# Patient Record
Sex: MALE | Race: WHITE | Employment: OTHER | URBAN - METROPOLITAN AREA
[De-identification: names, ages, dates, MRNs, and addresses within clinical notes are randomized per-mention and may not be internally consistent; named-entity substitution may affect disease eponyms.]

---

## 2017-01-23 VITALS
HEART RATE: 114 BPM | DIASTOLIC BLOOD PRESSURE: 92 MMHG | SYSTOLIC BLOOD PRESSURE: 136 MMHG | OXYGEN SATURATION: 96 % | HEIGHT: 67 IN | TEMPERATURE: 98 F | WEIGHT: 234 LBS | BODY MASS INDEX: 36.73 KG/M2 | RESPIRATION RATE: 22 BRPM

## 2017-01-23 PROCEDURE — 75810000275 HC EMERGENCY DEPT VISIT NO LEVEL OF CARE

## 2017-01-23 RX ORDER — CLOPIDOGREL BISULFATE 75 MG/1
75 TABLET ORAL
COMMUNITY

## 2017-01-24 ENCOUNTER — HOSPITAL ENCOUNTER (EMERGENCY)
Age: 62
Discharge: LEFT BEFORE DISCHARGE | End: 2017-01-24
Attending: EMERGENCY MEDICINE
Payer: MEDICARE

## 2017-01-24 DIAGNOSIS — Z53.21 PATIENT LEFT WITHOUT BEING SEEN: Primary | ICD-10-CM

## 2017-01-24 NOTE — ED TRIAGE NOTES
\"I am being treated by Dr. Tarah Fuller for this Esophagus problem. \"  Patient states that about 1900 his Esophagus started tightening up. States that Dr. Tarah Fuller dilates his esophagus about every 5-8 weeks.

## 2017-02-04 NOTE — ED PROVIDER NOTES
The patient left the emergency department before the patient was evaluated by me. Jose Guadalupe Fox M.D. Provider Attestation:  If a scribe was utilized in generation of this patient record, I personally performed the services described in the documentation, reviewed the documentation, as recorded by the scribe in my presence, and it accurately records the patient's history of presenting illness, review of systems, patient physical examination, and procedures performed by me as the attending physician. Jose Guadalupe Fox M.D. [facility] Board Certified Emergency Physician  1/23/2017.

## 2017-02-14 ENCOUNTER — ANESTHESIA EVENT (OUTPATIENT)
Dept: ENDOSCOPY | Age: 62
End: 2017-02-14
Payer: MEDICARE

## 2017-02-15 ENCOUNTER — HOSPITAL ENCOUNTER (OUTPATIENT)
Age: 62
Setting detail: OUTPATIENT SURGERY
Discharge: HOME | End: 2017-02-15
Attending: INTERNAL MEDICINE | Admitting: INTERNAL MEDICINE
Payer: MEDICARE

## 2017-02-15 ENCOUNTER — ANESTHESIA (OUTPATIENT)
Dept: ENDOSCOPY | Age: 62
End: 2017-02-15
Payer: MEDICARE

## 2017-02-15 VITALS
BODY MASS INDEX: 33.33 KG/M2 | RESPIRATION RATE: 15 BRPM | TEMPERATURE: 96.8 F | HEIGHT: 69 IN | DIASTOLIC BLOOD PRESSURE: 89 MMHG | HEART RATE: 76 BPM | SYSTOLIC BLOOD PRESSURE: 154 MMHG | WEIGHT: 225 LBS | OXYGEN SATURATION: 98 %

## 2017-02-15 LAB
AMPHET UR QL SCN: NEGATIVE
BARBITURATES UR QL SCN: NEGATIVE
BENZODIAZ UR QL: POSITIVE
CANNABINOIDS UR QL SCN: NEGATIVE
COCAINE UR QL SCN: NEGATIVE
HDSCOM,HDSCOM: ABNORMAL
METHADONE UR QL: NEGATIVE
OPIATES UR QL: NEGATIVE
PCP UR QL: NEGATIVE

## 2017-02-15 PROCEDURE — 88312 SPECIAL STAINS GROUP 1: CPT | Performed by: INTERNAL MEDICINE

## 2017-02-15 PROCEDURE — 77030019988 HC FCPS ENDOSC DISP BSC -B: Performed by: INTERNAL MEDICINE

## 2017-02-15 PROCEDURE — 77030008565 HC TBNG SUC IRR ERBE -B: Performed by: INTERNAL MEDICINE

## 2017-02-15 PROCEDURE — 74011250636 HC RX REV CODE- 250/636: Performed by: ANESTHESIOLOGY

## 2017-02-15 PROCEDURE — 74011250636 HC RX REV CODE- 250/636: Performed by: NURSE ANESTHETIST, CERTIFIED REGISTERED

## 2017-02-15 PROCEDURE — 80307 DRUG TEST PRSMV CHEM ANLYZR: CPT

## 2017-02-15 PROCEDURE — 74011000250 HC RX REV CODE- 250: Performed by: ANESTHESIOLOGY

## 2017-02-15 PROCEDURE — 74011250636 HC RX REV CODE- 250/636

## 2017-02-15 PROCEDURE — 88305 TISSUE EXAM BY PATHOLOGIST: CPT | Performed by: INTERNAL MEDICINE

## 2017-02-15 PROCEDURE — 76060000031 HC ANESTHESIA FIRST 0.5 HR: Performed by: INTERNAL MEDICINE

## 2017-02-15 PROCEDURE — 76040000019: Performed by: INTERNAL MEDICINE

## 2017-02-15 PROCEDURE — 77030018846 HC SOL IRR STRL H20 ICUM -A: Performed by: INTERNAL MEDICINE

## 2017-02-15 RX ORDER — FAMOTIDINE 10 MG/ML
20 INJECTION INTRAVENOUS ONCE
Status: COMPLETED | OUTPATIENT
Start: 2017-02-15 | End: 2017-02-15

## 2017-02-15 RX ORDER — PROPOFOL 10 MG/ML
INJECTION, EMULSION INTRAVENOUS AS NEEDED
Status: DISCONTINUED | OUTPATIENT
Start: 2017-02-15 | End: 2017-02-15 | Stop reason: HOSPADM

## 2017-02-15 RX ORDER — SODIUM CHLORIDE 0.9 % (FLUSH) 0.9 %
5-10 SYRINGE (ML) INJECTION AS NEEDED
Status: DISCONTINUED | OUTPATIENT
Start: 2017-02-15 | End: 2017-02-15 | Stop reason: HOSPADM

## 2017-02-15 RX ORDER — ONDANSETRON 2 MG/ML
4 INJECTION INTRAMUSCULAR; INTRAVENOUS ONCE
Status: COMPLETED | OUTPATIENT
Start: 2017-02-15 | End: 2017-02-15

## 2017-02-15 RX ORDER — HYDROMORPHONE HYDROCHLORIDE 2 MG/ML
0.5 INJECTION, SOLUTION INTRAMUSCULAR; INTRAVENOUS; SUBCUTANEOUS ONCE
Status: COMPLETED | OUTPATIENT
Start: 2017-02-15 | End: 2017-02-15

## 2017-02-15 RX ORDER — SODIUM CHLORIDE, SODIUM LACTATE, POTASSIUM CHLORIDE, CALCIUM CHLORIDE 600; 310; 30; 20 MG/100ML; MG/100ML; MG/100ML; MG/100ML
75 INJECTION, SOLUTION INTRAVENOUS CONTINUOUS
Status: DISCONTINUED | OUTPATIENT
Start: 2017-02-15 | End: 2017-02-15 | Stop reason: HOSPADM

## 2017-02-15 RX ADMIN — ONDANSETRON 4 MG: 2 INJECTION INTRAMUSCULAR; INTRAVENOUS at 08:12

## 2017-02-15 RX ADMIN — PROPOFOL 100 MG: 10 INJECTION, EMULSION INTRAVENOUS at 07:43

## 2017-02-15 RX ADMIN — HYDROMORPHONE HYDROCHLORIDE 0.5 MG: 2 INJECTION, SOLUTION INTRAMUSCULAR; INTRAVENOUS; SUBCUTANEOUS at 08:11

## 2017-02-15 RX ADMIN — FAMOTIDINE 20 MG: 10 INJECTION, SOLUTION INTRAVENOUS at 06:48

## 2017-02-15 RX ADMIN — PROPOFOL 100 MG: 10 INJECTION, EMULSION INTRAVENOUS at 07:40

## 2017-02-15 RX ADMIN — PROPOFOL 50 MG: 10 INJECTION, EMULSION INTRAVENOUS at 07:46

## 2017-02-15 RX ADMIN — SODIUM CHLORIDE, SODIUM LACTATE, POTASSIUM CHLORIDE, AND CALCIUM CHLORIDE 75 ML/HR: 600; 310; 30; 20 INJECTION, SOLUTION INTRAVENOUS at 06:48

## 2017-02-15 NOTE — ANESTHESIA PREPROCEDURE EVALUATION
Anesthetic History     PONV          Review of Systems / Medical History  Patient summary reviewed and pertinent labs reviewed    Pulmonary                   Neuro/Psych       CVA  TIA     Cardiovascular    Hypertension: well controlled        Dysrhythmias   CAD    Exercise tolerance: <4 METS     GI/Hepatic/Renal     GERD: poorly controlled      Liver disease     Endo/Other        Arthritis     Other Findings   Comments: Current Smoker? NO       Elective Surgery? Yes       Abstained from smoking 24 hours prior to anesthesia? N/A    Risk Factors for Postoperative nausea/vomiting:       History of postoperative nausea/vomiting? NO       Female? YES       Motion sickness? NO       Intended opioid administration for postoperative analgesia?   NO           Physical Exam    Airway  Mallampati: III  TM Distance: 4 - 6 cm  Neck ROM: normal range of motion   Mouth opening: Diminished (comment)     Cardiovascular  Regular rate and rhythm,  S1 and S2 normal,  no murmur, click, rub, or gallop             Dental    Dentition: Poor dentition     Pulmonary  Breath sounds clear to auscultation               Abdominal  GI exam deferred       Other Findings            Anesthetic Plan    ASA: 3  Anesthesia type: MAC          Induction: Intravenous  Anesthetic plan and risks discussed with: Patient

## 2017-02-15 NOTE — H&P
WW[facility].Dheere Bolo  [phone]      GASTROENTEROLOGY Pre-Procedure H and P      Impression/Plan:   1.  This patient is consented for an EGD for dysphagia      Chief Complaint: dysphagia      HPI:  Diamond Hawley is a 57 y.o. male who is being is having an EGD for dysphagia  PMH:   Past Medical History   Diagnosis Date    Altered mental status     Arthritis     Arthropathy     Back pain     Bilateral kidney stones     Burn      ON HANDS    CAD (coronary artery disease)      MI    3 cardiac stents    Calcium nephrolithiasis     Cardiac arrhythmia     Cigarette smoker     Cirrhosis, alcoholic ([address]     Cocaine abuse, episodic use     Colon polyp     Diarrhea     DJD (degenerative joint disease)     Dyskinesia     Esophageal disorder     Esophageal erosions     Flank pain, acute     Foot ulcer, right (HCC)     GERD (gastroesophageal reflux disease)     Hearing reduced     Hematuria     Herniated disc      X 10 IN BACK    Hiatus hernia syndrome     History of kidney stones     HNP (herniated nucleus pulposus)     Hyperlipemia     Hypertension     Hypokalemia     Knee pain     Left ureteral stone     Muscle atrophy     Myocardial infarction ([address] 1999    N&V (nausea and vomiting)     Nocturia     Nutcracker esophagus     Other ill-defined conditions(799.89)      dysphagia    Renal stone     Slurred speech     Stroke ([address]      questionable TIA    Swallowing difficulty     Syncopal episodes     Syncope and collapse     Unspecified adverse effect of anesthesia      AT TIMES ESOPHAGUS SPASMS AFTER PROCEDURES       PSH:   Past Surgical History   Procedure Laterality Date    Hx coronary stent placement  2008     3 STENTS PLACED    Hx heart catheterization  2012     EVERYTHING LOOKED GOOD AT THIS POINT    Hx cholecystectomy  2009    Hx appendectomy       9YEARS OLD    Hx hernia repair       ABDOMEN    Hx other surgical       ESOPHAGUS DILATATION    Hx endoscopy       with Dilatation, multiple times    Hx mohs procedure  2016    Hx urological  07/18/2016     [facility]-Cystoscopy,URETEROSCOPY W/ LITHOTRIPSY, Dr Peacock Certain Hx urological  10/10/2016     [facility]-Cystoscopy with removal of ureteral stent, Dr Peacock Certain Hx heent       \"2 jaw surgeries\"    Hx knee arthroscopy  9/2012     LEFT    Hx orthopaedic  12/12     left knee    Hx rotator cuff repair Right      x2    Hx urological  01/30/2017     [facility]-Cystoscopy, Left retrograde pyelography, Left diagnostic ureteroscopy, Left 6 x 26 cm double-J ureteral stent placement,Right retrograde pyelography, Right ureteroscopic stone extraction,Right 6 x 24 cm double-J ureteral stent placement, Interpretation of urography,Intraoperative fluoro less than 1 hour,            Social HX:   Social History     Social History    Marital status:      Spouse name: N/A    Number of children: N/A    Years of education: N/A     Occupational History    Not on file. Social History Main Topics    Smoking status: Current Every Day Smoker     Packs/day: 0.25     Years: 20.00     Types: Cigarettes    Smokeless tobacco: Never Used    Alcohol use No      Comment: \"none in over 21 years\"    Drug use: No      Comment: cocaine 2011    Sexual activity: Not on file     Other Topics Concern    Not on file     Social History Narrative       FHX:   Family History   Problem Relation Age of Onset    Diabetes Father     Heart Disease Father     Stroke Father        Allergy:   Allergies   Allergen Reactions    Bee Sting [Sting, Bee] Hives and Shortness of Breath    Codeine Hives and Shortness of Breath     Has tolerated Hydromorphone.      Haldol [Haloperidol Lactate] Hives and Shortness of Breath    Haloperidol Hives and Cough    Keflex [Cephalexin] Diarrhea    No Allergy Information Available Other (comments)     Other reaction(s): hives    Shellfish Containing Products Hives    Stadol [Butorphanol Tartrate] Hives, Shortness of Breath and Rash    Vicodin [Hydrocodone-Acetaminophen] Hives, Shortness of Breath and Rash       Home Medications:     Prescriptions Prior to Admission   Medication Sig    clopidogrel (PLAVIX) 75 mg tab Take 75 mg by mouth.  tamsulosin (FLOMAX) 0.4 mg capsule Take 1 Cap by mouth daily (after dinner).  HYDROmorphone (DILAUDID) 2 mg tablet Take 1-2 Tabs by mouth every four (4) hours as needed for Pain. Max Daily Amount: 24 mg.    ondansetron (ZOFRAN ODT) 4 mg disintegrating tablet 4 mg.  tamsulosin (FLOMAX) 0.4 mg capsule Take 1 Cap by mouth daily (after dinner).  nitroglycerin (NITROSTAT) 0.3 mg SL tablet 0.3 mg.    meperidine (DEMEROL) 100 mg tablet Take 100 mg by mouth every four (4) hours as needed for Pain.  diazepam (VALIUM) 10 mg tablet Take 10 mg by mouth every six (6) hours as needed for Anxiety.  esomeprazole (NEXIUM) 40 mg capsule Take 1 Cap by mouth Before breakfast and dinner. (Patient taking differently: Take 40 mg by mouth daily.)    carvedilol (COREG) 25 mg tablet Take 25 mg by mouth two (2) times daily (with meals).  atorvastatin (LIPITOR) 40 mg tablet Take 40 mg by mouth daily.  DULoxetine (CYMBALTA) 60 mg capsule Take 60 mg by mouth daily. Review of Systems:     Constitutional: No fevers, chills, weight loss, fatigue. Skin: No rashes, pruritis, jaundice, ulcerations, erythema. HENT: No headaches, nosebleeds, sinus pressure, rhinorrhea, sore throat. Eyes: No visual changes, blurred vision, eye pain, photophobia, jaundice. Cardiovascular: No chest pain, heart palpitations. Respiratory: No cough, SOB, wheezing, chest discomfort, orthopnea. Gastrointestinal:    Genitourinary: No dysuria, bleeding, discharge, pyuria. Musculoskeletal: No weakness, arthralgias, wasting. Endo: No sweats. Heme: No bruising, easy bleeding. Allergies: As noted. Neurological: Cranial nerves intact. Alert and oriented. Gait not assessed.    Psychiatric:  No anxiety, depression, hallucinations. Visit Vitals    Ht 5' 9\" (1.753 m)    Wt 102.1 kg (225 lb)    BMI 33.23 kg/m2       Physical Assessment:     constitutional: appearance: well developed, well nourished, normal habitus, no deformities, in no acute distress. skin: inspection: no rashes, ulcers, icterus or other lesions; no clubbing or telangiectasias. palpation: no induration or subcutaneos nodules. eyes: inspection: normal conjunctivae and lids; no jaundice pupils: normal  ENMT: mouth: normal oral mucosa,lips and gums; good dentition. oropharynx: normal tongue, hard and soft palate; posterior pharynx without erithema, exudate or lesions. neck: thyroid: normal size, consistency and position; no masses or tenderness. respiratory: effort: normal chest excursion; no intercostal retraction or accessory muscle use. cardiovascular: abdominal aorta: normal size and position; no bruits. palpation: PMI of normal size and position; normal rhythm; no thrill or murmurs. abdominal: abdomen: normal consistency; no tenderness or masses. hernias: no hernias appreciated. liver: normal size and consistency. spleen: not palpable. rectal: hemoccult/guaiac: not performed. musculoskeletal: digits and nails: no clubbing, cyanosis, petechiae or other inflammatory conditions. gait: normal gait and station head and neck: normal range of motion; no pain, crepitation or contracture. spine/ribs/pelvis: normal range of motion; no pain, deformity or contracture. neurologic: cranial nerves: II-XII normal.   psychiatric: judgement/insight: within normal limits. memory: within normal limits for recent and remote events. mood and affect: no evidence of depression, anxiety or agitation. orientation: oriented to time, space and person. Basic Metabolic Profile   No results for input(s): NA, K, CL, CO2, BUN, GLU, CA, MG, PHOS in the last 72 hours.     No lab exists for component: CREAT      CBC w/Diff    No results for input(s): WBC, RBC, HGB, HCT, MCV, MCH, MCHC, RDW, PLT, HGBEXT, HCTEXT, PLTEXT in the last 72 hours. No lab exists for component: MPV No results for input(s): GRANS, LYMPH, EOS, PRO, MYELO, METAS, BLAST in the last 72 hours. No lab exists for component: MONO, BASO     Hepatic Function   No results for input(s): ALB, TP, TBILI, GPT, SGOT, AP, AML, LPSE in the last 72 hours. No lab exists for component: DBILI     Coags   No results for input(s): PTP, INR, APTT in the last 72 hours. No lab exists for component: Mariaelena Donahue MD  [facility], [address] [place] [address]  Cell: [phone]  Direct pager: [phone]  [email or web address]

## 2017-02-15 NOTE — ANESTHESIA POSTPROCEDURE EVALUATION
Post-Anesthesia Evaluation & Assessment    Visit Vitals    /84    Pulse 77    Temp 36.5 °C (97.7 °F)    Resp 21    Ht 5' 9\" (1.753 m)    Wt 102.1 kg (225 lb)    SpO2 98%    BMI 33.23 kg/m2       Post-operative hydration adequate. Pain score (VAS): 0 Pain Scale 1: Numeric (0 - 10) (02/15/17 0825)  Pain Intensity 1: 3 (02/15/17 0825)   Managed. Mental status & Level of consciousness: alert and oriented x 3    Neurological status: moves all extremities, sensation grossly intact    Pulmonary status: airway patent, no supplemental oxygen required    Complications related to anesthesia: none    Patient has met all discharge requirements.     Additional comments:        Eri Young MD  February 15, 2017

## 2017-02-15 NOTE — PROGRESS NOTES
[email or web address] [address]  [place], [address]      Brief Procedure Note    Jenni Prado  1955  [id number]    Date of Procedure: 2/15/2017    Preoperative diagnosis: Alteration in chewing function [R13.10]    Postoperative diagnosis: Grade B Esophagitis    Type of Anesthesia: MAC (Monitored anesthesia care)    Description of findings: same as post op dx    Procedure: Procedure(s):  ENDOSCOPY WITH DILATION w/ biopsies    :  Dr. Iliana Mckeon MD    Assistant(s): Endoscopy Technician-1: Kiki Krabbe [place]  Endoscopy Technician-2: Marina Muhammad  Endoscopy RN-1: Arnel Montero RN  Endoscopy RN-2: Reji Aguirre RN    EBL:None    Specimens:   ID Type Source Tests Collected by Time Destination   1 : Distal Esophagus biopsies Preservative Esophagus, Distal  Iliana Mckeon MD 2/15/2017 0745 Pathology       Findings: See printed and scanned procedure note    Complications: None    Dr. Iliana Mckeon MD  2/15/2017  7:54 AM

## 2017-02-15 NOTE — PERIOP NOTES
I have reviewed discharge instructions with the patient and spouse. The patient and spouse verbalized understanding. Patient armband removed and shredded.

## 2017-02-15 NOTE — IP AVS SNAPSHOT
Emily Rigby 
 
 
 [address] Patient: Kalia Madsen MRN: [id number] THV:[id number]/73/2078 You are allergic to the following Allergen Reactions Bee Sting (Sting, Bee) Hives Shortness of Breath Codeine Hives Shortness of Breath Has tolerated Hydromorphone. Haldol (Haloperidol Lactate) Hives Shortness of Breath Haloperidol Hives Cough Keflex (Cephalexin) Diarrhea No Allergy Information Available Other (comments) Other reaction(s): hives Shellfish Containing Products Hives Stadol (Butorphanol Tartrate) Hives Shortness of Breath Rash Vicodin (Hydrocodone-Acetaminophen) Hives Shortness of Breath Rash Recent Documentation Height Weight BMI Smoking Status 1.753 m 102.1 kg 33.23 kg/m2 Current Every Day Smoker Emergency Contacts Name Discharge Info Relation Home Work Mobile [facility] DISCHARGE CAREGIVER [3] Spouse [3] [phone] About your hospitalization You were admitted on:  February 15, 2017 You last received care in the:  [facility] 2 RECOVERY You were discharged on:  February 15, 2017 Unit phone number:  [phone] Why you were hospitalized Your primary diagnosis was:  Not on File Providers Seen During Your Hospitalizations Provider Role Specialty Primary office phone Sesar Scott MD Attending Provider Gastroenterology [phone] Your Primary Care Physician (PCP) Primary Care Physician Office Phone Office Fax NONE ** None ** ** None ** Follow-up Information Follow up With Details Comments Contact Info None   None ([phone]) Patient stated that they have no PCP Sesar Scott MD  Follow up within 8 to 12 weeks. [address] [place] [address] [address] [facility] 
[phone] Current Discharge Medication List  
  
 CONTINUE these medications which have CHANGED Dose & Instructions Dispensing Information Comments Morning Noon Evening Bedtime  
 esomeprazole 40 mg capsule Commonly known as:  NexIUM What changed:  when to take this Your next dose is: Today, Tomorrow Other:  _________ Dose:  40 mg Take 1 Cap by mouth Before breakfast and dinner. Quantity:  180 Cap Refills:  4 CONTINUE these medications which have NOT CHANGED Dose & Instructions Dispensing Information Comments Morning Noon Evening Bedtime COREG 25 mg tablet Generic drug:  carvedilol Your next dose is: Today, Tomorrow Other:  _________ Dose:  25 mg Take 25 mg by mouth two (2) times daily (with meals). Refills:  0  
     
   
   
   
  
 CYMBALTA 60 mg capsule Generic drug:  DULoxetine Your next dose is: Today, Tomorrow Other:  _________ Dose:  60 mg Take 60 mg by mouth daily. Refills:  0 DEMEROL 100 mg tablet Generic drug:  meperidine Your next dose is: Today, Tomorrow Other:  _________ Dose:  100 mg Take 100 mg by mouth every four (4) hours as needed for Pain. Refills:  0 HYDROmorphone 2 mg tablet Commonly known as:  DILAUDID Your next dose is: Today, Tomorrow Other:  _________ Dose:  2-4 mg Take 1-2 Tabs by mouth every four (4) hours as needed for Pain. Max Daily Amount: 24 mg. Quantity:  41 Tab Refills:  0 LIPITOR 40 mg tablet Generic drug:  atorvastatin Your next dose is: Today, Tomorrow Other:  _________ Dose:  40 mg Take 40 mg by mouth daily. Refills:  0  
     
   
   
   
  
 nitroglycerin 0.3 mg SL tablet Commonly known as:  NITROSTAT Your next dose is: Today, Tomorrow Other:  _________  Dose:  0.3 mg  
0.3 mg.  
 Refills:  0  
     
   
   
   
  
 ondansetron 4 mg disintegrating tablet Commonly known as:  ZOFRAN ODT Your next dose is: Today, Tomorrow Other:  _________ Dose:  4 mg  
4 mg. Refills:  0 PLAVIX 75 mg Tab Generic drug:  clopidogrel Your next dose is: Today, Tomorrow Other:  _________ Dose:  75 mg Take 75 mg by mouth. Refills:  0  
     
   
   
   
  
 * tamsulosin 0.4 mg capsule Commonly known as:  FLOMAX Your next dose is: Today, Tomorrow Other:  _________ Dose:  0.4 mg Take 1 Cap by mouth daily (after dinner). Quantity:  90 Cap Refills:  3  
     
   
   
   
  
 * tamsulosin 0.4 mg capsule Commonly known as:  FLOMAX Your next dose is: Today, Tomorrow Other:  _________ Dose:  0.4 mg Take 1 Cap by mouth daily (after dinner). Quantity:  90 Cap Refills:  3 VALIUM 10 mg tablet Generic drug:  diazePAM  
   
Your next dose is: Today, Tomorrow Other:  _________ Dose:  10 mg Take 10 mg by mouth every six (6) hours as needed for Anxiety. Refills:  0  
     
   
   
   
  
 * Notice: This list has 2 medication(s) that are the same as other medications prescribed for you. Read the directions carefully, and ask your doctor or other care provider to review them with you. Discharge Instructions Esophageal Dilation: What to Expect at [facility] Your Recovery After you have esophageal dilation, you will stay at the hospital or surgery center for 1 to 2 hours. This will allow the medicine to wear off. You will be able to go home after your doctor or nurse checks to make sure you are not having any problems. This care sheet gives you a general idea about how long it will take for you to recover. But each person recovers at a different pace. Follow the steps below to get better as quickly as possible. How can you care for yourself at home? Activity · Rest as much as you need to after you go home. · You should be able to go back to your usual activities the day after the procedure. Diet · Follow your doctor's directions for eating after the procedure. · Drink plenty of fluids (unless your doctor has told you not to). Medicines · Your doctor will tell you if and when you can restart your medicines. He or she will also give you instructions about taking any new medicines. · If you take blood thinners, such as warfarin (Coumadin), clopidogrel (Plavix), or aspirin, be sure to talk to your doctor. He or she will tell you if and when to start taking those medicines again. Make sure that you understand exactly what your doctor wants you to do. · If you have a sore throat the day after the procedure, use an over-the-counter spray to numb your throat. Sucking on throat lozenges and gargling with warm salt water may also help relieve your symptoms. Follow-up care is a key part of your treatment and safety. Be sure to make and go to all appointments, and call your doctor if you are having problems. It's also a good idea to know your test results and keep a list of the medicines you take. When should you call for help? Call 911 anytime you think you may need emergency care. For example, call if: 
· You passed out (lost consciousness). · You have sudden chest pain and shortness of breath. · You cough up blood. · You vomit blood or what looks like coffee grounds. · You pass maroon or very bloody stools. Call your doctor now or seek immediate medical care if: 
· You have trouble swallowing. · You have belly pain. · Your stools are black and tarlike or have streaks of blood. · You are sick to your stomach or cannot keep fluids down. · You have signs of infection, such as: 
¨ Increased pain, swelling, warmth, or redness around your throat, neck, or belly. ¨ A fever. Watch closely for changes in your health, and be sure to contact your doctor if: 
· Your throat still hurts after a day or two. · You do not get better as expected. Where can you learn more? Go to http://turner-doris.info/. Enter A066 in the search box to learn more about \"Esophageal Dilation: What to Expect at Home. \" Current as of: August 9, 2016 Content Version: 11.1 © 3120-2565 [employer]. Care instructions adapted under license by [employer] (which disclaims liability or warranty for this information). If you have questions about a medical condition or this instruction, always ask your healthcare professional. [place] [address] any warranty or liability for your use of this information. DISCHARGE SUMMARY from Nurse The following personal items are in your possession at time of discharge: 
 
Dental Appliances: None Visual Aid: None PATIENT INSTRUCTIONS: 
 
After general anesthesia or intravenous sedation, for 24 hours or while taking prescription Narcotics: · Limit your activities · Do not drive and operate hazardous machinery · Do not make important personal or business decisions · Do  not drink alcoholic beverages · If you have not urinated within 8 hours after discharge, please contact your surgeon on call. Report the following to your surgeon: 
· Excessive pain, swelling, redness or odor of or around the surgical area · Temperature over 100.5 · Nausea and vomiting lasting longer than 4 hours or if unable to take medications · Any signs of decreased circulation or nerve impairment to extremity: change in color, persistent  numbness, tingling, coldness or increase pain · Any questions *  Please give a list of your current medications to your Primary Care Provider.  
 
*  Please update this list whenever your medications are discontinued, doses are 
 changed, or new medications (including over-the-counter products) are added. *  Please carry medication information at all times in case of emergency situations. These are general instructions for a healthy lifestyle: No smoking/ No tobacco products/ Avoid exposure to second hand smoke Surgeon General's Warning:  Quitting smoking now greatly reduces serious risk to your health. Obesity, smoking, and sedentary lifestyle greatly increases your risk for illness A healthy diet, regular physical exercise & weight monitoring are important for maintaining a healthy lifestyle You may be retaining fluid if you have a history of heart failure or if you experience any of the following symptoms:  Weight gain of 3 pounds or more overnight or 5 pounds in a week, increased swelling in our hands or feet or shortness of breath while lying flat in bed. Please call your doctor as soon as you notice any of these symptoms; do not wait until your next office visit. Recognize signs and symptoms of STROKE: 
 
F-face looks uneven A-arms unable to move or move unevenly S-speech slurred or non-existent T-time-call 911 as soon as signs and symptoms begin-DO NOT go Back to bed or wait to see if you get better-TIME IS BRAIN. Warning Signs of HEART ATTACK Call 911 if you have these symptoms: 
? Chest discomfort. Most heart attacks involve discomfort in the center of the chest that lasts more than a few minutes, or that goes away and comes back. It can feel like uncomfortable pressure, squeezing, fullness, or pain. ? Discomfort in other areas of the upper body. Symptoms can include pain or discomfort in one or both arms, the back, neck, jaw, or stomach. ? Shortness of breath with or without chest discomfort. ? Other signs may include breaking out in a cold sweat, nausea, or lightheadedness. Don't wait more than five minutes to call [address]!  Fast action can save your life. Calling 911 is almost always the fastest way to get lifesaving treatment. Emergency Medical Services staff can begin treatment when they arrive  up to an hour sooner than if someone gets to the hospital by car. The discharge information has been reviewed with the patient and spouse. The patient and spouse verbalized understanding. Discharge medications reviewed with the patient and spouse and appropriate educational materials and side effects teaching were provided. Discharge Orders None Introducing [facility] SERVICES! Shahida Collier introduces PinMyPet patient portal. Now you can access parts of your medical record, email your doctor's office, and request medication refills online. 1. In your internet browser, go to [email or web address] 2. Click on the First Time User? Click Here link in the Sign In box. You will see the New Member Sign Up page. 3. Enter your PinMyPet Access Code exactly as it appears below. You will not need to use this code after youve completed the sign-up process. If you do not sign up before the expiration date, you must request a new code. · PinMyPet Access Code: [email or web address] Expires: 3/14/2017 12:59 AM 
 
4. Enter the last four digits of your Social Security Number (xxxx) and Date of Birth (mm/dd/yyyy) as indicated and click Submit. You will be taken to the next sign-up page. 5. Create a PinMyPet ID. This will be your PinMyPet login ID and cannot be changed, so think of one that is secure and easy to remember. 6. Create a PinMyPet password. You can change your password at any time. 7. Enter your Password Reset Question and Answer. This can be used at a later time if you forget your password. 8. Enter your e-mail address. You will receive e-mail notification when new information is available in [address] 9. Click Sign Up. You can now view and download portions of your medical record. 10. Click the Download Summary menu link to download a portable copy of your medical information. If you have questions, please visit the Frequently Asked Questions section of the [employer] website. Remember, MyChart is NOT to be used for urgent needs. For medical emergencies, dial 911. Now available from your iPhone and Android! General Information Please provide this summary of care documentation to your next provider. Patient Signature:  ____________________________________________________________ Date:  ____________________________________________________________  
  
Yelena Sleight Provider Signature:  ____________________________________________________________ Date:  ____________________________________________________________

## 2017-02-15 NOTE — DISCHARGE INSTRUCTIONS
Esophageal Dilation: What to Expect at [address] [place] [address]  After you have esophageal dilation, you will stay at the hospital or surgery center for 1 to 2 hours. This will allow the medicine to wear off. You will be able to go home after your doctor or nurse checks to make sure you are not having any problems. This care sheet gives you a general idea about how long it will take for you to recover. But each person recovers at a different pace. Follow the steps below to get better as quickly as possible. How can you care for yourself at home? Activity  · Rest as much as you need to after you go home. · You should be able to go back to your usual activities the day after the procedure. Diet  · Follow your doctor's directions for eating after the procedure. · Drink plenty of fluids (unless your doctor has told you not to). Medicines  · Your doctor will tell you if and when you can restart your medicines. He or she will also give you instructions about taking any new medicines. · If you take blood thinners, such as warfarin (Coumadin), clopidogrel (Plavix), or aspirin, be sure to talk to your doctor. He or she will tell you if and when to start taking those medicines again. Make sure that you understand exactly what your doctor wants you to do. · If you have a sore throat the day after the procedure, use an over-the-counter spray to numb your throat. Sucking on throat lozenges and gargling with warm salt water may also help relieve your symptoms. Follow-up care is a key part of your treatment and safety. Be sure to make and go to all appointments, and call your doctor if you are having problems. It's also a good idea to know your test results and keep a list of the medicines you take. When should you call for help? Call 911 anytime you think you may need emergency care. For example, call if:  · You passed out (lost consciousness). · You have sudden chest pain and shortness of breath.   · You cough up blood.  · You vomit blood or what looks like coffee grounds. · You pass maroon or very bloody stools. Call your doctor now or seek immediate medical care if:  · You have trouble swallowing. · You have belly pain. · Your stools are black and tarlike or have streaks of blood. · You are sick to your stomach or cannot keep fluids down. · You have signs of infection, such as:  ¨ Increased pain, swelling, warmth, or redness around your throat, neck, or belly. ¨ A fever. Watch closely for changes in your health, and be sure to contact your doctor if:  · Your throat still hurts after a day or two. · You do not get better as expected. Where can you learn more? Go to http://turner-doris.info/. Enter R527 in the search box to learn more about \"Esophageal Dilation: What to Expect at Home. \"  Current as of: August 9, 2016  Content Version: 11.1  © 3635-3475 [employer]. Care instructions adapted under license by [employer] (which disclaims liability or warranty for this information). If you have questions about a medical condition or this instruction, always ask your healthcare professional. [place] [address] any warranty or liability for your use of this information. DISCHARGE SUMMARY from Nurse    The following personal items are in your possession at time of discharge:    Dental Appliances: None  Visual Aid: None                    PATIENT INSTRUCTIONS:    After general anesthesia or intravenous sedation, for 24 hours or while taking prescription Narcotics:  · Limit your activities  · Do not drive and operate hazardous machinery  · Do not make important personal or business decisions  · Do  not drink alcoholic beverages  · If you have not urinated within 8 hours after discharge, please contact your surgeon on call.     Report the following to your surgeon:  · Excessive pain, swelling, redness or odor of or around the surgical area  · Temperature over 100.5  · Nausea and vomiting lasting longer than 4 hours or if unable to take medications  · Any signs of decreased circulation or nerve impairment to extremity: change in color, persistent  numbness, tingling, coldness or increase pain  · Any questions  *  Please give a list of your current medications to your Primary Care Provider. *  Please update this list whenever your medications are discontinued, doses are      changed, or new medications (including over-the-counter products) are added. *  Please carry medication information at all times in case of emergency situations. These are general instructions for a healthy lifestyle:    No smoking/ No tobacco products/ Avoid exposure to second hand smoke    Surgeon General's Warning:  Quitting smoking now greatly reduces serious risk to your health. Obesity, smoking, and sedentary lifestyle greatly increases your risk for illness    A healthy diet, regular physical exercise & weight monitoring are important for maintaining a healthy lifestyle    You may be retaining fluid if you have a history of heart failure or if you experience any of the following symptoms:  Weight gain of 3 pounds or more overnight or 5 pounds in a week, increased swelling in our hands or feet or shortness of breath while lying flat in bed. Please call your doctor as soon as you notice any of these symptoms; do not wait until your next office visit. Recognize signs and symptoms of STROKE:    F-face looks uneven    A-arms unable to move or move unevenly    S-speech slurred or non-existent    T-time-call 911 as soon as signs and symptoms begin-DO NOT go       Back to bed or wait to see if you get better-TIME IS BRAIN. Warning Signs of HEART ATTACK     Call 911 if you have these symptoms:   Chest discomfort. Most heart attacks involve discomfort in the center of the chest that lasts more than a few minutes, or that goes away and comes back.  It can feel like uncomfortable pressure, squeezing, fullness, or pain.  Discomfort in other areas of the upper body. Symptoms can include pain or discomfort in one or both arms, the back, neck, jaw, or stomach.  Shortness of breath with or without chest discomfort.  Other signs may include breaking out in a cold sweat, nausea, or lightheadedness. Don't wait more than five minutes to call 911 - MINUTES MATTER! Fast action can save your life. Calling 911 is almost always the fastest way to get lifesaving treatment. Emergency Medical Services staff can begin treatment when they arrive -- up to an hour sooner than if someone gets to the hospital by car. The discharge information has been reviewed with the patient and spouse. The patient and spouse verbalized understanding. Discharge medications reviewed with the patient and spouse and appropriate educational materials and side effects teaching were provided.

## 2017-04-24 ENCOUNTER — ANESTHESIA EVENT (OUTPATIENT)
Dept: ENDOSCOPY | Age: 62
End: 2017-04-24
Payer: MEDICARE

## 2017-04-25 ENCOUNTER — ANESTHESIA (OUTPATIENT)
Dept: ENDOSCOPY | Age: 62
End: 2017-04-25
Payer: MEDICARE

## 2017-04-25 ENCOUNTER — HOSPITAL ENCOUNTER (OUTPATIENT)
Age: 62
Setting detail: OUTPATIENT SURGERY
Discharge: HOME | End: 2017-04-25
Attending: INTERNAL MEDICINE | Admitting: INTERNAL MEDICINE
Payer: MEDICARE

## 2017-04-25 VITALS
TEMPERATURE: 98 F | BODY MASS INDEX: 33.33 KG/M2 | SYSTOLIC BLOOD PRESSURE: 101 MMHG | HEIGHT: 69 IN | DIASTOLIC BLOOD PRESSURE: 80 MMHG | HEART RATE: 78 BPM | OXYGEN SATURATION: 93 % | WEIGHT: 225 LBS | RESPIRATION RATE: 12 BRPM

## 2017-04-25 PROCEDURE — 74011250636 HC RX REV CODE- 250/636

## 2017-04-25 PROCEDURE — 74011250636 HC RX REV CODE- 250/636: Performed by: NURSE ANESTHETIST, CERTIFIED REGISTERED

## 2017-04-25 PROCEDURE — 76040000019: Performed by: INTERNAL MEDICINE

## 2017-04-25 PROCEDURE — 76060000031 HC ANESTHESIA FIRST 0.5 HR: Performed by: INTERNAL MEDICINE

## 2017-04-25 RX ORDER — SODIUM CHLORIDE, SODIUM LACTATE, POTASSIUM CHLORIDE, CALCIUM CHLORIDE 600; 310; 30; 20 MG/100ML; MG/100ML; MG/100ML; MG/100ML
INJECTION, SOLUTION INTRAVENOUS
Status: DISCONTINUED | OUTPATIENT
Start: 2017-04-25 | End: 2017-04-25 | Stop reason: HOSPADM

## 2017-04-25 RX ORDER — FLUMAZENIL 0.1 MG/ML
0.2 INJECTION INTRAVENOUS
Status: DISCONTINUED | OUTPATIENT
Start: 2017-04-25 | End: 2017-04-25 | Stop reason: HOSPADM

## 2017-04-25 RX ORDER — HYDROMORPHONE HYDROCHLORIDE 2 MG/ML
1 INJECTION, SOLUTION INTRAMUSCULAR; INTRAVENOUS; SUBCUTANEOUS
Status: DISCONTINUED | OUTPATIENT
Start: 2017-04-25 | End: 2017-04-25 | Stop reason: HOSPADM

## 2017-04-25 RX ORDER — DIAZEPAM 10 MG/1
10 TABLET ORAL
COMMUNITY
End: 2018-11-24

## 2017-04-25 RX ORDER — EPINEPHRINE 0.1 MG/ML
1 INJECTION INTRACARDIAC; INTRAVENOUS
Status: DISCONTINUED | OUTPATIENT
Start: 2017-04-25 | End: 2017-04-25 | Stop reason: HOSPADM

## 2017-04-25 RX ORDER — SODIUM CHLORIDE 0.9 % (FLUSH) 0.9 %
5-10 SYRINGE (ML) INJECTION AS NEEDED
Status: DISCONTINUED | OUTPATIENT
Start: 2017-04-25 | End: 2017-04-25 | Stop reason: HOSPADM

## 2017-04-25 RX ORDER — ATROPINE SULFATE 0.1 MG/ML
0.5 INJECTION INTRAVENOUS
Status: DISCONTINUED | OUTPATIENT
Start: 2017-04-25 | End: 2017-04-25 | Stop reason: HOSPADM

## 2017-04-25 RX ORDER — SODIUM CHLORIDE, SODIUM LACTATE, POTASSIUM CHLORIDE, CALCIUM CHLORIDE 600; 310; 30; 20 MG/100ML; MG/100ML; MG/100ML; MG/100ML
50 INJECTION, SOLUTION INTRAVENOUS CONTINUOUS
Status: DISCONTINUED | OUTPATIENT
Start: 2017-04-25 | End: 2017-04-25 | Stop reason: HOSPADM

## 2017-04-25 RX ORDER — SODIUM CHLORIDE 0.9 % (FLUSH) 0.9 %
5-10 SYRINGE (ML) INJECTION EVERY 8 HOURS
Status: DISCONTINUED | OUTPATIENT
Start: 2017-04-25 | End: 2017-04-25 | Stop reason: HOSPADM

## 2017-04-25 RX ORDER — HYDROMORPHONE HYDROCHLORIDE 2 MG/ML
INJECTION, SOLUTION INTRAMUSCULAR; INTRAVENOUS; SUBCUTANEOUS
Status: DISCONTINUED
Start: 2017-04-25 | End: 2017-04-25 | Stop reason: HOSPADM

## 2017-04-25 RX ORDER — PROPOFOL 10 MG/ML
INJECTION, EMULSION INTRAVENOUS AS NEEDED
Status: DISCONTINUED | OUTPATIENT
Start: 2017-04-25 | End: 2017-04-25 | Stop reason: HOSPADM

## 2017-04-25 RX ORDER — NALOXONE HYDROCHLORIDE 0.4 MG/ML
0.4 INJECTION, SOLUTION INTRAMUSCULAR; INTRAVENOUS; SUBCUTANEOUS
Status: DISCONTINUED | OUTPATIENT
Start: 2017-04-25 | End: 2017-04-25 | Stop reason: HOSPADM

## 2017-04-25 RX ORDER — DEXTROMETHORPHAN/PSEUDOEPHED 2.5-7.5/.8
1.2 DROPS ORAL
Status: DISCONTINUED | OUTPATIENT
Start: 2017-04-25 | End: 2017-04-25 | Stop reason: HOSPADM

## 2017-04-25 RX ORDER — FAMOTIDINE 10 MG/ML
20 INJECTION INTRAVENOUS ONCE
Status: DISCONTINUED | OUTPATIENT
Start: 2017-04-25 | End: 2017-04-25 | Stop reason: HOSPADM

## 2017-04-25 RX ADMIN — PROPOFOL 50 MG: 10 INJECTION, EMULSION INTRAVENOUS at 12:20

## 2017-04-25 RX ADMIN — HYDROMORPHONE HYDROCHLORIDE 1 MG: 2 INJECTION, SOLUTION INTRAMUSCULAR; INTRAVENOUS; SUBCUTANEOUS at 12:49

## 2017-04-25 RX ADMIN — PROPOFOL 100 MG: 10 INJECTION, EMULSION INTRAVENOUS at 12:16

## 2017-04-25 RX ADMIN — SODIUM CHLORIDE, SODIUM LACTATE, POTASSIUM CHLORIDE, CALCIUM CHLORIDE: 600; 310; 30; 20 INJECTION, SOLUTION INTRAVENOUS at 12:16

## 2017-04-25 NOTE — IP AVS SNAPSHOT
[address] [facility] 
 
 
 [address] [id number] [phone] Patient: Marvin Braswell MRN: [id number] WLJ:[id number] You are allergic to the following Allergen Reactions Bee Sting (Sting, Bee) Hives Shortness of Breath Codeine Hives Shortness of Breath Has tolerated Hydromorphone. Haldol (Haloperidol Lactate) Hives Shortness of Breath Haloperidol Hives Cough Keflex (Cephalexin) Diarrhea No Allergy Information Available Other (comments) Other reaction(s): hives Shellfish Containing Products Hives Stadol (Butorphanol Tartrate) Hives Shortness of Breath Rash Vicodin (Hydrocodone-Acetaminophen) Hives Shortness of Breath Rash Recent Documentation Height Weight BMI Smoking Status 1.753 m 102.1 kg 33.23 kg/m2 Current Some Day Smoker Emergency Contacts Name Discharge Info Relation Home Work Mobile [facility] [employer] DISCHARGE CAREGIVER [3] Spouse [3] [phone] [facility]  Spouse [3] [phone] About your hospitalization You were admitted on:  April 25, 2017 You last received care in the:  [facility] You were discharged on:  April 25, 2017 Unit phone number:  [phone] Why you were hospitalized Your primary diagnosis was:  Not on File Providers Seen During Your Hospitalizations Provider Role Specialty Primary office phone Bradley Eastman MD Attending Provider Gastroenterology [phone] Your Primary Care Physician (PCP) Primary Care Physician Office Phone Office Fax NONE ** None ** ** None ** Follow-up Information Follow up With Details Comments Contact Info Bradley Eastman MD   [address] [place] [address] [address] 200 [facility] 
[phone]  None   None ([phone]) Patient stated that they have no PCP 
  
  
  
 
  
  
  
 Current Discharge Medication List  
  
CONTINUE these medications which have CHANGED Dose & Instructions Dispensing Information Comments Morning Noon Evening Bedtime  
 esomeprazole 40 mg capsule Commonly known as:  NexIUM What changed:  when to take this Your last dose was: Your next dose is:    
   
   
 Dose:  40 mg Take 1 Cap by mouth Before breakfast and dinner. Quantity:  180 Cap Refills:  4 CONTINUE these medications which have NOT CHANGED Dose & Instructions Dispensing Information Comments Morning Noon Evening Bedtime COREG 25 mg tablet Generic drug:  carvedilol Your last dose was: Your next dose is:    
   
   
 Dose:  25 mg Take 25 mg by mouth two (2) times daily (with meals). Refills:  0  
     
   
   
   
  
 CYMBALTA 60 mg capsule Generic drug:  DULoxetine Your last dose was: Your next dose is:    
   
   
 Dose:  60 mg Take 60 mg by mouth daily. Refills:  0 DEMEROL 100 mg tablet Generic drug:  meperidine Your last dose was: Your next dose is:    
   
   
 Dose:  100 mg Take 100 mg by mouth every four (4) hours as needed for Pain. Refills:  0  
     
   
   
   
  
 finasteride 5 mg tablet Commonly known as:  PROSCAR Your last dose was: Your next dose is:    
   
   
 Dose:  5 mg Take 1 Tab by mouth daily. Quantity:  90 Tab Refills:  3 HYDROmorphone 2 mg tablet Commonly known as:  DILAUDID Your last dose was: Your next dose is:    
   
   
 Dose:  2-4 mg Take 1-2 Tabs by mouth every four (4) hours as needed for Pain. Max Daily Amount: 24 mg. Quantity:  41 Tab Refills:  0 LIPITOR 40 mg tablet Generic drug:  atorvastatin Your last dose was: Your next dose is:    
   
   
 Dose:  40 mg Take 40 mg by mouth daily. Refills:  0 nitroglycerin 0.3 mg SL tablet Commonly known as:  NITROSTAT Your last dose was: Your next dose is:    
   
   
 Dose:  0.3 mg  
0.3 mg. Refills:  0  
     
   
   
   
  
 ondansetron 4 mg disintegrating tablet Commonly known as:  ZOFRAN ODT Your last dose was: Your next dose is:    
   
   
 Dose:  4 mg  
4 mg. Refills:  0 PLAVIX 75 mg Tab Generic drug:  clopidogrel Your last dose was: Your next dose is:    
   
   
 Dose:  75 mg Take 75 mg by mouth. Refills:  0  
     
   
   
   
  
 tamsulosin 0.4 mg capsule Commonly known as:  FLOMAX Your last dose was: Your next dose is:    
   
   
 Dose:  0.4 mg Take 1 Cap by mouth daily (after dinner). Quantity:  90 Cap Refills:  3 VALIUM 10 mg tablet Generic drug:  diazePAM  
   
Your last dose was: Your next dose is:    
   
   
 Dose:  10 mg Take 10 mg by mouth every six (6) hours as needed for Anxiety. Refills:  0 Discharge Instructions DISCHARGE SUMMARY from Nurse POST-PROCEDURE INSTRUCTIONS: 
 
Call your Physician if you: 
? Observe any excess bleeding. ? Develop a temperature over 100.5o F. 
? Experience abdominal, shoulder or chest pain. ? Notice any signs of decreased circulation or nerve impairment to an extremity such as a change in color, persistent numbness, tingling, coldness or increase in pain. ? Vomit blood or you have nausea and vomiting lasting longer than 4 hours. ? Are unable to take medications. ? Are unable to urinate within 8 hours after discharge following general anesthesia or intravenous sedation.  
 
For the next 24 hours after receiving general anesthesia or intravenous sedation, or while taking prescription Narcotics, limit your activities: 
? Do NOT drive a motor vehicle, operate hazard machinery or power tools, or perform tasks that require coordination. The medication you received during your procedure may have some effect on your mental awareness. ? Do NOT make important personal or business decisions. The medication you received during your procedure may have some effect on your mental awareness. ? Do NOT drink alcoholic beverages. These drinks do not mix well with the medications that have been given to you. ? Upon discharge from the hospital, you must be accompanied by a responsible adult. ? Resume your diet as directed by your physician. ? Resume medications as your physician has prescribed. ? Please give a list of your current medications to your Primary Care Provider. ? Please update this list whenever your medications are discontinued, doses are changed, or new medications (including over-the-counter products) are added. ? Please carry medication information at all times in case of emergency situations. These are general instructions for a healthy lifestyle: No smoking/ No tobacco products/ Avoid exposure to second hand smoke. ? Surgeon General's Warning:  Quitting smoking now greatly reduces serious risk to your health. Obesity, smoking, and a sedentary lifestyle greatly increase your risk for illness. ? A healthy diet, regular physical exercise & weight monitoring are important for maintaining a healthy lifestyle ? You may be retaining fluid if you have a history of heart failure or if you experience any of the following symptoms:  Weight gain of 3 pounds or more overnight or 5 pounds in a week, increased swelling in our hands or feet or shortness of breath while lying flat in bed. Please call your doctor as soon as you notice any of these symptoms; do not wait until your next office visit. Recognize signs and symptoms of STROKE: 
F  -  Face looks uneven A  -  Arms unable to move or move unevenly S  -  Speech slurred or non-existent T  -  Time to call 911 - as soon as signs and symptoms begin - DO NOT go back to bed or wait to see If you get better - TIME IS BRAIN. Colorectal Screening ? Colorectal cancer almost always develops from precancerous polyps (abnormal growths) in the colon or rectum. Screening tests can find precancerous polyps, so that they can be removed before they turn into cancer. Screening tests can also find colorectal cancer early, when treatment works best. 
? Speak with your physician about when you should begin screening and how often you should be tested. Upper GI Endoscopy: What to Expect at [facility] Your Recovery After you have an endoscopy, you will stay at the hospital or clinic for 1 to 2 hours. This will allow the medicine to wear off. You will be able to go home after your doctor or nurse checks to make sure you are not having any problems. You may have to stay overnight if you had treatment during the test. You may have a sore throat for a day or two after the test. 
This care sheet gives you a general idea about what to expect after the test. 
How can you care for yourself at home? Activity · Rest as much as you need to after you go home. · You should be able to go back to your usual activities the day after the test. 
Diet · Follow your doctor's directions for eating after the test. 
· Drink plenty of fluids (unless your doctor has told you not to). Medications · If you have a sore throat the day after the test, use an over-the-counter spray to numb your throat. Follow-up care is a key part of your treatment and safety. Be sure to make and go to all appointments, and call your doctor if you are having problems. It's also a good idea to know your test results and keep a list of the medicines you take. When should you call for help? Call 911 anytime you think you may need emergency care. For example, call if: 
· You passed out (lost consciousness). · You cough up blood. · You vomit blood or what looks like coffee grounds. · You pass maroon or very bloody stools. Call your doctor now or seek immediate medical care if: 
· You have trouble swallowing. · You have belly pain. · Your stools are black and tarlike or have streaks of blood. · You are sick to your stomach or cannot keep fluids down. Watch closely for changes in your health, and be sure to contact your doctor if: 
· Your throat still hurts after a day or two. · You do not get better as expected. Where can you learn more? Go to [employer] Enter [phone] in the search box to learn more about \"Upper GI Endoscopy: What to Expect at Home. \"  
© 3087-3910 Healthwise, Incorporated. Care instructions adapted under license by Juan M Hernandez (which disclaims liability or warranty for this information). This care instruction is for use with your licensed healthcare professional. If you have questions about a medical condition or this instruction, always ask your healthcare professional. [place] [address] any warranty or liability for your use of this information. Content Version: 64.5.758030; Current as of: November 14, 2014 Gastritis: Care Instructions Your Care Instructions Gastritis is a sore and upset stomach. It happens when something irritates the stomach lining. Many things can cause it. These include an infection such as the flu or something you ate or drank. Medicines or a sore on the lining of the stomach (ulcer) also can cause it. Your belly may bloat and ache. You may belch, vomit, and feel sick to your stomach. You should be able to relieve the problem by taking medicine. And it may help to change your diet. If gastritis lasts, your doctor may prescribe medicine. Follow-up care is a key part of your treatment and safety.  Be sure to make and go to all appointments, and call your doctor if you are having problems. It's also a good idea to know your test results and keep a list of the medicines you take. How can you care for yourself at home? · If your doctor prescribed antibiotics, take them as directed. Do not stop taking them just because you feel better. You need to take the full course of antibiotics. · Be safe with medicines. If your doctor prescribed medicine to decrease stomach acid, take it as directed. Call your doctor if you think you are having a problem with your medicine. · Do not take any other medicine, including over-the-counter pain relievers, without talking to your doctor first. 
· If your doctor recommends over-the-counter medicine to reduce stomach acid, such as Pepcid AC, Prilosec, Tagamet HB, or Zantac 75, follow the directions on the label. · Drink plenty of fluids (enough so that your urine is light yellow or clear like water) to prevent dehydration. Choose water and other caffeine-free clear liquids. If you have kidney, heart, or liver disease and have to limit fluids, talk with your doctor before you increase the amount of fluids you drink. · Limit how much alcohol you drink. · Avoid coffee, tea, cola drinks, chocolate, and other foods with caffeine. They increase stomach acid. When should you call for help? Call 911 anytime you think you may need emergency care. For example, call if: 
· You vomit blood or what looks like coffee grounds. · You pass maroon or very bloody stools. Call your doctor now or seek immediate medical care if: 
· You start breathing fast and have not produced urine in the last 8 hours. · You cannot keep fluids down. Watch closely for changes in your health, and be sure to contact your doctor if: 
· You do not get better as expected. Where can you learn more? Go to http://turner-doris.info/. Enter [id number] in the search box to learn more about \"Gastritis: Care Instructions. \" Current as of: August 9, 2016 Content Version: 11.2 © 2699-2671 Healthwise, [employer]. Care instructions adapted under license by [employer] (which disclaims liability or warranty for this information). If you have questions about a medical condition or this instruction, always ask your healthcare professional. [address] any warranty or liability for your use of this information. Esophageal Dilation: What to Expect at [facility] Your Recovery After you have esophageal dilation, you will stay at the hospital or surgery center for 1 to 2 hours. This will allow the medicine to wear off. You will be able to go home after your doctor or nurse checks to make sure you are not having any problems. This care sheet gives you a general idea about how long it will take for you to recover. But each person recovers at a different pace. Follow the steps below to get better as quickly as possible. How can you care for yourself at home? Activity · Rest as much as you need to after you go home. · You should be able to go back to your usual activities the day after the procedure. Diet · Follow your doctor's directions for eating after the procedure. · Drink plenty of fluids (unless your doctor has told you not to). Medicines · Your doctor will tell you if and when you can restart your medicines. He or she will also give you instructions about taking any new medicines. · If you take blood thinners, such as warfarin (Coumadin), clopidogrel (Plavix), or aspirin, be sure to talk to your doctor. He or she will tell you if and when to start taking those medicines again. Make sure that you understand exactly what your doctor wants you to do. · If you have a sore throat the day after the procedure, use an over-the-counter spray to numb your throat. Sucking on throat lozenges and gargling with warm salt water may also help relieve your symptoms. Follow-up care is a key part of your treatment and safety.  Be sure to make and go to all appointments, and call your doctor if you are having problems. It's also a good idea to know your test results and keep a list of the medicines you take. When should you call for help? Call 911 anytime you think you may need emergency care. For example, call if: 
· You passed out (lost consciousness). · You have sudden chest pain and shortness of breath. · You cough up blood. · You vomit blood or what looks like coffee grounds. · You pass maroon or very bloody stools. Call your doctor now or seek immediate medical care if: 
· You have trouble swallowing. · You have belly pain. · Your stools are black and tarlike or have streaks of blood. · You are sick to your stomach or cannot keep fluids down. · You have signs of infection, such as: 
¨ Increased pain, swelling, warmth, or redness around your throat, neck, or belly. ¨ A fever. Watch closely for changes in your health, and be sure to contact your doctor if: 
· Your throat still hurts after a day or two. · You do not get better as expected. Where can you learn more? Go to http://turner-doris.info/. Enter P929 in the search box to learn more about \"Esophageal Dilation: What to Expect at Home. \" Current as of: August 9, 2016 Content Version: 11.2 © 7940-5163 [employer]. Care instructions adapted under license by [employer] (which disclaims liability or warranty for this information). If you have questions about a medical condition or this instruction, always ask your healthcare professional. [place] [address] any warranty or liability for your use of this information. Discharge Orders None Introducing [facility]! [address] introduces [employer] patient portal. Now you can access parts of your medical record, email your doctor's office, and request medication refills online.    
 
1. In your internet browser, go to [email or web address]. [email or web address] 2. Click on the First Time User? Click Here link in the Sign In box. You will see the New Member Sign Up page. 3. Enter your [employer] Access Code exactly as it appears below. You will not need to use this code after youve completed the sign-up process. If you do not sign up before the expiration date, you must request a new code. · [employer] Access Code: [email or web address] Expires: 7/10/2017 10:11 AM 
 
4. Enter the last four digits of your Social Security Number (xxxx) and Date of Birth (mm/dd/yyyy) as indicated and click Submit. You will be taken to the next sign-up page. 5. Create a [employer] ID. This will be your [employer] login ID and cannot be changed, so think of one that is secure and easy to remember. 6. Create a [employer] password. You can change your password at any time. 7. Enter your Password Reset Question and Answer. This can be used at a later time if you forget your password. 8. Enter your e-mail address. You will receive e-mail notification when new information is available in [address] 9. Click Sign Up. You can now view and download portions of your medical record. 10. Click the Download Summary menu link to download a portable copy of your medical information. If you have questions, please visit the Frequently Asked Questions section of the [employer] website. Remember, [employer] is NOT to be used for urgent needs. For medical emergencies, dial 911. Now available from your iPhone and Android! General Information Please provide this summary of care documentation to your next provider. Patient Signature:  ____________________________________________________________ Date:  ____________________________________________________________  
  
Nobles Aroldo Provider Signature:  ____________________________________________________________ Date:  ____________________________________________________________

## 2017-04-25 NOTE — ANESTHESIA PREPROCEDURE EVALUATION
Anesthetic History     PONV          Review of Systems / Medical History  Patient summary reviewed and pertinent labs reviewed    Pulmonary          Smoker         Neuro/Psych       CVA: no residual symptoms  TIA     Cardiovascular    Hypertension: well controlled        Dysrhythmias   CAD         GI/Hepatic/Renal     GERD: poorly controlled      Liver disease     Endo/Other        Arthritis     Other Findings   Comments:   Risk Factors for Postoperative nausea/vomiting:       History of postoperative nausea/vomiting? YES       Female? NO       Motion sickness? NO       Intended opioid administration for postoperative analgesia?   NO         Physical Exam    Airway  Mallampati: III  TM Distance: 4 - 6 cm  Neck ROM: decreased range of motion       Comments: Cervical collar present Cardiovascular  Regular rate and rhythm,  S1 and S2 normal,  no murmur, click, rub, or gallop             Dental    Dentition: Poor dentition     Pulmonary  Breath sounds clear to auscultation               Abdominal  GI exam deferred       Other Findings            Anesthetic Plan    ASA: 3  Anesthesia type: MAC          Induction: Intravenous  Anesthetic plan and risks discussed with: Patient

## 2017-04-25 NOTE — PROCEDURES
[place]  [address] [facility], [address]      Brief Procedure Note    Renetta Cedric  1955  [id number]    Date of Procedure: 4/25/2017    Preoperative diagnosis: 787.20 - R13.10,  Dysphagia    Postoperative diagnosis:  Gastritis, grade 2 esophagiitis, non-obstructive dysphagia, Dilitation with 52F Jonotyaritza Ordonez    Type of Anesthesia: MAC (monitered anesthesia care)    Description of Findings: same as post op dx    Procedure: Procedure(s):  UPPER ENDOSCOPY / dilatation    :  Dr. Leydi Nava MD    Assistant(s): [unfilled]    Type of Anesthesia:MAC     EBL:None    Specimens: * No specimens in log *    Findings: See printed and scanned procedure note    Complications: None    Dr. Leydi Nava MD  4/25/2017  12:26 PM

## 2017-04-25 NOTE — DISCHARGE INSTRUCTIONS
DISCHARGE SUMMARY from Nurse     POST-PROCEDURE INSTRUCTIONS:    Call your Physician if you:  ? Observe any excess bleeding. ? Develop a temperature over 100.5o F.  ? Experience abdominal, shoulder or chest pain. ? Notice any signs of decreased circulation or nerve impairment to an extremity such as a change in color, persistent numbness, tingling, coldness or increase in pain. ? Vomit blood or you have nausea and vomiting lasting longer than 4 hours. ? Are unable to take medications. ? Are unable to urinate within 8 hours after discharge following general anesthesia or intravenous sedation. For the next 24 hours after receiving general anesthesia or intravenous sedation, or while taking prescription Narcotics, limit your activities:  ? Do NOT drive a motor vehicle, operate hazard machinery or power tools, or perform tasks that require coordination. The medication you received during your procedure may have some effect on your mental awareness. ? Do NOT make important personal or business decisions. The medication you received during your procedure may have some effect on your mental awareness. ? Do NOT drink alcoholic beverages. These drinks do not mix well with the medications that have been given to you. ? Upon discharge from the hospital, you must be accompanied by a responsible adult. ? Resume your diet as directed by your physician. ? Resume medications as your physician has prescribed. ? Please give a list of your current medications to your Primary Care Provider. ? Please update this list whenever your medications are discontinued, doses are changed, or new medications (including over-the-counter products) are added. ? Please carry medication information at all times in case of emergency situations. These are general instructions for a healthy lifestyle:    No smoking/ No tobacco products/ Avoid exposure to second hand smoke.    Surgeon General's Warning:  Quitting smoking now greatly reduces serious risk to your health. Obesity, smoking, and a sedentary lifestyle greatly increase your risk for illness.  A healthy diet, regular physical exercise & weight monitoring are important for maintaining a healthy lifestyle   You may be retaining fluid if you have a history of heart failure or if you experience any of the following symptoms:  Weight gain of 3 pounds or more overnight or 5 pounds in a week, increased swelling in our hands or feet or shortness of breath while lying flat in bed. Please call your doctor as soon as you notice any of these symptoms; do not wait until your next office visit. Recognize signs and symptoms of STROKE:  F  -  Face looks uneven  A  -  Arms unable to move or move unevenly  S  -  Speech slurred or non-existent  T  -  Time to call 911 - as soon as signs and symptoms begin - DO NOT go back to bed or wait to see If you get better - TIME IS BRAIN. Colorectal Screening   Colorectal cancer almost always develops from precancerous polyps (abnormal growths) in the colon or rectum. Screening tests can find precancerous polyps, so that they can be removed before they turn into cancer. Screening tests can also find colorectal cancer early, when treatment works best.  [facility] Speak with your physician about when you should begin screening and how often you should be tested. Upper GI Endoscopy: What to Expect at [address] [place] [address]  After you have an endoscopy, you will stay at the hospital or clinic for 1 to 2 hours. This will allow the medicine to wear off. You will be able to go home after your doctor or nurse checks to make sure you are not having any problems. You may have to stay overnight if you had treatment during the test. You may have a sore throat for a day or two after the test.  This care sheet gives you a general idea about what to expect after the test.  How can you care for yourself at home?   Activity  · Rest as much as you need to after you go home.  · You should be able to go back to your usual activities the day after the test.  Diet  · Follow your doctor's directions for eating after the test.  · Drink plenty of fluids (unless your doctor has told you not to). Medications  · If you have a sore throat the day after the test, use an over-the-counter spray to numb your throat. Follow-up care is a key part of your treatment and safety. Be sure to make and go to all appointments, and call your doctor if you are having problems. It's also a good idea to know your test results and keep a list of the medicines you take. When should you call for help? Call 911 anytime you think you may need emergency care. For example, call if:  · You passed out (lost consciousness). · You cough up blood. · You vomit blood or what looks like coffee grounds. · You pass maroon or very bloody stools. Call your doctor now or seek immediate medical care if:  · You have trouble swallowing. · You have belly pain. · Your stools are black and tarlike or have streaks of blood. · You are sick to your stomach or cannot keep fluids down. Watch closely for changes in your health, and be sure to contact your doctor if:  · Your throat still hurts after a day or two. · You do not get better as expected. Where can you learn more? Go to InterStelNet.be  Enter J454 in the search box to learn more about \"Upper GI Endoscopy: What to Expect at Home. \"   © 4290-5961 Healthwise, Incorporated. Care instructions adapted under license by [employer] (which disclaims liability or warranty for this information). This care instruction is for use with your licensed healthcare professional. If you have questions about a medical condition or this instruction, always ask your healthcare professional. [address] any warranty or liability for your use of this information.   Content Version: 84.7.873815; Current as of: November 14, 2014 Gastritis: Care Instructions  Your Care Instructions    Gastritis is a sore and upset stomach. It happens when something irritates the stomach lining. Many things can cause it. These include an infection such as the flu or something you ate or drank. Medicines or a sore on the lining of the stomach (ulcer) also can cause it. Your belly may bloat and ache. You may belch, vomit, and feel sick to your stomach. You should be able to relieve the problem by taking medicine. And it may help to change your diet. If gastritis lasts, your doctor may prescribe medicine. Follow-up care is a key part of your treatment and safety. Be sure to make and go to all appointments, and call your doctor if you are having problems. It's also a good idea to know your test results and keep a list of the medicines you take. How can you care for yourself at home? · If your doctor prescribed antibiotics, take them as directed. Do not stop taking them just because you feel better. You need to take the full course of antibiotics. · Be safe with medicines. If your doctor prescribed medicine to decrease stomach acid, take it as directed. Call your doctor if you think you are having a problem with your medicine. · Do not take any other medicine, including over-the-counter pain relievers, without talking to your doctor first.  · If your doctor recommends over-the-counter medicine to reduce stomach acid, such as Pepcid AC, Prilosec, Tagamet HB, or Zantac 75, follow the directions on the label. · Drink plenty of fluids (enough so that your urine is light yellow or clear like water) to prevent dehydration. Choose water and other caffeine-free clear liquids. If you have kidney, heart, or liver disease and have to limit fluids, talk with your doctor before you increase the amount of fluids you drink. · Limit how much alcohol you drink. · Avoid coffee, tea, cola drinks, chocolate, and other foods with caffeine. They increase stomach acid.   When should you call for help? Call 911 anytime you think you may need emergency care. For example, call if:  · You vomit blood or what looks like coffee grounds. · You pass maroon or very bloody stools. Call your doctor now or seek immediate medical care if:  · You start breathing fast and have not produced urine in the last 8 hours. · You cannot keep fluids down. Watch closely for changes in your health, and be sure to contact your doctor if:  · You do not get better as expected. Where can you learn more? Go to http://turner-doris.info/. Enter [id number] in the search box to learn more about \"Gastritis: Care Instructions. \"  Current as of: August 9, 2016  Content Version: 11.2  © 9599-8212 [employer]. Care instructions adapted under license by [employer]: [employer] (which disclaims liability or warranty for this information). If you have questions about a medical condition or this instruction, always ask your healthcare professional. [place] [address] any warranty or liability for your use of this information. Esophageal Dilation: What to Expect at [address] [place] [address]  After you have esophageal dilation, you will stay at the hospital or surgery center for 1 to 2 hours. This will allow the medicine to wear off. You will be able to go home after your doctor or nurse checks to make sure you are not having any problems. This care sheet gives you a general idea about how long it will take for you to recover. But each person recovers at a different pace. Follow the steps below to get better as quickly as possible. How can you care for yourself at home? Activity  · Rest as much as you need to after you go home. · You should be able to go back to your usual activities the day after the procedure. Diet  · Follow your doctor's directions for eating after the procedure. · Drink plenty of fluids (unless your doctor has told you not to).   Medicines  · Your doctor will tell you if and when you can restart your medicines. He or she will also give you instructions about taking any new medicines. · If you take blood thinners, such as warfarin (Coumadin), clopidogrel (Plavix), or aspirin, be sure to talk to your doctor. He or she will tell you if and when to start taking those medicines again. Make sure that you understand exactly what your doctor wants you to do. · If you have a sore throat the day after the procedure, use an over-the-counter spray to numb your throat. Sucking on throat lozenges and gargling with warm salt water may also help relieve your symptoms. Follow-up care is a key part of your treatment and safety. Be sure to make and go to all appointments, and call your doctor if you are having problems. It's also a good idea to know your test results and keep a list of the medicines you take. When should you call for help? Call 911 anytime you think you may need emergency care. For example, call if:  · You passed out (lost consciousness). · You have sudden chest pain and shortness of breath. · You cough up blood. · You vomit blood or what looks like coffee grounds. · You pass maroon or very bloody stools. Call your doctor now or seek immediate medical care if:  · You have trouble swallowing. · You have belly pain. · Your stools are black and tarlike or have streaks of blood. · You are sick to your stomach or cannot keep fluids down. · You have signs of infection, such as:  ¨ Increased pain, swelling, warmth, or redness around your throat, neck, or belly. ¨ A fever. Watch closely for changes in your health, and be sure to contact your doctor if:  · Your throat still hurts after a day or two. · You do not get better as expected. Where can you learn more? Go to http://turner-doris.info/. Enter C909 in the search box to learn more about \"Esophageal Dilation: What to Expect at Home. \"  Current as of: August 9, 2016  Content Version: 11.2  © 0594-9835 Healthwise, Incorporated. Care instructions adapted under license by [employer] (which disclaims liability or warranty for this information). If you have questions about a medical condition or this instruction, always ask your healthcare professional. [address] any warranty or liability for your use of this information.

## 2017-04-25 NOTE — H&P
[facility]    [email or web address]      Impression: 1. Esophageal spasm. Plan:     1. EGD/Dil with MAC      Chief Complaint: Dysphagia      HPI:  Hailee Mcclellan is a 57 y.o. male who is being seen preop for Dysphagia sx's possibly related to esophageal spasm.     PMH:   Past Medical History:   Diagnosis Date    Altered mental status     Arthritis     Arthropathy     Back pain     Bilateral kidney stones     Burn     ON HANDS    CAD (coronary artery disease)     MI    3 cardiac stents    Calcium nephrolithiasis     Calculus of kidney     Cardiac arrhythmia     Cerebral artery occlusion with cerebral infarction ([address]     Chest pain     Cigarette smoker     Cirrhosis, alcoholic (HCC)     Cocaine abuse, episodic use     Colon polyp     Diarrhea     DJD (degenerative joint disease)     Dyskinesia     Esophageal disorder     Esophageal erosions     Flank pain, acute     Foot ulcer, right (HCC)     GERD (gastroesophageal reflux disease)     Gross hematuria     Head trauma     Hearing reduced     Hematuria     Herniated disc     X 10 IN BACK    Hiatus hernia syndrome     History of kidney stones     HNP (herniated nucleus pulposus)     Hyperlipemia     Hypertension     Hypokalemia     Jaw fracture (HCC)     Kidney stones     Knee pain     Left ureteral stone     Muscle atrophy     Myocardial infarction ([address] 1999    N&V (nausea and vomiting)     Nocturia     Nondependent alcohol abuse, in remission     Nutcracker esophagus     Other ill-defined conditions     dysphagia    Renal stone     Slurred speech     Stroke [facility])     questionable TIA    Swallowing difficulty     Syncopal episodes     Syncope and collapse     Unspecified adverse effect of anesthesia     AT TIMES ESOPHAGUS SPASMS AFTER PROCEDURES    Unspecified cerebral artery occlusion with cerebral infarction        PSH:   Past Surgical History:   Procedure Laterality Date    HX APPENDECTOMY      9YEARS OLD    HX CHOLECYSTECTOMY  2009    HX CORONARY STENT PLACEMENT  2008    3 STENTS PLACED    HX ENDOSCOPY      with Dilatation, multiple times    HX HEART CATHETERIZATION  2012    EVERYTHING LOOKED GOOD AT THIS POINT    HX HEENT      \"2 jaw surgeries\"    HX HERNIA REPAIR      ABDOMEN    HX KNEE ARTHROSCOPY  9/2012    LEFT    HX MOHS PROCEDURES  2016    HX ORTHOPAEDIC  12/12    left knee    HX OTHER SURGICAL      ESOPHAGUS DILATATION    HX ROTATOR CUFF REPAIR Right     x2    HX UROLOGICAL  07/18/2016    [facility]-Cystoscopy,URETEROSCOPY W/ LITHOTRIPSY, Dr Rachna Ramírez  UROLOGICAL  10/10/2016    [facility]-Cystoscopy with removal of ureteral stent, Dr Rachna Ramírez  UROLOGICAL  01/30/2017    [facility]-Cystoscopy, Left retrograde pyelography, Left diagnostic ureteroscopy, Left 6 x 26 cm double-J ureteral stent placement,Right retrograde pyelography, Right ureteroscopic stone extraction,Right 6 x 24 cm double-J ureteral stent placement, Interpretation of urography,Intraoperative fluoro less than 1 hour,          UROLOGICAL  02/08/2017    [facility]-CYSTOURETHROSCOPY WITH STENT REMOVAL, Dr Da Hernandez       Social HX:   Social History     Social History    Marital status:      Spouse name: N/A    Number of children: N/A    Years of education: N/A     Occupational History    Not on file.      Social History Main Topics    Smoking status: Current Some Day Smoker     Packs/day: 0.25     Years: 20.00     Types: Cigarettes    Smokeless tobacco: Never Used    Alcohol use No      Comment: \"none in over 21 years\"    Drug use: No      Comment: cocaine 2011    Sexual activity: Not on file     Other Topics Concern    Not on file     Social History Narrative       FHX:   Family History   Problem Relation Age of Onset    Diabetes Father     Heart Disease Father     Stroke Father        Allergy:   Allergies   Allergen Reactions    Bee Sting [Sting, Bee] Hives and Shortness of Breath    Codeine Hives and Shortness of Breath     Has tolerated Hydromorphone.  Haldol [Haloperidol Lactate] Hives and Shortness of Breath    Haloperidol Hives and Cough    Keflex [Cephalexin] Diarrhea    No Allergy Information Available Other (comments)     Other reaction(s): hives    Shellfish Containing Products Hives    Stadol [Butorphanol Tartrate] Hives, Shortness of Breath and Rash    Vicodin [Hydrocodone-Acetaminophen] Hives, Shortness of Breath and Rash       Home Medications:     Prescriptions Prior to Admission   Medication Sig    finasteride (PROSCAR) 5 mg tablet Take 1 Tab by mouth daily.  HYDROmorphone (DILAUDID) 2 mg tablet Take 1-2 Tabs by mouth every four (4) hours as needed for Pain. Max Daily Amount: 24 mg.  clopidogrel (PLAVIX) 75 mg tab Take 75 mg by mouth.  ondansetron (ZOFRAN ODT) 4 mg disintegrating tablet 4 mg.  tamsulosin (FLOMAX) 0.4 mg capsule Take 1 Cap by mouth daily (after dinner).  nitroglycerin (NITROSTAT) 0.3 mg SL tablet 0.3 mg.    meperidine (DEMEROL) 100 mg tablet Take 100 mg by mouth every four (4) hours as needed for Pain.  esomeprazole (NEXIUM) 40 mg capsule Take 1 Cap by mouth Before breakfast and dinner. (Patient taking differently: Take 40 mg by mouth daily.)    carvedilol (COREG) 25 mg tablet Take 25 mg by mouth two (2) times daily (with meals).  atorvastatin (LIPITOR) 40 mg tablet Take 40 mg by mouth daily.  DULoxetine (CYMBALTA) 60 mg capsule Take 60 mg by mouth daily. Review of Systems:     Constitutional: No fevers, chills, weight loss, fatigue. Cardiovascular: No chest pain, heart palpitations. Respiratory: No cough, SOB, wheezing, chest discomfort, orthopnea. Gastrointestinal: Dysphagia       Musculoskeletal: Neck pain           Allergies: As noted.                          Visit Vitals    Ht 5' 9\" (1.753 m)    Wt 102.1 kg (225 lb)    BMI 33.23 kg/m2       Physical Assessment: constitutional: appearance: well developed, well nourished, normal habitus, no deformities,  ENMT: mouth: normal oral mucosa,lips and gums; good dentition. oropharynx: normal tongue, hard and soft palate; posterior pharynx without erithema, exudate or lesions. respiratory: effort: normal chest excursion; no intercostal retraction or accessory muscle use. cardiovascular: abdominal aorta: normal size and position; no bruits. palpation: PMI of normal size and position; normal rhythm; no thrill or murmurs. abdominal: abdomen: normal consistency; no tenderness or masses. hernias: no hernias appreciated. liver: normal size and consistency. spleen: not palpable. rectal: hemoccult/guaiac: not performed. psychiatric: orientation: oriented to time, space and person. Shawn Cordoba MD, M.D. [facility] 1948, [address] [facility]  Pager [phone]  [email or web address]

## 2017-04-25 NOTE — ANESTHESIA POSTPROCEDURE EVALUATION
Post-Anesthesia Evaluation & Assessment    Visit Vitals    BP (!) 88/58    Pulse 84    Temp 36.7 °C (98 °F)    Resp 14    Ht 5' 9\" (1.753 m)    Wt 102.1 kg (225 lb)    SpO2 92%    BMI 33.23 kg/m2       Post-operative hydration adequate. Pain score (VAS): 0 Pain Scale 1: Numeric (0 - 10) (04/25/17 1228)  Pain Intensity 1: 8 (04/25/17 1228)   Managed. Mental status & Level of consciousness: returned to baseline    Neurological status: returned to baseline    Pulmonary status: airway patent, oxygen given as needed. Complications related to anesthesia: none    Patient has met all discharge requirements.     Additional comments:        Jericho Funes MD  April 25, 2017

## 2017-04-25 NOTE — H&P
History and Physical reviewed; I have examined the patient and there are no pertinent changes. Collins Ortega MD, MD   11:31 AM 4/25/2017  [facility], [address] [place] [address]  [email or web address]

## 2017-06-17 ENCOUNTER — ANESTHESIA EVENT (OUTPATIENT)
Dept: ENDOSCOPY | Age: 62
End: 2017-06-17
Payer: MEDICARE

## 2017-06-19 ENCOUNTER — HOSPITAL ENCOUNTER (OUTPATIENT)
Age: 62
Setting detail: OUTPATIENT SURGERY
Discharge: HOME | End: 2017-06-19
Attending: INTERNAL MEDICINE | Admitting: INTERNAL MEDICINE
Payer: MEDICARE

## 2017-06-19 ENCOUNTER — ANESTHESIA (OUTPATIENT)
Dept: ENDOSCOPY | Age: 62
End: 2017-06-19
Payer: MEDICARE

## 2017-06-19 VITALS
RESPIRATION RATE: 30 BRPM | HEART RATE: 88 BPM | OXYGEN SATURATION: 97 % | WEIGHT: 230 LBS | HEIGHT: 68 IN | SYSTOLIC BLOOD PRESSURE: 128 MMHG | DIASTOLIC BLOOD PRESSURE: 68 MMHG | BODY MASS INDEX: 34.86 KG/M2 | TEMPERATURE: 98 F

## 2017-06-19 PROCEDURE — 74011000250 HC RX REV CODE- 250: Performed by: NURSE ANESTHETIST, CERTIFIED REGISTERED

## 2017-06-19 PROCEDURE — 74011250636 HC RX REV CODE- 250/636: Performed by: NURSE ANESTHETIST, CERTIFIED REGISTERED

## 2017-06-19 PROCEDURE — 76060000031 HC ANESTHESIA FIRST 0.5 HR: Performed by: INTERNAL MEDICINE

## 2017-06-19 PROCEDURE — 74011250636 HC RX REV CODE- 250/636: Performed by: ANESTHESIOLOGY

## 2017-06-19 PROCEDURE — 74011000250 HC RX REV CODE- 250

## 2017-06-19 PROCEDURE — 76040000019: Performed by: INTERNAL MEDICINE

## 2017-06-19 PROCEDURE — 88305 TISSUE EXAM BY PATHOLOGIST: CPT | Performed by: INTERNAL MEDICINE

## 2017-06-19 PROCEDURE — 74011250636 HC RX REV CODE- 250/636

## 2017-06-19 PROCEDURE — 77030009426 HC FCPS BIOP ENDOSC BSC -B: Performed by: INTERNAL MEDICINE

## 2017-06-19 RX ORDER — SODIUM CHLORIDE, SODIUM LACTATE, POTASSIUM CHLORIDE, CALCIUM CHLORIDE 600; 310; 30; 20 MG/100ML; MG/100ML; MG/100ML; MG/100ML
75 INJECTION, SOLUTION INTRAVENOUS CONTINUOUS
Status: DISCONTINUED | OUTPATIENT
Start: 2017-06-20 | End: 2017-06-19 | Stop reason: HOSPADM

## 2017-06-19 RX ORDER — PROPOFOL 10 MG/ML
INJECTION, EMULSION INTRAVENOUS AS NEEDED
Status: DISCONTINUED | OUTPATIENT
Start: 2017-06-19 | End: 2017-06-19 | Stop reason: HOSPADM

## 2017-06-19 RX ORDER — HYDROMORPHONE HYDROCHLORIDE 2 MG/ML
INJECTION, SOLUTION INTRAMUSCULAR; INTRAVENOUS; SUBCUTANEOUS
Status: COMPLETED
Start: 2017-06-19 | End: 2017-06-19

## 2017-06-19 RX ORDER — LIDOCAINE HYDROCHLORIDE 20 MG/ML
INJECTION, SOLUTION EPIDURAL; INFILTRATION; INTRACAUDAL; PERINEURAL AS NEEDED
Status: DISCONTINUED | OUTPATIENT
Start: 2017-06-19 | End: 2017-06-19 | Stop reason: HOSPADM

## 2017-06-19 RX ORDER — HYDROMORPHONE HYDROCHLORIDE 1 MG/ML
0.5 INJECTION, SOLUTION INTRAMUSCULAR; INTRAVENOUS; SUBCUTANEOUS ONCE
Status: DISCONTINUED | OUTPATIENT
Start: 2017-06-19 | End: 2017-06-19 | Stop reason: HOSPADM

## 2017-06-19 RX ADMIN — PROPOFOL 40 MG: 10 INJECTION, EMULSION INTRAVENOUS at 09:46

## 2017-06-19 RX ADMIN — LIDOCAINE HYDROCHLORIDE 20 MG: 20 INJECTION, SOLUTION EPIDURAL; INFILTRATION; INTRACAUDAL; PERINEURAL at 09:46

## 2017-06-19 RX ADMIN — FAMOTIDINE 20 MG: 10 INJECTION, SOLUTION INTRAVENOUS at 09:07

## 2017-06-19 RX ADMIN — HYDROMORPHONE HYDROCHLORIDE 0.5 MG: 2 INJECTION, SOLUTION INTRAMUSCULAR; INTRAVENOUS; SUBCUTANEOUS at 10:11

## 2017-06-19 RX ADMIN — SODIUM CHLORIDE, SODIUM LACTATE, POTASSIUM CHLORIDE, AND CALCIUM CHLORIDE 75 ML/HR: 600; 310; 30; 20 INJECTION, SOLUTION INTRAVENOUS at 09:07

## 2017-06-19 NOTE — IP AVS SNAPSHOT
[address] [place] [id number] [phone] Patient: Dung Ahn MRN: [id number] You are allergic to the following Allergen Reactions Bee Sting (Sting, Bee) Hives Shortness of Breath Codeine Hives Shortness of Breath Has tolerated Hydromorphone. Haldol (Haloperidol Lactate) Hives Shortness of Breath Haloperidol Hives Cough Keflex (Cephalexin) Diarrhea No Allergy Information Available Other (comments) Other reaction(s): hives Shellfish Containing Products Hives Stadol (Butorphanol Tartrate) Hives Shortness of Breath Rash Vicodin (Hydrocodone-Acetaminophen) Hives Shortness of Breath Rash Recent Documentation Height Weight BMI Smoking Status 1.727 m 104.3 kg 34.97 kg/m2 Current Some Day Smoker Emergency Contacts Name Discharge Info Relation Home Work Mobile [facility] [employer] DISCHARGE CAREGIVER [3] Spouse [3] [phone] Sandy Spangler  Spouse [3] [phone] About your hospitalization You were admitted on:  June 19, 2017 You last received care in the:  [facility] You were discharged on:  June 19, 2017 Unit phone number:  [phone] Why you were hospitalized Your primary diagnosis was:  Not on File Providers Seen During Your Hospitalizations Provider Role Specialty Primary office phone Renee Louis MD Attending Provider Gastroenterology [phone] Your Primary Care Physician (PCP) Primary Care Physician Office Phone Office Fax NONE ** None ** ** None ** Follow-up Information Follow up With Details Comments Contact Info Renee Louis MD   [address] [place] [address] [address] 200 [facility] 
[phone] None   None ([phone]) Patient stated that they have no PCP Your Appointments Tuesday July 11, 2017 11:45 AM EDT Any with Debbie Church MD  
[facility]. Lion Jarvis [address]) [address] [place] [address]  
[phone] Current Discharge Medication List  
  
CONTINUE these medications which have CHANGED Dose & Instructions Dispensing Information Comments Morning Noon Evening Bedtime  
 esomeprazole 40 mg capsule Commonly known as:  NexIUM What changed:  when to take this Your last dose was: Your next dose is:    
   
   
 Dose:  40 mg Take 1 Cap by mouth Before breakfast and dinner. Quantity:  180 Cap Refills:  4 CONTINUE these medications which have NOT CHANGED Dose & Instructions Dispensing Information Comments Morning Noon Evening Bedtime COREG 25 mg tablet Generic drug:  carvedilol Your last dose was: Your next dose is:    
   
   
 Dose:  25 mg Take 25 mg by mouth two (2) times daily (with meals). Refills:  0  
     
   
   
   
  
 CYMBALTA 60 mg capsule Generic drug:  DULoxetine Your last dose was: Your next dose is:    
   
   
 Dose:  60 mg Take 60 mg by mouth daily. Refills:  0 DEMEROL 100 mg tablet Generic drug:  meperidine Your last dose was: Your next dose is:    
   
   
 Dose:  100 mg Take 100 mg by mouth every four (4) hours as needed for Pain. Refills:  0  
     
   
   
   
  
 finasteride 5 mg tablet Commonly known as:  PROSCAR Your last dose was: Your next dose is:    
   
   
 Dose:  5 mg Take 1 Tab by mouth daily. Quantity:  90 Tab Refills:  3 LIPITOR 40 mg tablet Generic drug:  atorvastatin Your last dose was: Your next dose is:    
   
   
 Dose:  40 mg Take 40 mg by mouth daily. Refills:  0  
     
   
   
   
  
 nitroglycerin 0.3 mg SL tablet Commonly known as:  NITROSTAT Your last dose was: Your next dose is:    
   
   
 Dose:  0.3 mg  
0.3 mg. Refills:  0  
     
   
   
   
  
 ondansetron 4 mg disintegrating tablet Commonly known as:  ZOFRAN ODT Your last dose was: Your next dose is:    
   
   
 Dose:  4 mg  
4 mg. Refills:  0 PLAVIX 75 mg Tab Generic drug:  clopidogrel Your last dose was: Your next dose is:    
   
   
 Dose:  75 mg Take 75 mg by mouth. Refills:  0  
     
   
   
   
  
 tamsulosin 0.4 mg capsule Commonly known as:  FLOMAX Your last dose was: Your next dose is:    
   
   
 Dose:  0.4 mg Take 1 Cap by mouth daily (after dinner). Quantity:  90 Cap Refills:  3 VALIUM 10 mg tablet Generic drug:  diazePAM  
   
Your last dose was: Your next dose is:    
   
   
 Dose:  10 mg Take 10 mg by mouth every six (6) hours as needed for Anxiety. Refills:  0 Discharge Instructions Upper GI Endoscopy: What to Expect at [facility] Your Recovery After you have an endoscopy, you will stay at the hospital or clinic for 1 to 2 hours. This will allow the medicine to wear off. You will be able to go home after your doctor or nurse checks to make sure you are not having any problems. You may have to stay overnight if you had treatment during the test. You may have a sore throat for a day or two after the test. 
This care sheet gives you a general idea about what to expect after the test. 
How can you care for yourself at home? Activity · Rest as much as you need to after you go home. · You should be able to go back to your usual activities the day after the test. 
Diet · Follow your doctor's directions for eating after the test. 
· Drink plenty of fluids (unless your doctor has told you not to). Medications · If you have a sore throat the day after the test, use an over-the-counter spray to numb your throat. Follow-up care is a key part of your treatment and safety. Be sure to make and go to all appointments, and call your doctor if you are having problems. It's also a good idea to know your test results and keep a list of the medicines you take. When should you call for help? Call 911 anytime you think you may need emergency care. For example, call if: 
· You passed out (lost consciousness). · You cough up blood. · You vomit blood or what looks like coffee grounds. · You pass maroon or very bloody stools. Call your doctor now or seek immediate medical care if: 
· You have trouble swallowing. · You have belly pain. · Your stools are black and tarlike or have streaks of blood. · You are sick to your stomach or cannot keep fluids down. Watch closely for changes in your health, and be sure to contact your doctor if: 
· Your throat still hurts after a day or two. · You do not get better as expected. Where can you learn more? Go to [employer] Enter [phone] in the search box to learn more about \"Upper GI Endoscopy: What to Expect at Home. \"  
© 9001-7985 Healthwise, Incorporated. Care instructions adapted under license by [address] [employer] (which disclaims liability or warranty for this information). This care instruction is for use with your licensed healthcare professional. If you have questions about a medical condition or this instruction, always ask your healthcare professional. [place] [address] any warranty or liability for your use of this information. Content Version: 43.9.484387; Current as of: November 14, 2014 DISCHARGE SUMMARY from Nurse POST-PROCEDURE INSTRUCTIONS: 
 
Call your Physician if you: 
? Observe any excess bleeding. ? Develop a temperature over 100.5o F. 
? Experience abdominal, shoulder or chest pain. ?  Notice any signs of decreased circulation or nerve impairment to an extremity such as a change in color, persistent numbness, tingling, coldness or increase in pain. ? Vomit blood or you have nausea and vomiting lasting longer than 4 hours. ? Are unable to take medications. ? Are unable to urinate within 8 hours after discharge following general anesthesia or intravenous sedation. For the next 24 hours after receiving general anesthesia or intravenous sedation, or while taking prescription Narcotics, limit your activities: 
? Do NOT drive a motor vehicle, operate hazard machinery or power tools, or perform tasks that require coordination. The medication you received during your procedure may have some effect on your mental awareness. ? Do NOT make important personal or business decisions. The medication you received during your procedure may have some effect on your mental awareness. ? Do NOT drink alcoholic beverages. These drinks do not mix well with the medications that have been given to you. ? Upon discharge from the hospital, you must be accompanied by a responsible adult. ? Resume your diet as directed by your physician. ? Resume medications as your physician has prescribed. ? Please give a list of your current medications to your Primary Care Provider. ? Please update this list whenever your medications are discontinued, doses are changed, or new medications (including over-the-counter products) are added. ? Please carry medication information at all times in case of emergency situations. These are general instructions for a healthy lifestyle: No smoking/ No tobacco products/ Avoid exposure to second hand smoke. ? Surgeon General's Warning:  Quitting smoking now greatly reduces serious risk to your health. Obesity, smoking, and a sedentary lifestyle greatly increase your risk for illness. ? A healthy diet, regular physical exercise & weight monitoring are important for maintaining a healthy lifestyle ? You may be retaining fluid if you have a history of heart failure or if you experience any of the following symptoms:  Weight gain of 3 pounds or more overnight or 5 pounds in a week, increased swelling in our hands or feet or shortness of breath while lying flat in bed. Please call your doctor as soon as you notice any of these symptoms; do not wait until your next office visit. Recognize signs and symptoms of STROKE: 
F  -  Face looks uneven A  -  Arms unable to move or move unevenly S  -  Speech slurred or non-existent T  -  Time to call 911 - as soon as signs and symptoms begin - DO NOT go back to bed or wait to see If you get better - TIME IS BRAIN. Colorectal Screening ? Colorectal cancer almost always develops from precancerous polyps (abnormal growths) in the colon or rectum. Screening tests can find precancerous polyps, so that they can be removed before they turn into cancer. Screening tests can also find colorectal cancer early, when treatment works best. 
? Speak with your physician about when you should begin screening and how often you should be tested ? Additional Information If you have questions, please call [phone]. Remember, MyChart is NOT to be used for urgent needs. For medical emergencies, dial 911. Educational references and/or instructions provided during this visit included: 
 
 
 
 
APPOINTMENTS: 
 
Please make a follow-up appointment with your physician. Discharge information has been reviewed with the patient. The patient verbalized understanding. Esophageal Dilation: What to Expect at [facility] Your Recovery After you have esophageal dilation, you will stay at the hospital or surgery center for 1 to 2 hours. This will allow the medicine to wear off. You will be able to go home after your doctor or nurse checks to make sure you are not having any problems.  
This care sheet gives you a general idea about how long it will take for you to recover. But each person recovers at a different pace. Follow the steps below to get better as quickly as possible. How can you care for yourself at home? Activity · Rest as much as you need to after you go home. · You should be able to go back to your usual activities the day after the procedure. Diet · Follow your doctor's directions for eating after the procedure. · Drink plenty of fluids (unless your doctor has told you not to). Medicines · Your doctor will tell you if and when you can restart your medicines. He or she will also give you instructions about taking any new medicines. · If you take blood thinners, such as warfarin (Coumadin), clopidogrel (Plavix), or aspirin, be sure to talk to your doctor. He or she will tell you if and when to start taking those medicines again. Make sure that you understand exactly what your doctor wants you to do. · If you have a sore throat the day after the procedure, use an over-the-counter spray to numb your throat. Sucking on throat lozenges and gargling with warm salt water may also help relieve your symptoms. Follow-up care is a key part of your treatment and safety. Be sure to make and go to all appointments, and call your doctor if you are having problems. It's also a good idea to know your test results and keep a list of the medicines you take. When should you call for help? Call 911 anytime you think you may need emergency care. For example, call if: 
· You passed out (lost consciousness). · You have sudden chest pain and shortness of breath. · You cough up blood. · You vomit blood or what looks like coffee grounds. · You pass maroon or very bloody stools. Call your doctor now or seek immediate medical care if: 
· You have trouble swallowing. · You have belly pain. · Your stools are black and tarlike or have streaks of blood. · You are sick to your stomach or cannot keep fluids down. · You have signs of infection, such as: ¨ Increased pain, swelling, warmth, or redness around your throat, neck, or belly. ¨ A fever. Watch closely for changes in your health, and be sure to contact your doctor if: 
· Your throat still hurts after a day or two. · You do not get better as expected. Where can you learn more? Go to http://turner-doris.info/. Enter G077 in the search box to learn more about \"Esophageal Dilation: What to Expect at Home. \" Current as of: August 9, 2016 Content Version: 11.2 © 3388-5545 [employer]. Care instructions adapted under license by [employer] (which disclaims liability or warranty for this information). If you have questions about a medical condition or this instruction, always ask your healthcare professional. [address] any warranty or liability for your use of this information. Discharge Orders None Introducing [facility]! Hilda Martin introduces ClearViewâ„¢ Audio patient portal. Now you can access parts of your medical record, email your doctor's office, and request medication refills online. 1. In your internet browser, go to [email or web address] 2. Click on the First Time User? Click Here link in the Sign In box. You will see the New Member Sign Up page. 3. Enter your ClearViewâ„¢ Audio Access Code exactly as it appears below. You will not need to use this code after youve completed the sign-up process. If you do not sign up before the expiration date, you must request a new code. · ClearViewâ„¢ Audio Access Code: [email or web address] Expires: 7/10/2017 10:11 AM 
 
4. Enter the last four digits of your Social Security Number (xxxx) and Date of Birth (mm/dd/yyyy) as indicated and click Submit. You will be taken to the next sign-up page. 5. Create a ClearViewâ„¢ Audio ID. This will be your ClearViewâ„¢ Audio login ID and cannot be changed, so think of one that is secure and easy to remember. 6. Create a [employer] password. You can change your password at any time. 7. Enter your Password Reset Question and Answer. This can be used at a later time if you forget your password. 8. Enter your e-mail address. You will receive e-mail notification when new information is available in [address] 9. Click Sign Up. You can now view and download portions of your medical record. 10. Click the Download Summary menu link to download a portable copy of your medical information. If you have questions, please visit the Frequently Asked Questions section of the [employer] website. Remember, [employer] is NOT to be used for urgent needs. For medical emergencies, dial 911. Now available from your iPhone and Android! General Information Please provide this summary of care documentation to your next provider. Patient Signature:  ____________________________________________________________ Date:  ____________________________________________________________  
  
[employer]el[employer] Has Provider Signature:  ____________________________________________________________ Date:  ____________________________________________________________

## 2017-06-19 NOTE — ANESTHESIA POSTPROCEDURE EVALUATION
Post-Anesthesia Evaluation and Assessment    Patient: [place] Ahuja MRN: [id number]  SSN: [id number]    YOB: 1955  Age: 60 y.o. Sex: male       Cardiovascular Function/Vital Signs  Visit Vitals    /75    Pulse 80    Temp 36.7 °C (98 °F)    Resp (!) 46    Ht 5' 8\" (1.727 m)    Wt 104.3 kg (230 lb)    SpO2 98%    BMI 34.97 kg/m2       Patient is status post MAC anesthesia for Procedure(s):  UPPER ENDOSCOPY, Bx's, dilatation 54Fr. Nausea/Vomiting: None    Postoperative hydration reviewed and adequate. Pain:  Pain Scale 1: Numeric (0 - 10) (06/19/17 0847)  Pain Intensity 1: 5 (06/19/17 0847)   Managed    Neurological Status: At baseline    Mental Status and Level of Consciousness: Arousable    Pulmonary Status:   O2 Device: Nasal cannula (06/19/17 0950)   Adequate oxygenation and airway patent    Complications related to anesthesia: None    Post-anesthesia assessment completed.  No concerns    Signed By: Samantha Combs MD     June 19, 2017

## 2017-06-19 NOTE — ANESTHESIA PREPROCEDURE EVALUATION
Anesthetic History     PONV          Review of Systems / Medical History  Patient summary reviewed, nursing notes reviewed and pertinent labs reviewed    Pulmonary  Within defined limits                 Neuro/Psych       CVA: no residual symptoms  TIA     Cardiovascular    Hypertension: well controlled        Dysrhythmias   CAD         GI/Hepatic/Renal     GERD: well controlled      Liver disease     Endo/Other        Arthritis     Other Findings   Comments:   Risk Factors for Postoperative nausea/vomiting:       History of postoperative nausea/vomiting? YES       Female? NO       Motion sickness? NO       Intended opioid administration for postoperative analgesia? NO      Smoking Abstinence  Current Smoker? NO  Elective Surgery? YES  Seen preoperatively by anesthesiologist or proxy prior to day of surgery? YES  Pt abstained from smoking 24 hours prior to anesthesia?  N/A           Physical Exam    Airway  Mallampati: II  TM Distance: 4 - 6 cm  Neck ROM: normal range of motion   Mouth opening: Normal     Cardiovascular  Regular rate and rhythm,  S1 and S2 normal,  no murmur, click, rub, or gallop             Dental    Dentition: Poor dentition     Pulmonary  Breath sounds clear to auscultation               Abdominal  GI exam deferred       Other Findings            Anesthetic Plan    ASA: 3  Anesthesia type: MAC          Induction: Intravenous  Anesthetic plan and risks discussed with: Patient

## 2017-06-19 NOTE — H&P
[facility]   [email or web address]      Impression:   1.chest pains with reflux hx of esophageal spasm and dysphagia       Plan:     1. egd dil mac all risks discussed       Chief Complaint:   Chest pain reflux dysphagia     HPI:  Ene Mraquis is a 60 y.o. male who is being seen on consult for chest pain reflux dyspahgia     PMH:   Past Medical History:   Diagnosis Date    Altered mental status     Arthritis     Arthropathy     Back pain     Bilateral kidney stones     Burn     ON HANDS    CAD (coronary artery disease)     MI    3 cardiac stents    Calcium nephrolithiasis     Calculus of kidney     Cardiac arrhythmia     Cerebral artery occlusion with cerebral infarction ([address]     Chest pain     Cigarette smoker     Cirrhosis, alcoholic ([address]     Cocaine abuse, episodic use     Colon polyp     Diarrhea     DJD (degenerative joint disease)     Dyskinesia     Esophageal disorder     Esophageal erosions     Flank pain, acute     Foot ulcer, right (HCC)     GERD (gastroesophageal reflux disease)     Gross hematuria     Head trauma     Hearing reduced     Hematuria     Herniated disc     X 10 IN BACK    Hiatus hernia syndrome     History of kidney stones     HNP (herniated nucleus pulposus)     Hyperlipemia     Hypertension     Hypokalemia     Jaw fracture (HCC)     Kidney stones     Knee pain     Left ureteral stone     Muscle atrophy     Myocardial infarction ([address] 1999    N&V (nausea and vomiting)     Nocturia     Nondependent alcohol abuse, in remission     Nutcracker esophagus     Other ill-defined conditions     dysphagia    Renal stone     Slurred speech     Stroke [facility])     questionable TIA    Swallowing difficulty     Syncopal episodes     Syncope and collapse     Unspecified adverse effect of anesthesia     AT TIMES ESOPHAGUS SPASMS AFTER PROCEDURES    Unspecified cerebral artery occlusion with cerebral infarction        PSH:   Past Surgical History:   Procedure Laterality Date    HX APPENDECTOMY      9YEARS OLD    HX CHOLECYSTECTOMY  2009    HX CORONARY STENT PLACEMENT  2008    3 STENTS PLACED    HX ENDOSCOPY      with Dilatation, multiple times    HX HEART CATHETERIZATION  2012    EVERYTHING LOOKED GOOD AT THIS POINT    HX HEENT      \"2 jaw surgeries\"    HX HERNIA REPAIR      ABDOMEN    HX KNEE ARTHROSCOPY  9/2012    LEFT    HX MOHS PROCEDURES  2016    HX ORTHOPAEDIC  12/12    left knee    HX OTHER SURGICAL      ESOPHAGUS DILATATION    HX ROTATOR CUFF REPAIR Right     x2    HX UROLOGICAL  07/18/2016    [facility]-Cystoscopy,URETEROSCOPY W/ LITHOTRIPSY, Dr Pallavi Block  UROLOGICAL  10/10/2016    [facility]-Cystoscopy with removal of ureteral stent, Dr Pallavi Block  UROLOGICAL  01/30/2017    [facility]-Cystoscopy, Left retrograde pyelography, Left diagnostic ureteroscopy, Left 6 x 26 cm double-J ureteral stent placement,Right retrograde pyelography, Right ureteroscopic stone extraction,Right 6 x 24 cm double-J ureteral stent placement, Interpretation of urography,Intraoperative fluoro less than 1 hour,          UROLOGICAL  02/08/2017    [facility]-CYSTOURETHROSCOPY WITH STENT REMOVAL, Dr Kasandra Torres       Social HX:   Social History     Social History    Marital status:      Spouse name: N/A    Number of children: N/A    Years of education: N/A     Occupational History    Not on file.      Social History Main Topics    Smoking status: Current Some Day Smoker     Packs/day: 0.25     Years: 20.00     Types: Cigarettes    Smokeless tobacco: Never Used    Alcohol use No      Comment: \"none in over 21 years\"    Drug use: No      Comment: cocaine 2011    Sexual activity: Not on file     Other Topics Concern    Not on file     Social History Narrative       FHX:   Family History   Problem Relation Age of Onset    Diabetes Father     Heart Disease Father     Stroke Father        Allergy:   Allergies Allergen Reactions    Bee Sting [Sting, Bee] Hives and Shortness of Breath    Codeine Hives and Shortness of Breath     Has tolerated Hydromorphone.  Haldol [Haloperidol Lactate] Hives and Shortness of Breath    Haloperidol Hives and Cough    Keflex [Cephalexin] Diarrhea    No Allergy Information Available Other (comments)     Other reaction(s): hives    Shellfish Containing Products Hives    Stadol [Butorphanol Tartrate] Hives, Shortness of Breath and Rash    Vicodin [Hydrocodone-Acetaminophen] Hives, Shortness of Breath and Rash       Home Medications:     Prescriptions Prior to Admission   Medication Sig    diazePAM (VALIUM) 10 mg tablet Take 10 mg by mouth every six (6) hours as needed for Anxiety.  finasteride (PROSCAR) 5 mg tablet Take 1 Tab by mouth daily.  clopidogrel (PLAVIX) 75 mg tab Take 75 mg by mouth.  tamsulosin (FLOMAX) 0.4 mg capsule Take 1 Cap by mouth daily (after dinner).  meperidine (DEMEROL) 100 mg tablet Take 100 mg by mouth every four (4) hours as needed for Pain.  esomeprazole (NEXIUM) 40 mg capsule Take 1 Cap by mouth Before breakfast and dinner. (Patient taking differently: Take 40 mg by mouth daily.)    carvedilol (COREG) 25 mg tablet Take 25 mg by mouth two (2) times daily (with meals).  atorvastatin (LIPITOR) 40 mg tablet Take 40 mg by mouth daily.  DULoxetine (CYMBALTA) 60 mg capsule Take 60 mg by mouth daily.  ondansetron (ZOFRAN ODT) 4 mg disintegrating tablet 4 mg.  nitroglycerin (NITROSTAT) 0.3 mg SL tablet 0.3 mg.       Review of Systems:     Constitutional: No fevers, chills, weight loss, fatigue. Skin: No rashes, pruritis, jaundice, ulcerations, erythema. HENT: No headaches, nosebleeds, sinus pressure, rhinorrhea, sore throat. Eyes: No visual changes, blurred vision, eye pain, photophobia, jaundice. Cardiovascular: No chest pain, heart palpitations. Respiratory: No cough, SOB, wheezing, chest discomfort, orthopnea. Gastrointestinal: Chest pain with reflux    Genitourinary: No dysuria, bleeding, discharge, pyuria. Musculoskeletal: No weakness, arthralgias, wasting. Endo: No sweats. Heme: No bruising, easy bleeding. Allergies: As noted. Neurological: Cranial nerves intact. Alert and oriented. Gait not assessed. Psychiatric:  No anxiety, depression, hallucinations. Visit Vitals    /71    Pulse 85    Temp 98 °F (36.7 °C)    Resp 18    Ht 5' 8\" (1.727 m)    Wt 104.3 kg (230 lb)    SpO2 97%    BMI 34.97 kg/m2       Physical Assessment:     constitutional: appearance: well developed, well nourished, normal habitus, no deformities, in no acute distress. skin: inspection: no rashes, ulcers, icterus or other lesions; no clubbing or telangiectasias. palpation: no induration or subcutaneos nodules. eyes: inspection: normal conjunctivae and lids; no jaundice pupils: symmetrical, normoreactive to light, normal accommodation and size. ENMT: mouth: normal oral mucosa,lips and gums; good dentition. oropharynx: normal tongue, hard and soft palate; posterior pharynx without erythema, exudate or lesions. neck: no masses organomegaly or tenderness. respiratory: effort: normal chest excursion; no intercostal retraction or accessory muscle use. cardiovascular: abdominal aorta: normal size and position; no bruits. palpation: PMI of normal size and position; normal rhythm; no thrill or murmurs. abdominal: abdomen: normal consistency; no tenderness or masses. hernias: no hernias appreciated. liver: normal size and consistency. spleen: not palpable. rectal: hemoccult/guaiac: not performed. musculoskeletal: no deformities or muscle wasting   lymphatic: axilae: not palpable. groin: not palpable. neck: within normal limits. other: not palpable. neurologic: cranial nerves: II-XII normal.   psychiatric: judgement/insight: within normal limits.  memory: within normal limits for recent and remote events. mood and affect: no evidence of depression, anxiety or agitation. orientation: oriented to time, space and person. Basic Metabolic Profile   No results for input(s): NA, K, CL, CO2, BUN, GLU, CA, MG, PHOS in the last 72 hours. No lab exists for component: CREAT      CBC w/Diff    No results for input(s): WBC, RBC, HGB, HCT, MCV, MCH, MCHC, RDW, PLT, HGBEXT, HCTEXT, PLTEXT in the last 72 hours. No lab exists for component: MPV No results for input(s): GRANS, LYMPH, EOS, PRO, MYELO, METAS, BLAST in the last 72 hours. No lab exists for component: MONO, BASO     Hepatic Function   No results for input(s): ALB, TP, TBILI, GPT, SGOT, AP, AML, LPSE in the last 72 hours. No lab exists for component: Jose E Penaloza MD, M.D. [facility], [address] [place] [address]  [email or web address]. [facility]

## 2017-06-19 NOTE — DISCHARGE INSTRUCTIONS
Upper GI Endoscopy: What to Expect at [address] [place] [address]  After you have an endoscopy, you will stay at the hospital or clinic for 1 to 2 hours. This will allow the medicine to wear off. You will be able to go home after your doctor or nurse checks to make sure you are not having any problems. You may have to stay overnight if you had treatment during the test. You may have a sore throat for a day or two after the test.  This care sheet gives you a general idea about what to expect after the test.  How can you care for yourself at home? Activity  · Rest as much as you need to after you go home. · You should be able to go back to your usual activities the day after the test.  Diet  · Follow your doctor's directions for eating after the test.  · Drink plenty of fluids (unless your doctor has told you not to). Medications  · If you have a sore throat the day after the test, use an over-the-counter spray to numb your throat. Follow-up care is a key part of your treatment and safety. Be sure to make and go to all appointments, and call your doctor if you are having problems. It's also a good idea to know your test results and keep a list of the medicines you take. When should you call for help? Call 911 anytime you think you may need emergency care. For example, call if:  · You passed out (lost consciousness). · You cough up blood. · You vomit blood or what looks like coffee grounds. · You pass maroon or very bloody stools. Call your doctor now or seek immediate medical care if:  · You have trouble swallowing. · You have belly pain. · Your stools are black and tarlike or have streaks of blood. · You are sick to your stomach or cannot keep fluids down. Watch closely for changes in your health, and be sure to contact your doctor if:  · Your throat still hurts after a day or two. · You do not get better as expected. Where can you learn more?    Go to Deal[employer].be  Enter J454 in the search box to learn more about \"Upper GI Endoscopy: What to Expect at Home. \"   © 4010-8706 [employer], [employer]orporated. Care instructions adapted under license by [employer] (which disclaims liability or warranty for this information). This care instruction is for use with your licensed healthcare professional. If you have questions about a medical condition or this instruction, always ask your healthcare professional. [address] any warranty or liability for your use of this information. Content Version: 07.2.670811; Current as of: November 14, 2014    DISCHARGE SUMMARY from Nurse     POST-PROCEDURE INSTRUCTIONS:    Call your Physician if you:  ? Observe any excess bleeding. ? Develop a temperature over 100.5o F.  ? Experience abdominal, shoulder or chest pain. ? Notice any signs of decreased circulation or nerve impairment to an extremity such as a change in color, persistent numbness, tingling, coldness or increase in pain. ? Vomit blood or you have nausea and vomiting lasting longer than 4 hours. ? Are unable to take medications. ? Are unable to urinate within 8 hours after discharge following general anesthesia or intravenous sedation. For the next 24 hours after receiving general anesthesia or intravenous sedation, or while taking prescription Narcotics, limit your activities:  ? Do NOT drive a motor vehicle, operate hazard machinery or power tools, or perform tasks that require coordination. The medication you received during your procedure may have some effect on your mental awareness. ? Do NOT make important personal or business decisions. The medication you received during your procedure may have some effect on your mental awareness. ? Do NOT drink alcoholic beverages. These drinks do not mix well with the medications that have been given to you. ? Upon discharge from the hospital, you must be accompanied by a responsible adult. ?  Resume your diet as directed by your physician. ? Resume medications as your physician has prescribed. ? Please give a list of your current medications to your Primary Care Provider. ? Please update this list whenever your medications are discontinued, doses are changed, or new medications (including over-the-counter products) are added. ? Please carry medication information at all times in case of emergency situations. These are general instructions for a healthy lifestyle:    No smoking/ No tobacco products/ Avoid exposure to second hand smoke.  Surgeon General's Warning:  Quitting smoking now greatly reduces serious risk to your health. Obesity, smoking, and a sedentary lifestyle greatly increase your risk for illness.  A healthy diet, regular physical exercise & weight monitoring are important for maintaining a healthy lifestyle   You may be retaining fluid if you have a history of heart failure or if you experience any of the following symptoms:  Weight gain of 3 pounds or more overnight or 5 pounds in a week, increased swelling in our hands or feet or shortness of breath while lying flat in bed. Please call your doctor as soon as you notice any of these symptoms; do not wait until your next office visit. Recognize signs and symptoms of STROKE:  F  -  Face looks uneven  A  -  Arms unable to move or move unevenly  S  -  Speech slurred or non-existent  T  -  Time to call 911 - as soon as signs and symptoms begin - DO NOT go back to bed or wait to see If you get better - TIME IS BRAIN. Colorectal Screening   Colorectal cancer almost always develops from precancerous polyps (abnormal growths) in the colon or rectum. Screening tests can find precancerous polyps, so that they can be removed before they turn into cancer.  Screening tests can also find colorectal cancer early, when treatment works best.  Vinicio Koehler Speak with your physician about when you should begin screening and how often you should be tested  Vinicio Koehler   Additional Information    If you have questions, please call [phone]. Remember, MyChart is NOT to be used for urgent needs. For medical emergencies, dial 911. Educational references and/or instructions provided during this visit included:          APPOINTMENTS:    Please make a follow-up appointment with your physician. Discharge information has been reviewed with the patient. The patient verbalized understanding. Esophageal Dilation: What to Expect at [address] [place] [address]  After you have esophageal dilation, you will stay at the hospital or surgery center for 1 to 2 hours. This will allow the medicine to wear off. You will be able to go home after your doctor or nurse checks to make sure you are not having any problems. This care sheet gives you a general idea about how long it will take for you to recover. But each person recovers at a different pace. Follow the steps below to get better as quickly as possible. How can you care for yourself at home? Activity  · Rest as much as you need to after you go home. · You should be able to go back to your usual activities the day after the procedure. Diet  · Follow your doctor's directions for eating after the procedure. · Drink plenty of fluids (unless your doctor has told you not to). Medicines  · Your doctor will tell you if and when you can restart your medicines. He or she will also give you instructions about taking any new medicines. · If you take blood thinners, such as warfarin (Coumadin), clopidogrel (Plavix), or aspirin, be sure to talk to your doctor. He or she will tell you if and when to start taking those medicines again. Make sure that you understand exactly what your doctor wants you to do. · If you have a sore throat the day after the procedure, use an over-the-counter spray to numb your throat. Sucking on throat lozenges and gargling with warm salt water may also help relieve your symptoms.   Follow-up care is a key part of your treatment and safety. Be sure to make and go to all appointments, and call your doctor if you are having problems. It's also a good idea to know your test results and keep a list of the medicines you take. When should you call for help? Call 911 anytime you think you may need emergency care. For example, call if:  · You passed out (lost consciousness). · You have sudden chest pain and shortness of breath. · You cough up blood. · You vomit blood or what looks like coffee grounds. · You pass maroon or very bloody stools. Call your doctor now or seek immediate medical care if:  · You have trouble swallowing. · You have belly pain. · Your stools are black and tarlike or have streaks of blood. · You are sick to your stomach or cannot keep fluids down. · You have signs of infection, such as:  ¨ Increased pain, swelling, warmth, or redness around your throat, neck, or belly. ¨ A fever. Watch closely for changes in your health, and be sure to contact your doctor if:  · Your throat still hurts after a day or two. · You do not get better as expected. Where can you learn more? Go to http://turner-doris.info/. Enter N891 in the search box to learn more about \"Esophageal Dilation: What to Expect at Home. \"  Current as of: August 9, 2016  Content Version: 11.2  © 5780-7150 [employer]. Care instructions adapted under license by [employer] (which disclaims liability or warranty for this information). If you have questions about a medical condition or this instruction, always ask your healthcare professional. [place] [address] any warranty or liability for your use of this information.

## 2017-08-12 ENCOUNTER — HOSPITAL ENCOUNTER (EMERGENCY)
Age: 62
Discharge: HOME | End: 2017-08-13
Attending: EMERGENCY MEDICINE
Payer: MEDICARE

## 2017-08-12 ENCOUNTER — APPOINTMENT (OUTPATIENT)
Dept: GENERAL RADIOLOGY | Age: 62
End: 2017-08-12
Attending: EMERGENCY MEDICINE
Payer: MEDICARE

## 2017-08-12 DIAGNOSIS — R07.89 ATYPICAL CHEST PAIN: Primary | ICD-10-CM

## 2017-08-12 DIAGNOSIS — R13.10 DYSPHAGIA, UNSPECIFIED TYPE: ICD-10-CM

## 2017-08-12 LAB
ANION GAP BLD CALC-SCNC: 6 MMOL/L (ref 3–18)
BASOPHILS # BLD AUTO: 0 K/UL (ref 0–0.06)
BASOPHILS # BLD: 0 % (ref 0–2)
BUN SERPL-MCNC: 9 MG/DL (ref 7–18)
BUN/CREAT SERPL: 9 (ref 12–20)
CALCIUM SERPL-MCNC: 9.7 MG/DL (ref 8.5–10.1)
CHLORIDE SERPL-SCNC: 105 MMOL/L (ref 100–108)
CK MB CFR SERPL CALC: 0.9 % (ref 0–4)
CK MB SERPL-MCNC: 3.8 NG/ML (ref 5–25)
CK SERPL-CCNC: 413 U/L (ref 39–308)
CO2 SERPL-SCNC: 27 MMOL/L (ref 21–32)
CREAT SERPL-MCNC: 1.04 MG/DL (ref 0.6–1.3)
DIFFERENTIAL METHOD BLD: ABNORMAL
EOSINOPHIL # BLD: 0.6 K/UL (ref 0–0.4)
EOSINOPHIL NFR BLD: 7 % (ref 0–5)
ERYTHROCYTE [DISTWIDTH] IN BLOOD BY AUTOMATED COUNT: 14.2 % (ref 11.6–14.5)
GLUCOSE SERPL-MCNC: 88 MG/DL (ref 74–99)
HCT VFR BLD AUTO: 40 % (ref 36–48)
HGB BLD-MCNC: 14.3 G/DL (ref 13–16)
LYMPHOCYTES # BLD AUTO: 29 % (ref 21–52)
LYMPHOCYTES # BLD: 2.5 K/UL (ref 0.9–3.6)
MCH RBC QN AUTO: 32.4 PG (ref 24–34)
MCHC RBC AUTO-ENTMCNC: 35.8 G/DL (ref 31–37)
MCV RBC AUTO: 90.5 FL (ref 74–97)
MONOCYTES # BLD: 0.8 K/UL (ref 0.05–1.2)
MONOCYTES NFR BLD AUTO: 10 % (ref 3–10)
NEUTS SEG # BLD: 4.5 K/UL (ref 1.8–8)
NEUTS SEG NFR BLD AUTO: 54 % (ref 40–73)
PLATELET # BLD AUTO: 140 K/UL (ref 135–420)
PMV BLD AUTO: 10.8 FL (ref 9.2–11.8)
POTASSIUM SERPL-SCNC: 4.5 MMOL/L (ref 3.5–5.5)
RBC # BLD AUTO: 4.42 M/UL (ref 4.7–5.5)
SODIUM SERPL-SCNC: 138 MMOL/L (ref 136–145)
TROPONIN I SERPL-MCNC: <0.02 NG/ML (ref 0–0.04)
WBC # BLD AUTO: 8.5 K/UL (ref 4.6–13.2)

## 2017-08-12 PROCEDURE — 93005 ELECTROCARDIOGRAM TRACING: CPT

## 2017-08-12 PROCEDURE — 96374 THER/PROPH/DIAG INJ IV PUSH: CPT

## 2017-08-12 PROCEDURE — 80048 BASIC METABOLIC PNL TOTAL CA: CPT | Performed by: EMERGENCY MEDICINE

## 2017-08-12 PROCEDURE — 96375 TX/PRO/DX INJ NEW DRUG ADDON: CPT

## 2017-08-12 PROCEDURE — 96376 TX/PRO/DX INJ SAME DRUG ADON: CPT

## 2017-08-12 PROCEDURE — 82550 ASSAY OF CK (CPK): CPT | Performed by: EMERGENCY MEDICINE

## 2017-08-12 PROCEDURE — 71010 XR CHEST PORT: CPT

## 2017-08-12 PROCEDURE — 74011250636 HC RX REV CODE- 250/636: Performed by: EMERGENCY MEDICINE

## 2017-08-12 PROCEDURE — 85025 COMPLETE CBC W/AUTO DIFF WBC: CPT | Performed by: EMERGENCY MEDICINE

## 2017-08-12 PROCEDURE — 99285 EMERGENCY DEPT VISIT HI MDM: CPT

## 2017-08-12 RX ORDER — ONDANSETRON 2 MG/ML
4 INJECTION INTRAMUSCULAR; INTRAVENOUS
Status: COMPLETED | OUTPATIENT
Start: 2017-08-12 | End: 2017-08-12

## 2017-08-12 RX ORDER — HYDROMORPHONE HYDROCHLORIDE 2 MG/ML
1 INJECTION, SOLUTION INTRAMUSCULAR; INTRAVENOUS; SUBCUTANEOUS ONCE
Status: COMPLETED | OUTPATIENT
Start: 2017-08-12 | End: 2017-08-12

## 2017-08-12 RX ADMIN — HYDROMORPHONE HYDROCHLORIDE 1 MG: 2 INJECTION, SOLUTION INTRAMUSCULAR; INTRAVENOUS; SUBCUTANEOUS at 23:57

## 2017-08-12 RX ADMIN — ONDANSETRON 4 MG: 2 INJECTION INTRAMUSCULAR; INTRAVENOUS at 21:58

## 2017-08-12 RX ADMIN — HYDROMORPHONE HYDROCHLORIDE 1 MG: 2 INJECTION, SOLUTION INTRAMUSCULAR; INTRAVENOUS; SUBCUTANEOUS at 19:57

## 2017-08-12 NOTE — ED PROVIDER NOTES
HPI Comments: The patient is a 60 y.o. male with a history of  esophagus, presents to the ED with complaints of an episode of esophagus discomfort . Patient has associated symptoms of what the patient describes as a squeezing feeling and states that he feels as if his stomach is empty. Patient states when he has these episodes, he usually gets an IV, shot of Dilaudid, and Zofran. Patient denies fever, vomiting, and chest pain. Patient states that he has had three heart stents. Patient has no further complaints.        Past Medical History:   Diagnosis Date    Altered mental status     Arthritis     Arthropathy     Back pain     Bilateral kidney stones     Burn     ON HANDS    CAD (coronary artery disease)     MI    3 cardiac stents    Calcium nephrolithiasis     Calculus of kidney     Cardiac arrhythmia     Cerebral artery occlusion with cerebral infarction ([address]     Chest pain     Cigarette smoker     Cirrhosis, alcoholic (HCC)     Cocaine abuse, episodic use     Colon polyp     Coronary artery disease     Diarrhea     DJD (degenerative joint disease)     Drug-seeking behavior     Dyskinesia     Esophageal disorder     Esophageal erosions     Flank pain, acute     Foot ulcer, right (HCC)     Gastric ulcer     GERD (gastroesophageal reflux disease)     Gross hematuria     Head trauma     Hearing reduced     Hematuria     Herniated disc     X 10 IN BACK    Hiatus hernia syndrome     History of kidney stones     HNP (herniated nucleus pulposus)     Hyperlipemia     Hypertension     Hypokalemia     Infection     Jaw fracture (HCC)     Kidney stones     Knee pain     Left ureteral stone     Muscle atrophy     Myocardial infarction ([address] 1999    N&V (nausea and vomiting)     Nocturia     Nondependent alcohol abuse, in remission     Nutcracker esophagus     Other ill-defined conditions     dysphagia    Renal stone     Slurred speech     Stroke ([address] questionable TIA    Swallowing difficulty     Syncopal episodes     Syncope and collapse     Tobacco dependence     Unspecified adverse effect of anesthesia     AT TIMES ESOPHAGUS SPASMS AFTER PROCEDURES    Unspecified cerebral artery occlusion with cerebral infarction        Past Surgical History:   Procedure Laterality Date    HX APPENDECTOMY      9YEARS OLD    HX CHOLECYSTECTOMY  2009    HX CORONARY STENT PLACEMENT  2008    3 STENTS PLACED    HX ENDOSCOPY      with Dilatation, multiple times    HX HEART CATHETERIZATION  2012    EVERYTHING LOOKED GOOD AT THIS POINT    HX HEENT      \"2 jaw surgeries\"    HX HERNIA REPAIR      ABDOMEN    HX KNEE ARTHROSCOPY  9/2012    LEFT    HX MOHS PROCEDURES  2016    HX ORTHOPAEDIC  12/12    left knee    HX OTHER SURGICAL      ESOPHAGUS DILATATION    HX ROTATOR CUFF REPAIR Right     x2    HX UROLOGICAL  07/18/2016    [facility]-Cystoscopy,URETEROSCOPY W/ LITHOTRIPSY, Dr Cheryl Wise  UROLOGICAL  10/10/2016    [facility]-Cystoscopy with removal of ureteral stent, Dr Cheryl Wise  UROLOGICAL  01/30/2017    [facility]-Cystoscopy, Left retrograde pyelography, Left diagnostic ureteroscopy, Left 6 x 26 cm double-J ureteral stent placement,Right retrograde pyelography, Right ureteroscopic stone extraction,Right 6 x 24 cm double-J ureteral stent placement, Interpretation of urography,Intraoperative fluoro less than 1 hour,          UROLOGICAL  02/08/2017    [facility]-CYSTOURETHROSCOPY WITH STENT REMOVAL, Dr Keri Alvarado         Family History:   Problem Relation Age of Onset    Diabetes Father     Heart Disease Father     Stroke Father        Social History     Social History    Marital status:      Spouse name: N/A    Number of children: N/A    Years of education: N/A     Occupational History    Not on file.      Social History Main Topics    Smoking status: Current Some Day Smoker     Packs/day: 0.25     Years: 20.00     Types: Cigarettes    Smokeless tobacco: Never Used    Alcohol use No      Comment: \"none in over 20 years\"    Drug use: No      Comment: cocaine 2011    Sexual activity: Yes     Partners: Female     Other Topics Concern    Not on file     Social History Narrative         ALLERGIES: Bee sting [sting, bee]; Codeine; Haldol [haloperidol lactate]; Haloperidol; Keflex [cephalexin]; No allergy information available; Shellfish containing products; Stadol [butorphanol tartrate]; and Vicodin [hydrocodone-acetaminophen]    Review of Systems   Constitutional: Negative for fever. HENT: Positive for trouble swallowing. Negative for congestion. Respiratory: Positive for chest tightness. Negative for cough and shortness of breath. Cardiovascular: Negative for chest pain and leg swelling. Gastrointestinal: Negative for abdominal pain, nausea and vomiting. Genitourinary: Negative for dysuria. Musculoskeletal: Negative. Neurological: Negative for light-headedness and headaches. All other systems reviewed and are negative. Vitals:    08/12/17 1950 08/13/17 0046 08/13/17 0109 08/13/17 0217   BP: 120/84 148/73  140/72   Pulse: (!) 105 95  (!) 103   Resp: 20 20  19   Temp: 97.4 °F (36.3 °C)   98.6 °F (37 °C)   SpO2: 97% 95% 97% 98%   Weight: 108.4 kg (239 lb) 108.4 kg (239 lb)     Height: 5' 7\" (1.702 m) 6' (1.829 m)              Physical Exam   Constitutional: He is oriented to person, place, and time. He appears distressed. HENT:   Head: Atraumatic. Mouth/Throat: Oropharynx is clear and moist.   Eyes: Conjunctivae are normal.   Neck: Neck supple. Cardiovascular: Normal rate, regular rhythm and normal heart sounds. Pulmonary/Chest: Effort normal and breath sounds normal. No respiratory distress. He exhibits no tenderness. Abdominal: Soft. Bowel sounds are normal. He exhibits no distension. There is no tenderness. There is no rebound and no guarding. Musculoskeletal: Normal range of motion. He exhibits no edema or tenderness. Neurological: He is alert and oriented to person, place, and time. Skin: Skin is warm and dry. Psychiatric: He has a normal mood and affect. Nursing note and vitals reviewed. MDM  Number of Diagnoses or Management Options  Atypical chest pain:   Dysphagia, unspecified type:   Diagnosis management comments: Eric Boucher is a 60 y.o. male presenting with cp and difficulty swallowing. Pt scheduled to have esophageal dilation this week. Uncomfortable on arrival. Labs and pain medication ordered and on reexam pt resting comfortably. 2 sets enzymes wnl. Pt remained asymptomatic and tolerated po prior to dc. History and exam not consistent with aortic pathology or pulmonary embolus at this time. I have instructed pt to follow up as scheduled with GI this week and Return precautions discussed. Patient stated verbal understanding and agrees with course and plan. ED Course     Vitals:  Patient Vitals for the past 12 hrs:   Temp Pulse Resp BP SpO2   08/13/17 0217 98.6 °F (37 °C) (!) 103 19 140/72 98 %   08/13/17 0109 - - - - 97 %   08/13/17 0046 - 95 20 148/73 95 %   08/12/17 1950 97.4 °F (36.3 °C) (!) 105 20 120/84 97 %           EKG interpretation by ED Physician:  2014 Normal Sinus Rhythm. 94 bpm. No STEMI. Interpreted by Marisa Kwok MD 9:58 PM    02 Normal Sinus Rhythm. 98 bpm. No STEMI. X-Ray, CT or other radiology findings or impressions:  Xr Chest Port    Result Date: 8/12/2017  Chest Single View CPT CODE: 70369 HISTORY: Short of breath. Throat pain. COMPARISON: September 28, 2016. FINDINGS:  The lungs are clear. The heart and mediastinum are unremarkable. There is no evidence of pleural effusion. Surgical anchor right humeral head. IMPRESSION: No radiographic evidence of acute cardiopulmonary process. Progress notes, Consult notes or additional Procedure notes:       Disposition:   Discharged home in stable condition      Diagnosis:   1. Atypical chest pain    2. Dysphagia, unspecified type                       Procedures               Scribe Attestation      Brittany Sheehan acting as a scribe for and in the presence of Kiki Melendez MD      August 13, 2017 at 7:24 AM       Provider Attestation:      I personally performed the services described in the documentation, reviewed the documentation, as recorded by the scribe in my presence, and it accurately and completely records my words and actions.  August 13, 2017 at 7:24 AM - Kiki Melendez MD

## 2017-08-13 VITALS
WEIGHT: 239 LBS | SYSTOLIC BLOOD PRESSURE: 140 MMHG | HEIGHT: 72 IN | TEMPERATURE: 98.6 F | OXYGEN SATURATION: 98 % | BODY MASS INDEX: 32.37 KG/M2 | RESPIRATION RATE: 19 BRPM | DIASTOLIC BLOOD PRESSURE: 72 MMHG | HEART RATE: 103 BPM

## 2017-08-13 LAB
ATRIAL RATE: 94 BPM
ATRIAL RATE: 98 BPM
CALCULATED P AXIS, ECG09: -16 DEGREES
CALCULATED P AXIS, ECG09: 20 DEGREES
CALCULATED R AXIS, ECG10: -12 DEGREES
CALCULATED R AXIS, ECG10: -13 DEGREES
CALCULATED T AXIS, ECG11: 29 DEGREES
CALCULATED T AXIS, ECG11: 38 DEGREES
CK MB CFR SERPL CALC: 0.9 % (ref 0–4)
CK MB SERPL-MCNC: 3 NG/ML (ref 5–25)
CK SERPL-CCNC: 333 U/L (ref 39–308)
DIAGNOSIS, 93000: NORMAL
DIAGNOSIS, 93000: NORMAL
P-R INTERVAL, ECG05: 144 MS
P-R INTERVAL, ECG05: 146 MS
Q-T INTERVAL, ECG07: 332 MS
Q-T INTERVAL, ECG07: 342 MS
QRS DURATION, ECG06: 78 MS
QRS DURATION, ECG06: 80 MS
QTC CALCULATION (BEZET), ECG08: 415 MS
QTC CALCULATION (BEZET), ECG08: 436 MS
TROPONIN I SERPL-MCNC: <0.02 NG/ML (ref 0–0.04)
VENTRICULAR RATE, ECG03: 94 BPM
VENTRICULAR RATE, ECG03: 98 BPM

## 2017-08-13 PROCEDURE — 93005 ELECTROCARDIOGRAM TRACING: CPT

## 2017-08-13 NOTE — ROUTINE PROCESS
I have reviewed discharge instructions with the patient. The patient verbalized understanding. ..   arm

## 2017-08-13 NOTE — DISCHARGE INSTRUCTIONS
Chest Pain: Care Instructions  Your Care Instructions  There are many things that can cause chest pain. Some are not serious and will get better on their own in a few days. But some kinds of chest pain need more testing and treatment. Your doctor may have recommended a follow-up visit in the next 8 to 12 hours. If you are not getting better, you may need more tests or treatment. Even though your doctor has released you, you still need to watch for any problems. The doctor carefully checked you, but sometimes problems can develop later. If you have new symptoms or if your symptoms do not get better, get medical care right away. If you have worse or different chest pain or pressure that lasts more than 5 minutes or you passed out (lost consciousness), call 911 or seek other emergency help right away. A medical visit is only one step in your treatment. Even if you feel better, you still need to do what your doctor recommends, such as going to all suggested follow-up appointments and taking medicines exactly as directed. This will help you recover and help prevent future problems. How can you care for yourself at home? · Rest until you feel better. · Take your medicine exactly as prescribed. Call your doctor if you think you are having a problem with your medicine. · Do not drive after taking a prescription pain medicine. When should you call for help? Call 911 if:  · You passed out (lost consciousness). · You have severe difficulty breathing. · You have symptoms of a heart attack. These may include:  ¨ Chest pain or pressure, or a strange feeling in your chest.  ¨ Sweating. ¨ Shortness of breath. ¨ Nausea or vomiting. ¨ Pain, pressure, or a strange feeling in your back, neck, jaw, or upper belly or in one or both shoulders or arms. ¨ Lightheadedness or sudden weakness. ¨ A fast or irregular heartbeat.   After you call 911, the  may tell you to chew 1 adult-strength or 2 to 4 low-dose aspirin. Wait for an ambulance. Do not try to drive yourself. Call your doctor today if:  · You have any trouble breathing. · Your chest pain gets worse. · You are dizzy or lightheaded, or you feel like you may faint. · You are not getting better as expected. · You are having new or different chest pain. Where can you learn more? Go to http://turner-doris.info/. Enter A120 in the search box to learn more about \"Chest Pain: Care Instructions. \"  Current as of: March 20, 2017  Content Version: 11.3  © 3336-8397 [employer]. Care instructions adapted under license by [employer] (which disclaims liability or warranty for this information). If you have questions about a medical condition or this instruction, always ask your healthcare professional. [address] any warranty or liability for your use of this information.

## 2017-08-21 ENCOUNTER — ANESTHESIA EVENT (OUTPATIENT)
Dept: ENDOSCOPY | Age: 62
End: 2017-08-21
Payer: MEDICARE

## 2017-08-22 ENCOUNTER — ANESTHESIA (OUTPATIENT)
Dept: ENDOSCOPY | Age: 62
End: 2017-08-22
Payer: MEDICARE

## 2017-08-22 ENCOUNTER — HOSPITAL ENCOUNTER (OUTPATIENT)
Age: 62
Setting detail: OUTPATIENT SURGERY
Discharge: HOME | End: 2017-08-22
Attending: INTERNAL MEDICINE | Admitting: INTERNAL MEDICINE
Payer: MEDICARE

## 2017-08-22 VITALS
WEIGHT: 239 LBS | HEIGHT: 67 IN | OXYGEN SATURATION: 97 % | RESPIRATION RATE: 16 BRPM | HEART RATE: 71 BPM | BODY MASS INDEX: 37.51 KG/M2 | SYSTOLIC BLOOD PRESSURE: 131 MMHG | TEMPERATURE: 96.3 F | DIASTOLIC BLOOD PRESSURE: 66 MMHG

## 2017-08-22 PROCEDURE — 74011250636 HC RX REV CODE- 250/636: Performed by: ANESTHESIOLOGY

## 2017-08-22 PROCEDURE — 74011250636 HC RX REV CODE- 250/636: Performed by: NURSE ANESTHETIST, CERTIFIED REGISTERED

## 2017-08-22 PROCEDURE — 76040000019: Performed by: INTERNAL MEDICINE

## 2017-08-22 PROCEDURE — 76060000031 HC ANESTHESIA FIRST 0.5 HR: Performed by: INTERNAL MEDICINE

## 2017-08-22 PROCEDURE — 74011000250 HC RX REV CODE- 250

## 2017-08-22 PROCEDURE — 74011000250 HC RX REV CODE- 250: Performed by: NURSE ANESTHETIST, CERTIFIED REGISTERED

## 2017-08-22 PROCEDURE — 74011250636 HC RX REV CODE- 250/636

## 2017-08-22 RX ORDER — NALOXONE HYDROCHLORIDE 0.4 MG/ML
0.4 INJECTION, SOLUTION INTRAMUSCULAR; INTRAVENOUS; SUBCUTANEOUS
Status: CANCELLED | OUTPATIENT
Start: 2017-08-22 | End: 2017-08-22

## 2017-08-22 RX ORDER — LIDOCAINE HYDROCHLORIDE 20 MG/ML
INJECTION, SOLUTION EPIDURAL; INFILTRATION; INTRACAUDAL; PERINEURAL AS NEEDED
Status: DISCONTINUED | OUTPATIENT
Start: 2017-08-22 | End: 2017-08-22 | Stop reason: HOSPADM

## 2017-08-22 RX ORDER — EPINEPHRINE 0.1 MG/ML
1 INJECTION INTRACARDIAC; INTRAVENOUS
Status: CANCELLED | OUTPATIENT
Start: 2017-08-22 | End: 2017-08-22

## 2017-08-22 RX ORDER — DEXTROMETHORPHAN/PSEUDOEPHED 2.5-7.5/.8
1.2 DROPS ORAL
Status: CANCELLED | OUTPATIENT
Start: 2017-08-22

## 2017-08-22 RX ORDER — SODIUM CHLORIDE 0.9 % (FLUSH) 0.9 %
5-10 SYRINGE (ML) INJECTION AS NEEDED
Status: DISCONTINUED | OUTPATIENT
Start: 2017-08-22 | End: 2017-08-22 | Stop reason: HOSPADM

## 2017-08-22 RX ORDER — HYDROMORPHONE HYDROCHLORIDE 2 MG/ML
1 INJECTION, SOLUTION INTRAMUSCULAR; INTRAVENOUS; SUBCUTANEOUS 2 TIMES DAILY
Status: DISCONTINUED | OUTPATIENT
Start: 2017-08-22 | End: 2017-08-22 | Stop reason: HOSPADM

## 2017-08-22 RX ORDER — SODIUM CHLORIDE, SODIUM LACTATE, POTASSIUM CHLORIDE, CALCIUM CHLORIDE 600; 310; 30; 20 MG/100ML; MG/100ML; MG/100ML; MG/100ML
75 INJECTION, SOLUTION INTRAVENOUS CONTINUOUS
Status: DISCONTINUED | OUTPATIENT
Start: 2017-08-22 | End: 2017-08-22 | Stop reason: HOSPADM

## 2017-08-22 RX ORDER — SODIUM CHLORIDE 0.9 % (FLUSH) 0.9 %
5-10 SYRINGE (ML) INJECTION EVERY 8 HOURS
Status: DISCONTINUED | OUTPATIENT
Start: 2017-08-22 | End: 2017-08-22 | Stop reason: HOSPADM

## 2017-08-22 RX ORDER — ATROPINE SULFATE 0.1 MG/ML
0.5 INJECTION INTRAVENOUS
Status: CANCELLED | OUTPATIENT
Start: 2017-08-22 | End: 2017-08-22

## 2017-08-22 RX ORDER — PROPOFOL 10 MG/ML
INJECTION, EMULSION INTRAVENOUS AS NEEDED
Status: DISCONTINUED | OUTPATIENT
Start: 2017-08-22 | End: 2017-08-22 | Stop reason: HOSPADM

## 2017-08-22 RX ORDER — SODIUM CHLORIDE 0.9 % (FLUSH) 0.9 %
5-10 SYRINGE (ML) INJECTION EVERY 8 HOURS
Status: CANCELLED | OUTPATIENT
Start: 2017-08-22 | End: 2017-08-22

## 2017-08-22 RX ORDER — SODIUM CHLORIDE 0.9 % (FLUSH) 0.9 %
5-10 SYRINGE (ML) INJECTION AS NEEDED
Status: CANCELLED | OUTPATIENT
Start: 2017-08-22 | End: 2017-08-22

## 2017-08-22 RX ORDER — INSULIN LISPRO 100 [IU]/ML
INJECTION, SOLUTION INTRAVENOUS; SUBCUTANEOUS ONCE
Status: DISCONTINUED | OUTPATIENT
Start: 2017-08-22 | End: 2017-08-22 | Stop reason: HOSPADM

## 2017-08-22 RX ORDER — FLUMAZENIL 0.1 MG/ML
0.2 INJECTION INTRAVENOUS
Status: CANCELLED | OUTPATIENT
Start: 2017-08-22 | End: 2017-08-22

## 2017-08-22 RX ADMIN — PROPOFOL 100 MG: 10 INJECTION, EMULSION INTRAVENOUS at 07:40

## 2017-08-22 RX ADMIN — LIDOCAINE HYDROCHLORIDE 20 MG: 20 INJECTION, SOLUTION EPIDURAL; INFILTRATION; INTRACAUDAL; PERINEURAL at 07:40

## 2017-08-22 RX ADMIN — HYDROMORPHONE HYDROCHLORIDE 1 MG: 2 INJECTION, SOLUTION INTRAMUSCULAR; INTRAVENOUS; SUBCUTANEOUS at 08:04

## 2017-08-22 RX ADMIN — FAMOTIDINE 20 MG: 10 INJECTION, SOLUTION INTRAVENOUS at 07:22

## 2017-08-22 NOTE — DISCHARGE INSTRUCTIONS
DISCHARGE SUMMARY from Nurse     POST-PROCEDURE INSTRUCTIONS:    Call your Physician if you:  ? Observe any excess bleeding. ? Develop a temperature over 100.5o F.  ? Experience abdominal, shoulder or chest pain. ? Notice any signs of decreased circulation or nerve impairment to an extremity such as a change in color, persistent numbness, tingling, coldness or increase in pain. ? Vomit blood or you have nausea and vomiting lasting longer than 4 hours. ? Are unable to take medications. ? Are unable to urinate within 8 hours after discharge following general anesthesia or intravenous sedation. For the next 24 hours after receiving general anesthesia or intravenous sedation, or while taking prescription Narcotics, limit your activities:  ? Do NOT drive a motor vehicle, operate hazard machinery or power tools, or perform tasks that require coordination. The medication you received during your procedure may have some effect on your mental awareness. ? Do NOT make important personal or business decisions. The medication you received during your procedure may have some effect on your mental awareness. ? Do NOT drink alcoholic beverages. These drinks do not mix well with the medications that have been given to you. ? Upon discharge from the hospital, you must be accompanied by a responsible adult. ? Resume your diet as directed by your physician. ? Resume medications as your physician has prescribed. ? Please give a list of your current medications to your Primary Care Provider. ? Please update this list whenever your medications are discontinued, doses are changed, or new medications (including over-the-counter products) are added. ? Please carry medication information at all times in case of emergency situations. These are general instructions for a healthy lifestyle:    No smoking/ No tobacco products/ Avoid exposure to second hand smoke.    Surgeon General's Warning:  Quitting smoking now greatly reduces serious risk to your health. Obesity, smoking, and a sedentary lifestyle greatly increase your risk for illness.  A healthy diet, regular physical exercise & weight monitoring are important for maintaining a healthy lifestyle   You may be retaining fluid if you have a history of heart failure or if you experience any of the following symptoms:  Weight gain of 3 pounds or more overnight or 5 pounds in a week, increased swelling in our hands or feet or shortness of breath while lying flat in bed. Please call your doctor as soon as you notice any of these symptoms; do not wait until your next office visit. Recognize signs and symptoms of STROKE:  F  -  Face looks uneven  A  -  Arms unable to move or move unevenly  S  -  Speech slurred or non-existent  T  -  Time to call 911 - as soon as signs and symptoms begin - DO NOT go back to bed or wait to see If you get better - TIME IS BRAIN. Colorectal Screening   Colorectal cancer almost always develops from precancerous polyps (abnormal growths) in the colon or rectum. Screening tests can find precancerous polyps, so that they can be removed before they turn into cancer. Screening tests can also find colorectal cancer early, when treatment works best.  Carolyn Lyman Speak with your physician about when you should begin screening and how often you should be tested. Upper GI Endoscopy: What to Expect at [address] [place] [address]  After you have an endoscopy, you will stay at the hospital or clinic for 1 to 2 hours. This will allow the medicine to wear off. You will be able to go home after your doctor or nurse checks to make sure you are not having any problems. You may have to stay overnight if you had treatment during the test. You may have a sore throat for a day or two after the test.  This care sheet gives you a general idea about what to expect after the test.  How can you care for yourself at home?   Activity  · Rest as much as you need to after you go home.  · You should be able to go back to your usual activities the day after the test.  Diet  · Follow your doctor's directions for eating after the test.  · Drink plenty of fluids (unless your doctor has told you not to). Medications  · If you have a sore throat the day after the test, use an over-the-counter spray to numb your throat. Follow-up care is a key part of your treatment and safety. Be sure to make and go to all appointments, and call your doctor if you are having problems. It's also a good idea to know your test results and keep a list of the medicines you take. When should you call for help? Call 911 anytime you think you may need emergency care. For example, call if:  · You passed out (lost consciousness). · You cough up blood. · You vomit blood or what looks like coffee grounds. · You pass maroon or very bloody stools. Call your doctor now or seek immediate medical care if:  · You have trouble swallowing. · You have belly pain. · Your stools are black and tarlike or have streaks of blood. · You are sick to your stomach or cannot keep fluids down. Watch closely for changes in your health, and be sure to contact your doctor if:  · Your throat still hurts after a day or two. · You do not get better as expected. Where can you learn more? Go to Aicent.be  Enter J454 in the search box to learn more about \"Upper GI Endoscopy: What to Expect at Home. \"   © 9290-6911 [employer]. Care instructions adapted under license by [employer] (which disclaims liability or warranty for this information). This care instruction is for use with your licensed healthcare professional. If you have questions about a medical condition or this instruction, always ask your healthcare professional. [place] [address] any warranty or liability for your use of this information.   Content Version: 44.4.183392; Current as of: November 14, 2014         Esophageal Dilation: What to Expect at [address] [place] [address]  After you have esophageal dilation, you will stay at the hospital or surgery center for 1 to 2 hours. This will allow the medicine to wear off. You will be able to go home after your doctor or nurse checks to make sure you are not having any problems. This care sheet gives you a general idea about how long it will take for you to recover. But each person recovers at a different pace. Follow the steps below to get better as quickly as possible. How can you care for yourself at home? Activity  · Rest as much as you need to after you go home. · You should be able to go back to your usual activities the day after the procedure. Diet  · Follow your doctor's directions for eating after the procedure. · Drink plenty of fluids (unless your doctor has told you not to). Medicines  · Your doctor will tell you if and when you can restart your medicines. He or she will also give you instructions about taking any new medicines. · If you take blood thinners, such as warfarin (Coumadin), clopidogrel (Plavix), or aspirin, be sure to talk to your doctor. He or she will tell you if and when to start taking those medicines again. Make sure that you understand exactly what your doctor wants you to do. · If you have a sore throat the day after the procedure, use an over-the-counter spray to numb your throat. Sucking on throat lozenges and gargling with warm salt water may also help relieve your symptoms. Follow-up care is a key part of your treatment and safety. Be sure to make and go to all appointments, and call your doctor if you are having problems. It's also a good idea to know your test results and keep a list of the medicines you take. When should you call for help? Call 911 anytime you think you may need emergency care. For example, call if:  · You passed out (lost consciousness). · You have sudden chest pain and shortness of breath. · You cough up blood.   · You vomit blood or what looks like coffee grounds. · You pass maroon or very bloody stools. Call your doctor now or seek immediate medical care if:  · You have trouble swallowing. · You have belly pain. · Your stools are black and tarlike or have streaks of blood. · You are sick to your stomach or cannot keep fluids down. · You have signs of infection, such as:  ¨ Increased pain, swelling, warmth, or redness around your throat, neck, or belly. ¨ A fever. Watch closely for changes in your health, and be sure to contact your doctor if:  · Your throat still hurts after a day or two. · You do not get better as expected. Where can you learn more? Go to http://turner-doris.info/. Enter E647 in the search box to learn more about \"Esophageal Dilation: What to Expect at Home. \"  Current as of: August 9, 2016  Content Version: 11.3  © 4703-9997 [employer]. Care instructions adapted under license by [employer] (which disclaims liability or warranty for this information). If you have questions about a medical condition or this instruction, always ask your healthcare professional. [place] [address] any warranty or liability for your use of this information.

## 2017-08-22 NOTE — IP AVS SNAPSHOT
[address] [address] [place] [id number] [phone] Patient: Karsten Gonsales MRN: [id number] SWW:[id number]/27/1863 You are allergic to the following Allergen Reactions Bee Sting (Sting, Bee) Hives Shortness of Breath Codeine Hives Shortness of Breath Has tolerated Hydromorphone. Haldol (Haloperidol Lactate) Hives Shortness of Breath Haloperidol Hives Cough Keflex (Cephalexin) Diarrhea No Allergy Information Available Other (comments) Other reaction(s): hives Shellfish Containing Products Hives Stadol (Butorphanol Tartrate) Hives Shortness of Breath Rash Vicodin (Hydrocodone-Acetaminophen) Hives Shortness of Breath Rash Recent Documentation Height Weight BMI Smoking Status 1.702 m 108.4 kg 37.43 kg/m2 Current Some Day Smoker Emergency Contacts Name Discharge Info Relation Home Work Mobile [facility] DISCHARGE CAREGIVER [3] Spouse [3] [phone] About your hospitalization You were admitted on:  August 22, 2017 You last received care in the:  [facility] You were discharged on:  August 22, 2017 Unit phone number:  [phone] Why you were hospitalized Your primary diagnosis was:  Not on File Providers Seen During Your Hospitalizations Provider Role Specialty Primary office phone Toney Luna MD Attending Provider Gastroenterology [phone] Your Primary Care Physician (PCP) Primary Care Physician Office Phone Office Fax NONE ** None ** ** None ** Follow-up Information Follow up With Details Comments Contact Info Toney Luna MD   [address] [place] [address] [address] [facility] 
[phone] Current Discharge Medication List  
  
ASK your doctor about these medications Dose & Instructions Dispensing Information Comments Morning Noon Evening Bedtime COREG 25 mg tablet Generic drug:  carvedilol Your last dose was: Your next dose is:    
   
   
 Dose:  25 mg Take 25 mg by mouth two (2) times daily (with meals). Refills:  0  
     
   
   
   
  
 CYMBALTA 60 mg capsule Generic drug:  DULoxetine Your last dose was: Your next dose is:    
   
   
 Dose:  60 mg Take 60 mg by mouth daily. Refills:  0 DEMEROL 100 mg tablet Generic drug:  meperidine Your last dose was: Your next dose is:    
   
   
 Dose:  100 mg Take 100 mg by mouth every four (4) hours as needed for Pain. Refills:  0  
     
   
   
   
  
 esomeprazole 40 mg capsule Commonly known as:  NexIUM Your last dose was: Your next dose is:    
   
   
 Dose:  40 mg Take 1 Cap by mouth Before breakfast and dinner. Quantity:  180 Cap Refills:  4 HYDROmorphone 2 mg tablet Commonly known as:  DILAUDID Your last dose was: Your next dose is:    
   
   
 Dose:  2-4 mg Take 1-2 Tabs by mouth every four (4) hours as needed for Pain. Max Daily Amount: 24 mg. Quantity:  41 Tab Refills:  0 LIPITOR 40 mg tablet Generic drug:  atorvastatin Your last dose was: Your next dose is:    
   
   
 Dose:  40 mg Take 40 mg by mouth daily. Refills:  0  
     
   
   
   
  
 nitroglycerin 0.3 mg SL tablet Commonly known as:  NITROSTAT Your last dose was: Your next dose is:    
   
   
 Dose:  0.3 mg  
0.3 mg. Refills:  0  
     
   
   
   
  
 ondansetron 4 mg disintegrating tablet Commonly known as:  ZOFRAN ODT Your last dose was: Your next dose is:    
   
   
 Dose:  4 mg  
4 mg. Refills:  0 PLAVIX 75 mg Tab Generic drug:  clopidogrel Your last dose was: Your next dose is:    
   
   
 Dose:  75 mg Take 75 mg by mouth. Refills:  0  
     
   
   
   
  
 tamsulosin 0.4 mg capsule Commonly known as:  FLOMAX Your last dose was: Your next dose is:    
   
   
 Dose:  0.4 mg Take 1 Cap by mouth daily (after dinner). Quantity:  90 Cap Refills:  3 VALIUM 10 mg tablet Generic drug:  diazePAM  
   
Your last dose was: Your next dose is:    
   
   
 Dose:  10 mg Take 10 mg by mouth every six (6) hours as needed for Anxiety. Refills:  0 Discharge Instructions DISCHARGE SUMMARY from Nurse POST-PROCEDURE INSTRUCTIONS: 
 
Call your Physician if you: 
? Observe any excess bleeding. ? Develop a temperature over 100.5o F. 
? Experience abdominal, shoulder or chest pain. ? Notice any signs of decreased circulation or nerve impairment to an extremity such as a change in color, persistent numbness, tingling, coldness or increase in pain. ? Vomit blood or you have nausea and vomiting lasting longer than 4 hours. ? Are unable to take medications. ? Are unable to urinate within 8 hours after discharge following general anesthesia or intravenous sedation. For the next 24 hours after receiving general anesthesia or intravenous sedation, or while taking prescription Narcotics, limit your activities: 
? Do NOT drive a motor vehicle, operate hazard machinery or power tools, or perform tasks that require coordination. The medication you received during your procedure may have some effect on your mental awareness. ? Do NOT make important personal or business decisions. The medication you received during your procedure may have some effect on your mental awareness. ? Do NOT drink alcoholic beverages. These drinks do not mix well with the medications that have been given to you. ? Upon discharge from the hospital, you must be accompanied by a responsible adult. ? Resume your diet as directed by your physician. ? Resume medications as your physician has prescribed. ? Please give a list of your current medications to your Primary Care Provider. ? Please update this list whenever your medications are discontinued, doses are changed, or new medications (including over-the-counter products) are added. ? Please carry medication information at all times in case of emergency situations. These are general instructions for a healthy lifestyle: No smoking/ No tobacco products/ Avoid exposure to second hand smoke. ? Surgeon General's Warning:  Quitting smoking now greatly reduces serious risk to your health. Obesity, smoking, and a sedentary lifestyle greatly increase your risk for illness. ? A healthy diet, regular physical exercise & weight monitoring are important for maintaining a healthy lifestyle ? You may be retaining fluid if you have a history of heart failure or if you experience any of the following symptoms:  Weight gain of 3 pounds or more overnight or 5 pounds in a week, increased swelling in our hands or feet or shortness of breath while lying flat in bed. Please call your doctor as soon as you notice any of these symptoms; do not wait until your next office visit. Recognize signs and symptoms of STROKE: 
F  -  Face looks uneven A  -  Arms unable to move or move unevenly S  -  Speech slurred or non-existent T  -  Time to call 911 - as soon as signs and symptoms begin - DO NOT go back to bed or wait to see If you get better - TIME IS BRAIN. Colorectal Screening ? Colorectal cancer almost always develops from precancerous polyps (abnormal growths) in the colon or rectum. Screening tests can find precancerous polyps, so that they can be removed before they turn into cancer. Screening tests can also find colorectal cancer early, when treatment works best. 
?  Speak with your physician about when you should begin screening and how often you should be tested. Upper GI Endoscopy: What to Expect at [facility] Your Recovery After you have an endoscopy, you will stay at the hospital or clinic for 1 to 2 hours. This will allow the medicine to wear off. You will be able to go home after your doctor or nurse checks to make sure you are not having any problems. You may have to stay overnight if you had treatment during the test. You may have a sore throat for a day or two after the test. 
This care sheet gives you a general idea about what to expect after the test. 
How can you care for yourself at home? Activity · Rest as much as you need to after you go home. · You should be able to go back to your usual activities the day after the test. 
Diet · Follow your doctor's directions for eating after the test. 
· Drink plenty of fluids (unless your doctor has told you not to). Medications · If you have a sore throat the day after the test, use an over-the-counter spray to numb your throat. Follow-up care is a key part of your treatment and safety. Be sure to make and go to all appointments, and call your doctor if you are having problems. It's also a good idea to know your test results and keep a list of the medicines you take. When should you call for help? Call 911 anytime you think you may need emergency care. For example, call if: 
· You passed out (lost consciousness). · You cough up blood. · You vomit blood or what looks like coffee grounds. · You pass maroon or very bloody stools. Call your doctor now or seek immediate medical care if: 
· You have trouble swallowing. · You have belly pain. · Your stools are black and tarlike or have streaks of blood. · You are sick to your stomach or cannot keep fluids down. Watch closely for changes in your health, and be sure to contact your doctor if: 
· Your throat still hurts after a day or two. · You do not get better as expected. Where can you learn more? Go to [employer] Enter [phone] in the search box to learn more about \"Upper GI Endoscopy: What to Expect at Home. \"  
© 8365-9180 Healthwise, Incorporated. Care instructions adapted under license by Tanisha Oconnell (which disclaims liability or warranty for this information). This care instruction is for use with your licensed healthcare professional. If you have questions about a medical condition or this instruction, always ask your healthcare professional. [address] any warranty or liability for your use of this information. Content Version: 71.2.948119; Current as of: November 14, 2014 Esophageal Dilation: What to Expect at [facility] Your Recovery After you have esophageal dilation, you will stay at the hospital or surgery center for 1 to 2 hours. This will allow the medicine to wear off. You will be able to go home after your doctor or nurse checks to make sure you are not having any problems. This care sheet gives you a general idea about how long it will take for you to recover. But each person recovers at a different pace. Follow the steps below to get better as quickly as possible. How can you care for yourself at home? Activity · Rest as much as you need to after you go home. · You should be able to go back to your usual activities the day after the procedure. Diet · Follow your doctor's directions for eating after the procedure. · Drink plenty of fluids (unless your doctor has told you not to). Medicines · Your doctor will tell you if and when you can restart your medicines. He or she will also give you instructions about taking any new medicines. · If you take blood thinners, such as warfarin (Coumadin), clopidogrel (Plavix), or aspirin, be sure to talk to your doctor. He or she will tell you if and when to start taking those medicines again. Make sure that you understand exactly what your doctor wants you to do. · If you have a sore throat the day after the procedure, use an over-the-counter spray to numb your throat. Sucking on throat lozenges and gargling with warm salt water may also help relieve your symptoms. Follow-up care is a key part of your treatment and safety. Be sure to make and go to all appointments, and call your doctor if you are having problems. It's also a good idea to know your test results and keep a list of the medicines you take. When should you call for help? Call 911 anytime you think you may need emergency care. For example, call if: 
· You passed out (lost consciousness). · You have sudden chest pain and shortness of breath. · You cough up blood. · You vomit blood or what looks like coffee grounds. · You pass maroon or very bloody stools. Call your doctor now or seek immediate medical care if: 
· You have trouble swallowing. · You have belly pain. · Your stools are black and tarlike or have streaks of blood. · You are sick to your stomach or cannot keep fluids down. · You have signs of infection, such as: 
¨ Increased pain, swelling, warmth, or redness around your throat, neck, or belly. ¨ A fever. Watch closely for changes in your health, and be sure to contact your doctor if: 
· Your throat still hurts after a day or two. · You do not get better as expected. Where can you learn more? Go to http://turner-doris.info/. Enter M461 in the search box to learn more about \"Esophageal Dilation: What to Expect at Home. \" Current as of: August 9, 2016 Content Version: 11.3 © 8985-5442 [employer]. Care instructions adapted under license by [employer] (which disclaims liability or warranty for this information). If you have questions about a medical condition or this instruction, always ask your healthcare professional. [address] any warranty or liability for your use of this information. Discharge Orders None Introducing [facility]! Lilian Elenamargie introduces [facility] patient portal. Now you can access parts of your medical record, email your doctor's office, and request medication refills online. 1. In your internet browser, go to [email or web address] 2. Click on the First Time User? Click Here link in the Sign In box. You will see the New Member Sign Up page. 3. Enter your [facility] Access Code exactly as it appears below. You will not need to use this code after youve completed the sign-up process. If you do not sign up before the expiration date, you must request a new code. · [facility] Access Code: [email or web address] Expires: 11/9/2017  9:51 AM 
 
4. Enter the last four digits of your Social Security Number (xxxx) and Date of Birth (mm/dd/yyyy) as indicated and click Submit. You will be taken to the next sign-up page. 5. Create a [facility] ID. This will be your [facility] login ID and cannot be changed, so think of one that is secure and easy to remember. 6. Create a [facility] password. You can change your password at any time. 7. Enter your Password Reset Question and Answer. This can be used at a later time if you forget your password. 8. Enter your e-mail address. You will receive e-mail notification when new information is available in [address] 9. Click Sign Up. You can now view and download portions of your medical record. 10. Click the Download Summary menu link to download a portable copy of your medical information. If you have questions, please visit the Frequently Asked Questions section of the [facility] website. Remember, [facility] is NOT to be used for urgent needs. For medical emergencies, dial 911. Now available from your iPhone and Android! General Information Please provide this summary of care documentation to your next provider.  
  
  
    
    
 Patient Signature: ____________________________________________________________ Date:  ____________________________________________________________  
  
Francoise Braydon Provider Signature:  ____________________________________________________________ Date:  ____________________________________________________________

## 2017-08-22 NOTE — PROCEDURES
[place]  [facility], [address]      Brief Procedure Note    Jose Jackson  1955  [id number]    Date of Procedure: 8/22/2017    Preoperative diagnosis: Dysphagia, unspecified type [R13.10]    Postoperative diagnosis:  Grade 1 Esophagitis, Non Obstructive Dysphagia, dilated to 47 Fr    Type of Anesthesia: MAC (monitered anesthesia care)    Description of Findings: same as post op dx    Procedure: Procedure(s):  ENDOSCOPY WITH ESOPHAGEAL DILATATION 54Fr    :  Dr. Charity Villalpando MD    Assistant(s): [unfilled]    Type of Anesthesia:MAC     EBL:None    Specimens: * No specimens in log *    Findings: See printed and scanned procedure note    Complications: None    Dr. Charity Villalpando MD  8/22/2017  7:54 AM

## 2017-08-22 NOTE — H&P
[facility]    [email or web address]      Impression: 1. Dysphagia  2. Esophageal dysmotility      Plan:     1. EGD/Dil with MAC      Chief Complaint: Dysphagia      HPI:  Dalton Ortega is a 60 y.o. male who is being seen preop for dysphagia and chronic dysmotility of esophagus. He has some relief with dilations.     PMH:   Past Medical History:   Diagnosis Date    Altered mental status     Arthritis     Arthropathy     Back pain     Bilateral kidney stones     Burn     ON HANDS    CAD (coronary artery disease)     MI    3 cardiac stents    Calcium nephrolithiasis     Calculus of kidney     Cardiac arrhythmia     Cerebral artery occlusion with cerebral infarction (HCC)     Chest pain     Cigarette smoker     Cirrhosis, alcoholic (HCC)     Cocaine abuse, episodic use     Colon polyp     Coronary artery disease     Diarrhea     DJD (degenerative joint disease)     Drug-seeking behavior     Dyskinesia     Esophageal disorder     Esophageal erosions     Flank pain, acute     Foot ulcer, right (HCC)     Gastric ulcer     GERD (gastroesophageal reflux disease)     Gross hematuria     Head trauma     Hearing reduced     Hematuria     Herniated disc     X 10 IN BACK    Hiatus hernia syndrome     History of kidney stones     HNP (herniated nucleus pulposus)     Hyperlipemia     Hypertension     Hypokalemia     Infection     Jaw fracture (HCC)     Kidney stones     Knee pain     Left ureteral stone     Muscle atrophy     Myocardial infarction ([address] 1999    N&V (nausea and vomiting)     Nocturia     Nondependent alcohol abuse, in remission     Nutcracker esophagus     Other ill-defined conditions     dysphagia    Renal stone     Slurred speech     Stroke [facility])     questionable TIA    Swallowing difficulty     Syncopal episodes     Syncope and collapse     Tobacco dependence     Unspecified adverse effect of anesthesia     AT TIMES ESOPHAGUS SPASMS AFTER PROCEDURES    Unspecified cerebral artery occlusion with cerebral infarction        PSH:   Past Surgical History:   Procedure Laterality Date    HX APPENDECTOMY      9YEARS OLD    HX CHOLECYSTECTOMY  2009    HX CORONARY STENT PLACEMENT  2008    3 STENTS PLACED    HX ENDOSCOPY      with Dilatation, multiple times    HX HEART CATHETERIZATION  2012    EVERYTHING LOOKED GOOD AT THIS POINT    HX HEENT      \"2 jaw surgeries\"    HX HERNIA REPAIR      ABDOMEN    HX KNEE ARTHROSCOPY  9/2012    LEFT    HX MOHS PROCEDURES  2016    HX ORTHOPAEDIC  12/12    left knee    HX OTHER SURGICAL      ESOPHAGUS DILATATION    HX ROTATOR CUFF REPAIR Right     x2    HX UROLOGICAL  07/18/2016    SPA-Cystoscopy,URETEROSCOPY W/ LITHOTRIPSY, Dr Rosalie Gonzales  UROLOGICAL  10/10/2016    SL-Cystoscopy with removal of ureteral stent, Dr Rosalie Gonzales  UROLOGICAL  01/30/2017    SP[facility]-Cystoscopy, Left retrograde pyelography, Left diagnostic ureteroscopy, Left 6 x 26 cm double-J ureteral stent placement,Right retrograde pyelography, Right ureteroscopic stone extraction,Right 6 x 24 cm double-J ureteral stent placement, Interpretation of urography,Intraoperative fluoro less than 1 hour,          UROLOGICAL  02/08/2017    SL-CYSTOURETHROSCOPY WITH STENT REMOVAL, Dr Otilia Aguirre       Social HX:   Social History     Social History    Marital status:      Spouse name: N/A    Number of children: N/A    Years of education: N/A     Occupational History    Not on file.      Social History Main Topics    Smoking status: Current Some Day Smoker     Packs/day: 0.25     Years: 20.00     Types: Cigarettes    Smokeless tobacco: Never Used    Alcohol use No      Comment: \"none in over 20 years\"    Drug use: No      Comment: cocaine 2011    Sexual activity: Yes     Partners: Female     Other Topics Concern    Not on file     Social History Narrative       FHX:   Family History   Problem Relation Age of Onset    Diabetes Father     Heart Disease Father     Stroke Father        Allergy:   Allergies   Allergen Reactions    Bee Sting [Sting, Bee] Hives and Shortness of Breath    Codeine Hives and Shortness of Breath     Has tolerated Hydromorphone.  Haldol [Haloperidol Lactate] Hives and Shortness of Breath    Haloperidol Hives and Cough    Keflex [Cephalexin] Diarrhea    No Allergy Information Available Other (comments)     Other reaction(s): hives    Shellfish Containing Products Hives    Stadol [Butorphanol Tartrate] Hives, Shortness of Breath and Rash    Vicodin [Hydrocodone-Acetaminophen] Hives, Shortness of Breath and Rash       Home Medications:     Prescriptions Prior to Admission   Medication Sig    diazePAM (VALIUM) 10 mg tablet Take 10 mg by mouth every six (6) hours as needed for Anxiety.  ondansetron (ZOFRAN ODT) 4 mg disintegrating tablet 4 mg.  tamsulosin (FLOMAX) 0.4 mg capsule Take 1 Cap by mouth daily (after dinner).  meperidine (DEMEROL) 100 mg tablet Take 100 mg by mouth every four (4) hours as needed for Pain.  esomeprazole (NEXIUM) 40 mg capsule Take 1 Cap by mouth Before breakfast and dinner. (Patient taking differently: Take 40 mg by mouth daily.)    carvedilol (COREG) 25 mg tablet Take 25 mg by mouth two (2) times daily (with meals).  atorvastatin (LIPITOR) 40 mg tablet Take 40 mg by mouth daily.  DULoxetine (CYMBALTA) 60 mg capsule Take 60 mg by mouth daily.  HYDROmorphone (DILAUDID) 2 mg tablet Take 1-2 Tabs by mouth every four (4) hours as needed for Pain. Max Daily Amount: 24 mg.  clopidogrel (PLAVIX) 75 mg tab Take 75 mg by mouth.  nitroglycerin (NITROSTAT) 0.3 mg SL tablet 0.3 mg.       Review of Systems:     Constitutional: No fevers, chills, weight loss, fatigue. Cardiovascular: No chest pain, heart palpitations. Respiratory: No cough, SOB, wheezing, chest discomfort, orthopnea. Gastrointestinal: Dysphagia       Musculoskeletal: No weakness, arthralgias, wasting. Allergies: As noted. Visit Vitals    /74    Pulse 78    Temp 97.6 °F (36.4 °C)    Resp 12    Ht 5' 7\" (1.702 m)    Wt 108.4 kg (239 lb)    SpO2 97%    BMI 37.43 kg/m2       Physical Assessment:     constitutional: appearance: well developed, well nourished, normal habitus, no deformities, in no acute distress. ENMT: mouth: normal oral mucosa,lips and gums; good dentition. oropharynx: normal tongue, hard and soft palate; posterior pharynx without erithema, exudate or lesions. respiratory: effort: normal chest excursion; no intercostal retraction or accessory muscle use. cardiovascular: abdominal aorta: normal size and position; no bruits. palpation: PMI of normal size and position; normal rhythm; no thrill or murmurs. abdominal: abdomen: normal consistency; no tenderness or masses. hernias: no hernias appreciated. liver: normal size and consistency. spleen: not palpable. rectal: hemoccult/guaiac: not performed. musculoskeletal: digits and nails: no clubbing, cyanosis, petechiae or other inflammatory conditions. psychiatric: orientation: oriented to time, space and person. Nory Oakley MD, M.D. [facility] 1948, [address] [facility]  Pager [phone]  [email or web address]

## 2017-08-22 NOTE — ANESTHESIA POSTPROCEDURE EVALUATION
Post-Anesthesia Evaluation and Assessment    Patient: Marbin Ceballos MRN: [id number]  SSN: [id number]    YOB: 1955  Age: 60 y.o. Sex: male       Cardiovascular Function/Vital Signs  Visit Vitals    /53    Pulse 68    Temp 35.7 °C (96.3 °F)    Resp 16    Ht 5' 7\" (1.702 m)    Wt 108.4 kg (239 lb)    SpO2 99%    BMI 37.43 kg/m2       Patient is status post MAC anesthesia for Procedure(s):  ENDOSCOPY WITH ESOPHAGEAL DILATATION 54Fr. Nausea/Vomiting: None    Postoperative hydration reviewed and adequate. Pain:  Pain Scale 1: Numeric (0 - 10) (08/22/17 0708)  Pain Intensity 1: 0 (08/22/17 0708)   Managed    Neurological Status: At baseline    Mental Status and Level of Consciousness: Arousable    Pulmonary Status:   O2 Device: CO2 nasal cannula (08/22/17 0747)   Adequate oxygenation and airway patent    Complications related to anesthesia: None    Post-anesthesia assessment completed.  No concerns    Signed By: Yahir Cunha MD     August 22, 2017

## 2017-08-22 NOTE — ANESTHESIA PREPROCEDURE EVALUATION
Anesthetic History     PONV          Review of Systems / Medical History  Patient summary reviewed, nursing notes reviewed and pertinent labs reviewed    Pulmonary        Sleep apnea  Undiagnosed apnea and smoker         Neuro/Psych       CVA       Cardiovascular    Hypertension: well controlled        Dysrhythmias : atrial fibrillation  CAD    Exercise tolerance: >4 METS     GI/Hepatic/Renal     GERD: well controlled      PUD and liver disease     Endo/Other        Obesity and arthritis     Other Findings   Comments: Risk Factors for Postoperative nausea/vomiting:       History of postoperative nausea/vomiting? YES       Female? NO       Motion sickness? NO       Intended opioid administration for postoperative analgesia? NO      Smoking Abstinence  Current Smoker? YES  Elective Surgery? YES  Seen preoperatively by anesthesiologist or proxy prior to day of surgery? YES  Pt abstained from smoking 24 hours prior to anesthesia?  NO           Physical Exam    Airway  Mallampati: II  TM Distance: 4 - 6 cm  Neck ROM: short neck   Mouth opening: Normal     Cardiovascular    Rhythm: irregular           Dental    Dentition: Poor dentition     Pulmonary  Breath sounds clear to auscultation               Abdominal  GI exam deferred       Other Findings            Anesthetic Plan    ASA: 3  Anesthesia type: MAC            Anesthetic plan and risks discussed with: Patient

## 2017-10-17 ENCOUNTER — APPOINTMENT (OUTPATIENT)
Dept: GENERAL RADIOLOGY | Age: 62
End: 2017-10-17
Attending: EMERGENCY MEDICINE
Payer: MEDICARE

## 2017-10-17 ENCOUNTER — HOSPITAL ENCOUNTER (EMERGENCY)
Age: 62
Discharge: HOME | End: 2017-10-18
Attending: EMERGENCY MEDICINE
Payer: MEDICARE

## 2017-10-17 DIAGNOSIS — R07.89 OTHER CHEST PAIN: Primary | ICD-10-CM

## 2017-10-17 LAB
ALBUMIN SERPL-MCNC: 3 G/DL (ref 3.4–5)
ALBUMIN/GLOB SERPL: 0.7 {RATIO} (ref 0.8–1.7)
ALP SERPL-CCNC: 128 U/L (ref 45–117)
ALT SERPL-CCNC: 49 U/L (ref 16–61)
ANION GAP SERPL CALC-SCNC: 6 MMOL/L (ref 3–18)
APTT PPP: 29.2 SEC (ref 23–36.4)
AST SERPL-CCNC: 51 U/L (ref 15–37)
BASOPHILS # BLD: 0 K/UL (ref 0–0.1)
BASOPHILS NFR BLD: 1 % (ref 0–2)
BILIRUB SERPL-MCNC: 0.6 MG/DL (ref 0.2–1)
BNP SERPL-MCNC: 149 PG/ML (ref 0–900)
BUN SERPL-MCNC: 7 MG/DL (ref 7–18)
BUN/CREAT SERPL: 10 (ref 12–20)
CALCIUM SERPL-MCNC: 9.7 MG/DL (ref 8.5–10.1)
CHLORIDE SERPL-SCNC: 109 MMOL/L (ref 100–108)
CK MB CFR SERPL CALC: NORMAL % (ref 0–4)
CK MB SERPL-MCNC: <1 NG/ML (ref 5–25)
CK SERPL-CCNC: 104 U/L (ref 39–308)
CO2 SERPL-SCNC: 27 MMOL/L (ref 21–32)
CREAT SERPL-MCNC: 0.72 MG/DL (ref 0.6–1.3)
DIFFERENTIAL METHOD BLD: ABNORMAL
EOSINOPHIL # BLD: 0.4 K/UL (ref 0–0.4)
EOSINOPHIL NFR BLD: 5 % (ref 0–5)
ERYTHROCYTE [DISTWIDTH] IN BLOOD BY AUTOMATED COUNT: 13.3 % (ref 11.6–14.5)
GLOBULIN SER CALC-MCNC: 4.2 G/DL (ref 2–4)
GLUCOSE SERPL-MCNC: 84 MG/DL (ref 74–99)
HCT VFR BLD AUTO: 41.3 % (ref 36–48)
HGB BLD-MCNC: 14.9 G/DL (ref 13–16)
INR PPP: 1.1 (ref 0.8–1.2)
LIPASE SERPL-CCNC: 194 U/L (ref 73–393)
LYMPHOCYTES # BLD: 3 K/UL (ref 0.9–3.6)
LYMPHOCYTES NFR BLD: 34 % (ref 21–52)
MCH RBC QN AUTO: 32 PG (ref 24–34)
MCHC RBC AUTO-ENTMCNC: 36.1 G/DL (ref 31–37)
MCV RBC AUTO: 88.6 FL (ref 74–97)
MONOCYTES # BLD: 0.6 K/UL (ref 0.05–1.2)
MONOCYTES NFR BLD: 7 % (ref 3–10)
NEUTS SEG # BLD: 4.7 K/UL (ref 1.8–8)
NEUTS SEG NFR BLD: 53 % (ref 40–73)
PLATELET # BLD AUTO: 139 K/UL (ref 135–420)
PMV BLD AUTO: 9.8 FL (ref 9.2–11.8)
POTASSIUM SERPL-SCNC: 3.4 MMOL/L (ref 3.5–5.5)
PROT SERPL-MCNC: 7.2 G/DL (ref 6.4–8.2)
PROTHROMBIN TIME: 13.4 SEC (ref 11.5–15.2)
RBC # BLD AUTO: 4.66 M/UL (ref 4.7–5.5)
SODIUM SERPL-SCNC: 142 MMOL/L (ref 136–145)
TROPONIN I SERPL-MCNC: <0.02 NG/ML (ref 0–0.04)
WBC # BLD AUTO: 8.8 K/UL (ref 4.6–13.2)

## 2017-10-17 PROCEDURE — 80053 COMPREHEN METABOLIC PANEL: CPT | Performed by: EMERGENCY MEDICINE

## 2017-10-17 PROCEDURE — 74022 RADEX COMPL AQT ABD SERIES: CPT

## 2017-10-17 PROCEDURE — 96374 THER/PROPH/DIAG INJ IV PUSH: CPT

## 2017-10-17 PROCEDURE — 85610 PROTHROMBIN TIME: CPT | Performed by: EMERGENCY MEDICINE

## 2017-10-17 PROCEDURE — 99285 EMERGENCY DEPT VISIT HI MDM: CPT

## 2017-10-17 PROCEDURE — 83690 ASSAY OF LIPASE: CPT | Performed by: EMERGENCY MEDICINE

## 2017-10-17 PROCEDURE — 85730 THROMBOPLASTIN TIME PARTIAL: CPT | Performed by: EMERGENCY MEDICINE

## 2017-10-17 PROCEDURE — 82550 ASSAY OF CK (CPK): CPT | Performed by: EMERGENCY MEDICINE

## 2017-10-17 PROCEDURE — 74011250636 HC RX REV CODE- 250/636: Performed by: EMERGENCY MEDICINE

## 2017-10-17 PROCEDURE — 96375 TX/PRO/DX INJ NEW DRUG ADDON: CPT

## 2017-10-17 PROCEDURE — 83880 ASSAY OF NATRIURETIC PEPTIDE: CPT | Performed by: EMERGENCY MEDICINE

## 2017-10-17 PROCEDURE — 85025 COMPLETE CBC W/AUTO DIFF WBC: CPT | Performed by: EMERGENCY MEDICINE

## 2017-10-17 PROCEDURE — 96376 TX/PRO/DX INJ SAME DRUG ADON: CPT

## 2017-10-17 PROCEDURE — 93005 ELECTROCARDIOGRAM TRACING: CPT

## 2017-10-17 RX ORDER — ONDANSETRON 2 MG/ML
4 INJECTION INTRAMUSCULAR; INTRAVENOUS
Status: COMPLETED | OUTPATIENT
Start: 2017-10-17 | End: 2017-10-17

## 2017-10-17 RX ORDER — HYDROMORPHONE HYDROCHLORIDE 2 MG/ML
1 INJECTION, SOLUTION INTRAMUSCULAR; INTRAVENOUS; SUBCUTANEOUS
Status: COMPLETED | OUTPATIENT
Start: 2017-10-17 | End: 2017-10-17

## 2017-10-17 RX ORDER — HYDROMORPHONE HYDROCHLORIDE 1 MG/ML
0.5 INJECTION, SOLUTION INTRAMUSCULAR; INTRAVENOUS; SUBCUTANEOUS
Status: COMPLETED | OUTPATIENT
Start: 2017-10-17 | End: 2017-10-18

## 2017-10-17 RX ADMIN — HYDROMORPHONE HYDROCHLORIDE 1 MG: 2 INJECTION, SOLUTION INTRAMUSCULAR; INTRAVENOUS; SUBCUTANEOUS at 22:04

## 2017-10-17 RX ADMIN — ONDANSETRON 4 MG: 2 INJECTION INTRAMUSCULAR; INTRAVENOUS at 22:05

## 2017-10-18 ENCOUNTER — ANESTHESIA EVENT (OUTPATIENT)
Dept: ENDOSCOPY | Age: 62
End: 2017-10-18
Payer: MEDICARE

## 2017-10-18 VITALS
WEIGHT: 239 LBS | DIASTOLIC BLOOD PRESSURE: 61 MMHG | RESPIRATION RATE: 12 BRPM | OXYGEN SATURATION: 92 % | SYSTOLIC BLOOD PRESSURE: 125 MMHG | TEMPERATURE: 97.7 F | BODY MASS INDEX: 37.43 KG/M2 | HEART RATE: 96 BPM

## 2017-10-18 LAB
ATRIAL RATE: 88 BPM
CALCULATED P AXIS, ECG09: 47 DEGREES
CALCULATED R AXIS, ECG10: -21 DEGREES
CALCULATED T AXIS, ECG11: 53 DEGREES
DIAGNOSIS, 93000: NORMAL
P-R INTERVAL, ECG05: 142 MS
Q-T INTERVAL, ECG07: 354 MS
QRS DURATION, ECG06: 84 MS
QTC CALCULATION (BEZET), ECG08: 428 MS
VENTRICULAR RATE, ECG03: 88 BPM

## 2017-10-18 PROCEDURE — 74011250636 HC RX REV CODE- 250/636: Performed by: EMERGENCY MEDICINE

## 2017-10-18 RX ADMIN — HYDROMORPHONE HYDROCHLORIDE 0.5 MG: 1 INJECTION, SOLUTION INTRAMUSCULAR; INTRAVENOUS; SUBCUTANEOUS at 00:16

## 2017-10-18 NOTE — DISCHARGE INSTRUCTIONS
Chest Pain: Care Instructions  Your Care Instructions  There are many things that can cause chest pain. Some are not serious and will get better on their own in a few days. But some kinds of chest pain need more testing and treatment. Your doctor may have recommended a follow-up visit in the next 8 to 12 hours. If you are not getting better, you may need more tests or treatment. Even though your doctor has released you, you still need to watch for any problems. The doctor carefully checked you, but sometimes problems can develop later. If you have new symptoms or if your symptoms do not get better, get medical care right away. If you have worse or different chest pain or pressure that lasts more than 5 minutes or you passed out (lost consciousness), call 911 or seek other emergency help right away. A medical visit is only one step in your treatment. Even if you feel better, you still need to do what your doctor recommends, such as going to all suggested follow-up appointments and taking medicines exactly as directed. This will help you recover and help prevent future problems. How can you care for yourself at home? · Rest until you feel better. · Take your medicine exactly as prescribed. Call your doctor if you think you are having a problem with your medicine. · Do not drive after taking a prescription pain medicine. When should you call for help? Call 911 if:  · You passed out (lost consciousness). · You have severe difficulty breathing. · You have symptoms of a heart attack. These may include:  ¨ Chest pain or pressure, or a strange feeling in your chest.  ¨ Sweating. ¨ Shortness of breath. ¨ Nausea or vomiting. ¨ Pain, pressure, or a strange feeling in your back, neck, jaw, or upper belly or in one or both shoulders or arms. ¨ Lightheadedness or sudden weakness. ¨ A fast or irregular heartbeat.   After you call 911, the  may tell you to chew 1 adult-strength or 2 to 4 low-dose aspirin. Wait for an ambulance. Do not try to drive yourself. Call your doctor today if:  · You have any trouble breathing. · Your chest pain gets worse. · You are dizzy or lightheaded, or you feel like you may faint. · You are not getting better as expected. · You are having new or different chest pain. Where can you learn more? Go to http://turner-doris.info/. Enter A120 in the search box to learn more about \"Chest Pain: Care Instructions. \"  Current as of: March 20, 2017  Content Version: 11.3  © 8622-3840 [employer]. Care instructions adapted under license by [employer] (which disclaims liability or warranty for this information). If you have questions about a medical condition or this instruction, always ask your healthcare professional. [address] any warranty or liability for your use of this information.

## 2017-10-18 NOTE — ED TRIAGE NOTES
Patient A/O x 4, complaining of chest pain, SOB, abdominal pain x today. Patient states he called his cardiologist and was told to take nitroglycerin SL x 3, if no relief, then come to ED. Patient states he took his doses of Nitroglycerin x 1945, 1950, 1955. Patient states the pain is still constant.

## 2017-10-18 NOTE — ED PROVIDER NOTES
HPI Comments: 9:33 PM        Karis Masterson is a 60 y.o. Male with PMHx of GERD, Stroke, HLD, Hypokalemia and Cirrhosis presents to the ED with a chief complaint of epigastric pain for the past 3 days. Pt rates pain 8/10. 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 Pt also states chest pain and SOB. Pt states he has 3 stents placed in his heart and sees his cardiologist every 4 months. Pt also states that his GI physician, Dr. Yuni Bethea, has advised him to take three Nitro, one every 5 minutes and if he does not feel better then immediately go to the ED. Pt reports he has been very weak today. Patient denies fever, chills, cough, N/V/D, headache, dysuria, diaphoresis, syncope or any other symptoms or complaints. The history is provided by the patient. No  was used.         Past Medical History:   Diagnosis Date    Altered mental status     Arthritis     Arthropathy     Back pain     Bilateral kidney stones     Burn     ON HANDS    CAD (coronary artery disease)     MI    3 cardiac stents    Calcium nephrolithiasis     Calculus of kidney     Cardiac arrhythmia     Cerebral artery occlusion with cerebral infarction ([address]     Chest pain     Cigarette smoker     Cirrhosis, alcoholic ([address]     Cocaine abuse, episodic use     Colon polyp     Coronary artery disease     Diarrhea     DJD (degenerative joint disease)     Drug-seeking behavior     Dyskinesia     Esophageal disorder     Esophageal erosions     Flank pain, acute     Foot ulcer, right (HCC)     Gastric ulcer     GERD (gastroesophageal reflux disease)     Gross hematuria     Head trauma     Hearing reduced     Hematuria     Herniated disc     X 10 IN BACK    Hiatus hernia syndrome     History of kidney stones     HNP (herniated nucleus pulposus)     Hyperlipemia     Hypertension     Hypokalemia     Infection     Jaw fracture (HCC)     Kidney stones     Knee pain     Left ureteral stone     Muscle atrophy     Myocardial infarction ([facility] 1999    N&V (nausea and vomiting)     Nocturia     Nondependent alcohol abuse, in remission     Nutcracker esophagus     Other ill-defined conditions     dysphagia    Renal stone     Slurred speech     Stroke [facility])     questionable TIA    Swallowing difficulty     Syncopal episodes     Syncope and collapse     Tobacco dependence     Unspecified adverse effect of anesthesia     AT TIMES ESOPHAGUS SPASMS AFTER PROCEDURES    Unspecified cerebral artery occlusion with cerebral infarction        Past Surgical History:   Procedure Laterality Date    HX APPENDECTOMY      9YEARS OLD    HX CHOLECYSTECTOMY  2009    HX CORONARY STENT PLACEMENT  2008    3 STENTS PLACED    HX ENDOSCOPY      with Dilatation, multiple times    HX HEART CATHETERIZATION  2012    EVERYTHING LOOKED GOOD AT THIS POINT    HX HEENT      \"2 jaw surgeries\"    HX HERNIA REPAIR      ABDOMEN    HX KNEE ARTHROSCOPY  9/2012    LEFT    HX MOHS PROCEDURES  2016    HX ORTHOPAEDIC  12/12    left knee    HX OTHER SURGICAL      ESOPHAGUS DILATATION    HX ROTATOR CUFF REPAIR Right     x2    HX UROLOGICAL  07/18/2016    [facility]-Cystoscopy,URETEROSCOPY W/ LITHOTRIPSY, Dr Juan Watts  UROLOGICAL  10/10/2016    [facility]-Cystoscopy with removal of ureteral stent, Dr Juan Watts  UROLOGICAL  01/30/2017    [facility]-Cystoscopy, Left retrograde pyelography, Left diagnostic ureteroscopy, Left 6 x 26 cm double-J ureteral stent placement,Right retrograde pyelography, Right ureteroscopic stone extraction,Right 6 x 24 cm double-J ureteral stent placement, Interpretation of urography,Intraoperative fluoro less than 1 hour,          UROLOGICAL  02/08/2017    [facility]-CYSTOURETHROSCOPY WITH STENT REMOVAL, Dr Joreg Lorenzo         Family History:   Problem Relation Age of Onset    Diabetes Father     Heart Disease Father     Stroke Father        Social History     Social History    Marital status:      Spouse name: N/A    Number of children: N/A    Years of education: N/A     Occupational History    Not on file. Social History Main Topics    Smoking status: Current Some Day Smoker     Packs/day: 0.25     Years: 20.00     Types: Cigarettes    Smokeless tobacco: Never Used    Alcohol use No      Comment: \"none in over 20 years\"    Drug use: No      Comment: cocaine 2011    Sexual activity: Yes     Partners: Female     Other Topics Concern    Not on file     Social History Narrative         ALLERGIES: Bee sting [sting, bee]; Codeine; Haldol [haloperidol lactate]; Haloperidol; Keflex [cephalexin]; No allergy information available; Shellfish containing products; Stadol [butorphanol tartrate]; and Vicodin [hydrocodone-acetaminophen]    Review of Systems   Respiratory: Positive for shortness of breath. Cardiovascular: Positive for chest pain. Gastrointestinal: Positive for abdominal pain. All other systems reviewed and are negative. Vitals:    10/17/17 2047   BP: (!) 185/94   Pulse: 93   Resp: 20   Temp: 97.7 °F (36.5 °C)   SpO2: 97%   Weight: 108.4 kg (239 lb)            Physical Exam   Constitutional: He is oriented to person, place, and time. He appears well-developed. HENT:   Head: Normocephalic and atraumatic. Eyes: EOM are normal. Pupils are equal, round, and reactive to light. Neck: Normal range of motion. Neck supple. Cardiovascular: Normal rate, regular rhythm and normal heart sounds. Exam reveals no friction rub. No murmur heard. Pulmonary/Chest: Effort normal and breath sounds normal. No respiratory distress. He has no wheezes. Abdominal: Soft. He exhibits no distension. There is no tenderness. There is no rebound and no guarding. Musculoskeletal: Normal range of motion. Neurological: He is alert and oriented to person, place, and time. Skin: Skin is warm and dry. Psychiatric: He has a normal mood and affect.  His behavior is normal. Thought content normal.        MDM  Number of Diagnoses or Management Options  Other chest pain:   Diagnosis management comments:   EKG shows a sinus rhythm with a normal axis and normal intervals there is no ST elevation or depression there is no hypertrophy. There is trace depression morphology and II as well as aVF and V5 and V6. This is completely unchanged compared to 8 1317. Records shows no prior echo or stress here [facility] was sent to the hospital shows a stress test from July 10, 2017 is as follows  INTERPRETATION:  This is a Lexiscan nuclear stress test with the patient receiving 0.4 mg of Lexiscan, 8.5 and 33 millicuries of technetium. There were no diagnostic ST-segment changes associated with the administration of Lexiscan. Review of stress and rest nuclear SPECT images shows that there was significant soft tissue attenuation and diaphragmatic attenuation artifact present, which to some degree, limits diagnostic accuracy. There are, however, no definite pathologic perfusion defects noted. This, therefore, felt to represent a study with no diagnostic abnormalities and no definite evidence for infarct or ischemia. Review of gated images shows a normal left ventricular ejection fraction of 50%. There were no segmental wall motion abnormalities seen     patient has essentially had midsternal chest pain constant for the past 3 days he has had a history of both coronary disease and nutcracker esophagus. He contacted his GI and cardiologist who told him to get evaluated. I do not think this is a aortic dissection I do not think he needs a CAT scan pending results of the labs, he has had this in the past.     cxr napd  Axr; nsbgp.        ED Course       Procedures           Scribe Attestation      Jeanette Gooden acting as a scribe for and in the presence of Jenifer Madsen MD      October 17, 2017 at 10:21 PM       Provider Attestation:      I personally performed the services described in the documentation, reviewed the documentation, as recorded by the scribe in my presence, and it accurately and completely records my words and actions.  October 17, 2017 at 10:21 PM - Mane Mcmullen MD

## 2017-10-18 NOTE — ROUTINE PROCESS
I have reviewed discharge instructions with the patient. The patient verbalized understanding. Patient armband removed and shredded. Pt ambulated to car with wife without any distress.

## 2017-10-18 NOTE — ED NOTES
[facility]le[facility] EMERGENCY DEPT      60 y.o. male with noted past medical history who presents to the emergency department with intermittent chest and abd pain at 1300 hours that has now been constant for the last couple hours. I performed a brief evaluation, including history and physical, of the patient here in triage and I have determined that pt will need further treatment and evaluation from the main side ER physician. I have placed initial orders to help in expediting patients care. Emmy Hays M.D.

## 2017-10-19 ENCOUNTER — ANESTHESIA (OUTPATIENT)
Dept: ENDOSCOPY | Age: 62
End: 2017-10-19
Payer: MEDICARE

## 2017-10-19 ENCOUNTER — HOSPITAL ENCOUNTER (OUTPATIENT)
Age: 62
Setting detail: OUTPATIENT SURGERY
Discharge: HOME | End: 2017-10-19
Attending: INTERNAL MEDICINE | Admitting: INTERNAL MEDICINE
Payer: MEDICARE

## 2017-10-19 VITALS
SYSTOLIC BLOOD PRESSURE: 132 MMHG | OXYGEN SATURATION: 99 % | DIASTOLIC BLOOD PRESSURE: 60 MMHG | BODY MASS INDEX: 37.51 KG/M2 | HEIGHT: 67 IN | RESPIRATION RATE: 14 BRPM | HEART RATE: 78 BPM | TEMPERATURE: 97.7 F | WEIGHT: 239 LBS

## 2017-10-19 PROCEDURE — 76040000019: Performed by: INTERNAL MEDICINE

## 2017-10-19 PROCEDURE — 74011250636 HC RX REV CODE- 250/636: Performed by: NURSE ANESTHETIST, CERTIFIED REGISTERED

## 2017-10-19 PROCEDURE — 74011250636 HC RX REV CODE- 250/636

## 2017-10-19 PROCEDURE — 77030009426 HC FCPS BIOP ENDOSC BSC -B: Performed by: INTERNAL MEDICINE

## 2017-10-19 PROCEDURE — 74011000250 HC RX REV CODE- 250: Performed by: NURSE ANESTHETIST, CERTIFIED REGISTERED

## 2017-10-19 PROCEDURE — 76060000031 HC ANESTHESIA FIRST 0.5 HR: Performed by: INTERNAL MEDICINE

## 2017-10-19 PROCEDURE — 74011250636 HC RX REV CODE- 250/636: Performed by: ANESTHESIOLOGY

## 2017-10-19 PROCEDURE — 88305 TISSUE EXAM BY PATHOLOGIST: CPT | Performed by: INTERNAL MEDICINE

## 2017-10-19 PROCEDURE — 74011000250 HC RX REV CODE- 250

## 2017-10-19 RX ORDER — PROPOFOL 10 MG/ML
INJECTION, EMULSION INTRAVENOUS AS NEEDED
Status: DISCONTINUED | OUTPATIENT
Start: 2017-10-19 | End: 2017-10-19 | Stop reason: HOSPADM

## 2017-10-19 RX ORDER — LIDOCAINE HYDROCHLORIDE 20 MG/ML
INJECTION, SOLUTION EPIDURAL; INFILTRATION; INTRACAUDAL; PERINEURAL AS NEEDED
Status: DISCONTINUED | OUTPATIENT
Start: 2017-10-19 | End: 2017-10-19 | Stop reason: HOSPADM

## 2017-10-19 RX ORDER — HYDROMORPHONE HYDROCHLORIDE 2 MG/ML
1 INJECTION, SOLUTION INTRAMUSCULAR; INTRAVENOUS; SUBCUTANEOUS
Status: COMPLETED | OUTPATIENT
Start: 2017-10-19 | End: 2017-10-19

## 2017-10-19 RX ORDER — SODIUM CHLORIDE, SODIUM LACTATE, POTASSIUM CHLORIDE, CALCIUM CHLORIDE 600; 310; 30; 20 MG/100ML; MG/100ML; MG/100ML; MG/100ML
50 INJECTION, SOLUTION INTRAVENOUS CONTINUOUS
Status: DISCONTINUED | OUTPATIENT
Start: 2017-10-19 | End: 2017-10-19 | Stop reason: HOSPADM

## 2017-10-19 RX ADMIN — PROPOFOL 100 MG: 10 INJECTION, EMULSION INTRAVENOUS at 07:48

## 2017-10-19 RX ADMIN — HYDROMORPHONE HYDROCHLORIDE 1 MG: 2 INJECTION, SOLUTION INTRAMUSCULAR; INTRAVENOUS; SUBCUTANEOUS at 08:18

## 2017-10-19 RX ADMIN — LIDOCAINE HYDROCHLORIDE 60 MG: 20 INJECTION, SOLUTION EPIDURAL; INFILTRATION; INTRACAUDAL; PERINEURAL at 07:48

## 2017-10-19 RX ADMIN — FAMOTIDINE 20 MG: 10 INJECTION INTRAVENOUS at 07:23

## 2017-10-19 RX ADMIN — SODIUM CHLORIDE, SODIUM LACTATE, POTASSIUM CHLORIDE, AND CALCIUM CHLORIDE 50 ML/HR: 600; 310; 30; 20 INJECTION, SOLUTION INTRAVENOUS at 07:19

## 2017-10-19 NOTE — ANESTHESIA PREPROCEDURE EVALUATION
Anesthetic History     PONV          Review of Systems / Medical History  Patient summary reviewed and pertinent labs reviewed    Pulmonary          Smoker         Neuro/Psych       CVA  TIA     Cardiovascular    Hypertension        Dysrhythmias   CAD         GI/Hepatic/Renal     GERD      PUD and liver disease     Endo/Other        Arthritis     Other Findings   Comments:   Risk Factors for Postoperative nausea/vomiting:       History of postoperative nausea/vomiting? NO       Female? NO       Motion sickness? NO       Intended opioid administration for postoperative analgesia? YES      Smoking Abstinence  Current Smoker? YES  Elective Surgery? NO  Seen preoperatively by anesthesiologist or proxy prior to day of surgery? YES  Pt abstained from smoking 24 hours prior to anesthesia?  YES           Physical Exam    Airway  Mallampati: II  TM Distance: 4 - 6 cm  Neck ROM: normal range of motion   Mouth opening: Normal     Cardiovascular  Regular rate and rhythm,  S1 and S2 normal,  no murmur, click, rub, or gallop             Dental  No notable dental hx       Pulmonary  Breath sounds clear to auscultation               Abdominal  Abdominal exam normal       Other Findings            Anesthetic Plan    ASA: 3  Anesthesia type: MAC          Induction: Intravenous  Anesthetic plan and risks discussed with: Patient

## 2017-10-19 NOTE — ANESTHESIA POSTPROCEDURE EVALUATION
Post-Anesthesia Evaluation and Assessment    Patient: Magi Champion MRN: [id number]  SSN: [id number]    YOB: 1955  Age: 60 y.o. Sex: male       Cardiovascular Function/Vital Signs  Visit Vitals    /51    Pulse 79    Temp 36.5 °C (97.7 °F)    Resp 20    Ht 5' 7\" (1.702 m)    Wt 108.4 kg (239 lb)    SpO2 97%    BMI 37.43 kg/m2       Patient is status post MAC anesthesia for Procedure(s):  UPPER ENDOSCOPY / dilatation 54Fr / Bx's / Polypectomy. Nausea/Vomiting: None    Postoperative hydration reviewed and adequate. Pain:  Pain Scale 1: Numeric (0 - 10) (10/19/17 8601)  Pain Intensity 1: 0 (10/19/17 0708)   Managed    Neurological Status: At baseline    Mental Status and Level of Consciousness: Arousable    Pulmonary Status:   O2 Device: Nasal cannula (10/19/17 3768)   Adequate oxygenation and airway patent    Complications related to anesthesia: None    Post-anesthesia assessment completed.  No concerns    Signed By: William Champion MD     October 19, 2017

## 2017-10-19 NOTE — IP AVS SNAPSHOT
[address] [facility] [address] [id number] [phone] Patient: Karely Gong MRN: [id number] BGQ:[id number] You are allergic to the following Allergen Reactions Bee Sting (Sting, Bee) Hives Shortness of Breath Codeine Hives Shortness of Breath Has tolerated Hydromorphone. Haldol (Haloperidol Lactate) Hives Shortness of Breath Haloperidol Hives Cough Keflex (Cephalexin) Diarrhea No Allergy Information Available Other (comments) Other reaction(s): hives Shellfish Containing Products Hives Stadol (Butorphanol Tartrate) Hives Shortness of Breath Rash Vicodin (Hydrocodone-Acetaminophen) Hives Shortness of Breath Rash Recent Documentation Height Weight BMI Smoking Status 1.702 m 108.4 kg 37.43 kg/m2 Current Some Day Smoker Emergency Contacts Name Discharge Info Relation Home Work Mobile [facility] [employer] DISCHARGE CAREGIVER [3] Spouse [3] [phone] About your hospitalization You were admitted on:  October 19, 2017 You last received care in the:  [facility] You were discharged on:  October 19, 2017 Unit phone number:  [phone] Why you were hospitalized Your primary diagnosis was:  Not on File Providers Seen During Your Hospitalizations Provider Role Specialty Primary office phone Kevin Chang MD Attending Provider Gastroenterology [phone] Your Primary Care Physician (PCP) Primary Care Physician Office Phone Office Fax NONE ** None ** ** None ** Follow-up Information Follow up With Details Comments Contact Info Kevin Chang MD   [address] [place] [address] [address] [facility] 
[phone] None   None (395) Patient stated that they have no PCP Current Discharge Medication List  
  
 CONTINUE these medications which have CHANGED Dose & Instructions Dispensing Information Comments Morning Noon Evening Bedtime  
 esomeprazole 40 mg capsule Commonly known as:  NexIUM What changed:  when to take this Your last dose was: Your next dose is:    
   
   
 Dose:  40 mg Take 1 Cap by mouth Before breakfast and dinner. Quantity:  180 Cap Refills:  4 CONTINUE these medications which have NOT CHANGED Dose & Instructions Dispensing Information Comments Morning Noon Evening Bedtime  
 chlorthalidone 25 mg tablet Commonly known as:  Suzen Pay Your last dose was: Your next dose is:    
   
   
 Dose:  25 mg Take 1 Tab by mouth daily. Quantity:  30 Tab Refills:  11  
     
   
   
   
  
 COREG 25 mg tablet Generic drug:  carvedilol Your last dose was: Your next dose is:    
   
   
 Dose:  25 mg Take 25 mg by mouth two (2) times daily (with meals). Refills:  0  
     
   
   
   
  
 CYMBALTA 60 mg capsule Generic drug:  DULoxetine Your last dose was: Your next dose is:    
   
   
 Dose:  60 mg Take 60 mg by mouth daily. Refills:  0 DEMEROL 100 mg tablet Generic drug:  meperidine Your last dose was: Your next dose is:    
   
   
 Dose:  100 mg Take 100 mg by mouth every four (4) hours as needed for Pain. Refills:  0 HYDROmorphone 2 mg tablet Commonly known as:  DILAUDID Your last dose was: Your next dose is:    
   
   
 Dose:  2-4 mg Take 1-2 Tabs by mouth every four (4) hours as needed for Pain. Max Daily Amount: 24 mg. Quantity:  41 Tab Refills:  0 LIPITOR 40 mg tablet Generic drug:  atorvastatin Your last dose was: Your next dose is:    
   
   
 Dose:  40 mg Take 40 mg by mouth daily. Refills:  0 nitroglycerin 0.3 mg SL tablet Commonly known as:  NITROSTAT Your last dose was: Your next dose is:    
   
   
 Dose:  0.3 mg  
0.3 mg. Refills:  0  
     
   
   
   
  
 ondansetron 4 mg disintegrating tablet Commonly known as:  ZOFRAN ODT Your last dose was: Your next dose is:    
   
   
 Dose:  4 mg  
4 mg. Refills:  0 PLAVIX 75 mg Tab Generic drug:  clopidogrel Your last dose was: Your next dose is:    
   
   
 Dose:  75 mg Take 75 mg by mouth. Refills:  0  
     
   
   
   
  
 tamsulosin 0.4 mg capsule Commonly known as:  FLOMAX Your last dose was: Your next dose is:    
   
   
 Dose:  0.4 mg Take 1 Cap by mouth daily (after dinner). Quantity:  90 Cap Refills:  3 VALIUM 10 mg tablet Generic drug:  diazePAM  
   
Your last dose was: Your next dose is:    
   
   
 Dose:  10 mg Take 10 mg by mouth every six (6) hours as needed for Anxiety. Refills:  0 Discharge Instructions Upper GI Endoscopy: What to Expect at [facility] Your Recovery After you have an endoscopy, you will stay at the hospital or clinic for 1 to 2 hours. This will allow the medicine to wear off. You will be able to go home after your doctor or nurse checks to make sure you are not having any problems. You may have to stay overnight if you had treatment during the test. You may have a sore throat for a day or two after the test. 
This care sheet gives you a general idea about what to expect after the test. 
How can you care for yourself at home? Activity · Rest as much as you need to after you go home. · You should be able to go back to your usual activities the day after the test. 
Diet · Follow your doctor's directions for eating after the test. 
· Drink plenty of fluids (unless your doctor has told you not to). Medications · If you have a sore throat the day after the test, use an over-the-counter spray to numb your throat. Follow-up care is a key part of your treatment and safety. Be sure to make and go to all appointments, and call your doctor if you are having problems. It's also a good idea to know your test results and keep a list of the medicines you take. When should you call for help? Call 911 anytime you think you may need emergency care. For example, call if: 
· You passed out (lost consciousness). · You cough up blood. · You vomit blood or what looks like coffee grounds. · You pass maroon or very bloody stools. Call your doctor now or seek immediate medical care if: 
· You have trouble swallowing. · You have belly pain. · Your stools are black and tarlike or have streaks of blood. · You are sick to your stomach or cannot keep fluids down. Watch closely for changes in your health, and be sure to contact your doctor if: 
· Your throat still hurts after a day or two. · You do not get better as expected. Where can you learn more? Go to Handpressions.be Enter [phone] in the search box to learn more about \"Upper GI Endoscopy: What to Expect at Home. \"  
© 7938-2346 Healthwise, Incorporated. Care instructions adapted under license by Jose Patel (which disclaims liability or warranty for this information). This care instruction is for use with your licensed healthcare professional. If you have questions about a medical condition or this instruction, always ask your healthcare professional. [place] [address] any warranty or liability for your use of this information. Content Version: 51.7.516370; Current as of: November 14, 2014 DISCHARGE SUMMARY from Nurse POST-PROCEDURE INSTRUCTIONS: 
 
Call your Physician if you: 
? Observe any excess bleeding. ? Develop a temperature over 100.5o F. 
? Experience abdominal, shoulder or chest pain. ? Notice any signs of decreased circulation or nerve impairment to an extremity such as a change in color, persistent numbness, tingling, coldness or increase in pain. ? Vomit blood or you have nausea and vomiting lasting longer than 4 hours. ? Are unable to take medications. ? Are unable to urinate within 8 hours after discharge following general anesthesia or intravenous sedation. For the next 24 hours after receiving general anesthesia or intravenous sedation, or while taking prescription Narcotics, limit your activities: 
? Do NOT drive a motor vehicle, operate hazard machinery or power tools, or perform tasks that require coordination. The medication you received during your procedure may have some effect on your mental awareness. ? Do NOT make important personal or business decisions. The medication you received during your procedure may have some effect on your mental awareness. ? Do NOT drink alcoholic beverages. These drinks do not mix well with the medications that have been given to you. ? Upon discharge from the hospital, you must be accompanied by a responsible adult. ? Resume your diet as directed by your physician. ? Resume medications as your physician has prescribed. ? Please give a list of your current medications to your Primary Care Provider. ? Please update this list whenever your medications are discontinued, doses are changed, or new medications (including over-the-counter products) are added. ? Please carry medication information at all times in case of emergency situations. These are general instructions for a healthy lifestyle: No smoking/ No tobacco products/ Avoid exposure to second hand smoke. ? Surgeon General's Warning:  Quitting smoking now greatly reduces serious risk to your health. Obesity, smoking, and a sedentary lifestyle greatly increase your risk for illness.  
? A healthy diet, regular physical exercise & weight monitoring are important for maintaining a healthy lifestyle ? You may be retaining fluid if you have a history of heart failure or if you experience any of the following symptoms:  Weight gain of 3 pounds or more overnight or 5 pounds in a week, increased swelling in our hands or feet or shortness of breath while lying flat in bed. Please call your doctor as soon as you notice any of these symptoms; do not wait until your next office visit. Recognize signs and symptoms of STROKE: 
F  -  Face looks uneven A  -  Arms unable to move or move unevenly S  -  Speech slurred or non-existent T  -  Time to call 911 - as soon as signs and symptoms begin - DO NOT go back to bed or wait to see If you get better - TIME IS BRAIN. Colorectal Screening ? Colorectal cancer almost always develops from precancerous polyps (abnormal growths) in the colon or rectum. Screening tests can find precancerous polyps, so that they can be removed before they turn into cancer. Screening tests can also find colorectal cancer early, when treatment works best. 
? Speak with your physician about when you should begin screening and how often you should be tested ? Additional Information High-Fiber Diet: Care Instructions Your Care Instructions A high-fiber diet may help you relieve constipation and feel less bloated. Your doctor and dietitian will help you make a high-fiber eating plan based on your personal needs. The plan will include the things you like to eat. It will also make sure that you get 30 grams of fiber a day. Before you make changes to the way you eat, be sure to talk with your doctor or dietitian. Follow-up care is a key part of your treatment and safety. Be sure to make and go to all appointments, and call your doctor if you are having problems. It's also a good idea to know your test results and keep a list of the medicines you take. How can you care for yourself at home? · You can increase how much fiber you get if you eat more of certain foods. These foods include: ¨ Whole-grain breads and cereals. ¨ Fruits, such as pears, apples, and peaches. Eat the skins and peels if you can. ¨ Vegetables, such as broccoli, cabbage, spinach, carrots, asparagus, and squash. ¨ Starchy vegetables. These include potatoes with skins, kidney beans, and lima beans. · Take a fiber supplement every day if your doctor recommends it. Examples are Benefiber, Citrucel, FiberCon, and Metamucil. Ask your doctor how much to take. · Drink plenty of fluids, enough so that your urine is light yellow or clear like water. If you have kidney, heart, or liver disease and have to limit fluids, talk with your doctor before you increase the amount of fluids you drink. · Get some exercise every day. Exercise helps stool move through the colon. It also helps prevent constipation. · Keep a food diary. Try to notice and write down what foods cause gas, pain, or other symptoms. Then you can avoid these foods. Where can you learn more? Go to http://turner-doris.info/. Enter X779 in the search box to learn more about \"High-Fiber Diet: Care Instructions. \" Current as of: December 15, 2016 Content Version: 11.3 © 6895-3875 [employer]. Care instructions adapted under license by [employer] (which disclaims liability or warranty for this information). If you have questions about a medical condition or this instruction, always ask your healthcare professional. [place] [address] any warranty or liability for your use of this information. Gastroesophageal Reflux Disease (GERD): Care Instructions Your Care Instructions Gastroesophageal reflux disease (GERD) is the backward flow of stomach acid into the esophagus. The esophagus is the tube that leads from your throat to your stomach.  A one-way valve prevents the stomach acid from moving up into this tube. When you have GERD, this valve does not close tightly enough. If you have mild GERD symptoms including heartburn, you may be able to control the problem with antacids or over-the-counter medicine. Changing your diet, losing weight, and making other lifestyle changes can also help reduce symptoms. Follow-up care is a key part of your treatment and safety. Be sure to make and go to all appointments, and call your doctor if you are having problems. Its also a good idea to know your test results and keep a list of the medicines you take. How can you care for yourself at home? · Take your medicines exactly as prescribed. Call your doctor if you think you are having a problem with your medicine. · Your doctor may recommend over-the-counter medicine. For mild or occasional indigestion, antacids, such as Tums, Gaviscon, Mylanta, or Maalox, may help. Your doctor also may recommend over-the-counter acid reducers, such as Pepcid AC, Tagamet HB, Zantac 75, or Prilosec. Read and follow all instructions on the label. If you use these medicines often, talk with your doctor. · Change your eating habits. ¨ Its best to eat several small meals instead of two or three large meals. ¨ After you eat, wait 2 to 3 hours before you lie down. ¨ Chocolate, mint, and alcohol can make GERD worse. ¨ Spicy foods, foods that have a lot of acid (like tomatoes and oranges), and coffee can make GERD symptoms worse in some people. If your symptoms are worse after you eat a certain food, you may want to stop eating that food to see if your symptoms get better. · Do not smoke or chew tobacco. Smoking can make GERD worse. If you need help quitting, talk to your doctor about stop-smoking programs and medicines. These can increase your chances of quitting for good.  
· If you have GERD symptoms at night, raise the head of your bed 6 to 8 inches by putting the frame on blocks or placing a foam wedge under the head of your mattress. (Adding extra pillows does not work.) · Do not wear tight clothing around your middle. · Lose weight if you need to. Losing just 5 to 10 pounds can help. When should you call for help? Call your doctor now or seek immediate medical care if: 
· You have new or different belly pain. · Your stools are black and tarlike or have streaks of blood. Watch closely for changes in your health, and be sure to contact your doctor if: 
· Your symptoms have not improved after 2 days. · Food seems to catch in your throat or chest. 
Where can you learn more? Go to http://turner-doris.info/. Enter D219 in the search box to learn more about \"Gastroesophageal Reflux Disease (GERD): Care Instructions. \" Current as of: August 9, 2016 Content Version: 11.3 © 1587-4347 [employer]. Care instructions adapted under license by [employer]ent[employer] (which disclaims liability or warranty for this information). If you have questions about a medical condition or this instruction, always ask your healthcare professional. [address] any warranty or liability for your use of this information. Gastritis: Care Instructions Your Care Instructions Gastritis is a sore and upset stomach. It happens when something irritates the stomach lining. Many things can cause it. These include an infection such as the flu or something you ate or drank. Medicines or a sore on the lining of the stomach (ulcer) also can cause it. Your belly may bloat and ache. You may belch, vomit, and feel sick to your stomach. You should be able to relieve the problem by taking medicine. And it may help to change your diet. If gastritis lasts, your doctor may prescribe medicine. Follow-up care is a key part of your treatment and safety.  Be sure to make and go to all appointments, and call your doctor if you are having problems. It's also a good idea to know your test results and keep a list of the medicines you take. How can you care for yourself at home? · If your doctor prescribed antibiotics, take them as directed. Do not stop taking them just because you feel better. You need to take the full course of antibiotics. · Be safe with medicines. If your doctor prescribed medicine to decrease stomach acid, take it as directed. Call your doctor if you think you are having a problem with your medicine. · Do not take any other medicine, including over-the-counter pain relievers, without talking to your doctor first. 
· If your doctor recommends over-the-counter medicine to reduce stomach acid, such as Pepcid AC, Prilosec, Tagamet HB, or Zantac 75, follow the directions on the label. · Drink plenty of fluids (enough so that your urine is light yellow or clear like water) to prevent dehydration. Choose water and other caffeine-free clear liquids. If you have kidney, heart, or liver disease and have to limit fluids, talk with your doctor before you increase the amount of fluids you drink. · Limit how much alcohol you drink. · Avoid coffee, tea, cola drinks, chocolate, and other foods with caffeine. They increase stomach acid. When should you call for help? Call 911 anytime you think you may need emergency care. For example, call if: 
· You vomit blood or what looks like coffee grounds. · You pass maroon or very bloody stools. Call your doctor now or seek immediate medical care if: 
· You start breathing fast and have not produced urine in the last 8 hours. · You cannot keep fluids down. Watch closely for changes in your health, and be sure to contact your doctor if: 
· You do not get better as expected. Where can you learn more? Go to http://turner-doris.info/. Enter [id number] in the search box to learn more about \"Gastritis: Care Instructions. \" Current as of: August 9, 2016 Content Version: 11.3 © 4091-8663 Healthwise, [employer]. Care instructions adapted under license by [employer] (which disclaims liability or warranty for this information). If you have questions about a medical condition or this instruction, always ask your healthcare professional. [address] any warranty or liability for your use of this information. If you have questions, please call [phone]. Remember, Voz[employer] is NOT to be used for urgent needs. For medical emergencies, dial 911. Educational references and/or instructions provided during this visit included: 
 
GERD and gastritis APPOINTMENTS: 
 
Please make a follow-up appointment with your physician. Discharge information has been reviewed with the patient and responsible party. The patient and responsible party verbalized understanding. Discharge Orders None Introducing [facility]! Naty Donis introduces Voz[employer] patient portal. Now you can access parts of your medical record, email your doctor's office, and request medication refills online. 1. In your internet browser, go to [email or web address] 2. Click on the First Time User? Click Here link in the Sign In box. You will see the New Member Sign Up page. 3. Enter your Voz[employer] Access Code exactly as it appears below. You will not need to use this code after youve completed the sign-up process. If you do not sign up before the expiration date, you must request a new code. · Voz[employer] Access Code: [email or web address] Expires: 11/9/2017  9:51 AM 
 
4. Enter the last four digits of your Social Security Number (xxxx) and Date of Birth (mm/dd/yyyy) as indicated and click Submit. You will be taken to the next sign-up page. 5. Create a Lucky Antt ID. This will be your V[employer] login ID and cannot be changed, so think of one that is secure and easy to remember. 6. Create a Voz[employer]com password. You can change your password at any time. 7. Enter your Password Reset Question and Answer. This can be used at a later time if you forget your password. 8. Enter your e-mail address. You will receive e-mail notification when new information is available in [address] 9. Click Sign Up. You can now view and download portions of your medical record. 10. Click the Download Summary menu link to download a portable copy of your medical information. If you have questions, please visit the Frequently Asked Questions section of the Atlas[employer]D website. Remember, Atlas[employer]D is NOT to be used for urgent needs. For medical emergencies, dial 911. Now available from your iPhone and Android! General Information Please provide this summary of care documentation to your next provider. Patient Signature:  ____________________________________________________________ Date:  ____________________________________________________________  
  
Shane Sport Provider Signature:  ____________________________________________________________ Date:  ____________________________________________________________

## 2017-10-19 NOTE — H&P
[facility]   [email or web address]      Impression:   1.esophageal spasm chest pain dysphagia       Plan:     1. egd dil mac all risks discussed       Chief Complaint: VERONICA      HPI:  Ramandeep Mata is a 60 y.o. male who is being seen on consult for VERONICA.     PMH:   Past Medical History:   Diagnosis Date    Altered mental status     Arthritis     Arthropathy     Back pain     Bilateral kidney stones     Burn     ON HANDS    CAD (coronary artery disease)     MI    3 cardiac stents    Calcium nephrolithiasis     Calculus of kidney     Cardiac arrhythmia     Cerebral artery occlusion with cerebral infarction ([address]     Chest pain     Cigarette smoker     Cirrhosis, alcoholic (HCC)     Cocaine abuse, episodic use     Colon polyp     Diarrhea     DJD (degenerative joint disease)     Drug-seeking behavior     Dyskinesia     Esophageal disorder     Esophageal erosions     Flank pain, acute     Foot ulcer, right (HCC)     Gastric ulcer     GERD (gastroesophageal reflux disease)     Gross hematuria     Head trauma     Hearing reduced     Hematuria     Herniated disc     X 10 IN BACK    Hiatus hernia syndrome     History of kidney stones     HNP (herniated nucleus pulposus)     Hyperlipemia     Hypertension     Hypokalemia     Infection     Jaw fracture (HCC)     Kidney stones     Knee pain     Left ureteral stone     Muscle atrophy     Myocardial infarction 1999    N&V (nausea and vomiting)     Nocturia     Nondependent alcohol abuse, in remission     Nutcracker esophagus     Other ill-defined conditions(949.89)     dysphagia    Renal stone     Slurred speech     Stroke [facility])     questionable TIA    Swallowing difficulty     Syncopal episodes     Syncope and collapse     Tobacco dependence     Unspecified adverse effect of anesthesia     AT TIMES ESOPHAGUS SPASMS AFTER PROCEDURES    Unspecified cerebral artery occlusion with cerebral infarction        PSH:   Past Surgical History:   Procedure Laterality Date    HX APPENDECTOMY      9YEARS OLD    HX CHOLECYSTECTOMY  2009    HX CORONARY STENT PLACEMENT  2008    3 STENTS PLACED    HX ENDOSCOPY      with Dilatation, multiple times    HX HEART CATHETERIZATION  2012    EVERYTHING LOOKED GOOD AT THIS POINT    HX HEENT      \"2 jaw surgeries\"    HX HERNIA REPAIR      ABDOMEN    HX KNEE ARTHROSCOPY  9/2012    LEFT    HX MOHS PROCEDURES  2016    HX ORTHOPAEDIC  12/12    left knee    HX OTHER SURGICAL      ESOPHAGUS DILATATION    HX ROTATOR CUFF REPAIR Right     x2    HX UROLOGICAL  07/18/2016    SPA-Cystoscopy,URETEROSCOPY W/ LITHOTRIPSY, Dr Callum Garvey  UROLOGICAL  10/10/2016    SL-Cystoscopy with removal of ureteral stent, Dr Callum Garvey  UROLOGICAL  01/30/2017    SPAH-Cystoscopy, Left retrograde pyelography, Left diagnostic ureteroscopy, Left 6 x 26 cm double-J ureteral stent placement,Right retrograde pyelography, Right ureteroscopic stone extraction,Right 6 x 24 cm double-J ureteral stent placement, Interpretation of urography,Intraoperative fluoro less than 1 hour,          UROLOGICAL  02/08/2017    SL-CYSTOURETHROSCOPY WITH STENT REMOVAL, Dr Adán Travis       Social HX:   Social History     Social History    Marital status:      Spouse name: N/A    Number of children: N/A    Years of education: N/A     Occupational History    Not on file.      Social History Main Topics    Smoking status: Current Some Day Smoker     Packs/day: 0.25     Years: 20.00     Types: Cigarettes    Smokeless tobacco: Never Used    Alcohol use No      Comment: \"none in over 21 years\"    Drug use: No      Comment: cocaine 2011    Sexual activity: Yes     Partners: Female     Other Topics Concern    Not on file     Social History Narrative       FHX:   Family History   Problem Relation Age of Onset    Diabetes Father     Heart Disease Father     Stroke Father Allergy:   Allergies   Allergen Reactions    Bee Sting [Sting, Bee] Hives and Shortness of Breath    Codeine Hives and Shortness of Breath     Has tolerated Hydromorphone.  Haldol [Haloperidol Lactate] Hives and Shortness of Breath    Haloperidol Hives and Cough    Keflex [Cephalexin] Diarrhea    No Allergy Information Available Other (comments)     Other reaction(s): hives    Shellfish Containing Products Hives    Stadol [Butorphanol Tartrate] Hives, Shortness of Breath and Rash    Vicodin [Hydrocodone-Acetaminophen] Hives, Shortness of Breath and Rash       Home Medications:     Prescriptions Prior to Admission   Medication Sig    chlorthalidone (HYGROTEN) 25 mg tablet Take 1 Tab by mouth daily.  HYDROmorphone (DILAUDID) 2 mg tablet Take 1-2 Tabs by mouth every four (4) hours as needed for Pain. Max Daily Amount: 24 mg.  tamsulosin (FLOMAX) 0.4 mg capsule Take 1 Cap by mouth daily (after dinner).  esomeprazole (NEXIUM) 40 mg capsule Take 1 Cap by mouth Before breakfast and dinner. (Patient taking differently: Take 40 mg by mouth daily.)    carvedilol (COREG) 25 mg tablet Take 25 mg by mouth two (2) times daily (with meals).  atorvastatin (LIPITOR) 40 mg tablet Take 40 mg by mouth daily.  DULoxetine (CYMBALTA) 60 mg capsule Take 60 mg by mouth daily.  diazePAM (VALIUM) 10 mg tablet Take 10 mg by mouth every six (6) hours as needed for Anxiety.  clopidogrel (PLAVIX) 75 mg tab Take 75 mg by mouth.  ondansetron (ZOFRAN ODT) 4 mg disintegrating tablet 4 mg.  nitroglycerin (NITROSTAT) 0.3 mg SL tablet 0.3 mg.    meperidine (DEMEROL) 100 mg tablet Take 100 mg by mouth every four (4) hours as needed for Pain. Review of Systems:     Constitutional: No fevers, chills, weight loss, fatigue. Skin: No rashes, pruritis, jaundice, ulcerations, erythema. HENT: No headaches, nosebleeds, sinus pressure, rhinorrhea, sore throat.    Eyes: No visual changes, blurred vision, eye pain, photophobia, jaundice. Cardiovascular: No chest pain, heart palpitations. Respiratory: No cough, SOB, wheezing, chest discomfort, orthopnea. Gastrointestinal:    Genitourinary: No dysuria, bleeding, discharge, pyuria. Musculoskeletal: No weakness, arthralgias, wasting. Endo: No sweats. Heme: No bruising, easy bleeding. Allergies: As noted. Neurological: Cranial nerves intact. Alert and oriented. Gait not assessed. Psychiatric:  No anxiety, depression, hallucinations. Visit Vitals    /66    Pulse 75    Temp 97.7 °F (36.5 °C)    Resp 16    Ht 5' 7\" (1.702 m)    Wt 108.4 kg (239 lb)    SpO2 97%    BMI 37.43 kg/m2       Physical Assessment:     constitutional: appearance: well developed, well nourished, normal habitus, no deformities, in no acute distress. skin: inspection: no rashes, ulcers, icterus or other lesions; no clubbing or telangiectasias. palpation: no induration or subcutaneos nodules. eyes: inspection: normal conjunctivae and lids; no jaundice pupils: symmetrical, normoreactive to light, normal accommodation and size. ENMT: mouth: normal oral mucosa,lips and gums; good dentition. oropharynx: normal tongue, hard and soft palate; posterior pharynx without erythema, exudate or lesions. neck: no masses organomegaly or tenderness. respiratory: effort: normal chest excursion; no intercostal retraction or accessory muscle use. cardiovascular: abdominal aorta: normal size and position; no bruits. palpation: PMI of normal size and position; normal rhythm; no thrill or murmurs. abdominal: abdomen: normal consistency; no tenderness or masses. hernias: no hernias appreciated. liver: normal size and consistency. spleen: not palpable. rectal: hemoccult/guaiac: not performed. musculoskeletal: no deformities or muscle wasting   lymphatic: axilae: not palpable. groin: not palpable. neck: within normal limits. other: not palpable.    neurologic: cranial nerves: II-XII normal.   psychiatric: judgement/insight: within normal limits. memory: within normal limits for recent and remote events. mood and affect: no evidence of depression, anxiety or agitation. orientation: oriented to time, space and person. Basic Metabolic Profile   Recent Labs      10/17/17   2201   NA  142   K  3.4*   CL  109*   CO2  27   BUN  7   GLU  84   CA  9.7         CBC w/Diff    Recent Labs      10/17/17   2201   WBC  8.8   RBC  4.66*   HGB  14.9   HCT  41.3   MCV  88.6   MCH  32.0   MCHC  36.1   RDW  13.3   PLT  139    Recent Labs      10/17/17   2201   GRANS  53   LYMPH  34   EOS  5        Hepatic Function   Recent Labs      10/17/17   2201   ALB  3.0*   TP  7.2   TBILI  0.6   SGOT  51*   AP  128*   LPSE  194          Viviana Beauchamp MD, M.D. [facility] [address] 1948, [address] [facility]  [email or web address]

## 2017-10-19 NOTE — DISCHARGE INSTRUCTIONS
Upper GI Endoscopy: What to Expect at [address] [place] [address]  After you have an endoscopy, you will stay at the hospital or clinic for 1 to 2 hours. This will allow the medicine to wear off. You will be able to go home after your doctor or nurse checks to make sure you are not having any problems. You may have to stay overnight if you had treatment during the test. You may have a sore throat for a day or two after the test.  This care sheet gives you a general idea about what to expect after the test.  How can you care for yourself at home? Activity  · Rest as much as you need to after you go home. · You should be able to go back to your usual activities the day after the test.  Diet  · Follow your doctor's directions for eating after the test.  · Drink plenty of fluids (unless your doctor has told you not to). Medications  · If you have a sore throat the day after the test, use an over-the-counter spray to numb your throat. Follow-up care is a key part of your treatment and safety. Be sure to make and go to all appointments, and call your doctor if you are having problems. It's also a good idea to know your test results and keep a list of the medicines you take. When should you call for help? Call 911 anytime you think you may need emergency care. For example, call if:  · You passed out (lost consciousness). · You cough up blood. · You vomit blood or what looks like coffee grounds. · You pass maroon or very bloody stools. Call your doctor now or seek immediate medical care if:  · You have trouble swallowing. · You have belly pain. · Your stools are black and tarlike or have streaks of blood. · You are sick to your stomach or cannot keep fluids down. Watch closely for changes in your health, and be sure to contact your doctor if:  · Your throat still hurts after a day or two. · You do not get better as expected. Where can you learn more?    Go to DealExplorer.be  Enter J454 in the search box to learn more about \"Upper GI Endoscopy: What to Expect at Home. \"   © 2344-0249 [employer]. Care instructions adapted under license by [employer] (which disclaims liability or warranty for this information). This care instruction is for use with your licensed healthcare professional. If you have questions about a medical condition or this instruction, always ask your healthcare professional. [address]  any warranty or liability for your use of this information. Content Version: 49.6.078408; Current as of: November 14, 2014    DISCHARGE SUMMARY from Nurse     POST-PROCEDURE INSTRUCTIONS:    Call your Physician if you:  ? Observe any excess bleeding. ? Develop a temperature over 100.5o F.  ? Experience abdominal, shoulder or chest pain. ? Notice any signs of decreased circulation or nerve impairment to an extremity such as a change in color, persistent numbness, tingling, coldness or increase in pain. ? Vomit blood or you have nausea and vomiting lasting longer than 4 hours. ? Are unable to take medications. ? Are unable to urinate within 8 hours after discharge following general anesthesia or intravenous sedation. For the next 24 hours after receiving general anesthesia or intravenous sedation, or while taking prescription Narcotics, limit your activities:  ? Do NOT drive a motor vehicle, operate hazard machinery or power tools, or perform tasks that require coordination. The medication you received during your procedure may have some effect on your mental awareness. ? Do NOT make important personal or business decisions. The medication you received during your procedure may have some effect on your mental awareness. ? Do NOT drink alcoholic beverages. These drinks do not mix well with the medications that have been given to you. ? Upon discharge from the hospital, you must be accompanied by a responsible adult. ?  Resume your diet as directed by your physician. ? Resume medications as your physician has prescribed. ? Please give a list of your current medications to your Primary Care Provider. ? Please update this list whenever your medications are discontinued, doses are changed, or new medications (including over-the-counter products) are added. ? Please carry medication information at all times in case of emergency situations. These are general instructions for a healthy lifestyle:    No smoking/ No tobacco products/ Avoid exposure to second hand smoke.  Surgeon General's Warning:  Quitting smoking now greatly reduces serious risk to your health. Obesity, smoking, and a sedentary lifestyle greatly increase your risk for illness.  A healthy diet, regular physical exercise & weight monitoring are important for maintaining a healthy lifestyle   You may be retaining fluid if you have a history of heart failure or if you experience any of the following symptoms:  Weight gain of 3 pounds or more overnight or 5 pounds in a week, increased swelling in our hands or feet or shortness of breath while lying flat in bed. Please call your doctor as soon as you notice any of these symptoms; do not wait until your next office visit. Recognize signs and symptoms of STROKE:  F  -  Face looks uneven  A  -  Arms unable to move or move unevenly  S  -  Speech slurred or non-existent  T  -  Time to call 911 - as soon as signs and symptoms begin - DO NOT go back to bed or wait to see If you get better - TIME IS BRAIN. Colorectal Screening   Colorectal cancer almost always develops from precancerous polyps (abnormal growths) in the colon or rectum. Screening tests can find precancerous polyps, so that they can be removed before they turn into cancer.  Screening tests can also find colorectal cancer early, when treatment works best.  Vinicio Koehler Speak with your physician about when you should begin screening and how often you should be tested  Vinicio Koehler   Additional Information       High-Fiber Diet: Care Instructions  Your Care Instructions  A high-fiber diet may help you relieve constipation and feel less bloated. Your doctor and dietitian will help you make a high-fiber eating plan based on your personal needs. The plan will include the things you like to eat. It will also make sure that you get 30 grams of fiber a day. Before you make changes to the way you eat, be sure to talk with your doctor or dietitian. Follow-up care is a key part of your treatment and safety. Be sure to make and go to all appointments, and call your doctor if you are having problems. It's also a good idea to know your test results and keep a list of the medicines you take. How can you care for yourself at home? · You can increase how much fiber you get if you eat more of certain foods. These foods include:  ¨ Whole-grain breads and cereals. ¨ Fruits, such as pears, apples, and peaches. Eat the skins and peels if you can. ¨ Vegetables, such as broccoli, cabbage, spinach, carrots, asparagus, and squash. ¨ Starchy vegetables. These include potatoes with skins, kidney beans, and lima beans. · Take a fiber supplement every day if your doctor recommends it. Examples are Benefiber, Citrucel, FiberCon, and Metamucil. Ask your doctor how much to take. · Drink plenty of fluids, enough so that your urine is light yellow or clear like water. If you have kidney, heart, or liver disease and have to limit fluids, talk with your doctor before you increase the amount of fluids you drink. · Get some exercise every day. Exercise helps stool move through the colon. It also helps prevent constipation. · Keep a food diary. Try to notice and write down what foods cause gas, pain, or other symptoms. Then you can avoid these foods. Where can you learn more? Go to http://turner-doris.info/. Enter U755 in the search box to learn more about \"High-Fiber Diet: Care Instructions. \"  Current as of: December 15, 2016  Content Version: 11.3  © 2793-0682 [employer]. Care instructions adapted under license by [employer] (which disclaims liability or warranty for this information). If you have questions about a medical condition or this instruction, always ask your healthcare professional. [address] any warranty or liability for your use of this information. Gastroesophageal Reflux Disease (GERD): Care Instructions  Your Care Instructions    Gastroesophageal reflux disease (GERD) is the backward flow of stomach acid into the esophagus. The esophagus is the tube that leads from your throat to your stomach. A one-way valve prevents the stomach acid from moving up into this tube. When you have GERD, this valve does not close tightly enough. If you have mild GERD symptoms including heartburn, you may be able to control the problem with antacids or over-the-counter medicine. Changing your diet, losing weight, and making other lifestyle changes can also help reduce symptoms. Follow-up care is a key part of your treatment and safety. Be sure to make and go to all appointments, and call your doctor if you are having problems. Its also a good idea to know your test results and keep a list of the medicines you take. How can you care for yourself at home? · Take your medicines exactly as prescribed. Call your doctor if you think you are having a problem with your medicine. · Your doctor may recommend over-the-counter medicine. For mild or occasional indigestion, antacids, such as Tums, Gaviscon, Mylanta, or Maalox, may help. Your doctor also may recommend over-the-counter acid reducers, such as Pepcid AC, Tagamet HB, Zantac 75, or Prilosec. Read and follow all instructions on the label. If you use these medicines often, talk with your doctor. · Change your eating habits. ¨ Its best to eat several small meals instead of two or three large meals.   ¨ After you eat, wait 2 to 3 hours before you lie down. ¨ Chocolate, mint, and alcohol can make GERD worse. ¨ Spicy foods, foods that have a lot of acid (like tomatoes and oranges), and coffee can make GERD symptoms worse in some people. If your symptoms are worse after you eat a certain food, you may want to stop eating that food to see if your symptoms get better. · Do not smoke or chew tobacco. Smoking can make GERD worse. If you need help quitting, talk to your doctor about stop-smoking programs and medicines. These can increase your chances of quitting for good. · If you have GERD symptoms at night, raise the head of your bed 6 to 8 inches by putting the frame on blocks or placing a foam wedge under the head of your mattress. (Adding extra pillows does not work.)  · Do not wear tight clothing around your middle. · Lose weight if you need to. Losing just 5 to 10 pounds can help. When should you call for help? Call your doctor now or seek immediate medical care if:  · You have new or different belly pain. · Your stools are black and tarlike or have streaks of blood. Watch closely for changes in your health, and be sure to contact your doctor if:  · Your symptoms have not improved after 2 days. · Food seems to catch in your throat or chest.  Where can you learn more? Go to http://turner-doris.info/. Enter I995 in the search box to learn more about \"Gastroesophageal Reflux Disease (GERD): Care Instructions. \"  Current as of: August 9, 2016  Content Version: 11.3  © 5050-7967 [employer]. Care instructions adapted under license by [employer] (which disclaims liability or warranty for this information). If you have questions about a medical condition or this instruction, always ask your healthcare professional. [address] any warranty or liability for your use of this information.     Gastritis: Care Instructions  Your Care Instructions    Gastritis is a sore and upset stomach. It happens when something irritates the stomach lining. Many things can cause it. These include an infection such as the flu or something you ate or drank. Medicines or a sore on the lining of the stomach (ulcer) also can cause it. Your belly may bloat and ache. You may belch, vomit, and feel sick to your stomach. You should be able to relieve the problem by taking medicine. And it may help to change your diet. If gastritis lasts, your doctor may prescribe medicine. Follow-up care is a key part of your treatment and safety. Be sure to make and go to all appointments, and call your doctor if you are having problems. It's also a good idea to know your test results and keep a list of the medicines you take. How can you care for yourself at home? · If your doctor prescribed antibiotics, take them as directed. Do not stop taking them just because you feel better. You need to take the full course of antibiotics. · Be safe with medicines. If your doctor prescribed medicine to decrease stomach acid, take it as directed. Call your doctor if you think you are having a problem with your medicine. · Do not take any other medicine, including over-the-counter pain relievers, without talking to your doctor first.  · If your doctor recommends over-the-counter medicine to reduce stomach acid, such as Pepcid AC, Prilosec, Tagamet HB, or Zantac 75, follow the directions on the label. · Drink plenty of fluids (enough so that your urine is light yellow or clear like water) to prevent dehydration. Choose water and other caffeine-free clear liquids. If you have kidney, heart, or liver disease and have to limit fluids, talk with your doctor before you increase the amount of fluids you drink. · Limit how much alcohol you drink. · Avoid coffee, tea, cola drinks, chocolate, and other foods with caffeine. They increase stomach acid. When should you call for help?   Call 911 anytime you think you may need emergency care. For example, call if:  · You vomit blood or what looks like coffee grounds. · You pass maroon or very bloody stools. Call your doctor now or seek immediate medical care if:  · You start breathing fast and have not produced urine in the last 8 hours. · You cannot keep fluids down. Watch closely for changes in your health, and be sure to contact your doctor if:  · You do not get better as expected. Where can you learn more? Go to http://turner-doris.info/. Enter [id number] in the search box to learn more about \"Gastritis: Care Instructions. \"  Current as of: August 9, 2016  Content Version: 11.3  © 9144-8843 [employer]. Care instructions adapted under license by [employer] (which disclaims liability or warranty for this information). If you have questions about a medical condition or this instruction, always ask your healthcare professional. [address] any warranty or liability for your use of this information. If you have questions, please call [phone]. Remember, [employer] is NOT to be used for urgent needs. For medical emergencies, dial 911. Educational references and/or instructions provided during this visit included:    GERD and gastritis      APPOINTMENTS:    Please make a follow-up appointment with your physician. Discharge information has been reviewed with the patient and responsible party. The patient and responsible party verbalized understanding.

## 2017-10-19 NOTE — PERIOP NOTES
Dr. Lupe Beach at bedside discussing results with patient. Patient complains of chest pain regularly occurring after this procedure, Dr. Lupe Beach assessed patient, Dr. Deepali Cardenas updated on patient's status and chest pain, pain medication ordered and patient deemed stable and appropriate to discharge after pain controlled.

## 2017-12-14 RX ORDER — OMEPRAZOLE 10 MG/1
10 CAPSULE, DELAYED RELEASE ORAL 2 TIMES DAILY
COMMUNITY

## 2017-12-15 ENCOUNTER — HOSPITAL ENCOUNTER (EMERGENCY)
Age: 62
Discharge: HOME | End: 2017-12-15
Attending: EMERGENCY MEDICINE
Payer: MEDICARE

## 2017-12-15 VITALS
OXYGEN SATURATION: 98 % | DIASTOLIC BLOOD PRESSURE: 60 MMHG | HEART RATE: 79 BPM | RESPIRATION RATE: 15 BRPM | TEMPERATURE: 97.7 F | SYSTOLIC BLOOD PRESSURE: 117 MMHG

## 2017-12-15 DIAGNOSIS — K22.4 NUTCRACKER ESOPHAGUS: Primary | ICD-10-CM

## 2017-12-15 LAB
ANION GAP SERPL CALC-SCNC: 6 MMOL/L (ref 3–18)
BASOPHILS # BLD: 0 K/UL (ref 0–0.1)
BASOPHILS NFR BLD: 0 % (ref 0–2)
BUN SERPL-MCNC: 15 MG/DL (ref 7–18)
BUN/CREAT SERPL: 15 (ref 12–20)
CALCIUM SERPL-MCNC: 9.4 MG/DL (ref 8.5–10.1)
CHLORIDE SERPL-SCNC: 108 MMOL/L (ref 100–108)
CK MB CFR SERPL CALC: 1.1 % (ref 0–4)
CK MB SERPL-MCNC: 1.6 NG/ML (ref 5–25)
CK SERPL-CCNC: 147 U/L (ref 39–308)
CO2 SERPL-SCNC: 25 MMOL/L (ref 21–32)
CREAT SERPL-MCNC: 1 MG/DL (ref 0.6–1.3)
DIFFERENTIAL METHOD BLD: ABNORMAL
EOSINOPHIL # BLD: 0.6 K/UL (ref 0–0.4)
EOSINOPHIL NFR BLD: 8 % (ref 0–5)
ERYTHROCYTE [DISTWIDTH] IN BLOOD BY AUTOMATED COUNT: 13.6 % (ref 11.6–14.5)
GLUCOSE SERPL-MCNC: 86 MG/DL (ref 74–99)
HCT VFR BLD AUTO: 43.5 % (ref 36–48)
HGB BLD-MCNC: 15.7 G/DL (ref 13–16)
LYMPHOCYTES # BLD: 3.4 K/UL (ref 0.9–3.6)
LYMPHOCYTES NFR BLD: 46 % (ref 21–52)
MCH RBC QN AUTO: 32.2 PG (ref 24–34)
MCHC RBC AUTO-ENTMCNC: 36.1 G/DL (ref 31–37)
MCV RBC AUTO: 89.3 FL (ref 74–97)
MONOCYTES # BLD: 0.6 K/UL (ref 0.05–1.2)
MONOCYTES NFR BLD: 8 % (ref 3–10)
NEUTS SEG # BLD: 2.8 K/UL (ref 1.8–8)
NEUTS SEG NFR BLD: 38 % (ref 40–73)
PLATELET # BLD AUTO: 128 K/UL (ref 135–420)
PMV BLD AUTO: 10.4 FL (ref 9.2–11.8)
POTASSIUM SERPL-SCNC: 4.8 MMOL/L (ref 3.5–5.5)
RBC # BLD AUTO: 4.87 M/UL (ref 4.7–5.5)
SODIUM SERPL-SCNC: 139 MMOL/L (ref 136–145)
TROPONIN I SERPL-MCNC: <0.02 NG/ML (ref 0–0.04)
WBC # BLD AUTO: 7.5 K/UL (ref 4.6–13.2)

## 2017-12-15 PROCEDURE — 85025 COMPLETE CBC W/AUTO DIFF WBC: CPT | Performed by: EMERGENCY MEDICINE

## 2017-12-15 PROCEDURE — 74011250636 HC RX REV CODE- 250/636: Performed by: EMERGENCY MEDICINE

## 2017-12-15 PROCEDURE — 93005 ELECTROCARDIOGRAM TRACING: CPT

## 2017-12-15 PROCEDURE — 96374 THER/PROPH/DIAG INJ IV PUSH: CPT

## 2017-12-15 PROCEDURE — 80048 BASIC METABOLIC PNL TOTAL CA: CPT | Performed by: EMERGENCY MEDICINE

## 2017-12-15 PROCEDURE — 96375 TX/PRO/DX INJ NEW DRUG ADDON: CPT

## 2017-12-15 PROCEDURE — 96376 TX/PRO/DX INJ SAME DRUG ADON: CPT

## 2017-12-15 PROCEDURE — 82550 ASSAY OF CK (CPK): CPT | Performed by: EMERGENCY MEDICINE

## 2017-12-15 PROCEDURE — 99284 EMERGENCY DEPT VISIT MOD MDM: CPT

## 2017-12-15 RX ORDER — HYDROMORPHONE HYDROCHLORIDE 2 MG/ML
1 INJECTION, SOLUTION INTRAMUSCULAR; INTRAVENOUS; SUBCUTANEOUS
Status: COMPLETED | OUTPATIENT
Start: 2017-12-15 | End: 2017-12-15

## 2017-12-15 RX ORDER — HYDROMORPHONE HYDROCHLORIDE 2 MG/ML
1 INJECTION, SOLUTION INTRAMUSCULAR; INTRAVENOUS; SUBCUTANEOUS ONCE
Status: DISCONTINUED | OUTPATIENT
Start: 2017-12-15 | End: 2017-12-15

## 2017-12-15 RX ORDER — ONDANSETRON 2 MG/ML
4 INJECTION INTRAMUSCULAR; INTRAVENOUS
Status: COMPLETED | OUTPATIENT
Start: 2017-12-15 | End: 2017-12-15

## 2017-12-15 RX ORDER — HYDROMORPHONE HYDROCHLORIDE 2 MG/ML
1 INJECTION, SOLUTION INTRAMUSCULAR; INTRAVENOUS; SUBCUTANEOUS ONCE
Status: COMPLETED | OUTPATIENT
Start: 2017-12-15 | End: 2017-12-15

## 2017-12-15 RX ADMIN — HYDROMORPHONE HYDROCHLORIDE 1 MG: 2 INJECTION, SOLUTION INTRAMUSCULAR; INTRAVENOUS; SUBCUTANEOUS at 02:35

## 2017-12-15 RX ADMIN — HYDROMORPHONE HYDROCHLORIDE 1 MG: 2 INJECTION, SOLUTION INTRAMUSCULAR; INTRAVENOUS; SUBCUTANEOUS at 01:47

## 2017-12-15 RX ADMIN — ONDANSETRON 4 MG: 2 INJECTION INTRAMUSCULAR; INTRAVENOUS at 01:49
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
## 2017-12-15 NOTE — ED NOTES
I have reviewed discharge instructions with the patient. The patient verbalized understanding. Pt is AxOx4, NAD. Pt ambulated out of ED with steady gait.

## 2017-12-16 LAB
ATRIAL RATE: 86 BPM
CALCULATED P AXIS, ECG09: 62 DEGREES
CALCULATED R AXIS, ECG10: -4 DEGREES
CALCULATED T AXIS, ECG11: 33 DEGREES
DIAGNOSIS, 93000: NORMAL
P-R INTERVAL, ECG05: 142 MS
Q-T INTERVAL, ECG07: 370 MS
QRS DURATION, ECG06: 78 MS
QTC CALCULATION (BEZET), ECG08: 442 MS
VENTRICULAR RATE, ECG03: 86 BPM

## 2017-12-19 ENCOUNTER — ANESTHESIA EVENT (OUTPATIENT)
Dept: ENDOSCOPY | Age: 62
End: 2017-12-19
Payer: MEDICARE

## 2017-12-20 ENCOUNTER — ANESTHESIA (OUTPATIENT)
Dept: ENDOSCOPY | Age: 62
End: 2017-12-20
Payer: MEDICARE

## 2017-12-20 ENCOUNTER — HOSPITAL ENCOUNTER (OUTPATIENT)
Age: 62
Setting detail: OUTPATIENT SURGERY
Discharge: HOME | End: 2017-12-20
Attending: INTERNAL MEDICINE | Admitting: INTERNAL MEDICINE
Payer: MEDICARE

## 2017-12-20 VITALS
OXYGEN SATURATION: 96 % | HEART RATE: 77 BPM | RESPIRATION RATE: 16 BRPM | WEIGHT: 240.4 LBS | HEIGHT: 67 IN | TEMPERATURE: 97.2 F | BODY MASS INDEX: 37.73 KG/M2 | SYSTOLIC BLOOD PRESSURE: 130 MMHG | DIASTOLIC BLOOD PRESSURE: 67 MMHG

## 2017-12-20 PROCEDURE — 74011000250 HC RX REV CODE- 250: Performed by: NURSE ANESTHETIST, CERTIFIED REGISTERED

## 2017-12-20 PROCEDURE — 74011250636 HC RX REV CODE- 250/636

## 2017-12-20 PROCEDURE — 80307 DRUG TEST PRSMV CHEM ANLYZR: CPT | Performed by: NURSE ANESTHETIST, CERTIFIED REGISTERED

## 2017-12-20 PROCEDURE — 74011250636 HC RX REV CODE- 250/636: Performed by: NURSE ANESTHETIST, CERTIFIED REGISTERED

## 2017-12-20 PROCEDURE — 76040000019: Performed by: INTERNAL MEDICINE

## 2017-12-20 PROCEDURE — 77030018846 HC SOL IRR STRL H20 ICUM -A: Performed by: INTERNAL MEDICINE

## 2017-12-20 PROCEDURE — 77030008565 HC TBNG SUC IRR ERBE -B: Performed by: INTERNAL MEDICINE

## 2017-12-20 PROCEDURE — 76060000031 HC ANESTHESIA FIRST 0.5 HR: Performed by: INTERNAL MEDICINE

## 2017-12-20 PROCEDURE — 77030019988 HC FCPS ENDOSC DISP BSC -B: Performed by: INTERNAL MEDICINE

## 2017-12-20 RX ORDER — SODIUM CHLORIDE 0.9 % (FLUSH) 0.9 %
5-10 SYRINGE (ML) INJECTION EVERY 8 HOURS
Status: DISCONTINUED | OUTPATIENT
Start: 2017-12-20 | End: 2017-12-20 | Stop reason: HOSPADM

## 2017-12-20 RX ORDER — SODIUM CHLORIDE 0.9 % (FLUSH) 0.9 %
5-10 SYRINGE (ML) INJECTION AS NEEDED
Status: DISCONTINUED | OUTPATIENT
Start: 2017-12-20 | End: 2017-12-20 | Stop reason: HOSPADM

## 2017-12-20 RX ORDER — NALOXONE HYDROCHLORIDE 0.4 MG/ML
0.1 INJECTION, SOLUTION INTRAMUSCULAR; INTRAVENOUS; SUBCUTANEOUS ONCE
Status: DISCONTINUED | OUTPATIENT
Start: 2017-12-20 | End: 2017-12-20 | Stop reason: HOSPADM

## 2017-12-20 RX ORDER — HYDROMORPHONE HYDROCHLORIDE 2 MG/ML
INJECTION, SOLUTION INTRAMUSCULAR; INTRAVENOUS; SUBCUTANEOUS
Status: COMPLETED
Start: 2017-12-20 | End: 2017-12-20

## 2017-12-20 RX ORDER — HYDROMORPHONE HYDROCHLORIDE 1 MG/ML
INJECTION, SOLUTION INTRAMUSCULAR; INTRAVENOUS; SUBCUTANEOUS
Status: COMPLETED
Start: 2017-12-20 | End: 2017-12-20

## 2017-12-20 RX ORDER — SODIUM CHLORIDE, SODIUM LACTATE, POTASSIUM CHLORIDE, CALCIUM CHLORIDE 600; 310; 30; 20 MG/100ML; MG/100ML; MG/100ML; MG/100ML
25 INJECTION, SOLUTION INTRAVENOUS CONTINUOUS
Status: DISCONTINUED | OUTPATIENT
Start: 2017-12-20 | End: 2017-12-20 | Stop reason: HOSPADM

## 2017-12-20 RX ORDER — HYDROMORPHONE HYDROCHLORIDE 2 MG/ML
1 INJECTION, SOLUTION INTRAMUSCULAR; INTRAVENOUS; SUBCUTANEOUS
Status: COMPLETED | OUTPATIENT
Start: 2017-12-20 | End: 2017-12-20

## 2017-12-20 RX ORDER — SODIUM CHLORIDE, SODIUM LACTATE, POTASSIUM CHLORIDE, CALCIUM CHLORIDE 600; 310; 30; 20 MG/100ML; MG/100ML; MG/100ML; MG/100ML
75 INJECTION, SOLUTION INTRAVENOUS CONTINUOUS
Status: DISCONTINUED | OUTPATIENT
Start: 2017-12-20 | End: 2017-12-20 | Stop reason: HOSPADM

## 2017-12-20 RX ORDER — PROPOFOL 10 MG/ML
INJECTION, EMULSION INTRAVENOUS AS NEEDED
Status: DISCONTINUED | OUTPATIENT
Start: 2017-12-20 | End: 2017-12-20 | Stop reason: HOSPADM

## 2017-12-20 RX ORDER — HYDROMORPHONE HYDROCHLORIDE 1 MG/ML
1 INJECTION, SOLUTION INTRAMUSCULAR; INTRAVENOUS; SUBCUTANEOUS ONCE
Status: COMPLETED | OUTPATIENT
Start: 2017-12-20 | End: 2017-12-20

## 2017-12-20 RX ORDER — SODIUM CHLORIDE, SODIUM LACTATE, POTASSIUM CHLORIDE, CALCIUM CHLORIDE 600; 310; 30; 20 MG/100ML; MG/100ML; MG/100ML; MG/100ML
INJECTION, SOLUTION INTRAVENOUS
Status: DISCONTINUED | OUTPATIENT
Start: 2017-12-20 | End: 2017-12-20 | Stop reason: HOSPADM

## 2017-12-20 RX ADMIN — SODIUM CHLORIDE, SODIUM LACTATE, POTASSIUM CHLORIDE, AND CALCIUM CHLORIDE 75 ML/HR: 600; 310; 30; 20 INJECTION, SOLUTION INTRAVENOUS at 10:54

## 2017-12-20 RX ADMIN — HYDROMORPHONE HYDROCHLORIDE 1 MG: 1 INJECTION, SOLUTION INTRAMUSCULAR; INTRAVENOUS; SUBCUTANEOUS at 11:17

## 2017-12-20 RX ADMIN — PROPOFOL 50 MG: 10 INJECTION, EMULSION INTRAVENOUS at 11:02

## 2017-12-20 RX ADMIN — HYDROMORPHONE HYDROCHLORIDE 1 MG: 2 INJECTION, SOLUTION INTRAMUSCULAR; INTRAVENOUS; SUBCUTANEOUS at 11:39

## 2017-12-20 RX ADMIN — FAMOTIDINE 20 MG: 10 INJECTION, SOLUTION INTRAVENOUS at 10:45

## 2017-12-20 RX ADMIN — SODIUM CHLORIDE, SODIUM LACTATE, POTASSIUM CHLORIDE, CALCIUM CHLORIDE: 600; 310; 30; 20 INJECTION, SOLUTION INTRAVENOUS at 10:52

## 2017-12-20 RX ADMIN — PROPOFOL 50 MG: 10 INJECTION, EMULSION INTRAVENOUS at 11:03
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
## 2017-12-20 NOTE — ANESTHESIA PREPROCEDURE EVALUATION
Anesthetic History               Review of Systems / Medical History  Patient summary reviewed and pertinent labs reviewed    Pulmonary  Within defined limits                 Neuro/Psych       CVA: no residual symptoms       Cardiovascular    Hypertension          CAD (Denies angina) and cardiac stents    Exercise tolerance: >4 METS     GI/Hepatic/Renal     GERD: poorly controlled      PUD and liver disease (Alcoholic cirhossis)     Endo/Other        Arthritis    Comments: Took Marihuana and Cocaine many years ago Other Findings   Comments:   Risk Factors for Postoperative nausea/vomiting:       History of postoperative nausea/vomiting? NO       Female? NO       Motion sickness? NO       Intended opioid administration for postoperative analgesia? NO      Smoking Abstinence  Current Smoker? YES  Elective Surgery? NO  Seen preoperatively by anesthesiologist or proxy prior to day of surgery? YES  Pt abstained from smoking 24 hours prior to anesthesia?  N/A         Physical Exam    Airway  Mallampati: II  TM Distance: 4 - 6 cm  Neck ROM: normal range of motion   Mouth opening: Normal     Cardiovascular  Regular rate and rhythm,  S1 and S2 normal,  no murmur, click, rub, or gallop             Dental    Dentition: Poor dentition     Pulmonary  Breath sounds clear to auscultation               Abdominal  GI exam deferred       Other Findings            Anesthetic Plan    ASA: 3  Anesthesia type: MAC          Induction: Intravenous  Anesthetic plan and risks discussed with: Patient

## 2017-12-20 NOTE — IP AVS SNAPSHOT
Rich Donis 
 
 
 [address] [facility] [address] 
[phone] Patient: Karis Masterson MRN: [id number] About your hospitalization You were admitted on:  December 20, 2017 You last received care in the:  [facility] PACU You were discharged on:  December 20, 2017 Why you were hospitalized Your primary diagnosis was:  Not on File Things You Need To Do (next 8 weeks) Follow up with None Where:  None (395) Patient stated that they have no PCP Follow up with Bobbi Boggs MD  
Follow up as instructed Phone:  [phone] Where:  [address] [facility], [address] [facility] [address], [address] [facility] Discharge Orders None A check alexa indicates which time of day the medication should be taken. My Medications TAKE these medications as instructed Instructions Each Dose to Equal  
 Morning Noon Evening Bedtime  
 chlorthalidone 25 mg tablet Commonly known as:  Sandoval Jacinto Your last dose was: Your next dose is: Take 1 Tab by mouth daily. 25 mg COREG 25 mg tablet Generic drug:  carvedilol Your last dose was: Your next dose is: Take 25 mg by mouth two (2) times daily (with meals). 25 mg  
    
   
   
   
  
 CYMBALTA 60 mg capsule Generic drug:  DULoxetine Your last dose was: Your next dose is: Take 60 mg by mouth daily. 60 mg DEMEROL 100 mg tablet Generic drug:  meperidine Your last dose was: Your next dose is: Take 100 mg by mouth every four (4) hours as needed for Pain. 100 mg  
    
   
   
   
  
 esomeprazole 40 mg capsule Commonly known as:  NexIUM Your last dose was: Your next dose is: Take 1 Cap by mouth Before breakfast and dinner. 40 mg  
    
   
   
   
  
 LIPITOR 40 mg tablet Generic drug:  atorvastatin Your last dose was: Your next dose is: Take 40 mg by mouth daily. 40 mg  
    
   
   
   
  
 nitroglycerin 0.3 mg SL tablet Commonly known as:  NITROSTAT Your last dose was: Your next dose is: 0.3 mg.  
 0.3 mg  
    
   
   
   
  
 PLAVIX 75 mg Tab Generic drug:  clopidogrel Your last dose was: Your next dose is: Take 75 mg by mouth. 75 mg PriLOSEC 10 mg capsule Generic drug:  omeprazole Your last dose was: Your next dose is: Take 10 mg by mouth daily. 10 mg  
    
   
   
   
  
 tamsulosin 0.4 mg capsule Commonly known as:  FLOMAX Your last dose was: Your next dose is: Take 1 Cap by mouth daily (after dinner). 0.4 mg  
    
   
   
   
  
 VALIUM 10 mg tablet Generic drug:  diazePAM  
   
Your last dose was: Your next dose is: Take 10 mg by mouth every six (6) hours as needed for Anxiety. 10 mg Discharge Instructions Resume your Plavix as instructed. Esophageal Dilation: What to Expect at [facility] Your Recovery After you have esophageal dilation, you will stay at the hospital or surgery center for 1 to 2 hours. This will allow the medicine to wear off. You will be able to go home after your doctor or nurse checks to make sure you are not having any problems. This care sheet gives you a general idea about how long it will take for you to recover. But each person recovers at a different pace. Follow the steps below to get better as quickly as possible. How can you care for yourself at home? Activity ? · Rest as much as you need to after you go home. ? · You should be able to go back to your usual activities the day after the procedure. Diet ? · Follow your doctor's directions for eating after the procedure. ? · Drink plenty of fluids (unless your doctor has told you not to). Medicines ? · Your doctor will tell you if and when you can restart your medicines. He or she will also give you instructions about taking any new medicines. ? · If you take blood thinners, such as warfarin (Coumadin), clopidogrel (Plavix), or aspirin, be sure to talk to your doctor. He or she will tell you if and when to start taking those medicines again. Make sure that you understand exactly what your doctor wants you to do. ? · If you have a sore throat the day after the procedure, use an over-the-counter spray to numb your throat. Sucking on throat lozenges and gargling with warm salt water may also help relieve your symptoms. Follow-up care is a key part of your treatment and safety. Be sure to make and go to all appointments, and call your doctor if you are having problems. It's also a good idea to know your test results and keep a list of the medicines you take. When should you call for help? Call 911 anytime you think you may need emergency care. For example, call if: 
? · You passed out (lost consciousness). ? · You have trouble breathing. ? · Your stools are maroon or very bloody ? Call your doctor now or seek immediate medical care if: 
? · You have new or worse belly pain. ? · You have a fever. ? · You have new or more blood in your stools. ? · You are sick to your stomach and cannot drink fluids. ? · You cannot pass stools or gas. ? · You have pain that does not get better after you take pain medicine. ? Watch closely for changes in your health, and be sure to contact your doctor if: 
? · Your throat still hurts after a day or two. ? · You do not get better as expected. Where can you learn more? Go to http://turner-doris.info/. Enter P664 in the search box to learn more about \"Esophageal Dilation: What to Expect at Home. \" Current as of: May 12, 2017 Content Version: 11.4 © 4484-2692 [employer]. Care instructions adapted under license by [employer] / [email or web address] (which disclaims liability or warranty for this information). If you have questions about a medical condition or this instruction, always ask your healthcare professional. [address] any warranty or liability for your use of this information. Esophagitis: Care Instructions Your Care Instructions Esophagitis (say \"ih-sof-uh-JY-tus\") is irritation of the esophagus, the tube that carries food from your throat to your stomach. Acid reflux is the most common cause of this condition. When you have reflux, stomach acid and juices flow upward. This can cause pain or a burning feeling in your chest. You may have a sore throat. It may be hard to swallow. Other causes of this condition include some medicines and supplements. Allergies or an infection can also cause it. Your doctor will ask about your symptoms and past health. He or she might do tests to find the cause of your symptoms. Treatment depends on what is causing the problem. Treatment might include changing your diet or taking medicine to relieve your symptoms. It might also include changing a medicine that is causing your symptoms. If you have reflux, medicine that reduces the stomach acid helps your body heal. It might take 1 to 3 weeks to heal. 
Follow-up care is a key part of your treatment and safety. Be sure to make and go to all appointments, and call your doctor if you are having problems. It's also a good idea to know your test results and keep a list of the medicines you take. How can you care for yourself at home? · If you have acid reflux, your doctor may recommend that you: 
¨ Eat several small meals instead of two or three large meals. After you eat, wait 2 to 3 hours before you lie down. ¨ Avoid chocolate, mint, alcohol, and spicy foods.  
¨ Don't smoke or use smokeless tobacco. Smoking can make this condition worse. If you need help quitting, talk to your doctor about stop-smoking programs and medicines. These can increase your chances of quitting for good. ¨ Raise the head of your bed 6 to 8 inches if you have symptoms at night. ¨ Lose weight if you are overweight. ¨ Take an over-the-counter antacid, such as Maalox, Mylanta, or Tums. Be careful when you take over-the-counter antacid medicines. Many of these medicines have aspirin in them. Read the label to make sure that you are not taking more than the recommended dose. Too much aspirin can be harmful. ¨ Take stronger acid reducers. Examples are famotidine (such as Pepcid), omeprazole (such as Prilosec), and ranitidine (such as Zantac). · If your condition is caused by infection, allergy, or other problems, use the medicine or treatments that your doctor recommends. · Be safe with medicines. Take your medicines exactly as prescribed. Call your doctor if you think you are having a problem with your medicine. When should you call for help? Call your doctor now or seek immediate medical care if: 
? · You have new or worse belly pain. ? · You are vomiting. ? Watch closely for changes in your health, and be sure to contact your doctor if: 
? · You have new or worse symptoms of reflux. ? · You have trouble or pain swallowing. ? · You are losing weight. ? · You do not get better as expected. Where can you learn more? Go to http://turner-doris.info/. Enter U761 in the search box to learn more about \"Esophagitis: Care Instructions. \" Current as of: May 12, 2017 Content Version: 11.4 © 3911-4356 [employer]. Care instructions adapted under license by [employer] (which disclaims liability or warranty for this information).  If you have questions about a medical condition or this instruction, always ask your healthcare professional. [address] any warranty or liability for your use of this information. DISCHARGE SUMMARY from Nurse PATIENT INSTRUCTIONS: 
 
 
F-face looks uneven A-arms unable to move or move unevenly S-speech slurred or non-existent T-time-call 911 as soon as signs and symptoms begin-DO NOT go Back to bed or wait to see if you get better-TIME IS BRAIN. Warning Signs of HEART ATTACK Call 911 if you have these symptoms: 
? Chest discomfort. Most heart attacks involve discomfort in the center of the chest that lasts more than a few minutes, or that goes away and comes back. It can feel like uncomfortable pressure, squeezing, fullness, or pain. ? Discomfort in other areas of the upper body. Symptoms can include pain or discomfort in one or both arms, the back, neck, jaw, or stomach. ? Shortness of breath with or without chest discomfort. ? Other signs may include breaking out in a cold sweat, nausea, or lightheadedness. Don't wait more than five minutes to call [address]! Fast action can save your life. Calling 911 is almost always the fastest way to get lifesaving treatment. Emergency Medical Services staff can begin treatment when they arrive  up to an hour sooner than if someone gets to the hospital by car. The discharge information has been reviewed with the patient and spouse. The patient and spouse verbalized understanding. Discharge medications reviewed with the patient and spouse and appropriate educational materials and side effects teaching were provided. ___________________________________________________________________________________________________________________________________ Introducing [facility] [place] [facility]! [employer] introduces [employer]uke patient portal. Now you can access parts of your medical record, email your doctor's office, and request medication refills online. 1. In your internet browser, go to [email or web address] 2. Click on the First Time User? Click Here link in the Sign In box. You will see the New Member Sign Up page. 3. Enter your [employer] Access Code exactly as it appears below. You will not need to use this code after youve completed the sign-up process. If you do not sign up before the expiration date, you must request a new code. · [employer] Access Code: [email or web address] Expires: 3/15/2018  3:05 AM 
 
4. Enter the last four digits of your Social Security Number (xxxx) and Date of Birth (mm/dd/yyyy) as indicated and click Submit. You will be taken to the next sign-up page. 5. Create a [employer] ID. This will be your [employer] login ID and cannot be changed, so think of one that is secure and easy to remember. 6. Create a [employer] password. You can change your password at any time. 7. Enter your Password Reset Question and Answer. This can be used at a later time if you forget your password. 8. Enter your e-mail address. You will receive e-mail notification when new information is available in [address] 9. Click Sign Up. You can now view and download portions of your medical record. 10. Click the Download Summary menu link to download a portable copy of your medical information. If you have questions, please visit the Frequently Asked Questions section of the [employer] website. Remember, [employer] is NOT to be used for urgent needs. For medical emergencies, dial 911. Now available from your iPhone and Android! Providers Seen During Your Hospitalization Provider Specialty Primary office phone Megan Lugo MD Gastroenterology [phone] Your Primary Care Physician (PCP) Primary Care Physician Office Phone Office Fax NONE ** None ** ** None ** You are allergic to the following Allergen Reactions Bee Sting (Sting, Bee) Hives Shortness of Breath Codeine Hives Shortness of Breath Has tolerated Hydromorphone. Haldol (Haloperidol Lactate) Hives Shortness of Breath Haloperidol Hives Cough Keflex (Cephalexin) Diarrhea No Allergy Information Available Other (comments) Other reaction(s): hives Shellfish Containing Products Hives Stadol (Butorphanol Tartrate) Hives Shortness of Breath Rash Vicodin (Hydrocodone-Acetaminophen) Hives Shortness of Breath Rash Recent Documentation Height Weight BMI Smoking Status 1.702 m 109 kg 37.65 kg/m2 Former Smoker Emergency Contacts Name Discharge Info Relation Home Work [employer] [facility] DISCHARGE CAREGIVER [3] Spouse [3] [phone] Patient Belongings The following personal items are in your possession at time of discharge: 
  Dental Appliances: None  Visual Aid: None Please provide this summary of care documentation to your next provider. Signatures-by signing, you are acknowledging that this After Visit Summary has been reviewed with you and you have received a copy. Patient Signature:  ____________________________________________________________ Date:  ____________________________________________________________  
  
Rose Loza Provider Signature:  ____________________________________________________________ Date:  ____________________________________________________________

## 2017-12-20 NOTE — DISCHARGE INSTRUCTIONS
Resume your Plavix as instructed. Esophageal Dilation: What to Expect at [address] [place] [address]  After you have esophageal dilation, you will stay at the hospital or surgery center for 1 to 2 hours. This will allow the medicine to wear off. You will be able to go home after your doctor or nurse checks to make sure you are not having any problems. This care sheet gives you a general idea about how long it will take for you to recover. But each person recovers at a different pace. Follow the steps below to get better as quickly as possible. How can you care for yourself at home? Activity  ? · Rest as much as you need to after you go home. ? · You should be able to go back to your usual activities the day after the procedure. Diet  ? · Follow your doctor's directions for eating after the procedure. ? · Drink plenty of fluids (unless your doctor has told you not to). Medicines  ? · Your doctor will tell you if and when you can restart your medicines. He or she will also give you instructions about taking any new medicines. ? · If you take blood thinners, such as warfarin (Coumadin), clopidogrel (Plavix), or aspirin, be sure to talk to your doctor. He or she will tell you if and when to start taking those medicines again. Make sure that you understand exactly what your doctor wants you to do. ? · If you have a sore throat the day after the procedure, use an over-the-counter spray to numb your throat. Sucking on throat lozenges and gargling with warm salt water may also help relieve your symptoms. Follow-up care is a key part of your treatment and safety. Be sure to make and go to all appointments, and call your doctor if you are having problems. It's also a good idea to know your test results and keep a list of the medicines you take. When should you call for help? Call 911 anytime you think you may need emergency care. For example, call if:  ? · You passed out (lost consciousness).    ? · You have trouble breathing. ? · Your stools are maroon or very bloody   ? Call your doctor now or seek immediate medical care if:  ? · You have new or worse belly pain. ? · You have a fever. ? · You have new or more blood in your stools. ? · You are sick to your stomach and cannot drink fluids. ? · You cannot pass stools or gas. ? · You have pain that does not get better after you take pain medicine. ? Watch closely for changes in your health, and be sure to contact your doctor if:  ? · Your throat still hurts after a day or two. ? · You do not get better as expected. Where can you learn more? Go to http://turner-doris.info/. Enter J493 in the search box to learn more about \"Esophageal Dilation: What to Expect at Home. \"  Current as of: May 12, 2017  Content Version: 11.4  © 1243-3848 [employer]. Care instructions adapted under license by [employer] (which disclaims liability or warranty for this information). If you have questions about a medical condition or this instruction, always ask your healthcare professional. [place] [address] any warranty or liability for your use of this information. Esophagitis: Care Instructions  Your Care Instructions    Esophagitis (say \"ih-sof-uh-JY-tus\") is irritation of the esophagus, the tube that carries food from your throat to your stomach. Acid reflux is the most common cause of this condition. When you have reflux, stomach acid and juices flow upward. This can cause pain or a burning feeling in your chest. You may have a sore throat. It may be hard to swallow. Other causes of this condition include some medicines and supplements. Allergies or an infection can also cause it. Your doctor will ask about your symptoms and past health. He or she might do tests to find the cause of your symptoms. Treatment depends on what is causing the problem. Treatment might include changing your diet or taking medicine to relieve your symptoms. It might also include changing a medicine that is causing your symptoms. If you have reflux, medicine that reduces the stomach acid helps your body heal. It might take 1 to 3 weeks to heal.  Follow-up care is a key part of your treatment and safety. Be sure to make and go to all appointments, and call your doctor if you are having problems. It's also a good idea to know your test results and keep a list of the medicines you take. How can you care for yourself at home? · If you have acid reflux, your doctor may recommend that you:  ¨ Eat several small meals instead of two or three large meals. After you eat, wait 2 to 3 hours before you lie down. ¨ Avoid chocolate, mint, alcohol, and spicy foods. ¨ Don't smoke or use smokeless tobacco. Smoking can make this condition worse. If you need help quitting, talk to your doctor about stop-smoking programs and medicines. These can increase your chances of quitting for good. ¨ Raise the head of your bed 6 to 8 inches if you have symptoms at night. ¨ Lose weight if you are overweight. ¨ Take an over-the-counter antacid, such as Maalox, Mylanta, or Tums. Be careful when you take over-the-counter antacid medicines. Many of these medicines have aspirin in them. Read the label to make sure that you are not taking more than the recommended dose. Too much aspirin can be harmful. ¨ Take stronger acid reducers. Examples are famotidine (such as Pepcid), omeprazole (such as Prilosec), and ranitidine (such as Zantac). · If your condition is caused by infection, allergy, or other problems, use the medicine or treatments that your doctor recommends. · Be safe with medicines. Take your medicines exactly as prescribed. Call your doctor if you think you are having a problem with your medicine. When should you call for help? Call your doctor now or seek immediate medical care if:  ? · You have new or worse belly pain. ? · You are vomiting. ? Watch closely for changes in your health, and be sure to contact your doctor if:  ? · You have new or worse symptoms of reflux. ? · You have trouble or pain swallowing. ? · You are losing weight. ? · You do not get better as expected. Where can you learn more? Go to http://turner-doris.info/. Enter U618 in the search box to learn more about \"Esophagitis: Care Instructions. \"  Current as of: May 12, 2017  Content Version: 11.4  © 3214-7192 [employer]. Care instructions adapted under license by [employer] (which disclaims liability or warranty for this information). If you have questions about a medical condition or this instruction, always ask your healthcare professional. [address] any warranty or liability for your use of this information. DISCHARGE SUMMARY from Nurse    PATIENT INSTRUCTIONS:    After general anesthesia or intravenous sedation, for 24 hours or while taking prescription Narcotics:  · Limit your activities  · Do not drive and operate hazardous machinery  · Do not make important personal or business decisions  · Do  not drink alcoholic beverages  · If you have not urinated within 8 hours after discharge, please contact your surgeon on call. Report the following to your surgeon:  · Excessive pain, swelling, redness or odor of or around the surgical area  · Temperature over 100.5  · Nausea and vomiting lasting longer than 4 hours or if unable to take medications  · Any signs of decreased circulation or nerve impairment to extremity: change in color, persistent  numbness, tingling, coldness or increase pain  · Any questions    *  Please give a list of your current medications to your Primary Care Provider. *  Please update this list whenever your medications are discontinued, doses are      changed, or new medications (including over-the-counter products) are added.     *  Please carry medication information at all times in case of emergency situations. These are general instructions for a healthy lifestyle:    No smoking/ No tobacco products/ Avoid exposure to second hand smoke  Surgeon General's Warning:  Quitting smoking now greatly reduces serious risk to your health. Obesity, smoking, and sedentary lifestyle greatly increases your risk for illness    A healthy diet, regular physical exercise & weight monitoring are important for maintaining a healthy lifestyle    You may be retaining fluid if you have a history of heart failure or if you experience any of the following symptoms:  Weight gain of 3 pounds or more overnight or 5 pounds in a week, increased swelling in our hands or feet or shortness of breath while lying flat in bed. Please call your doctor as soon as you notice any of these symptoms; do not wait until your next office visit. Recognize signs and symptoms of STROKE:    F-face looks uneven    A-arms unable to move or move unevenly    S-speech slurred or non-existent    T-time-call 911 as soon as signs and symptoms begin-DO NOT go       Back to bed or wait to see if you get better-TIME IS BRAIN. Warning Signs of HEART ATTACK     Call 911 if you have these symptoms:   Chest discomfort. Most heart attacks involve discomfort in the center of the chest that lasts more than a few minutes, or that goes away and comes back. It can feel like uncomfortable pressure, squeezing, fullness, or pain.  Discomfort in other areas of the upper body. Symptoms can include pain or discomfort in one or both arms, the back, neck, jaw, or stomach.  Shortness of breath with or without chest discomfort.  Other signs may include breaking out in a cold sweat, nausea, or lightheadedness. Don't wait more than five minutes to call 911 - MINUTES MATTER! Fast action can save your life. Calling 911 is almost always the fastest way to get lifesaving treatment.  Emergency Medical Services staff can begin treatment when they arrive -- up to an hour sooner than if someone gets to the hospital by car. The discharge information has been reviewed with the patient and spouse. The patient and spouse verbalized understanding. Discharge medications reviewed with the patient and spouse and appropriate educational materials and side effects teaching were provided.   ___________________________________________________________________________________________________________________________________

## 2017-12-20 NOTE — ANESTHESIA POSTPROCEDURE EVALUATION
Post-Anesthesia Evaluation and Assessment    Patient: Ene Oneal MRN: [id number]  SSN: [id number]    YOB: 1955  Age: 60 y.o. Sex: male       Cardiovascular Function/Vital Signs  Visit Vitals    /59    Pulse 81    Temp 37 °C (98.6 °F)    Resp 15    Ht 5' 7\" (1.702 m)    Wt 109 kg (240 lb 6.4 oz)    SpO2 94%    BMI 37.65 kg/m2       Patient is status post MAC anesthesia for Procedure(s):  ENDOSCOPY WITH DILATION. Nausea/Vomiting: None    Postoperative hydration reviewed and adequate. Pain:  Pain Scale 1: Numeric (0 - 10) (12/20/17 1144)  Pain Intensity 1: 3 (12/20/17 1144)   Managed    Neurological Status: At baseline    Mental Status and Level of Consciousness: Arousable    Pulmonary Status:   O2 Device: Room air (12/20/17 1115)   Adequate oxygenation and airway patent    Complications related to anesthesia: None    Post-anesthesia assessment completed.  No concerns    Signed By: Eleazar Osorio MD     December 20, 2017

## 2017-12-20 NOTE — PERIOP NOTES
Patient armband removed and shredded    Patient confirmed by two identifiers with discharge instructions prior to being provided to patient and spouse.   Instructed to resume plavix today as instructed by Dr. Gema Mendiola per Brett Mccarthy RN

## 2017-12-28 ENCOUNTER — HOSPITAL ENCOUNTER (EMERGENCY)
Age: 62
Discharge: HOME | End: 2017-12-29
Attending: EMERGENCY MEDICINE
Payer: MEDICARE

## 2017-12-28 ENCOUNTER — APPOINTMENT (OUTPATIENT)
Dept: GENERAL RADIOLOGY | Age: 62
End: 2017-12-28
Attending: EMERGENCY MEDICINE
Payer: MEDICARE

## 2017-12-28 DIAGNOSIS — R10.13 ABDOMINAL PAIN, EPIGASTRIC: Primary | ICD-10-CM

## 2017-12-28 LAB
ANION GAP SERPL CALC-SCNC: 8 MMOL/L (ref 3–18)
BASOPHILS # BLD: 0 K/UL (ref 0–0.1)
BASOPHILS NFR BLD: 0 % (ref 0–2)
BUN SERPL-MCNC: 17 MG/DL (ref 7–18)
BUN/CREAT SERPL: 20 (ref 12–20)
CALCIUM SERPL-MCNC: 10.6 MG/DL (ref 8.5–10.1)
CHLORIDE SERPL-SCNC: 108 MMOL/L (ref 100–108)
CK MB CFR SERPL CALC: 1.4 % (ref 0–4)
CK MB SERPL-MCNC: 1.2 NG/ML (ref 5–25)
CK SERPL-CCNC: 85 U/L (ref 39–308)
CO2 SERPL-SCNC: 26 MMOL/L (ref 21–32)
CREAT SERPL-MCNC: 0.86 MG/DL (ref 0.6–1.3)
DIFFERENTIAL METHOD BLD: ABNORMAL
EOSINOPHIL # BLD: 0.3 K/UL (ref 0–0.4)
EOSINOPHIL NFR BLD: 4 % (ref 0–5)
ERYTHROCYTE [DISTWIDTH] IN BLOOD BY AUTOMATED COUNT: 13.6 % (ref 11.6–14.5)
GLUCOSE SERPL-MCNC: 129 MG/DL (ref 74–99)
HCT VFR BLD AUTO: 44.1 % (ref 36–48)
HGB BLD-MCNC: 15.9 G/DL (ref 13–16)
LIPASE SERPL-CCNC: 247 U/L (ref 73–393)
LYMPHOCYTES # BLD: 2.9 K/UL (ref 0.9–3.6)
LYMPHOCYTES NFR BLD: 38 % (ref 21–52)
MCH RBC QN AUTO: 32.4 PG (ref 24–34)
MCHC RBC AUTO-ENTMCNC: 36.1 G/DL (ref 31–37)
MCV RBC AUTO: 90 FL (ref 74–97)
MONOCYTES # BLD: 0.6 K/UL (ref 0.05–1.2)
MONOCYTES NFR BLD: 8 % (ref 3–10)
NEUTS SEG # BLD: 3.9 K/UL (ref 1.8–8)
NEUTS SEG NFR BLD: 50 % (ref 40–73)
PLATELET # BLD AUTO: 134 K/UL (ref 135–420)
PMV BLD AUTO: 10.6 FL (ref 9.2–11.8)
POTASSIUM SERPL-SCNC: 4.2 MMOL/L (ref 3.5–5.5)
RBC # BLD AUTO: 4.9 M/UL (ref 4.7–5.5)
SODIUM SERPL-SCNC: 142 MMOL/L (ref 136–145)
TROPONIN I SERPL-MCNC: <0.02 NG/ML (ref 0–0.04)
WBC # BLD AUTO: 7.7 K/UL (ref 4.6–13.2)

## 2017-12-28 PROCEDURE — 96374 THER/PROPH/DIAG INJ IV PUSH: CPT

## 2017-12-28 PROCEDURE — 80048 BASIC METABOLIC PNL TOTAL CA: CPT | Performed by: EMERGENCY MEDICINE

## 2017-12-28 PROCEDURE — 93005 ELECTROCARDIOGRAM TRACING: CPT

## 2017-12-28 PROCEDURE — 83690 ASSAY OF LIPASE: CPT | Performed by: EMERGENCY MEDICINE

## 2017-12-28 PROCEDURE — 85025 COMPLETE CBC W/AUTO DIFF WBC: CPT | Performed by: EMERGENCY MEDICINE

## 2017-12-28 PROCEDURE — 99284 EMERGENCY DEPT VISIT MOD MDM: CPT

## 2017-12-28 PROCEDURE — 84484 ASSAY OF TROPONIN QUANT: CPT | Performed by: EMERGENCY MEDICINE

## 2017-12-28 PROCEDURE — 96375 TX/PRO/DX INJ NEW DRUG ADDON: CPT

## 2017-12-28 PROCEDURE — 74011250636 HC RX REV CODE- 250/636: Performed by: EMERGENCY MEDICINE

## 2017-12-28 PROCEDURE — 71010 XR CHEST PORT: CPT

## 2017-12-28 RX ORDER — HYDROMORPHONE HYDROCHLORIDE 2 MG/ML
1 INJECTION, SOLUTION INTRAMUSCULAR; INTRAVENOUS; SUBCUTANEOUS ONCE
Status: COMPLETED | OUTPATIENT
Start: 2017-12-28 | End: 2017-12-28

## 2017-12-28 RX ORDER — HYDROMORPHONE HYDROCHLORIDE 2 MG/ML
1 INJECTION, SOLUTION INTRAMUSCULAR; INTRAVENOUS; SUBCUTANEOUS ONCE
Status: COMPLETED | OUTPATIENT
Start: 2017-12-28 | End: 2017-12-29

## 2017-12-28 RX ORDER — ONDANSETRON 2 MG/ML
4 INJECTION INTRAMUSCULAR; INTRAVENOUS
Status: COMPLETED | OUTPATIENT
Start: 2017-12-28 | End: 2017-12-28

## 2017-12-28 RX ADMIN — ONDANSETRON 4 MG: 2 SOLUTION INTRAMUSCULAR; INTRAVENOUS at 22:17

## 2017-12-28 RX ADMIN — HYDROMORPHONE HYDROCHLORIDE 1 MG: 2 INJECTION, SOLUTION INTRAMUSCULAR; INTRAVENOUS; SUBCUTANEOUS at 22:17

## 2017-12-29 VITALS
BODY MASS INDEX: 39.16 KG/M2 | SYSTOLIC BLOOD PRESSURE: 134 MMHG | TEMPERATURE: 97.5 F | DIASTOLIC BLOOD PRESSURE: 87 MMHG | RESPIRATION RATE: 13 BRPM | OXYGEN SATURATION: 95 % | WEIGHT: 250 LBS | HEART RATE: 85 BPM

## 2017-12-29 LAB
ATRIAL RATE: 92 BPM
CALCULATED P AXIS, ECG09: 62 DEGREES
CALCULATED R AXIS, ECG10: 8 DEGREES
CALCULATED T AXIS, ECG11: 60 DEGREES
DIAGNOSIS, 93000: NORMAL
P-R INTERVAL, ECG05: 140 MS
Q-T INTERVAL, ECG07: 350 MS
QRS DURATION, ECG06: 86 MS
QTC CALCULATION (BEZET), ECG08: 432 MS
VENTRICULAR RATE, ECG03: 92 BPM

## 2017-12-29 PROCEDURE — 96376 TX/PRO/DX INJ SAME DRUG ADON: CPT

## 2017-12-29 RX ADMIN — HYDROMORPHONE HYDROCHLORIDE 1 MG: 2 INJECTION, SOLUTION INTRAMUSCULAR; INTRAVENOUS; SUBCUTANEOUS at 00:02

## 2017-12-29 NOTE — DISCHARGE INSTRUCTIONS
Abdominal Pain: Care Instructions  Your Care Instructions    Abdominal pain has many possible causes. Some aren't serious and get better on their own in a few days. Others need more testing and treatment. If your pain continues or gets worse, you need to be rechecked and may need more tests to find out what is wrong. You may need surgery to correct the problem. Don't ignore new symptoms, such as fever, nausea and vomiting, urination problems, pain that gets worse, and dizziness. These may be signs of a more serious problem. Your doctor may have recommended a follow-up visit in the next 8 to 12 hours. If you are not getting better, you may need more tests or treatment. The doctor has checked you carefully, but problems can develop later. If you notice any problems or new symptoms, get medical treatment right away. Follow-up care is a key part of your treatment and safety. Be sure to make and go to all appointments, and call your doctor if you are having problems. It's also a good idea to know your test results and keep a list of the medicines you take. How can you care for yourself at home? · Rest until you feel better. · To prevent dehydration, drink plenty of fluids, enough so that your urine is light yellow or clear like water. Choose water and other caffeine-free clear liquids until you feel better. If you have kidney, heart, or liver disease and have to limit fluids, talk with your doctor before you increase the amount of fluids you drink. · If your stomach is upset, eat mild foods, such as rice, dry toast or crackers, bananas, and applesauce. Try eating several small meals instead of two or three large ones. · Wait until 48 hours after all symptoms have gone away before you have spicy foods, alcohol, and drinks that contain caffeine. · Do not eat foods that are high in fat. · Avoid anti-inflammatory medicines such as aspirin, ibuprofen (Advil, Motrin), and naproxen (Aleve).  These can cause stomach upset. Talk to your doctor if you take daily aspirin for another health problem. When should you call for help? Call 911 anytime you think you may need emergency care. For example, call if:  ? · You passed out (lost consciousness). ? · You pass maroon or very bloody stools. ? · You vomit blood or what looks like coffee grounds. ? · You have new, severe belly pain. ?Call your doctor now or seek immediate medical care if:  ? · Your pain gets worse, especially if it becomes focused in one area of your belly. ? · You have a new or higher fever. ? · Your stools are black and look like tar, or they have streaks of blood. ? · You have unexpected vaginal bleeding. ? · You have symptoms of a urinary tract infection. These may include:  ¨ Pain when you urinate. ¨ Urinating more often than usual.  ¨ Blood in your urine. ? · You are dizzy or lightheaded, or you feel like you may faint. ? Watch closely for changes in your health, and be sure to contact your doctor if:  ? · You are not getting better after 1 day (24 hours). Where can you learn more? Go to http://turnerCuriyodoris.info/. Enter H417 in the search box to learn more about \"Abdominal Pain: Care Instructions. \"  Current as of: March 20, 2017  Content Version: 11.4  © 9029-9947 [employer]. Care instructions adapted under license by [employer] (which disclaims liability or warranty for this information). If you have questions about a medical condition or this instruction, always ask your healthcare professional. [place] [address] any warranty or liability for your use of this information. Abdominal Pain: Care Instructions  Your Care Instructions    Abdominal pain has many possible causes. Some aren't serious and get better on their own in a few days. Others need more testing and treatment.  If your pain continues or gets worse, you need to be rechecked and may need more tests to find out what is wrong. You may need surgery to correct the problem. Don't ignore new symptoms, such as fever, nausea and vomiting, urination problems, pain that gets worse, and dizziness. These may be signs of a more serious problem. Your doctor may have recommended a follow-up visit in the next 8 to 12 hours. If you are not getting better, you may need more tests or treatment. The doctor has checked you carefully, but problems can develop later. If you notice any problems or new symptoms, get medical treatment right away. Follow-up care is a key part of your treatment and safety. Be sure to make and go to all appointments, and call your doctor if you are having problems. It's also a good idea to know your test results and keep a list of the medicines you take. How can you care for yourself at home? · Rest until you feel better. · To prevent dehydration, drink plenty of fluids, enough so that your urine is light yellow or clear like water. Choose water and other caffeine-free clear liquids until you feel better. If you have kidney, heart, or liver disease and have to limit fluids, talk with your doctor before you increase the amount of fluids you drink. · If your stomach is upset, eat mild foods, such as rice, dry toast or crackers, bananas, and applesauce. Try eating several small meals instead of two or three large ones. · Wait until 48 hours after all symptoms have gone away before you have spicy foods, alcohol, and drinks that contain caffeine. · Do not eat foods that are high in fat. · Avoid anti-inflammatory medicines such as aspirin, ibuprofen (Advil, Motrin), and naproxen (Aleve). These can cause stomach upset. Talk to your doctor if you take daily aspirin for another health problem. When should you call for help? Call 911 anytime you think you may need emergency care. For example, call if:  ? · You passed out (lost consciousness). ? · You pass maroon or very bloody stools.    ? · You vomit blood or what looks like coffee grounds. ? · You have new, severe belly pain. ?Call your doctor now or seek immediate medical care if:  ? · Your pain gets worse, especially if it becomes focused in one area of your belly. ? · You have a new or higher fever. ? · Your stools are black and look like tar, or they have streaks of blood. ? · You have unexpected vaginal bleeding. ? · You have symptoms of a urinary tract infection. These may include:  ¨ Pain when you urinate. ¨ Urinating more often than usual.  ¨ Blood in your urine. ? · You are dizzy or lightheaded, or you feel like you may faint. ? Watch closely for changes in your health, and be sure to contact your doctor if:  ? · You are not getting better after 1 day (24 hours). Where can you learn more? Go to http://turner-doris.info/. Enter R972 in the search box to learn more about \"Abdominal Pain: Care Instructions. \"  Current as of: March 20, 2017  Content Version: 11.4  © 8290-1162 [employer]. Care instructions adapted under license by [employer] (which disclaims liability or warranty for this information). If you have questions about a medical condition or this instruction, always ask your healthcare professional. [address] any warranty or liability for your use of this information.

## 2017-12-29 NOTE — ED NOTES
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

## 2017-12-29 NOTE — ED PROVIDER NOTES
EMERGENCY DEPARTMENT HISTORY AND PHYSICAL EXAM    10:17 PM      Date: 12/28/2017  Patient Name: Kush Pedro    History of Presenting Illness     Chief Complaint   Patient presents with    Chest Pain         History Provided By: Patient    Chief Complaint: Chest Pain   Duration:3  Hours  Timing:  Constant  Location: Midsternal   Quality: N/A  Severity: Mild  Modifying Factors: States pain is relieved with Dilaudid   Associated Symptoms: trouble swallowing. Additional History (Context): Kush Pedro is a 60 y.o. male with hx of HLD, MI, CAD, CVA, cardiac stents, gastric ulcer, Nutcracker esophagus and GERD presenting to the ED with c/o constant midsternal CP that began 3 hours ago. Pt reports he was eating his dinner when all of a sudden he began to choke on a piece of food. States his wife had to \"hit his back pretty hard for the lodged food to fly out his mouth\". Notes CP began 15-20 minutes after episode. Pt took 3 NTG PTA with no relief. Denies any SOB, fever, chills, diaphoresis, leg swelling, abdominal pain, nausea, vomiting, diarrhea or urinary sx. Associated sx include headache and  trouble swallowing. Severity is mild. States pain is normally relieved with Dilaudid and has always gotten a shot in the past. Pt is followed by a cardiologist and GI. PCP: None    Current Outpatient Prescriptions   Medication Sig Dispense Refill    omeprazole (PRILOSEC) 10 mg capsule Take 10 mg by mouth daily.  chlorthalidone (HYGROTEN) 25 mg tablet Take 1 Tab by mouth daily. 30 Tab 11    diazePAM (VALIUM) 10 mg tablet Take 10 mg by mouth every six (6) hours as needed for Anxiety.  clopidogrel (PLAVIX) 75 mg tab Take 75 mg by mouth.  tamsulosin (FLOMAX) 0.4 mg capsule Take 1 Cap by mouth daily (after dinner). 90 Cap 3    nitroglycerin (NITROSTAT) 0.3 mg SL tablet 0.3 mg.      meperidine (DEMEROL) 100 mg tablet Take 100 mg by mouth every four (4) hours as needed for Pain.       esomeprazole (NEXIUM) 40 mg capsule Take 1 Cap by mouth Before breakfast and dinner. (Patient taking differently: Take 40 mg by mouth daily. ) 180 Cap 4    carvedilol (COREG) 25 mg tablet Take 25 mg by mouth two (2) times daily (with meals).  atorvastatin (LIPITOR) 40 mg tablet Take 40 mg by mouth daily.  DULoxetine (CYMBALTA) 60 mg capsule Take 60 mg by mouth daily.            Past History     Past Medical History:  Past Medical History:   Diagnosis Date    Altered mental status     Arthritis     Arthropathy     Back pain     Bilateral kidney stones     Burn     ON HANDS    CAD (coronary artery disease)     MI    3 cardiac stents    Calcium nephrolithiasis     Calculus of kidney     Cardiac arrhythmia     Cerebral artery occlusion with cerebral infarction (HCC)     Chest pain     Cigarette smoker     Cirrhosis, alcoholic (HCC)     Cocaine abuse, episodic use     Colon polyp     Diarrhea     DJD (degenerative joint disease)     Drug-seeking behavior     Dyskinesia     Esophageal disorder     Esophageal erosions     Flank pain, acute     Foot ulcer, right (HCC)     Gastric ulcer     GERD (gastroesophageal reflux disease)     Gross hematuria     Head trauma     Hearing reduced     Hematuria     Herniated disc     X 10 IN BACK    Hiatus hernia syndrome     History of kidney stones     HNP (herniated nucleus pulposus)     Hyperlipemia     Hypertension     Hypokalemia     Infection     Jaw fracture (HCC)     Kidney stones     Knee pain     Left ureteral stone     Muscle atrophy     Myocardial infarction 1999    N&V (nausea and vomiting)     Nocturia     Nondependent alcohol abuse, in remission     Nutcracker esophagus     Other ill-defined conditions(489.89)     dysphagia    Renal stone     Slurred speech     Stroke [facility])     questionable TIA    Swallowing difficulty     Syncopal episodes     Syncope and collapse     Tobacco dependence     Unspecified adverse effect of anesthesia     AT TIMES ESOPHAGUS SPASMS AFTER PROCEDURES    Unspecified cerebral artery occlusion with cerebral infarction        Past Surgical History:  Past Surgical History:   Procedure Laterality Date    HX APPENDECTOMY      9YEARS OLD    HX CHOLECYSTECTOMY  2009    HX CORONARY STENT PLACEMENT  2008    3 STENTS PLACED    HX ENDOSCOPY      with Dilatation, multiple times    HX HEART CATHETERIZATION  2012    EVERYTHING LOOKED GOOD AT THIS POINT    HX HEENT      \"2 jaw surgeries\"    HX HERNIA REPAIR      ABDOMEN    HX KNEE ARTHROSCOPY  9/2012    LEFT    HX MOHS PROCEDURES  2016    HX ORTHOPAEDIC  12/12    left knee    HX OTHER SURGICAL      ESOPHAGUS DILATATION    HX ROTATOR CUFF REPAIR Right     x2    HX UROLOGICAL  07/18/2016    SPA-Cystoscopy,URETEROSCOPY W/ LITHOTRIPSY, Dr Melinda King  UROLOGICAL  10/10/2016    SL-Cystoscopy with removal of ureteral stent, Dr Melinda King  UROLOGICAL  01/30/2017    SPA-Cystoscopy, Left retrograde pyelography, Left diagnostic ureteroscopy, Left 6 x 26 cm double-J ureteral stent placement,Right retrograde pyelography, Right ureteroscopic stone extraction,Right 6 x 24 cm double-J ureteral stent placement, Interpretation of urography,Intraoperative fluoro less than 1 hour,          UROLOGICAL  02/08/2017    SL-CYSTOURETHROSCOPY WITH STENT REMOVAL, Dr Austyn Hadley       Family History:  Family History   Problem Relation Age of Onset    Diabetes Father     Heart Disease Father     Stroke Father        Social History:  Social History   Substance Use Topics    Smoking status: Former Smoker     Packs/day: 0.25     Years: 20.00     Types: Cigarettes    Smokeless tobacco: Never Used    Alcohol use No      Comment: \"none in over 20 years\"       Allergies: Allergies   Allergen Reactions    Bee Sting [Sting, Bee] Hives and Shortness of Breath    Codeine Hives and Shortness of Breath     Has tolerated Hydromorphone.      Haldol [Haloperidol Lactate] Hives and Shortness of Breath    Haloperidol Hives and Cough    Keflex [Cephalexin] Diarrhea    No Allergy Information Available Other (comments)     Other reaction(s): hives    Shellfish Containing Products Hives    Stadol [Butorphanol Tartrate] Hives, Shortness of Breath and Rash    Vicodin [Hydrocodone-Acetaminophen] Hives, Shortness of Breath and Rash         Review of Systems       Review of Systems   Constitutional: Negative for chills, diaphoresis and fever. HENT: Positive for trouble swallowing. Respiratory: Negative for cough and shortness of breath. Cardiovascular: Positive for chest pain. Negative for leg swelling. Gastrointestinal: Negative for abdominal pain, diarrhea, nausea and vomiting. Neurological: Positive for headaches. All other systems reviewed and are negative. Physical Exam     Visit Vitals    /87    Pulse 85    Temp 97.5 °F (36.4 °C)    Resp 13    Wt 113.4 kg (250 lb)    SpO2 95%    BMI 39.16 kg/m2         Physical Exam   Constitutional: He is oriented to person, place, and time. He appears well-developed. HENT:   Head: Normocephalic and atraumatic. Eyes: Conjunctivae and EOM are normal.   Neck: Normal range of motion. Cardiovascular: Normal heart sounds. Exam reveals no gallop and no friction rub. No murmur heard. Pulmonary/Chest: Effort normal and breath sounds normal. No stridor. Abdominal: Soft. There is no tenderness. Musculoskeletal: Normal range of motion. He exhibits no tenderness. Neurological: He is alert and oriented to person, place, and time. Skin: Skin is warm and dry. He is not diaphoretic. Psychiatric: He has a normal mood and affect. His behavior is normal.   Nursing note and vitals reviewed.         Diagnostic Study Results     Labs -  Recent Results (from the past 12 hour(s))   EKG, 12 LEAD, INITIAL    Collection Time: 12/28/17  8:45 PM   Result Value Ref Range    Ventricular Rate 92 BPM    Atrial Rate 92 BPM    P-R Interval 140 ms    QRS Duration 86 ms    Q-T Interval 350 ms    QTC Calculation (Bezet) 432 ms    Calculated P Axis 62 degrees    Calculated R Axis 8 degrees    Calculated T Axis 60 degrees    Diagnosis       Normal sinus rhythm  Normal ECG  When compared with ECG of 15-DEC-2017 01:17,  No significant change was found     CARDIAC PANEL,(CK, CKMB & TROPONIN)    Collection Time: 12/28/17  9:45 PM   Result Value Ref Range    CK 85 39 - 308 U/L    CK - MB 1.2 <3.6 ng/ml    CK-MB Index 1.4 0.0 - 4.0 %    Troponin-I, Qt. <0.02 0.0 - 0.045 NG/ML   CBC WITH AUTOMATED DIFF    Collection Time: 12/28/17  9:45 PM   Result Value Ref Range    WBC 7.7 4.6 - 13.2 K/uL    RBC 4.90 4.70 - 5.50 M/uL    HGB 15.9 13.0 - 16.0 g/dL    HCT 44.1 36.0 - 48.0 %    MCV 90.0 74.0 - 97.0 FL    MCH 32.4 24.0 - 34.0 PG    MCHC 36.1 31.0 - 37.0 g/dL    RDW 13.6 11.6 - 14.5 %    PLATELET 963 (L) 431 - 420 K/uL    MPV 10.6 9.2 - 11.8 FL    NEUTROPHILS 50 40 - 73 %    LYMPHOCYTES 38 21 - 52 %    MONOCYTES 8 3 - 10 %    EOSINOPHILS 4 0 - 5 %    BASOPHILS 0 0 - 2 %    ABS. NEUTROPHILS 3.9 1.8 - 8.0 K/UL    ABS. LYMPHOCYTES 2.9 0.9 - 3.6 K/UL    ABS. MONOCYTES 0.6 0.05 - 1.2 K/UL    ABS. EOSINOPHILS 0.3 0.0 - 0.4 K/UL    ABS.  BASOPHILS 0.0 0.0 - 0.1 K/UL    DF AUTOMATED     METABOLIC PANEL, BASIC    Collection Time: 12/28/17  9:45 PM   Result Value Ref Range    Sodium 142 136 - 145 mmol/L    Potassium 4.2 3.5 - 5.5 mmol/L    Chloride 108 100 - 108 mmol/L    CO2 26 21 - 32 mmol/L    Anion gap 8 3.0 - 18 mmol/L    Glucose 129 (H) 74 - 99 mg/dL    BUN 17 7.0 - 18 MG/DL    Creatinine 0.86 0.6 - 1.3 MG/DL    BUN/Creatinine ratio 20 12 - 20      GFR est AA >60 >60 ml/min/1.73m2    GFR est non-AA >60 >60 ml/min/1.73m2    Calcium 10.6 (H) 8.5 - 10.1 MG/DL   LIPASE    Collection Time: 12/28/17  9:45 PM   Result Value Ref Range    Lipase 247 73 - 393 U/L       Radiologic Studies -   XR CHEST PORT   IMPRESSION:     No radiographic evidence of acute cardiopulmonary process. Medical Decision Making   Provider Notes (Medical Decision Making): Very reassuring exam and states he needs dilaudid for his sx. Which is likely more GI related given hx, but will eval EKG, trop, CXR. Pt has been treated with dilaudid in the past so will cont his usual treatment regimen. If work up neg and sx improved, will likely need additional esophageal dilation for his nutcracker esophagus and outpt cardiac eval. Low suspicion for PE, PNA, pericardial effusion/pericarditis, etc.     I am the first provider for this patient. I reviewed the vital signs, available nursing notes, past medical history, past surgical history, family history and social history. Vital Signs-Reviewed the patient's vital signs. Pulse Oximetry Analysis -  97% on room air (Interpretation)  Normal     Cardiac Monitor:  Rate: 103 bpm   Rhythm:  Sinus Tachycardia    EKG: Interpreted by the EP. Time Interpreted: 8:45 PM    Rate: 92 bpm    Rhythm: Normal Sinus Rhythm    Interpretation: Normal ST segments. Comparison: Unchanged from prior    Records Reviewed: Nursing Notes, Old Medical Records and Previous electrocardiograms (Time of Review: 10:17 PM)    ED Course: Progress Notes, Reevaluation, and Consults:    -Re-evaluted the patient sx much improved after dilaudid  -Results including EKG, trop, CXR were discussed and reviewed with pt who understood the implications. Otherwise reassuring results     -We discussed the diagnosis, treatment, and plan. Next steps in close outpt care include: close outpt cardiac + GI follow up    -They verbally convey understanding and agreement of the signs, symptoms, diagnosis, treatment and prognosis and additionally agree to follow up as discussed. All questions were answered, and we reviewed pertinent return precautions as seen in the discharge paperwork. Pt understood follow up instructions, and would return to the ED if any worsening or concerns.      Ramón Palma Koki Sarmiento MD  12:08 AM      Diagnosis     Clinical Impression:   1. Abdominal pain, epigastric        Disposition: Discharge     Follow-up Information     Follow up With Details Comments Contact Info    Hilda Marquez MD Call in 2 days  [address] [facility]  [address] [place] [address] [address]      Michelle Potter MD Call in 2 days  [address] [facility] [place] [facility] [phone]      [facility] EMERGENCY DEPT  As needed, If symptoms worsen [address] [place] [id number]  [phone]           Discharge Medication List as of 12/29/2017 12:02 AM      CONTINUE these medications which have NOT CHANGED    Details   omeprazole (PRILOSEC) 10 mg capsule Take 10 mg by mouth daily. , Historical Med      chlorthalidone (HYGROTEN) 25 mg tablet Take 1 Tab by mouth daily. , Print, Disp-30 Tab, R-11      diazePAM (VALIUM) 10 mg tablet Take 10 mg by mouth every six (6) hours as needed for Anxiety. , Historical Med      clopidogrel (PLAVIX) 75 mg tab Take 75 mg by mouth., Historical Med      tamsulosin (FLOMAX) 0.4 mg capsule Take 1 Cap by mouth daily (after dinner). , Print, Disp-90 Cap, R-3      nitroglycerin (NITROSTAT) 0.3 mg SL tablet 0.3 mg., Historical Med      meperidine (DEMEROL) 100 mg tablet Take 100 mg by mouth every four (4) hours as needed for Pain., Historical Med      esomeprazole (NEXIUM) 40 mg capsule Take 1 Cap by mouth Before breakfast and dinner. Print, 40 mg, Disp-180 Cap, R-4      carvedilol (COREG) 25 mg tablet Take 25 mg by mouth two (2) times daily (with meals). , Historical Med      atorvastatin (LIPITOR) 40 mg tablet Take 40 mg by mouth daily. Historical Med, 40 mg      DULoxetine (CYMBALTA) 60 mg capsule Take 60 mg by mouth daily.   Historical Med, 60 mg           _______________________________    Attestations:  Scribe [address] [place] [address] acting as a scribe for and in the presence of Dayanna Perez MD      December 28, 2017 at 10:17 PM       Provider Attestation:      I personally performed the services described in the documentation, reviewed the documentation, as recorded by the scribe in my presence, and it accurately and completely records my words and actions.  December 28, 2017 at 10:17 PM - Galina Fay MD    _______________________________

## 2017-12-29 NOTE — ED TRIAGE NOTES
C/o chest pain that started at 1915 today. Pt states sitting with family when pain occurred.  States HX of MI with 3 stents  7 years ago

## 2018-01-21 ENCOUNTER — APPOINTMENT (OUTPATIENT)
Dept: GENERAL RADIOLOGY | Age: 63
End: 2018-01-21
Attending: NURSE PRACTITIONER
Payer: MEDICARE

## 2018-01-21 ENCOUNTER — HOSPITAL ENCOUNTER (EMERGENCY)
Age: 63
Discharge: LEFT BEFORE DISCHARGE | End: 2018-01-21
Attending: EMERGENCY MEDICINE
Payer: MEDICARE

## 2018-01-21 VITALS
OXYGEN SATURATION: 97 % | SYSTOLIC BLOOD PRESSURE: 135 MMHG | BODY MASS INDEX: 36.88 KG/M2 | HEART RATE: 86 BPM | HEIGHT: 67 IN | DIASTOLIC BLOOD PRESSURE: 74 MMHG | WEIGHT: 235 LBS | TEMPERATURE: 98 F | RESPIRATION RATE: 22 BRPM

## 2018-01-21 LAB
ALBUMIN SERPL-MCNC: 3.8 G/DL (ref 3.4–5)
ALBUMIN/GLOB SERPL: 1 {RATIO} (ref 0.8–1.7)
ALP SERPL-CCNC: 134 U/L (ref 45–117)
ALT SERPL-CCNC: 57 U/L (ref 16–61)
ANION GAP SERPL CALC-SCNC: 6 MMOL/L (ref 3–18)
AST SERPL-CCNC: 60 U/L (ref 15–37)
ATRIAL RATE: 84 BPM
BASOPHILS # BLD: 0 K/UL (ref 0–0.1)
BASOPHILS NFR BLD: 0 % (ref 0–2)
BILIRUB SERPL-MCNC: 0.5 MG/DL (ref 0.2–1)
BNP SERPL-MCNC: 89 PG/ML (ref 0–900)
BUN SERPL-MCNC: 20 MG/DL (ref 7–18)
BUN/CREAT SERPL: 23 (ref 12–20)
CALCIUM SERPL-MCNC: 9.3 MG/DL (ref 8.5–10.1)
CALCULATED P AXIS, ECG09: 29 DEGREES
CALCULATED R AXIS, ECG10: -4 DEGREES
CALCULATED T AXIS, ECG11: 58 DEGREES
CHLORIDE SERPL-SCNC: 104 MMOL/L (ref 100–108)
CK MB CFR SERPL CALC: 1 % (ref 0–4)
CK MB SERPL-MCNC: 6.2 NG/ML (ref 5–25)
CK SERPL-CCNC: 628 U/L (ref 39–308)
CO2 SERPL-SCNC: 26 MMOL/L (ref 21–32)
CREAT SERPL-MCNC: 0.87 MG/DL (ref 0.6–1.3)
DIAGNOSIS, 93000: NORMAL
DIFFERENTIAL METHOD BLD: ABNORMAL
EOSINOPHIL # BLD: 0.5 K/UL (ref 0–0.4)
EOSINOPHIL NFR BLD: 7 % (ref 0–5)
ERYTHROCYTE [DISTWIDTH] IN BLOOD BY AUTOMATED COUNT: 13.3 % (ref 11.6–14.5)
GLOBULIN SER CALC-MCNC: 4 G/DL (ref 2–4)
GLUCOSE SERPL-MCNC: 82 MG/DL (ref 74–99)
HCT VFR BLD AUTO: 40 % (ref 36–48)
HGB BLD-MCNC: 14.4 G/DL (ref 13–16)
LYMPHOCYTES # BLD: 2.5 K/UL (ref 0.9–3.6)
LYMPHOCYTES NFR BLD: 36 % (ref 21–52)
MCH RBC QN AUTO: 31.9 PG (ref 24–34)
MCHC RBC AUTO-ENTMCNC: 36 G/DL (ref 31–37)
MCV RBC AUTO: 88.7 FL (ref 74–97)
MONOCYTES # BLD: 0.7 K/UL (ref 0.05–1.2)
MONOCYTES NFR BLD: 10 % (ref 3–10)
NEUTS SEG # BLD: 3.2 K/UL (ref 1.8–8)
NEUTS SEG NFR BLD: 47 % (ref 40–73)
P-R INTERVAL, ECG05: 150 MS
PLATELET # BLD AUTO: 123 K/UL (ref 135–420)
PMV BLD AUTO: 10.3 FL (ref 9.2–11.8)
POTASSIUM SERPL-SCNC: 3.9 MMOL/L (ref 3.5–5.5)
PROT SERPL-MCNC: 7.8 G/DL (ref 6.4–8.2)
Q-T INTERVAL, ECG07: 364 MS
QRS DURATION, ECG06: 84 MS
QTC CALCULATION (BEZET), ECG08: 430 MS
RBC # BLD AUTO: 4.51 M/UL (ref 4.7–5.5)
SODIUM SERPL-SCNC: 136 MMOL/L (ref 136–145)
TROPONIN I SERPL-MCNC: <0.02 NG/ML (ref 0–0.04)
VENTRICULAR RATE, ECG03: 84 BPM
WBC # BLD AUTO: 6.8 K/UL (ref 4.6–13.2)

## 2018-01-21 PROCEDURE — 80053 COMPREHEN METABOLIC PANEL: CPT | Performed by: NURSE PRACTITIONER

## 2018-01-21 PROCEDURE — 82550 ASSAY OF CK (CPK): CPT | Performed by: NURSE PRACTITIONER

## 2018-01-21 PROCEDURE — 83880 ASSAY OF NATRIURETIC PEPTIDE: CPT | Performed by: NURSE PRACTITIONER

## 2018-01-21 PROCEDURE — 85025 COMPLETE CBC W/AUTO DIFF WBC: CPT | Performed by: NURSE PRACTITIONER

## 2018-01-21 PROCEDURE — 71046 X-RAY EXAM CHEST 2 VIEWS: CPT

## 2018-01-21 PROCEDURE — 75810000275 HC EMERGENCY DEPT VISIT NO LEVEL OF CARE

## 2018-01-21 PROCEDURE — 93005 ELECTROCARDIOGRAM TRACING: CPT

## 2018-01-21 NOTE — ED NOTES
Chronic esophageal pain that is only relieved by Dilaudid and previous hx of MI. Nitro did not relieve current chest pain. EKG is NSR. 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 Skin is warm and dry. Labs ordered Assisted in Triage of patient and referred to main treatment area due to severity of complaint. Initial orders started and patient assisted to back by Triage RN.    Signed By: Kalyan Phillips NP     January 21, 2018

## 2018-01-21 NOTE — ED NOTES
Pt states he does not want to wait anymore. Pt informed of pending discharges. Pt states he never has to wait this long. Pt reminded that we are working him up. Pt reports that he does not care and he is leaving. Pt encouraged to stay without success. Pt walked out of ED alert,in NAD, breathing is even and unlabored.

## 2018-01-21 NOTE — ED TRIAGE NOTES
Due for recheck with next fill.    Pt reports sudden onset of CP at 12p. Pt took 3 ntg with no relief. Pt with a significant medical hx. Also c/o HA from ntg.

## 2018-01-30 ENCOUNTER — HOSPITAL ENCOUNTER (EMERGENCY)
Age: 63
Discharge: HOME | End: 2018-01-31
Attending: EMERGENCY MEDICINE
Payer: MEDICARE

## 2018-01-30 DIAGNOSIS — K22.4 NUTCRACKER ESOPHAGUS: ICD-10-CM

## 2018-01-30 DIAGNOSIS — R07.9 CHEST PAIN, UNSPECIFIED TYPE: Primary | ICD-10-CM

## 2018-01-30 PROCEDURE — 99283 EMERGENCY DEPT VISIT LOW MDM: CPT

## 2018-01-30 PROCEDURE — 96375 TX/PRO/DX INJ NEW DRUG ADDON: CPT

## 2018-01-30 PROCEDURE — 96376 TX/PRO/DX INJ SAME DRUG ADON: CPT

## 2018-01-30 PROCEDURE — 96374 THER/PROPH/DIAG INJ IV PUSH: CPT

## 2018-01-30 PROCEDURE — 93005 ELECTROCARDIOGRAM TRACING: CPT

## 2018-01-31 VITALS
RESPIRATION RATE: 19 BRPM | SYSTOLIC BLOOD PRESSURE: 165 MMHG | TEMPERATURE: 97.3 F | HEART RATE: 87 BPM | DIASTOLIC BLOOD PRESSURE: 98 MMHG | OXYGEN SATURATION: 97 %

## 2018-01-31 LAB
ANION GAP SERPL CALC-SCNC: 6 MMOL/L (ref 3–18)
ATRIAL RATE: 93 BPM
BASOPHILS # BLD: 0 K/UL (ref 0–0.1)
BASOPHILS NFR BLD: 1 % (ref 0–2)
BUN SERPL-MCNC: 9 MG/DL (ref 7–18)
BUN/CREAT SERPL: 11 (ref 12–20)
CALCIUM SERPL-MCNC: 9 MG/DL (ref 8.5–10.1)
CALCULATED P AXIS, ECG09: -4 DEGREES
CALCULATED R AXIS, ECG10: -15 DEGREES
CALCULATED T AXIS, ECG11: 22 DEGREES
CHLORIDE SERPL-SCNC: 109 MMOL/L (ref 100–108)
CK MB CFR SERPL CALC: NORMAL % (ref 0–4)
CK MB SERPL-MCNC: <1 NG/ML (ref 5–25)
CK SERPL-CCNC: 131 U/L (ref 39–308)
CO2 SERPL-SCNC: 25 MMOL/L (ref 21–32)
CREAT SERPL-MCNC: 0.84 MG/DL (ref 0.6–1.3)
DIAGNOSIS, 93000: NORMAL
DIFFERENTIAL METHOD BLD: ABNORMAL
EOSINOPHIL # BLD: 0.3 K/UL (ref 0–0.4)
EOSINOPHIL NFR BLD: 5 % (ref 0–5)
ERYTHROCYTE [DISTWIDTH] IN BLOOD BY AUTOMATED COUNT: 13.3 % (ref 11.6–14.5)
GLUCOSE SERPL-MCNC: 98 MG/DL (ref 74–99)
HCT VFR BLD AUTO: 42.1 % (ref 36–48)
HGB BLD-MCNC: 15.1 G/DL (ref 13–16)
LYMPHOCYTES # BLD: 2.6 K/UL (ref 0.9–3.6)
LYMPHOCYTES NFR BLD: 40 % (ref 21–52)
MCH RBC QN AUTO: 32.1 PG (ref 24–34)
MCHC RBC AUTO-ENTMCNC: 35.9 G/DL (ref 31–37)
MCV RBC AUTO: 89.6 FL (ref 74–97)
MONOCYTES # BLD: 0.5 K/UL (ref 0.05–1.2)
MONOCYTES NFR BLD: 7 % (ref 3–10)
NEUTS SEG # BLD: 3.1 K/UL (ref 1.8–8)
NEUTS SEG NFR BLD: 47 % (ref 40–73)
P-R INTERVAL, ECG05: 154 MS
PLATELET # BLD AUTO: 122 K/UL (ref 135–420)
PMV BLD AUTO: 10.6 FL (ref 9.2–11.8)
POTASSIUM SERPL-SCNC: 4.2 MMOL/L (ref 3.5–5.5)
Q-T INTERVAL, ECG07: 354 MS
QRS DURATION, ECG06: 84 MS
QTC CALCULATION (BEZET), ECG08: 440 MS
RBC # BLD AUTO: 4.7 M/UL (ref 4.7–5.5)
SODIUM SERPL-SCNC: 140 MMOL/L (ref 136–145)
TROPONIN I SERPL-MCNC: <0.02 NG/ML (ref 0–0.04)
VENTRICULAR RATE, ECG03: 93 BPM
WBC # BLD AUTO: 6.6 K/UL (ref 4.6–13.2)

## 2018-01-31 PROCEDURE — 74011250636 HC RX REV CODE- 250/636

## 2018-01-31 PROCEDURE — 96374 THER/PROPH/DIAG INJ IV PUSH: CPT

## 2018-01-31 PROCEDURE — 74011250636 HC RX REV CODE- 250/636: Performed by: EMERGENCY MEDICINE

## 2018-01-31 PROCEDURE — 80048 BASIC METABOLIC PNL TOTAL CA: CPT | Performed by: EMERGENCY MEDICINE

## 2018-01-31 PROCEDURE — 96376 TX/PRO/DX INJ SAME DRUG ADON: CPT

## 2018-01-31 PROCEDURE — 96375 TX/PRO/DX INJ NEW DRUG ADDON: CPT

## 2018-01-31 PROCEDURE — 82553 CREATINE MB FRACTION: CPT | Performed by: EMERGENCY MEDICINE

## 2018-01-31 PROCEDURE — 85025 COMPLETE CBC W/AUTO DIFF WBC: CPT | Performed by: EMERGENCY MEDICINE

## 2018-01-31 RX ORDER — HYDROMORPHONE HYDROCHLORIDE 1 MG/ML
1 INJECTION, SOLUTION INTRAMUSCULAR; INTRAVENOUS; SUBCUTANEOUS ONCE
Status: COMPLETED | OUTPATIENT
Start: 2018-01-31 | End: 2018-01-31

## 2018-01-31 RX ORDER — DIPHENHYDRAMINE HYDROCHLORIDE 50 MG/ML
INJECTION, SOLUTION INTRAMUSCULAR; INTRAVENOUS
Status: COMPLETED
Start: 2018-01-31 | End: 2018-01-31

## 2018-01-31 RX ORDER — HYDROMORPHONE HYDROCHLORIDE 1 MG/ML
INJECTION, SOLUTION INTRAMUSCULAR; INTRAVENOUS; SUBCUTANEOUS
Status: COMPLETED
Start: 2018-01-31 | End: 2018-01-31

## 2018-01-31 RX ORDER — DIPHENHYDRAMINE HYDROCHLORIDE 50 MG/ML
50 INJECTION, SOLUTION INTRAMUSCULAR; INTRAVENOUS ONCE
Status: COMPLETED | OUTPATIENT
Start: 2018-01-31 | End: 2018-01-31

## 2018-01-31 RX ADMIN — HYDROMORPHONE HYDROCHLORIDE 1 MG: 1 INJECTION, SOLUTION INTRAMUSCULAR; INTRAVENOUS; SUBCUTANEOUS at 02:34

## 2018-01-31 RX ADMIN — HYDROMORPHONE HYDROCHLORIDE 1 MG: 1 INJECTION, SOLUTION INTRAMUSCULAR; INTRAVENOUS; SUBCUTANEOUS at 01:44

## 2018-01-31 RX ADMIN — DIPHENHYDRAMINE HYDROCHLORIDE 50 MG: 50 INJECTION, SOLUTION INTRAMUSCULAR; INTRAVENOUS at 01:48

## 2018-01-31 NOTE — DISCHARGE INSTRUCTIONS
Chest Pain: Care Instructions  Your Care Instructions    There are many things that can cause chest pain. Some are not serious and will get better on their own in a few days. But some kinds of chest pain need more testing and treatment. Your doctor may have recommended a follow-up visit in the next 8 to 12 hours. If you are not getting better, you may need more tests or treatment. Even though your doctor has released you, you still need to watch for any problems. The doctor carefully checked you, but sometimes problems can develop later. If you have new symptoms or if your symptoms do not get better, get medical care right away. If you have worse or different chest pain or pressure that lasts more than 5 minutes or you passed out (lost consciousness), call 911 or seek other emergency help right away. A medical visit is only one step in your treatment. Even if you feel better, you still need to do what your doctor recommends, such as going to all suggested follow-up appointments and taking medicines exactly as directed. This will help you recover and help prevent future problems. How can you care for yourself at home? · Rest until you feel better. · Take your medicine exactly as prescribed. Call your doctor if you think you are having a problem with your medicine. · Do not drive after taking a prescription pain medicine. When should you call for help? Call 911 if:  ? · You passed out (lost consciousness). ? · You have severe difficulty breathing. ? · You have symptoms of a heart attack. These may include:  ¨ Chest pain or pressure, or a strange feeling in your chest.  ¨ Sweating. ¨ Shortness of breath. ¨ Nausea or vomiting. ¨ Pain, pressure, or a strange feeling in your back, neck, jaw, or upper belly or in one or both shoulders or arms. ¨ Lightheadedness or sudden weakness. ¨ A fast or irregular heartbeat.   After you call 911, the  may tell you to chew 1 adult-strength or 2 to 4 low-dose aspirin. Wait for an ambulance. Do not try to drive yourself. ?Call your doctor today if:  ? · You have any trouble breathing. ? · Your chest pain gets worse. ? · You are dizzy or lightheaded, or you feel like you may faint. ? · You are not getting better as expected. ? · You are having new or different chest pain. Where can you learn more? Go to http://turner-doris.info/. Enter A120 in the search box to learn more about \"Chest Pain: Care Instructions. \"  Current as of: March 20, 2017  Content Version: 11.4  © 0477-9719 [employer]. Care instructions adapted under license by [employer] (which disclaims liability or warranty for this information). If you have questions about a medical condition or this instruction, always ask your healthcare professional. [address] any warranty or liability for your use of this information. Esophageal Spasm: Care Instructions  Your Care Instructions    The esophagus is the tube that carries food from your mouth to your stomach. An esophageal spasm is when the muscles along the esophagus tighten in an irregular, painful way. Normally, the esophagus tightens in a coordinated manner to move food along and into the stomach. An esophageal spasm can prevent food from getting to the stomach. This leaves it stuck in the esophagus. The cause of esophageal spasm is not known, although it is more common in people who have gastroesophageal reflux disease (GERD). In some people, very hot or very cold foods can trigger a spasm. Follow-up care is a key part of your treatment and safety. Be sure to make and go to all appointments, and call your doctor if you are having problems. It's also a good idea to know your test results and keep a list of the medicines you take. How can you care for yourself at home? · Be safe with medicines. Take your medicines exactly as prescribed.  Call your doctor if you think you are having a problem with your medicine. You will get more details on the specific medicines your doctor prescribes. · Treat other conditions that can make esophageal spasms worse, such as GERD. · To treat GERD:  ¨ Your doctor may recommend an over-the-counter medicine. For mild or occasional indigestion, it may help to take antacids, such as Tums, Gaviscon, Mylanta, or Maalox. Be careful when you take over-the-counter antacid medicines. Many of these medicines have aspirin in them. Read the label to make sure that you are not taking more than the recommended dose. Too much aspirin can be harmful. ¨ Your doctor also may recommend over-the-counter acid reducers, such as famotidine (Pepcid AC), cimetidine (Tagamet HB), ranitidine (Zantac 75 and Zantac 150), or omeprazole (Prilosec). ¨ Eat several small meals instead of two or three large meals. ¨ After you eat, wait 2 to 3 hours before you lie down. ¨ Chocolate, mint, and alcohol can make GERD worse. They relax the valve between the esophagus and the stomach. ¨ Spicy foods, foods that have a lot of acid (like tomatoes and oranges), and coffee can make GERD symptoms worse in some people. If your symptoms are worse after you eat a certain food, you may want to stop eating that food to see if your symptoms get better. ¨ Do not smoke or chew tobacco.  ¨ If you have GERD symptoms at night, raise the head of your bed 6 to 8 inches. You can do this by putting the frame on blocks. Or you can place a foam wedge under the head of your mattress. (Adding extra pillows does not work.)  ¨ Do not wear tight clothing around your middle. ¨ Lose weight if you need to. Losing just 5 to 10 pounds can help. · Ask your doctor about relaxation and controlled breathing exercises. These may help reduce symptoms. · Avoid very hot or cold foods if they trigger esophageal spasms. When should you call for help?   Call your doctor now or seek immediate medical care if:  ? · You have new or worse belly pain. ? · You are vomiting. ? Watch closely for changes in your health, and be sure to contact your doctor if:  ? · You have new or worse symptoms of indigestion. ? · You have trouble or pain swallowing. ? · You are losing weight. ? · You do not get better as expected. Where can you learn more? Go to http://turner-doris.info/. Enter T207 in the search box to learn more about \"Esophageal Spasm: Care Instructions. \"  Current as of: May 12, 2017  Content Version: 11.4  © 1660-6171 [employer]. Care instructions adapted under license by [employer] (which disclaims liability or warranty for this information). If you have questions about a medical condition or this instruction, always ask your healthcare professional. [address] any warranty or liability for your use of this information.

## 2018-01-31 NOTE — MED STUDENT NOTES
*ATTENTION:  This note has been created by a medical student for educational purposes only. Please do not refer to the content of this note for clinical decision-making, billing, or other purposes. Please see attending physicians note to obtain clinical information on this patient. *       Student Progress Note  Please refer to attendings daily rounding note for full details      Patient: Etta Velásquez MRN: [id number]  CSN: [id number]    YOB: 1955  Age: 60 y.o. Sex: male    DOA: 1/30/2018 LOS:  LOS: 0 days          Chief Complaint:    \"chest pain\"  Subjective:   Mr. West Godoy is a 62y.o. Male with PMH of HLD, CAD, CVA, stent palcement, gerd [facility] that began at 2100. He was eating a sandwich when the pain began, he started choking and vomited the food back up. He stated his pain was in epigastric region and was 10/10, but didn't radiate anywhere. He took 3 nitroglycerine sublingual, 1 every 5 minutes and he stated that it gave him HA but didn't help with his chest pain. He has had several episodes of the pain/choking/vomiting over the past 20-25 years and he was worked up extensively to discover he had \"nutcracker esophagus\" as well as esophageal strictures of which he has had dilated. He denies any fatigue, wt loss, back pain, fevers, dizziness, SOB, HA, syncope.     Extensive PMH:  Past Medical History:   Diagnosis Date    Altered mental status     Arthritis     Arthropathy     Back pain     Bilateral kidney stones     Burn     ON HANDS    CAD (coronary artery disease)     MI    3 cardiac stents    Calcium nephrolithiasis     Calculus of kidney     Cardiac arrhythmia     Cerebral artery occlusion with cerebral infarction ([facility] [address]     Chest pain     Cigarette smoker     Cirrhosis, alcoholic (HCC)     Cocaine abuse, episodic use     Colon polyp     Diarrhea     DJD (degenerative joint disease)     Drug-seeking behavior     Dyskinesia  Esophageal disorder     Esophageal erosions     Flank pain, acute     Foot ulcer, right (HCC)     Gastric ulcer     GERD (gastroesophageal reflux disease)     Gross hematuria     Head trauma     Hearing reduced     Hematuria     Herniated disc     X 10 IN BACK    Hiatus hernia syndrome     History of kidney stones     HNP (herniated nucleus pulposus)     Hyperlipemia     Hypertension     Hypokalemia     Infection     Jaw fracture (HCC)     Kidney stones     Knee pain     Left ureteral stone     Muscle atrophy     Myocardial infarction 1999    N&V (nausea and vomiting)     Nocturia     Nondependent alcohol abuse, in remission     Nutcracker esophagus     Other ill-defined conditions(799.89)     dysphagia    Renal stone     Slurred speech     Stroke ([facility] [address]     questionable TIA    Swallowing difficulty     Syncopal episodes     Syncope and collapse     Tobacco dependence     Unspecified adverse effect of anesthesia     AT TIMES ESOPHAGUS SPASMS AFTER PROCEDURES    Unspecified cerebral artery occlusion with cerebral infarction      PSH:  Past Surgical History:   Procedure Laterality Date    HX APPENDECTOMY      9YEARS OLD    HX CHOLECYSTECTOMY  2009    HX CORONARY STENT PLACEMENT  2008    3 STENTS PLACED    HX ENDOSCOPY      with Dilatation, multiple times    HX HEART CATHETERIZATION  2012    EVERYTHING LOOKED GOOD AT THIS POINT    HX HEENT      \"2 jaw surgeries\"    HX HERNIA REPAIR      ABDOMEN    HX KNEE ARTHROSCOPY  9/2012    LEFT    HX MOHS PROCEDURES  2016    HX ORTHOPAEDIC  12/12    left knee    HX OTHER SURGICAL      ESOPHAGUS DILATATION    HX ROTATOR CUFF REPAIR Right     x2    HX UROLOGICAL  07/18/2016    [facility]-Cystoscopy,URETEROSCOPY W/ LITHOTRIPSY, Dr Guzman Alvarado    HX UROLOGICAL  10/10/2016    [facility]-Cystoscopy with removal of ureteral stent, Dr Emperatriz Ramirez HX UROLOGICAL  01/30/2017    [facility]-Cystoscopy, Left retrograde pyelography, Left diagnostic ureteroscopy, Left 6 x 26 cm double-J ureteral stent placement,Right retrograde pyelography, Right ureteroscopic stone extraction,Right 6 x 24 cm double-J ureteral stent placement, Interpretation of urography,Intraoperative fluoro less than 1 hour,          UROLOGICAL  02/08/2017    [facility]-CYSTOURETHROSCOPY WITH STENT REMOVAL, Dr Bush Cande     Med:  No current facility-administered medications on file prior to encounter. Current Outpatient Prescriptions on File Prior to Encounter   Medication Sig Dispense Refill    omeprazole (PRILOSEC) 10 mg capsule Take 10 mg by mouth daily.  chlorthalidone (HYGROTEN) 25 mg tablet Take 1 Tab by mouth daily. 30 Tab 11    diazePAM (VALIUM) 10 mg tablet Take 10 mg by mouth every six (6) hours as needed for Anxiety.  clopidogrel (PLAVIX) 75 mg tab Take 75 mg by mouth.  tamsulosin (FLOMAX) 0.4 mg capsule Take 1 Cap by mouth daily (after dinner). 90 Cap 3    nitroglycerin (NITROSTAT) 0.3 mg SL tablet 0.3 mg.      meperidine (DEMEROL) 100 mg tablet Take 100 mg by mouth every four (4) hours as needed for Pain.  esomeprazole (NEXIUM) 40 mg capsule Take 1 Cap by mouth Before breakfast and dinner. (Patient taking differently: Take 40 mg by mouth daily. ) 180 Cap 4    carvedilol (COREG) 25 mg tablet Take 25 mg by mouth two (2) times daily (with meals).  atorvastatin (LIPITOR) 40 mg tablet Take 40 mg by mouth daily.  DULoxetine (CYMBALTA) 60 mg capsule Take 60 mg by mouth daily. Allergy:  Allergies   Allergen Reactions    Bee Sting [Sting, Bee] Hives and Shortness of Breath    Codeine Hives and Shortness of Breath     Has tolerated Hydromorphone.      Haldol [Haloperidol Lactate] Hives and Shortness of Breath    Haloperidol Hives and Cough    Keflex [Cephalexin] Diarrhea    No Allergy Information Available Other (comments)     Other reaction(s): hives    Shellfish Containing Products Hives    Stadol [Butorphanol Tartrate] Hives, Shortness of Breath and Rash    Vicodin [Hydrocodone-Acetaminophen] Hives, Shortness of Breath and Rash     Social: Retired , 20 pack year smoking hx. Objective:      Visit Vitals    /67    Pulse 84    Resp 22    SpO2 93%         Physical Exam:  Gen: Patient alert and oriented to person, place and time in no apparent distress. HEENT: Normocephalic, no trauma noted. Non-icteric sclerae. CV: RRR with no murmur noted, no S3 or S4, no palpitations or heave. Resp: Left sided basilar rales heard, otherwise no wheezing, or rhonchi noted, normal chest excursion. Abd: Nondistended abdomen, normal bowel sounds, no tenderness to palpation. Extrem:  No edema, no cyanosis, capillary refill <2secs. Skin: Hypopigmented lesions along both arms, several scars scattered on knuckles and arms, no jaundice. Neuro: No focal deficits. I have reviewed the patient's Labs and Radiology studies. Assessment/Plan:   Chest Pain-Acutely will give patient dilaudid 1mg once, and diphenhydramine 50mg once, CXR possible. Given his history, I think his chest pain is of GI in origin, to r/o Cardiac origin, tests EKG, CMP, and Troponins were ordered and all of which were negative. Suggest follow-up with cardiac outpatient and get echo or stress testing done. Esophageal Stricture-follow up with GI outpatient to determine if he needs to have dilation of stricture again. Nutcracker Esophagus-Follow-up with GI outpatient and suggest looking into a calcium channel blocker, or sildenafil for possible alleviation of some symptoms, he's currently not on any medication specifically for this dx. Dwaine Underwood  1/31/2018  1:04 AM  *ATTENTION:  This note has been created by a medical student for educational purposes only. Please do not refer to the content of this note for clinical decision-making, billing, or other purposes. Please see attending physicians note to obtain clinical information on this patient. *

## 2018-01-31 NOTE — ED NOTES
I have reviewed discharge instructions with the patient. The patient verbalized understanding. I have reviewed the provider's instructions with the patient, answering all questions to his satisfaction. Patient armband removed and shredded.  Pt signed paper discharge instructions removed all belongings and ambulated without distress or discomfort

## 2018-01-31 NOTE — ED PROVIDER NOTES
EMERGENCY DEPARTMENT HISTORY AND PHYSICAL EXAM    1:14 AM      Date: 1/30/2018  Patient Name: Kim Villafuerte    History of Presenting Illness     No chief complaint on file. History Provided By: Patient    Chief Complaint: chest pain  Duration:  Began today while eating  Timing:  constant  Location: mid-chest  Quality: \"gnawing\"  Severity: Severe  Modifying Factors: no relief with Nitro  Associated Symptoms: vomiting      Additional History (Context): Kim Villafuerte is a 60 y.o. male with nutcracker esophagus , GERD, esophageal erosions who presents with severe \"gnawing\" chest pain that began today while eating, felt a choking sensation and vomited. He notes that pain is still present, and denies relief with 3 Nitro taken at home. Patient states that he has had many previous similar episodes that his esophagus is frequently stretched to reduce choking episodes. He states that he is also followed by cardiology. No other symptoms or concerns were expressed. PCP: None    Current Facility-Administered Medications   Medication Dose Route Frequency Provider Last Rate Last Dose    HYDROmorphone (PF) (DILAUDID) injection 1 mg  1 mg IntraVENous ONCE Ilene Farley MD         Current Outpatient Prescriptions   Medication Sig Dispense Refill    omeprazole (PRILOSEC) 10 mg capsule Take 10 mg by mouth daily.  chlorthalidone (HYGROTEN) 25 mg tablet Take 1 Tab by mouth daily. 30 Tab 11    diazePAM (VALIUM) 10 mg tablet Take 10 mg by mouth every six (6) hours as needed for Anxiety.  clopidogrel (PLAVIX) 75 mg tab Take 75 mg by mouth.  tamsulosin (FLOMAX) 0.4 mg capsule Take 1 Cap by mouth daily (after dinner). 90 Cap 3    nitroglycerin (NITROSTAT) 0.3 mg SL tablet 0.3 mg.      meperidine (DEMEROL) 100 mg tablet Take 100 mg by mouth every four (4) hours as needed for Pain.  esomeprazole (NEXIUM) 40 mg capsule Take 1 Cap by mouth Before breakfast and dinner.  (Patient taking differently: Take 40 mg by mouth daily. ) 180 Cap 4    carvedilol (COREG) 25 mg tablet Take 25 mg by mouth two (2) times daily (with meals).  atorvastatin (LIPITOR) 40 mg tablet Take 40 mg by mouth daily.  DULoxetine (CYMBALTA) 60 mg capsule Take 60 mg by mouth daily.            Past History     Past Medical History:  Past Medical History:   Diagnosis Date    Altered mental status     Arthritis     Arthropathy     Back pain     Bilateral kidney stones     Burn     ON HANDS    CAD (coronary artery disease)     MI    3 cardiac stents    Calcium nephrolithiasis     Calculus of kidney     Cardiac arrhythmia     Cerebral artery occlusion with cerebral infarction ([facility] [address]     Chest pain     Cigarette smoker     Cirrhosis, alcoholic (HCC)     Cocaine abuse, episodic use     Colon polyp     Diarrhea     DJD (degenerative joint disease)     Drug-seeking behavior     Dyskinesia     Esophageal disorder     Esophageal erosions     Flank pain, acute     Foot ulcer, right (HCC)     Gastric ulcer     GERD (gastroesophageal reflux disease)     Gross hematuria     Head trauma     Hearing reduced     Hematuria     Herniated disc     X 10 IN BACK    Hiatus hernia syndrome     History of kidney stones     HNP (herniated nucleus pulposus)     Hyperlipemia     Hypertension     Hypokalemia     Infection     Jaw fracture (HCC)     Kidney stones     Knee pain     Left ureteral stone     Muscle atrophy     Myocardial infarction 1999    N&V (nausea and vomiting)     Nocturia     Nondependent alcohol abuse, in remission     Nutcracker esophagus     Other ill-defined conditions(669.20)     dysphagia    Renal stone     Slurred speech     Stroke [facility])     questionable TIA    Swallowing difficulty     Syncopal episodes     Syncope and collapse     Tobacco dependence     Unspecified adverse effect of anesthesia     AT TIMES ESOPHAGUS SPASMS AFTER PROCEDURES    Unspecified cerebral artery occlusion with cerebral infarction        Past Surgical History:  Past Surgical History:   Procedure Laterality Date    HX APPENDECTOMY      9YEARS OLD    HX CHOLECYSTECTOMY  2009    HX CORONARY STENT PLACEMENT  2008    3 STENTS PLACED    HX ENDOSCOPY      with Dilatation, multiple times    HX HEART CATHETERIZATION  2012    EVERYTHING LOOKED GOOD AT THIS POINT    HX HEENT      \"2 jaw surgeries\"    HX HERNIA REPAIR      ABDOMEN    HX KNEE ARTHROSCOPY  9/2012    LEFT    HX MOHS PROCEDURES  2016    HX ORTHOPAEDIC  12/12    left knee    HX OTHER SURGICAL      ESOPHAGUS DILATATION    HX ROTATOR CUFF REPAIR Right     x2    HX UROLOGICAL  07/18/2016    [facility]-Cystoscopy,URETEROSCOPY W/ LITHOTRIPSY, Dr Chris Schmitz  UROLOGICAL  10/10/2016    [facility]-Cystoscopy with removal of ureteral stent, Dr Chris Schmitz HX UROLOGICAL  01/30/2017    [facility]-Cystoscopy, Left retrograde pyelography, Left diagnostic ureteroscopy, Left 6 x 26 cm double-J ureteral stent placement,Right retrograde pyelography, Right ureteroscopic stone extraction,Right 6 x 24 cm double-J ureteral stent placement, Interpretation of urography,Intraoperative fluoro less than 1 hour,          UROLOGICAL  02/08/2017    [facility]CYSTOURETHROSCOPY WITH STENT REMOVAL, Dr Maria G Lyman       Family History:  Family History   Problem Relation Age of Onset    Diabetes Father     Heart Disease Father     Stroke Father        Social History:  Social History   Substance Use Topics    Smoking status: Former Smoker     Packs/day: 0.25     Years: 20.00     Types: Cigarettes    Smokeless tobacco: Never Used    Alcohol use No      Comment: \"none in over 20 years\"       Allergies: Allergies   Allergen Reactions    Bee Sting [Sting, Bee] Hives and Shortness of Breath    Codeine Hives and Shortness of Breath     Has tolerated Hydromorphone.      Haldol [Haloperidol Lactate] Hives and Shortness of Breath    Haloperidol Hives and Cough    Keflex [Cephalexin] Diarrhea    No Allergy Information Available Other (comments)     Other reaction(s): hives    Shellfish Containing Products Hives    Stadol [Butorphanol Tartrate] Hives, Shortness of Breath and Rash    Vicodin [Hydrocodone-Acetaminophen] Hives, Shortness of Breath and Rash         Review of Systems       Review of Systems   Constitutional: Negative for activity change, appetite change, diaphoresis and fever. HENT: Positive for trouble swallowing. Negative for congestion, dental problem, ear pain, hearing loss, nosebleeds, postnasal drip, sinus pressure, sneezing and tinnitus. Eyes: Negative for photophobia, discharge, redness and visual disturbance. Respiratory: Negative for cough, choking, shortness of breath, wheezing and stridor. Cardiovascular: Positive for chest pain. Negative for palpitations and leg swelling. Gastrointestinal: Positive for vomiting. Negative for abdominal distention, abdominal pain, anal bleeding and blood in stool. Genitourinary: Negative for decreased urine volume, difficulty urinating, discharge, dysuria, frequency, hematuria, penile swelling, scrotal swelling, testicular pain and urgency. Musculoskeletal: Negative for arthralgias, back pain, gait problem, joint swelling, myalgias and neck pain. Skin: Negative for color change and pallor. Neurological: Negative for dizziness, tremors, seizures, syncope and headaches. Hematological: Negative for adenopathy. Does not bruise/bleed easily. Psychiatric/Behavioral: Negative for agitation, behavioral problems, confusion and hallucinations. The patient is not nervous/anxious. Physical Exam     Visit Vitals    BP (!) 165/98    Pulse 87    Temp 97.3 °F (36.3 °C)    Resp 19    SpO2 97%         Physical Exam   Constitutional: He is oriented to person, place, and time. He appears well-developed and well-nourished. HENT:   Head: Normocephalic and atraumatic.    Right Ear: External ear normal.   Left Ear: External ear normal. Nose: Nose normal.   Mouth/Throat: Oropharynx is clear and moist.   Eyes: Conjunctivae and EOM are normal. Pupils are equal, round, and reactive to light. Right eye exhibits no discharge. No scleral icterus. Neck: Normal range of motion. Neck supple. No JVD present. No thyromegaly present. Cardiovascular: Normal rate, regular rhythm and intact distal pulses. Exam reveals no gallop and no friction rub. No murmur heard. Pulmonary/Chest: No respiratory distress. He has no wheezes. He has no rales. He exhibits no tenderness. Abdominal: He exhibits no distension and no mass. There is no tenderness. There is no rebound and no guarding. Musculoskeletal: Normal range of motion. He exhibits no edema. Lymphadenopathy:     He has no cervical adenopathy. Neurological: He is alert and oriented to person, place, and time. No cranial nerve deficit. Coordination normal.   Skin: Skin is warm and dry. No rash noted. No erythema. Psychiatric: He has a normal mood and affect. His behavior is normal. Judgment normal.   Nursing note and vitals reviewed.         Diagnostic Study Results     Labs -  Recent Results (from the past 12 hour(s))   EKG, 12 LEAD, INITIAL    Collection Time: 01/30/18 11:57 PM   Result Value Ref Range    Ventricular Rate 93 BPM    Atrial Rate 93 BPM    P-R Interval 154 ms    QRS Duration 84 ms    Q-T Interval 354 ms    QTC Calculation (Bezet) 440 ms    Calculated P Axis -4 degrees    Calculated R Axis -15 degrees    Calculated T Axis 22 degrees    Diagnosis       Normal sinus rhythm  Normal ECG  When compared with ECG of 21-JAN-2018 12:24,  No significant change was found     CBC WITH AUTOMATED DIFF    Collection Time: 01/31/18 12:20 AM   Result Value Ref Range    WBC 6.6 4.6 - 13.2 K/uL    RBC 4.70 4.70 - 5.50 M/uL    HGB 15.1 13.0 - 16.0 g/dL    HCT 42.1 36.0 - 48.0 %    MCV 89.6 74.0 - 97.0 FL    MCH 32.1 24.0 - 34.0 PG    MCHC 35.9 31.0 - 37.0 g/dL    RDW 13.3 11.6 - 14.5 %    PLATELET 527 (L) 135 - 420 K/uL    MPV 10.6 9.2 - 11.8 FL    NEUTROPHILS 47 40 - 73 %    LYMPHOCYTES 40 21 - 52 %    MONOCYTES 7 3 - 10 %    EOSINOPHILS 5 0 - 5 %    BASOPHILS 1 0 - 2 %    ABS. NEUTROPHILS 3.1 1.8 - 8.0 K/UL    ABS. LYMPHOCYTES 2.6 0.9 - 3.6 K/UL    ABS. MONOCYTES 0.5 0.05 - 1.2 K/UL    ABS. EOSINOPHILS 0.3 0.0 - 0.4 K/UL    ABS. BASOPHILS 0.0 0.0 - 0.1 K/UL    DF AUTOMATED     CARDIAC PANEL,(CK, CKMB & TROPONIN)    Collection Time: 01/31/18 12:20 AM   Result Value Ref Range     39 - 308 U/L    CK - MB <1.0 <3.6 ng/ml    CK-MB Index  0.0 - 4.0 %     CALCULATION NOT PERFORMED WHEN RESULT IS BELOW LINEAR LIMIT    Troponin-I, Qt. <0.02 0.0 - 6.876 NG/ML   METABOLIC PANEL, BASIC    Collection Time: 01/31/18 12:20 AM   Result Value Ref Range    Sodium 140 136 - 145 mmol/L    Potassium 4.2 3.5 - 5.5 mmol/L    Chloride 109 (H) 100 - 108 mmol/L    CO2 25 21 - 32 mmol/L    Anion gap 6 3.0 - 18 mmol/L    Glucose 98 74 - 99 mg/dL    BUN 9 7.0 - 18 MG/DL    Creatinine 0.84 0.6 - 1.3 MG/DL    BUN/Creatinine ratio 11 (L) 12 - 20      GFR est AA >60 >60 ml/min/1.73m2    GFR est non-AA >60 >60 ml/min/1.73m2    Calcium 9.0 8.5 - 10.1 MG/DL       Radiologic Studies -   No orders to display         Medical Decision Making   I am the first provider for this patient. I reviewed the vital signs, available nursing notes, past medical history, past surgical history, family history and social history. Provider Notes (Medical Decision Making): Chest pain, ddx includes recurrent symptoms from a nutcracker esophagus, CAD, other I will begin the workup, obtain labs and diagnostics, treat as indicated and follow the the patient's condition and response. Discussed the need for close follow up for trials of medications, further outpatient workup    Vital Signs-Reviewed the patient's vital signs. Pulse Oximetry Analysis -  93% on room air (Interpretation)    EKG: Interpreted by the EP.    Time Interpreted: 23:58   Rate: 93   Rhythm: Normal Sinus Rhythm    Interpretation: No STEMI. Records Reviewed: Nursing Notes and Old Medical Records (Time of Review: 1:14 AM)    ED Course: Progress Notes, Reevaluation, and Consults:    2:15 AM  Patient is feeling slightly better, but has requested more pain medication, will do so. Also discussed using other medications for esophageal issues. Do not feel that cardiac ischemia is a cause of patient's chest pain. Diagnosis     Clinical Impression:   1. Chest pain, unspecified type    2. Nutcracker esophagus        Disposition: Discharge    Follow-up Information     Follow up With Details Comments Contact Info    Cedric Mcpherson MD Call in 2 days ED visit [facility] [address] [facility]      [facility] EMERGENCY DEPT Go to As needed, ED visit follow-up [address] [facility] [id number] [phone]           Patient's Medications   Start Taking    No medications on file   Continue Taking    ATORVASTATIN (LIPITOR) 40 MG TABLET    Take 40 mg by mouth daily. CARVEDILOL (COREG) 25 MG TABLET    Take 25 mg by mouth two (2) times daily (with meals). CHLORTHALIDONE (HYGROTEN) 25 MG TABLET    Take 1 Tab by mouth daily. CLOPIDOGREL (PLAVIX) 75 MG TAB    Take 75 mg by mouth. DIAZEPAM (VALIUM) 10 MG TABLET    Take 10 mg by mouth every six (6) hours as needed for Anxiety. DULOXETINE (CYMBALTA) 60 MG CAPSULE    Take 60 mg by mouth daily. ESOMEPRAZOLE (NEXIUM) 40 MG CAPSULE    Take 1 Cap by mouth Before breakfast and dinner. MEPERIDINE (DEMEROL) 100 MG TABLET    Take 100 mg by mouth every four (4) hours as needed for Pain. NITROGLYCERIN (NITROSTAT) 0.3 MG SL TABLET    0.3 mg.    OMEPRAZOLE (PRILOSEC) 10 MG CAPSULE    Take 10 mg by mouth daily. TAMSULOSIN (FLOMAX) 0.4 MG CAPSULE    Take 1 Cap by mouth daily (after dinner).    These Medications have changed    No medications on file   Stop Taking    No medications on file _______________________________    Attestations:  Scribe Attestation     Loren Jones acting as a scribe for and in the presence of Rafael Gallo MD      January 31, 2018 at 1:14 AM       Provider Attestation:      I personally performed the services described in the documentation, reviewed the documentation, as recorded by the scribe in my presence, and it accurately and completely records my words and actions.  January 31, 2018 at 1:14 AM - Rafael Gallo MD    _______________________________

## 2018-02-02 ENCOUNTER — ANESTHESIA EVENT (OUTPATIENT)
Dept: ENDOSCOPY | Age: 63
End: 2018-02-02
Payer: MEDICARE

## 2018-02-05 ENCOUNTER — HOSPITAL ENCOUNTER (OUTPATIENT)
Age: 63
Setting detail: OUTPATIENT SURGERY
Discharge: HOME | End: 2018-02-05
Attending: INTERNAL MEDICINE | Admitting: INTERNAL MEDICINE
Payer: MEDICARE

## 2018-02-05 ENCOUNTER — ANESTHESIA (OUTPATIENT)
Dept: ENDOSCOPY | Age: 63
End: 2018-02-05
Payer: MEDICARE

## 2018-02-05 VITALS
HEIGHT: 67 IN | SYSTOLIC BLOOD PRESSURE: 139 MMHG | OXYGEN SATURATION: 97 % | TEMPERATURE: 97.7 F | HEART RATE: 88 BPM | WEIGHT: 239 LBS | DIASTOLIC BLOOD PRESSURE: 50 MMHG | RESPIRATION RATE: 11 BRPM | BODY MASS INDEX: 37.51 KG/M2

## 2018-02-05 PROBLEM — S12.9XXA CERVICAL SPINE FRACTURE (HCC): Status: ACTIVE | Noted: 2017-02-23

## 2018-02-05 PROBLEM — S32.009A LUMBAR VERTERBRAL FRACTURE, TRAUMATIC: Status: ACTIVE | Noted: 2017-02-23

## 2018-02-05 PROCEDURE — 74011250636 HC RX REV CODE- 250/636

## 2018-02-05 PROCEDURE — 76040000019: Performed by: INTERNAL MEDICINE

## 2018-02-05 PROCEDURE — 74011000250 HC RX REV CODE- 250: Performed by: NURSE ANESTHETIST, CERTIFIED REGISTERED

## 2018-02-05 PROCEDURE — 74011250636 HC RX REV CODE- 250/636: Performed by: NURSE ANESTHETIST, CERTIFIED REGISTERED

## 2018-02-05 PROCEDURE — 76060000031 HC ANESTHESIA FIRST 0.5 HR: Performed by: INTERNAL MEDICINE

## 2018-02-05 RX ORDER — PROPOFOL 10 MG/ML
INJECTION, EMULSION INTRAVENOUS AS NEEDED
Status: DISCONTINUED | OUTPATIENT
Start: 2018-02-05 | End: 2018-02-05 | Stop reason: HOSPADM

## 2018-02-05 RX ORDER — SODIUM CHLORIDE 0.9 % (FLUSH) 0.9 %
5-10 SYRINGE (ML) INJECTION EVERY 8 HOURS
Status: DISCONTINUED | OUTPATIENT
Start: 2018-02-05 | End: 2018-02-05 | Stop reason: HOSPADM

## 2018-02-05 RX ORDER — SODIUM CHLORIDE, SODIUM LACTATE, POTASSIUM CHLORIDE, CALCIUM CHLORIDE 600; 310; 30; 20 MG/100ML; MG/100ML; MG/100ML; MG/100ML
75 INJECTION, SOLUTION INTRAVENOUS CONTINUOUS
Status: DISCONTINUED | OUTPATIENT
Start: 2018-02-05 | End: 2018-02-05 | Stop reason: HOSPADM

## 2018-02-05 RX ORDER — HYDROMORPHONE HYDROCHLORIDE 2 MG/ML
INJECTION, SOLUTION INTRAMUSCULAR; INTRAVENOUS; SUBCUTANEOUS
Status: COMPLETED
Start: 2018-02-05 | End: 2018-02-05

## 2018-02-05 RX ORDER — INSULIN LISPRO 100 [IU]/ML
INJECTION, SOLUTION INTRAVENOUS; SUBCUTANEOUS ONCE
Status: DISCONTINUED | OUTPATIENT
Start: 2018-02-05 | End: 2018-02-05 | Stop reason: HOSPADM

## 2018-02-05 RX ORDER — SODIUM CHLORIDE 0.9 % (FLUSH) 0.9 %
5-10 SYRINGE (ML) INJECTION AS NEEDED
Status: DISCONTINUED | OUTPATIENT
Start: 2018-02-05 | End: 2018-02-05 | Stop reason: HOSPADM

## 2018-02-05 RX ORDER — HYDROMORPHONE HYDROCHLORIDE 2 MG/ML
INJECTION, SOLUTION INTRAMUSCULAR; INTRAVENOUS; SUBCUTANEOUS AS NEEDED
Status: DISCONTINUED | OUTPATIENT
Start: 2018-02-05 | End: 2018-02-05 | Stop reason: HOSPADM

## 2018-02-05 RX ADMIN — HYDROMORPHONE HYDROCHLORIDE 1 MG: 2 INJECTION, SOLUTION INTRAMUSCULAR; INTRAVENOUS; SUBCUTANEOUS at 13:44

## 2018-02-05 RX ADMIN — FAMOTIDINE 20 MG: 10 INJECTION, SOLUTION INTRAVENOUS at 13:05

## 2018-02-05 RX ADMIN — PROPOFOL 100 MG: 10 INJECTION, EMULSION INTRAVENOUS at 13:47

## 2018-02-05 RX ADMIN — SODIUM CHLORIDE, SODIUM LACTATE, POTASSIUM CHLORIDE, AND CALCIUM CHLORIDE 75 ML/HR: 600; 310; 30; 20 INJECTION, SOLUTION INTRAVENOUS at 12:50

## 2018-02-05 RX ADMIN — HYDROMORPHONE HYDROCHLORIDE 1 MG: 2 INJECTION, SOLUTION INTRAMUSCULAR; INTRAVENOUS; SUBCUTANEOUS at 13:47

## 2018-02-05 NOTE — ANESTHESIA PREPROCEDURE EVALUATION
Anesthetic History   No history of anesthetic complications            Review of Systems / Medical History  Patient summary reviewed and pertinent labs reviewed    Pulmonary  Within defined limits                 Neuro/Psych       CVA: no residual symptoms       Cardiovascular    Hypertension          CAD and cardiac stents    Exercise tolerance: >4 METS     GI/Hepatic/Renal     GERD    Renal disease: stones       Endo/Other        Arthritis     Other Findings   Comments: Documentation of current medication  Current medications obtained, documented and obtained? YES      Risk Factors for Postoperative nausea/vomiting:       History of postoperative nausea/vomiting? NO       Female? NO       Motion sickness? NO       Intended opioid administration for postoperative analgesia? YES      Smoking Abstinence:  Current Smoker? NO  Elective Surgery? YES  Seen preoperatively by anesthesiologist or proxy prior to day of surgery? YES  Pt abstained from smoking 24 hours prior to anesthesia?  N/A    Preventive care/screening for High Blood Pressure:  Aged 18 years and older: YES  Screened for high blood pressure: YES  Patients with high blood pressure referred to primary care provider   for BP management: YES                 Physical Exam    Airway  Mallampati: III  TM Distance: 4 - 6 cm  Neck ROM: normal range of motion   Mouth opening: Normal     Cardiovascular  Regular rate and rhythm,  S1 and S2 normal,  no murmur, click, rub, or gallop  Rhythm: regular  Rate: normal         Dental  No notable dental hx       Pulmonary  Breath sounds clear to auscultation               Abdominal  GI exam deferred       Other Findings            Anesthetic Plan    ASA: 3  Anesthesia type: MAC          Induction: Intravenous  Anesthetic plan and risks discussed with: Patient

## 2018-02-05 NOTE — DISCHARGE INSTRUCTIONS
Upper GI Endoscopy: What to Expect at [address] [place] [address]  After you have an endoscopy, you will stay at the hospital or clinic for 1 to 2 hours. This will allow the medicine to wear off. You will be able to go home after your doctor or nurse checks to make sure you are not having any problems. You may have to stay overnight if you had treatment during the test. You may have a sore throat for a day or two after the test.  This care sheet gives you a general idea about what to expect after the test.  How can you care for yourself at home? Activity  · Rest as much as you need to after you go home. · You should be able to go back to your usual activities the day after the test.  Diet  · Follow your doctor's directions for eating after the test.  · Drink plenty of fluids (unless your doctor has told you not to). Medications  · If you have a sore throat the day after the test, use an over-the-counter spray to numb your throat. Follow-up care is a key part of your treatment and safety. Be sure to make and go to all appointments, and call your doctor if you are having problems. It's also a good idea to know your test results and keep a list of the medicines you take. When should you call for help? Call 911 anytime you think you may need emergency care. For example, call if:  · You passed out (lost consciousness). · You cough up blood. · You vomit blood or what looks like coffee grounds. · You pass maroon or very bloody stools. Call your doctor now or seek immediate medical care if:  · You have trouble swallowing. · You have belly pain. · Your stools are black and tarlike or have streaks of blood. · You are sick to your stomach or cannot keep fluids down. Watch closely for changes in your health, and be sure to contact your doctor if:  · Your throat still hurts after a day or two. · You do not get better as expected. Where can you learn more?    Go to [employer]  Enter J454 in the search box to learn more about \"Upper GI Endoscopy: What to Expect at Home. \"   © 6715-3639 [employer]wise, [employer]orporated. Care instructions adapted under license by [address] [employer] (which disclaims liability or warranty for this information). This care instruction is for use with your licensed healthcare professional. If you have questions about a medical condition or this instruction, always ask your healthcare professional. [address] any warranty or liability for your use of this information. Content Version: 83.4.130156; Current as of: November 14, 2014    DISCHARGE SUMMARY from Nurse     POST-PROCEDURE INSTRUCTIONS:    Call your Physician if you:  ? Observe any excess bleeding. ? Develop a temperature over 100.5o F.  ? Experience abdominal, shoulder or chest pain. ? Notice any signs of decreased circulation or nerve impairment to an extremity such as a change in color, persistent numbness, tingling, coldness or increase in pain. ? Vomit blood or you have nausea and vomiting lasting longer than 4 hours. ? Are unable to take medications. ? Are unable to urinate within 8 hours after discharge following general anesthesia or intravenous sedation. For the next 24 hours after receiving general anesthesia or intravenous sedation, or while taking prescription Narcotics, limit your activities:  ? Do NOT drive a motor vehicle, operate hazard machinery or power tools, or perform tasks that require coordination. The medication you received during your procedure may have some effect on your mental awareness. ? Do NOT make important personal or business decisions. The medication you received during your procedure may have some effect on your mental awareness. ? Do NOT drink alcoholic beverages. These drinks do not mix well with the medications that have been given to you. ? Upon discharge from the hospital, you must be accompanied by a responsible adult. ?  Resume your diet as directed by your physician. ? Resume medications as your physician has prescribed. ? Please give a list of your current medications to your Primary Care Provider. ? Please update this list whenever your medications are discontinued, doses are changed, or new medications (including over-the-counter products) are added. ? Please carry medication information at all times in case of emergency situations. These are general instructions for a healthy lifestyle:    No smoking/ No tobacco products/ Avoid exposure to second hand smoke.  Surgeon General's Warning:  Quitting smoking now greatly reduces serious risk to your health. Obesity, smoking, and a sedentary lifestyle greatly increase your risk for illness.  A healthy diet, regular physical exercise & weight monitoring are important for maintaining a healthy lifestyle   You may be retaining fluid if you have a history of heart failure or if you experience any of the following symptoms:  Weight gain of 3 pounds or more overnight or 5 pounds in a week, increased swelling in our hands or feet or shortness of breath while lying flat in bed. Please call your doctor as soon as you notice any of these symptoms; do not wait until your next office visit. Recognize signs and symptoms of STROKE:  F  -  Face looks uneven  A  -  Arms unable to move or move unevenly  S  -  Speech slurred or non-existent  T  -  Time to call 911 - as soon as signs and symptoms begin - DO NOT go back to bed or wait to see If you get better - TIME IS BRAIN. Colorectal Screening   Colorectal cancer almost always develops from precancerous polyps (abnormal growths) in the colon or rectum. Screening tests can find precancerous polyps, so that they can be removed before they turn into cancer.  Screening tests can also find colorectal cancer early, when treatment works best.  [facility] Speak with your physician about when you should begin screening and how often you should be tested  [facility]   Additional Information    If you have questions, please call [phone]. Remember, MyChart is NOT to be used for urgent needs. For medical emergencies, dial 911. Discharge information has been reviewed with the patient. The patient verbalized understanding.

## 2018-02-05 NOTE — ANESTHESIA POSTPROCEDURE EVALUATION
Post-Anesthesia Evaluation and Assessment    Patient: Carmelita West MRN: [id number]  SSN: [id number]    YOB: 1955  Age: 60 y.o. Sex: male      Data from PACU flowsheet    Cardiovascular Function/Vital Signs  Visit Vitals    /69    Pulse 92    Temp 36.5 °C (97.7 °F)    Resp 10    Ht 5' 7\" (1.702 m)    Wt 108.4 kg (239 lb)    SpO2 97%    BMI 37.43 kg/m2       Patient is status post MAC anesthesia for Procedure(s):  UPPER ENDOSCOPY / dilation 54Fr. Nausea/Vomiting: controlled    Postoperative hydration reviewed and adequate. Pain:  Pain Scale 1: Numeric (0 - 10) (02/05/18 1410)  Pain Intensity 1: 0 (02/05/18 1410)   Managed      Mental Status and Level of Consciousness: Alert and oriented     Pulmonary Status:   O2 Device: Room air (02/05/18 1359)   Adequate oxygenation and airway patent    Complications related to anesthesia: None    Post-anesthesia assessment completed.  No concerns    Signed By: Lionel Kaminski MD     February 5, 2018

## 2018-02-05 NOTE — H&P
[facility]   [email or web address]      Impression:   1.esophageal spasm reflux       Plan:     1. egd dil mac all risks discussed       Chief Complaint: dysphagia       HPI:  Corie Donnelly is a 60 y.o. male who is being seen on consult for dysphagia.     PMH:   Past Medical History:   Diagnosis Date    Altered mental status     Arthritis     Arthropathy     Back pain     Bilateral kidney stones     Burn     ON HANDS    CAD (coronary artery disease) 1999    MI    3 cardiac stents    Calcium nephrolithiasis     Calculus of kidney     Cardiac arrhythmia     Cerebral artery occlusion with cerebral infarction ([address]     Chest pain     Cigarette smoker     Cirrhosis, alcoholic (HCC)     Cocaine abuse, episodic use     Colon polyp     Diarrhea     DJD (degenerative joint disease)     Drug-seeking behavior     Dyskinesia     Esophageal disorder     Esophageal erosions     Flank pain, acute     Foot ulcer, right (HCC)     Gastric ulcer     GERD (gastroesophageal reflux disease)     Gross hematuria     Head trauma     Hearing reduced     Hematuria     Herniated disc     X 10 IN BACK    Hiatus hernia syndrome     History of kidney stones     HNP (herniated nucleus pulposus)     Hyperlipemia     Hypertension     Hypokalemia     Infection     Jaw fracture (HCC)     Kidney stones     Knee pain     Left ureteral stone     Muscle atrophy     Myocardial infarction 1999    N&V (nausea and vomiting)     Nocturia     Nondependent alcohol abuse, in remission     Nutcracker esophagus     Other ill-defined conditions(799.89)     dysphagia    Renal stone     Slurred speech     Stroke ([address] 1999    questionable TIA    Swallowing difficulty     Syncopal episodes     Syncope and collapse     Tobacco dependence     Unspecified adverse effect of anesthesia     AT TIMES ESOPHAGUS SPASMS AFTER PROCEDURES    Unspecified cerebral artery occlusion with cerebral infarction        PSH:   Past Surgical History:   Procedure Laterality Date    HX APPENDECTOMY      9YEARS OLD    HX CHOLECYSTECTOMY  2009    HX CORONARY STENT PLACEMENT  2008    3 STENTS PLACED    HX ENDOSCOPY      with Dilatation, multiple times    HX HEART CATHETERIZATION  2012    EVERYTHING LOOKED GOOD AT THIS POINT    HX HEENT      \"2 jaw surgeries\"    HX HERNIA REPAIR      ABDOMEN    HX KNEE ARTHROSCOPY  9/2012    LEFT    HX MOHS PROCEDURES  2016    HX ORTHOPAEDIC  12/12    left knee    HX OTHER SURGICAL      ESOPHAGUS DILATATION    HX ROTATOR CUFF REPAIR Right     x2    HX UROLOGICAL  07/18/2016    SPA-Cystoscopy,URETEROSCOPY W/ LITHOTRIPSY, Dr Sanjuanita Jin  UROLOGICAL  10/10/2016    SL-Cystoscopy with removal of ureteral stent, Dr Sanjuanita Jin  UROLOGICAL  01/30/2017    SPAH-Cystoscopy, Left retrograde pyelography, Left diagnostic ureteroscopy, Left 6 x 26 cm double-J ureteral stent placement,Right retrograde pyelography, Right ureteroscopic stone extraction,Right 6 x 24 cm double-J ureteral stent placement, Interpretation of urography,Intraoperative fluoro less than 1 hour,          UROLOGICAL  02/08/2017    SL-CYSTOURETHROSCOPY WITH STENT REMOVAL, Dr Bisi Weems       Social HX:   Social History     Social History    Marital status:      Spouse name: N/A    Number of children: N/A    Years of education: N/A     Occupational History    Not on file.      Social History Main Topics    Smoking status: Former Smoker     Packs/day: 0.25     Years: 20.00     Types: Cigarettes    Smokeless tobacco: Never Used    Alcohol use No      Comment: \"none in over 21 years\"    Drug use: No      Comment: cocaine 2011    Sexual activity: Yes     Partners: Female     Other Topics Concern    Not on file     Social History Narrative       FHX:   Family History   Problem Relation Age of Onset    Diabetes Father     Heart Disease Father     Stroke Father Allergy:   Allergies   Allergen Reactions    Bee Sting [Sting, Bee] Hives and Shortness of Breath    Codeine Hives and Shortness of Breath     Has tolerated Hydromorphone.  Haldol [Haloperidol Lactate] Hives and Shortness of Breath    Haloperidol Hives and Cough    Keflex [Cephalexin] Diarrhea    No Allergy Information Available Other (comments)     Other reaction(s): hives    Shellfish Containing Products Hives    Stadol [Butorphanol Tartrate] Hives, Shortness of Breath and Rash    Vicodin [Hydrocodone-Acetaminophen] Hives, Shortness of Breath and Rash       Home Medications:     Prescriptions Prior to Admission   Medication Sig    omeprazole (PRILOSEC) 10 mg capsule Take 10 mg by mouth daily.  diazePAM (VALIUM) 10 mg tablet Take 10 mg by mouth every six (6) hours as needed for Anxiety.  clopidogrel (PLAVIX) 75 mg tab Take 75 mg by mouth.  meperidine (DEMEROL) 100 mg tablet Take 100 mg by mouth every four (4) hours as needed for Pain.  carvedilol (COREG) 25 mg tablet Take 25 mg by mouth two (2) times daily (with meals).  atorvastatin (LIPITOR) 40 mg tablet Take 40 mg by mouth daily.  DULoxetine (CYMBALTA) 60 mg capsule Take 60 mg by mouth daily.  chlorthalidone (HYGROTEN) 25 mg tablet Take 1 Tab by mouth daily.  tamsulosin (FLOMAX) 0.4 mg capsule Take 1 Cap by mouth daily (after dinner).  nitroglycerin (NITROSTAT) 0.3 mg SL tablet 0.3 mg.    esomeprazole (NEXIUM) 40 mg capsule Take 1 Cap by mouth Before breakfast and dinner. (Patient taking differently: Take 40 mg by mouth daily.)       Review of Systems:     Constitutional: No fevers, chills, weight loss, fatigue. Skin: No rashes, pruritis, jaundice, ulcerations, erythema. HENT: No headaches, nosebleeds, sinus pressure, rhinorrhea, sore throat. Eyes: No visual changes, blurred vision, eye pain, photophobia, jaundice. Cardiovascular: Notes chest pain,no heart palpitations.    Respiratory: No cough, SOB, wheezing, chest discomfort, orthopnea. Gastrointestinal: Chest pain dyspahgia    Genitourinary: No dysuria, bleeding, discharge, pyuria. Musculoskeletal: No weakness, arthralgias, wasting. Endo: No sweats. Heme: No bruising, easy bleeding. Allergies: As noted. Neurological: Cranial nerves intact. Alert and oriented. Gait not assessed. Psychiatric:  No anxiety, depression, hallucinations. Visit Vitals    Ht 5' 7\" (1.702 m)    Wt 108.4 kg (239 lb)    BMI 37.43 kg/m2       Physical Assessment:     constitutional: appearance: well developed, well nourished, normal habitus, no deformities, in no acute distress. skin: inspection: no rashes, ulcers, icterus or other lesions; no clubbing or telangiectasias. palpation: no induration or subcutaneos nodules. eyes: inspection: normal conjunctivae and lids; no jaundice pupils: symmetrical, normoreactive to light, normal accommodation and size. ENMT: mouth: normal oral mucosa,lips and gums; good dentition. oropharynx: normal tongue, hard and soft palate; posterior pharynx without erythema, exudate or lesions. neck: no masses organomegaly or tenderness. respiratory: effort: normal chest excursion; no intercostal retraction or accessory muscle use. cardiovascular: abdominal aorta: normal size and position; no bruits. palpation: PMI of normal size and position; normal rhythm; no thrill or murmurs. abdominal: abdomen: normal consistency; no tenderness or masses. hernias: no hernias appreciated. liver: normal size and consistency. spleen: not palpable. rectal: hemoccult/guaiac: not performed. musculoskeletal: no deformities or muscle wasting   lymphatic: axilae: not palpable. groin: not palpable. neck: within normal limits. other: not palpable. neurologic: cranial nerves: II-XII normal.   psychiatric: judgement/insight: within normal limits. memory: within normal limits for recent and remote events.  mood and affect: no evidence of depression, anxiety or agitation. orientation: oriented to time, space and person. Basic Metabolic Profile   No results for input(s): NA, K, CL, CO2, BUN, GLU, CA, MG, PHOS in the last 72 hours. No lab exists for component: CREAT      CBC w/Diff    No results for input(s): WBC, RBC, HGB, HCT, MCV, MCH, MCHC, RDW, PLT, HGBEXT, HCTEXT, PLTEXT in the last 72 hours. No lab exists for component: MPV No results for input(s): GRANS, LYMPH, EOS, PRO, MYELO, METAS, BLAST in the last 72 hours. No lab exists for component: MONO, BASO     Hepatic Function   No results for input(s): ALB, TP, TBILI, GPT, SGOT, AP, AML, LPSE in the last 72 hours. No lab exists for component: Jerrod Crawford MD, M.D. [facility], [facility]  [email or web address]

## 2018-02-05 NOTE — IP AVS SNAPSHOT
[address] [facility] 177 [address] [address] [address] [phone] Patient: Emmanuel Trinidad MRN: [id number] DIS:9/94/[id number] About your hospitalization You were admitted on:  February 5, 2018 You last received care in the:  [facility] You were discharged on:  February 5, 2018 Why you were hospitalized Your primary diagnosis was:  Not on File Follow-up Information Follow up With Details Comments Contact Info Michael Hernandez DO   [address] [address] 
[phone] Discharge Orders None A check alexa indicates which time of day the medication should be taken. My Medications CHANGE how you take these medications Instructions Each Dose to Equal  
 Morning Noon Evening Bedtime  
 esomeprazole 40 mg capsule Commonly known as:  NexIUM What changed:  when to take this Your last dose was: Your next dose is: Take 1 Cap by mouth Before breakfast and dinner. 40 mg CONTINUE taking these medications Instructions Each Dose to Equal  
 Morning Noon Evening Bedtime  
 chlorthalidone 25 mg tablet Commonly known as:  Janes An Your last dose was: Your next dose is: Take 1 Tab by mouth daily. 25 mg COREG 25 mg tablet Generic drug:  carvedilol Your last dose was: Your next dose is: Take 25 mg by mouth two (2) times daily (with meals). 25 mg  
    
   
   
   
  
 CYMBALTA 60 mg capsule Generic drug:  DULoxetine Your last dose was: Your next dose is: Take 60 mg by mouth daily. 60 mg DEMEROL 100 mg tablet Generic drug:  meperidine Your last dose was: Your next dose is: Take 100 mg by mouth every four (4) hours as needed for Pain.   
 100 mg  
    
   
   
   
  
 LIPITOR 40 mg tablet Generic drug:  atorvastatin Your last dose was: Your next dose is: Take 40 mg by mouth daily. 40 mg  
    
   
   
   
  
 nitroglycerin 0.3 mg SL tablet Commonly known as:  NITROSTAT Your last dose was: Your next dose is: 0.3 mg.  
 0.3 mg  
    
   
   
   
  
 PLAVIX 75 mg Tab Generic drug:  clopidogrel Your last dose was: Your next dose is: Take 75 mg by mouth. 75 mg PriLOSEC 10 mg capsule Generic drug:  omeprazole Your last dose was: Your next dose is: Take 10 mg by mouth daily. 10 mg  
    
   
   
   
  
 tamsulosin 0.4 mg capsule Commonly known as:  FLOMAX Your last dose was: Your next dose is: Take 1 Cap by mouth daily (after dinner). 0.4 mg  
    
   
   
   
  
 VALIUM 10 mg tablet Generic drug:  diazePAM  
   
Your last dose was: Your next dose is: Take 10 mg by mouth every six (6) hours as needed for Anxiety. 10 mg Discharge Instructions Upper GI Endoscopy: What to Expect at [facility] Your Recovery After you have an endoscopy, you will stay at the hospital or clinic for 1 to 2 hours. This will allow the medicine to wear off. You will be able to go home after your doctor or nurse checks to make sure you are not having any problems. You may have to stay overnight if you had treatment during the test. You may have a sore throat for a day or two after the test. 
This care sheet gives you a general idea about what to expect after the test. 
How can you care for yourself at home? Activity · Rest as much as you need to after you go home. · You should be able to go back to your usual activities the day after the test. 
Diet · Follow your doctor's directions for eating after the test. 
 · Drink plenty of fluids (unless your doctor has told you not to). Medications · If you have a sore throat the day after the test, use an over-the-counter spray to numb your throat. Follow-up care is a key part of your treatment and safety. Be sure to make and go to all appointments, and call your doctor if you are having problems. It's also a good idea to know your test results and keep a list of the medicines you take. When should you call for help? Call 911 anytime you think you may need emergency care. For example, call if: 
· You passed out (lost consciousness). · You cough up blood. · You vomit blood or what looks like coffee grounds. · You pass maroon or very bloody stools. Call your doctor now or seek immediate medical care if: 
· You have trouble swallowing. · You have belly pain. · Your stools are black and tarlike or have streaks of blood. · You are sick to your stomach or cannot keep fluids down. Watch closely for changes in your health, and be sure to contact your doctor if: 
· Your throat still hurts after a day or two. · You do not get better as expected. Where can you learn more? Go to [employer] Enter [phone] in the search box to learn more about \"Upper GI Endoscopy: What to Expect at Home. \"  
© 7627-1750 Healthwise, [employer]. Care instructions adapted under license by [facility] (which disclaims liability or warranty for this information). This care instruction is for use with your licensed healthcare professional. If you have questions about a medical condition or this instruction, always ask your healthcare professional. [place] [address] any warranty or liability for your use of this information. Content Version: 05.8.124129; Current as of: November 14, 2014 DISCHARGE SUMMARY from Nurse POST-PROCEDURE INSTRUCTIONS: 
 
Call your Physician if you: 
? Observe any excess bleeding. ?  Develop a temperature over 100.5o F. 
 ? Experience abdominal, shoulder or chest pain. ? Notice any signs of decreased circulation or nerve impairment to an extremity such as a change in color, persistent numbness, tingling, coldness or increase in pain. ? Vomit blood or you have nausea and vomiting lasting longer than 4 hours. ? Are unable to take medications. ? Are unable to urinate within 8 hours after discharge following general anesthesia or intravenous sedation. For the next 24 hours after receiving general anesthesia or intravenous sedation, or while taking prescription Narcotics, limit your activities: 
? Do NOT drive a motor vehicle, operate hazard machinery or power tools, or perform tasks that require coordination. The medication you received during your procedure may have some effect on your mental awareness. ? Do NOT make important personal or business decisions. The medication you received during your procedure may have some effect on your mental awareness. ? Do NOT drink alcoholic beverages. These drinks do not mix well with the medications that have been given to you. ? Upon discharge from the hospital, you must be accompanied by a responsible adult. ? Resume your diet as directed by your physician. ? Resume medications as your physician has prescribed. ? Please give a list of your current medications to your Primary Care Provider. ? Please update this list whenever your medications are discontinued, doses are changed, or new medications (including over-the-counter products) are added. ? Please carry medication information at all times in case of emergency situations. These are general instructions for a healthy lifestyle: No smoking/ No tobacco products/ Avoid exposure to second hand smoke. ? Surgeon General's Warning:  Quitting smoking now greatly reduces serious risk to your health. Obesity, smoking, and a sedentary lifestyle greatly increase your risk for illness. ? A healthy diet, regular physical exercise & weight monitoring are important for maintaining a healthy lifestyle ? You may be retaining fluid if you have a history of heart failure or if you experience any of the following symptoms:  Weight gain of 3 pounds or more overnight or 5 pounds in a week, increased swelling in our hands or feet or shortness of breath while lying flat in bed. Please call your doctor as soon as you notice any of these symptoms; do not wait until your next office visit. Recognize signs and symptoms of STROKE: 
F  -  Face looks uneven A  -  Arms unable to move or move unevenly S  -  Speech slurred or non-existent T  -  Time to call 911 - as soon as signs and symptoms begin - DO NOT go back to bed or wait to see If you get better - TIME IS BRAIN. Colorectal Screening ? Colorectal cancer almost always develops from precancerous polyps (abnormal growths) in the colon or rectum. Screening tests can find precancerous polyps, so that they can be removed before they turn into cancer. Screening tests can also find colorectal cancer early, when treatment works best. 
? Speak with your physician about when you should begin screening and how often you should be tested ? Additional Information If you have questions, please call [phone]. Remember, [employer] is NOT to be used for urgent needs. For medical emergencies, dial 911. Discharge information has been reviewed with the patient. The patient verbalized understanding. Introducing [facility] & HEALTH SERVICES! [facility] introduces [employer] patient portal. Now you can access parts of your medical record, email your doctor's office, and request medication refills online. 1. In your internet browser, go to [email or web address] 2. Click on the First Time User? Click Here link in the Sign In box. You will see the New Member Sign Up page. 3. Enter your [employer] Access Code exactly as it appears below. You will not need to use this code after youve completed the sign-up process. If you do not sign up before the expiration date, you must request a new code. · [employer] Access Code: [email or web address] Expires: 3/15/2018  3:05 AM 
 
4. Enter the last four digits of your Social Security Number (xxxx) and Date of Birth (mm/dd/yyyy) as indicated and click Submit. You will be taken to the next sign-up page. 5. Create a [place] ID. This will be your [employer] login ID and cannot be changed, so think of one that is secure and easy to remember. 6. Create a [employer] password. You can change your password at any time. 7. Enter your Password Reset Question and Answer. This can be used at a later time if you forget your password. 8. Enter your e-mail address. You will receive e-mail notification when new information is available in [address]. 9. Click Sign Up. You can now view and download portions of your medical record. 10. Click the Download Summary menu link to download a portable copy of your medical information. If you have questions, please visit the Frequently Asked Questions section of the [employer] website. Remember, [employer] is NOT to be used for urgent needs. For medical emergencies, dial 911. Now available from your iPhone and Android! Providers Seen During Your Hospitalization Provider Specialty Primary office phone Evaristo Zuñiga MD Gastroenterology [phone] Your Primary Care Physician (PCP) Primary Care Physician Office Phone Office Fax Lauren Rosado [phone] You are allergic to the following Allergen Reactions Bee Sting (Sting, Bee) Hives Shortness of Breath Codeine Hives Shortness of Breath Has tolerated Hydromorphone. Haldol (Haloperidol Lactate) Hives Shortness of Breath Haloperidol Hives Cough Keflex (Cephalexin) Diarrhea No Allergy Information Available Other (comments) Other reaction(s): hives Shellfish Containing Products Hives Stadol (Butorphanol Tartrate) Hives Shortness of Breath Rash Vicodin (Hydrocodone-Acetaminophen) Hives Shortness of Breath Rash Recent Documentation Height Weight BMI Smoking Status 1.702 m 108.4 kg 37.43 kg/m2 Former Smoker Emergency Contacts Name Discharge Info Relation Home Work Mobile [facility] DISCHARGE CAREGIVER [3] Spouse [3] [phone] Patient Belongings The following personal items are in your possession at time of discharge: 
  Dental Appliances: None  Visual Aid: None Please provide this summary of care documentation to your next provider. Signatures-by signing, you are acknowledging that this After Visit Summary has been reviewed with you and you have received a copy. Patient Signature:  ____________________________________________________________ Date:  ____________________________________________________________  
  
Marlyse Leaks Provider Signature:  ____________________________________________________________ Date:  ____________________________________________________________

## 2018-04-04 ENCOUNTER — APPOINTMENT (OUTPATIENT)
Dept: GENERAL RADIOLOGY | Age: 63
End: 2018-04-04
Attending: EMERGENCY MEDICINE
Payer: MEDICARE

## 2018-04-04 ENCOUNTER — HOSPITAL ENCOUNTER (EMERGENCY)
Age: 63
Discharge: HOME | End: 2018-04-04
Attending: EMERGENCY MEDICINE
Payer: MEDICARE

## 2018-04-04 VITALS
BODY MASS INDEX: 36.41 KG/M2 | WEIGHT: 232 LBS | OXYGEN SATURATION: 92 % | TEMPERATURE: 97.6 F | SYSTOLIC BLOOD PRESSURE: 137 MMHG | RESPIRATION RATE: 19 BRPM | DIASTOLIC BLOOD PRESSURE: 71 MMHG | HEIGHT: 67 IN | HEART RATE: 74 BPM

## 2018-04-04 DIAGNOSIS — R07.9 CHEST PAIN, UNSPECIFIED TYPE: Primary | ICD-10-CM

## 2018-04-04 LAB
ANION GAP SERPL CALC-SCNC: 6 MMOL/L (ref 3–18)
BASOPHILS # BLD: 0 K/UL (ref 0–0.1)
BASOPHILS NFR BLD: 1 % (ref 0–2)
BUN SERPL-MCNC: 12 MG/DL (ref 7–18)
BUN/CREAT SERPL: 12 (ref 12–20)
CALCIUM SERPL-MCNC: 10.2 MG/DL (ref 8.5–10.1)
CHLORIDE SERPL-SCNC: 106 MMOL/L (ref 100–108)
CK MB CFR SERPL CALC: NORMAL % (ref 0–4)
CK MB SERPL-MCNC: <1 NG/ML (ref 5–25)
CK SERPL-CCNC: 80 U/L (ref 39–308)
CO2 SERPL-SCNC: 27 MMOL/L (ref 21–32)
CREAT SERPL-MCNC: 1 MG/DL (ref 0.6–1.3)
DIFFERENTIAL METHOD BLD: ABNORMAL
EOSINOPHIL # BLD: 0.3 K/UL (ref 0–0.4)
EOSINOPHIL NFR BLD: 4 % (ref 0–5)
ERYTHROCYTE [DISTWIDTH] IN BLOOD BY AUTOMATED COUNT: 13 % (ref 11.6–14.5)
GLUCOSE SERPL-MCNC: 92 MG/DL (ref 74–99)
HCT VFR BLD AUTO: 44.8 % (ref 36–48)
HGB BLD-MCNC: 16.2 G/DL (ref 13–16)
LYMPHOCYTES # BLD: 2.8 K/UL (ref 0.9–3.6)
LYMPHOCYTES NFR BLD: 39 % (ref 21–52)
MCH RBC QN AUTO: 32.2 PG (ref 24–34)
MCHC RBC AUTO-ENTMCNC: 36.2 G/DL (ref 31–37)
MCV RBC AUTO: 89.1 FL (ref 74–97)
MONOCYTES # BLD: 0.5 K/UL (ref 0.05–1.2)
MONOCYTES NFR BLD: 7 % (ref 3–10)
NEUTS SEG # BLD: 3.6 K/UL (ref 1.8–8)
NEUTS SEG NFR BLD: 49 % (ref 40–73)
PLATELET # BLD AUTO: 143 K/UL (ref 135–420)
PMV BLD AUTO: 10.1 FL (ref 9.2–11.8)
POTASSIUM SERPL-SCNC: 4.1 MMOL/L (ref 3.5–5.5)
RBC # BLD AUTO: 5.03 M/UL (ref 4.7–5.5)
SODIUM SERPL-SCNC: 139 MMOL/L (ref 136–145)
TROPONIN I SERPL-MCNC: <0.02 NG/ML (ref 0–0.04)
WBC # BLD AUTO: 7.4 K/UL (ref 4.6–13.2)

## 2018-04-04 PROCEDURE — 80048 BASIC METABOLIC PNL TOTAL CA: CPT | Performed by: EMERGENCY MEDICINE

## 2018-04-04 PROCEDURE — 93005 ELECTROCARDIOGRAM TRACING: CPT

## 2018-04-04 PROCEDURE — 74011250637 HC RX REV CODE- 250/637: Performed by: EMERGENCY MEDICINE

## 2018-04-04 PROCEDURE — 71046 X-RAY EXAM CHEST 2 VIEWS: CPT

## 2018-04-04 PROCEDURE — 96374 THER/PROPH/DIAG INJ IV PUSH: CPT

## 2018-04-04 PROCEDURE — 85025 COMPLETE CBC W/AUTO DIFF WBC: CPT | Performed by: EMERGENCY MEDICINE

## 2018-04-04 PROCEDURE — 94762 N-INVAS EAR/PLS OXIMTRY CONT: CPT

## 2018-04-04 PROCEDURE — 96375 TX/PRO/DX INJ NEW DRUG ADDON: CPT

## 2018-04-04 PROCEDURE — 82550 ASSAY OF CK (CPK): CPT | Performed by: EMERGENCY MEDICINE

## 2018-04-04 PROCEDURE — 74011250636 HC RX REV CODE- 250/636: Performed by: EMERGENCY MEDICINE

## 2018-04-04 PROCEDURE — 99285 EMERGENCY DEPT VISIT HI MDM: CPT

## 2018-04-04 RX ORDER — GUAIFENESIN 100 MG/5ML
324 LIQUID (ML) ORAL
Status: COMPLETED | OUTPATIENT
Start: 2018-04-04 | End: 2018-04-04

## 2018-04-04 RX ORDER — OXYCODONE AND ACETAMINOPHEN 5; 325 MG/1; MG/1
1 TABLET ORAL
Status: COMPLETED | OUTPATIENT
Start: 2018-04-04 | End: 2018-04-04

## 2018-04-04 RX ORDER — ONDANSETRON 2 MG/ML
4 INJECTION INTRAMUSCULAR; INTRAVENOUS
Status: COMPLETED | OUTPATIENT
Start: 2018-04-04 | End: 2018-04-04

## 2018-04-04 RX ORDER — MORPHINE SULFATE 4 MG/ML
4 INJECTION INTRAVENOUS
Status: COMPLETED | OUTPATIENT
Start: 2018-04-04 | End: 2018-04-04

## 2018-04-04 RX ADMIN — ONDANSETRON 4 MG: 2 INJECTION, SOLUTION INTRAMUSCULAR; INTRAVENOUS at 20:36

## 2018-04-04 RX ADMIN — OXYCODONE HYDROCHLORIDE AND ACETAMINOPHEN 1 TABLET: 5; 325 TABLET ORAL at 21:43

## 2018-04-04 RX ADMIN — ASPIRIN 81 MG 324 MG: 81 TABLET ORAL at 20:36

## 2018-04-04 RX ADMIN — MORPHINE SULFATE 4 MG: 4 INJECTION INTRAVENOUS at 20:36

## 2018-04-04 NOTE — ED PROVIDER NOTES
EMERGENCY DEPARTMENT HISTORY AND PHYSICAL EXAM    7:55 PM      Date: 4/4/2018  Patient Name: Desirae Winter    History of Presenting Illness     Chief Complaint   Patient presents with    Chest Pain         History Provided By: Patient    Chief Complaint: CP  Duration:  days  Timing:  constant  Location: n/a  Quality: Sharp  Severity: Moderate  Modifying Factors: denies any modifying factors. Associated Symptoms: pain radiates to left arm    Additional History (Context): Desirae Winter is a 60 y.o. male with a Hx of MI, stroke, drug-seeking behavior, CAD, cirrhosis, cocaine abuse, hyperlipidemia, GERD, nutcracker esophagus, who presents with CP onset 5 days ago after choking on a \"slider\". The pt reports he again choked on some roast beef 3 days ago, and the CP has been constant since that time. States that the pain is radiating to his left arm. Reports that the pain is consistent with prior pain from his \"nutcracker esophagus\". The pt has his esophagus dilated once every 5-8 weeks to manage his Sx, and it has been 5-6 weeks since his last dilation. He was sent here by his cardiologists to rule out cardiac cause of the CP. The pt reports he has only been able to eat yogurt, and applesauce today. Denies any modifying factors. No further complaints at this time. Pain has been cosntant for 4 days. PCP: Aaron Dsouza DO    Current Facility-Administered Medications   Medication Dose Route Frequency Provider Last Rate Last Dose    oxyCODONE-acetaminophen (PERCOCET) 5-325 mg per tablet 1 Tab  1 Tab Oral NOW Chandana Melgar MD         Current Outpatient Prescriptions   Medication Sig Dispense Refill    omeprazole (PRILOSEC) 10 mg capsule Take 10 mg by mouth daily.  chlorthalidone (HYGROTEN) 25 mg tablet Take 1 Tab by mouth daily. 30 Tab 11    diazePAM (VALIUM) 10 mg tablet Take 10 mg by mouth every six (6) hours as needed for Anxiety.  clopidogrel (PLAVIX) 75 mg tab Take 75 mg by mouth.       tamsulosin (FLOMAX) 0.4 mg capsule Take 1 Cap by mouth daily (after dinner). 90 Cap 3    nitroglycerin (NITROSTAT) 0.3 mg SL tablet 0.3 mg.      meperidine (DEMEROL) 100 mg tablet Take 100 mg by mouth every four (4) hours as needed for Pain.  esomeprazole (NEXIUM) 40 mg capsule Take 1 Cap by mouth Before breakfast and dinner. (Patient taking differently: Take 40 mg by mouth daily. ) 180 Cap 4    carvedilol (COREG) 25 mg tablet Take 25 mg by mouth two (2) times daily (with meals).  atorvastatin (LIPITOR) 40 mg tablet Take 40 mg by mouth daily.  DULoxetine (CYMBALTA) 60 mg capsule Take 60 mg by mouth daily.            Past History     Past Medical History:  Past Medical History:   Diagnosis Date    Altered mental status     Arthritis     Arthropathy     Back pain     Bilateral kidney stones     Burn     ON HANDS    CAD (coronary artery disease) 1999    MI    3 cardiac stents    Calcium nephrolithiasis     Calculus of kidney     Cardiac arrhythmia     Cerebral artery occlusion with cerebral infarction (HCC)     Chest pain     Cigarette smoker     Cirrhosis, alcoholic (HCC)     Cocaine abuse, episodic use     Colon polyp     Diarrhea     DJD (degenerative joint disease)     Drug-seeking behavior     Dyskinesia     Esophageal disorder     Esophageal erosions     Flank pain, acute     Foot ulcer, right (HCC)     Gastric ulcer     GERD (gastroesophageal reflux disease)     Gross hematuria     Head trauma     Hearing reduced     Hematuria     Herniated disc     X 10 IN BACK    Hiatus hernia syndrome     History of kidney stones     HNP (herniated nucleus pulposus)     Hyperlipemia     Hypertension     Hypokalemia     Infection     Jaw fracture (HCC)     Kidney stones     Knee pain     Left ureteral stone     Muscle atrophy     Myocardial infarction ([facility] [address] 1999    N&V (nausea and vomiting)     Nocturia     Nondependent alcohol abuse, in remission     Luzmaria esophagus     Other ill-defined conditions(799.89)     dysphagia    Renal stone     Slurred speech     Stroke [facility]) 1999    questionable TIA    Swallowing difficulty     Syncopal episodes     Syncope and collapse     Tobacco dependence     Unspecified adverse effect of anesthesia     AT TIMES ESOPHAGUS SPASMS AFTER PROCEDURES    Unspecified cerebral artery occlusion with cerebral infarction        Past Surgical History:  Past Surgical History:   Procedure Laterality Date    HX APPENDECTOMY      9YEARS OLD    HX CHOLECYSTECTOMY  2009    HX CORONARY STENT PLACEMENT  2008    3 STENTS PLACED    HX ENDOSCOPY      with Dilatation, multiple times    HX HEART CATHETERIZATION  2012    EVERYTHING LOOKED GOOD AT THIS POINT    HX HEENT      \"2 jaw surgeries\"    HX HERNIA REPAIR      ABDOMEN    HX KNEE ARTHROSCOPY  9/2012    LEFT    HX MOHS PROCEDURES  2016    HX ORTHOPAEDIC  12/12    left knee    HX OTHER SURGICAL      ESOPHAGUS DILATATION    HX ROTATOR CUFF REPAIR Right     x2    HX UROLOGICAL  07/18/2016    [facility]-Cystoscopy,URETEROSCOPY W/ LITHOTRIPSY, Dr Theresa Johnson  UROLOGICAL  10/10/2016    [facility]-Cystoscopy with removal of ureteral stent, Dr Theresa Johnson  UROLOGICAL  01/30/2017    [facility]-Cystoscopy, Left retrograde pyelography, Left diagnostic ureteroscopy, Left 6 x 26 cm double-J ureteral stent placement,Right retrograde pyelography, Right ureteroscopic stone extraction,Right 6 x 24 cm double-J ureteral stent placement, Interpretation of urography,Intraoperative fluoro less than 1 hour,          UROLOGICAL  02/08/2017    [facility]-CYSTOURETHROSCOPY WITH STENT REMOVAL, Dr Yoon Hooker       Family History:  Family History   Problem Relation Age of Onset    Diabetes Father     Heart Disease Father     Stroke Father        Social History:  Social History   Substance Use Topics    Smoking status: Former Smoker     Packs/day: 0.25     Years: 20.00     Types: Cigarettes    Smokeless tobacco: Never Used    Alcohol use No      Comment: \"none in over 20 years\"       Allergies: Allergies   Allergen Reactions    Bee Sting [Sting, Bee] Hives and Shortness of Breath    Codeine Hives and Shortness of Breath     Has tolerated Hydromorphone.  Haldol [Haloperidol Lactate] Hives and Shortness of Breath    Haloperidol Hives and Cough    Keflex [Cephalexin] Diarrhea    No Allergy Information Available Other (comments)     Other reaction(s): hives    Shellfish Containing Products Hives    Stadol [Butorphanol Tartrate] Hives, Shortness of Breath and Rash    Vicodin [Hydrocodone-Acetaminophen] Hives, Shortness of Breath and Rash         Review of Systems       Review of Systems   Constitutional: Negative for chills and fever. HENT: Negative for rhinorrhea. Cardiovascular: Positive for chest pain. Gastrointestinal: Negative for abdominal pain, nausea and vomiting. Endocrine: Negative for polyuria. Genitourinary: Negative for dysuria. Musculoskeletal: Positive for myalgias (CP radiates to left arm). Negative for back pain. Skin: Negative for rash. Neurological: Negative for headaches. All other systems reviewed and are negative. Physical Exam     Patient Vitals for the past 12 hrs:   Temp Pulse Resp BP SpO2   04/04/18 2115 - 74 19 141/90 92 %   04/04/18 2100 - 76 14 129/77 93 %   04/04/18 2045 - 77 15 132/79 93 %   04/04/18 1910 97.6 °F (36.4 °C) 81 16 (!) 153/96 96 %         Physical Exam   Constitutional: He is oriented to person, place, and time. He appears well-developed and well-nourished. Speaking in full sentences   HENT:   Head: Normocephalic and atraumatic. Eyes: Conjunctivae are normal. Pupils are equal, round, and reactive to light. Neck: Normal range of motion. Neck supple. Cardiovascular: Normal rate and regular rhythm. Pulmonary/Chest: Effort normal and breath sounds normal. No respiratory distress. He has no wheezes. No respiratory distress.    Abdominal: Soft. Bowel sounds are normal. He exhibits no distension. There is no tenderness. There is no rebound and no guarding. Musculoskeletal: Normal range of motion. Neurological: He is alert and oriented to person, place, and time. Skin: Skin is warm and dry. Psychiatric: He has a normal mood and affect. Thought content normal.   Nursing note and vitals reviewed. Diagnostic Study Results     Labs -  Recent Results (from the past 12 hour(s))   EKG, 12 LEAD, INITIAL    Collection Time: 04/04/18  6:48 PM   Result Value Ref Range    Ventricular Rate 86 BPM    Atrial Rate 86 BPM    P-R Interval 146 ms    QRS Duration 82 ms    Q-T Interval 358 ms    QTC Calculation (Bezet) 428 ms    Calculated P Axis 33 degrees    Calculated R Axis 4 degrees    Calculated T Axis 51 degrees    Diagnosis       Normal sinus rhythm  Normal ECG  When compared with ECG of 30-JAN-2018 23:57,  No significant change was found     CBC WITH AUTOMATED DIFF    Collection Time: 04/04/18  8:18 PM   Result Value Ref Range    WBC 7.4 4.6 - 13.2 K/uL    RBC 5.03 4.70 - 5.50 M/uL    HGB 16.2 (H) 13.0 - 16.0 g/dL    HCT 44.8 36.0 - 48.0 %    MCV 89.1 74.0 - 97.0 FL    MCH 32.2 24.0 - 34.0 PG    MCHC 36.2 31.0 - 37.0 g/dL    RDW 13.0 11.6 - 14.5 %    PLATELET 788 892 - 928 K/uL    MPV 10.1 9.2 - 11.8 FL    NEUTROPHILS 49 40 - 73 %    LYMPHOCYTES 39 21 - 52 %    MONOCYTES 7 3 - 10 %    EOSINOPHILS 4 0 - 5 %    BASOPHILS 1 0 - 2 %    ABS. NEUTROPHILS 3.6 1.8 - 8.0 K/UL    ABS. LYMPHOCYTES 2.8 0.9 - 3.6 K/UL    ABS. MONOCYTES 0.5 0.05 - 1.2 K/UL    ABS. EOSINOPHILS 0.3 0.0 - 0.4 K/UL    ABS.  BASOPHILS 0.0 0.0 - 0.1 K/UL    DF AUTOMATED     METABOLIC PANEL, BASIC    Collection Time: 04/04/18  8:18 PM   Result Value Ref Range    Sodium 139 136 - 145 mmol/L    Potassium 4.1 3.5 - 5.5 mmol/L    Chloride 106 100 - 108 mmol/L    CO2 27 21 - 32 mmol/L    Anion gap 6 3.0 - 18 mmol/L    Glucose 92 74 - 99 mg/dL    BUN 12 7.0 - 18 MG/DL    Creatinine 1.00 0.6 - 1.3 MG/DL    BUN/Creatinine ratio 12 12 - 20      GFR est AA >60 >60 ml/min/1.73m2    GFR est non-AA >60 >60 ml/min/1.73m2    Calcium 10.2 (H) 8.5 - 10.1 MG/DL   CARDIAC PANEL,(CK, CKMB & TROPONIN)    Collection Time: 04/04/18  8:18 PM   Result Value Ref Range    CK 80 39 - 308 U/L    CK - MB <1.0 <3.6 ng/ml    CK-MB Index  0.0 - 4.0 %     CALCULATION NOT PERFORMED WHEN RESULT IS BELOW LINEAR LIMIT    Troponin-I, Qt. <0.02 0.0 - 0.045 NG/ML       Radiologic Studies -   Xr Chest Pa Lat    Result Date: 4/4/2018  Chest PA and lateral views CPT CODE: 04291 HISTORY:Sudden onset shortness of breath COMPARISON: 1/21/18 FINDINGS: The heart is normal in size. The lungs are well expanded with interval resolution of bilateral lower lung opacities. Coarse interstitial lung markers noted and suggesting chronic interstitial process. No acute pulmonary finding. The bilateral costophrenic angles are sharp. The mediastinum, pulmonary vascularity and bony thorax appear unremarkable. IMPRESSION: No acute cardiopulmonary process. Thank you for your referral.        Medical Decision Making   I am the first provider for this patient. I reviewed the vital signs, available nursing notes, past medical history, past surgical history, family history and social history. Vital Signs-Reviewed the patient's vital signs. Pulse Oximetry Analysis -  96% on room air (Interpretation) normal.    Cardiac Monitor:  Rate: 81    EKG: Interpreted by the EP. Time Interpreted: 18   Rate: 80   Rhythm:NSR   Interpretation:no significant ST changes   Comparison:n/a     Records Reviewed: Nursing Notes and Old Medical Records (Time of Review: 7:55 PM)    Provider Notes (Medical Decision Making): Discussed with GI who said they know him well. Wanted him to have a cardiac evaluation and did not want him admitted for any GI procedures. Patient asking for dilaudid. Informed that we do not have it as there is a shortage.  Says he feels better after morphine. ED Course: Progress Notes, Reevaluation, and Consults:      Consult:  Discussed care with Dr. Hailey Ortiz, GI. Standard discussion; including history of patients chief complaint, available diagnostic results, and treatment course. Knows the pt well, wanted to make sure the pt did not have any cardiac disease. Will dilate the pt as an outpatient. 9:03 PM, 4/4/2018     9:30 PM cardiac enzymes negative, pt has had constant pain for several days, one set is sufficient. Will discharge home with GI FU. Diagnosis     Clinical Impression:   1. Chest pain, unspecified type        Disposition: discharge    Follow-up Information     Follow up With Details Comments [facility],[address], DO In 2 days  [facility] [facility]  [address] [place] [address] [address] [place] [address]      Flores Thomason MD In 1 day  [address] [place] [address]  [phone]             Patient's Medications   Start Taking    No medications on file   Continue Taking    ATORVASTATIN (LIPITOR) 40 MG TABLET    Take 40 mg by mouth daily. CARVEDILOL (COREG) 25 MG TABLET    Take 25 mg by mouth two (2) times daily (with meals). CHLORTHALIDONE (HYGROTEN) 25 MG TABLET    Take 1 Tab by mouth daily. CLOPIDOGREL (PLAVIX) 75 MG TAB    Take 75 mg by mouth. DIAZEPAM (VALIUM) 10 MG TABLET    Take 10 mg by mouth every six (6) hours as needed for Anxiety. DULOXETINE (CYMBALTA) 60 MG CAPSULE    Take 60 mg by mouth daily. ESOMEPRAZOLE (NEXIUM) 40 MG CAPSULE    Take 1 Cap by mouth Before breakfast and dinner. MEPERIDINE (DEMEROL) 100 MG TABLET    Take 100 mg by mouth every four (4) hours as needed for Pain. NITROGLYCERIN (NITROSTAT) 0.3 MG SL TABLET    0.3 mg.    OMEPRAZOLE (PRILOSEC) 10 MG CAPSULE    Take 10 mg by mouth daily. TAMSULOSIN (FLOMAX) 0.4 MG CAPSULE    Take 1 Cap by mouth daily (after dinner).    These Medications have changed    No medications on file   Stop Taking    No medications on file     _______________________________    Attestations:  Agueda [address] acting as a scribe for and in the presence of Trey Keller MD      April 04, 2018 at 7:55 PM       Provider Attestation:      I personally performed the services described in the documentation, reviewed the documentation, as recorded by the scribe in my presence, and it accurately and completely records my words and actions.  April 04, 2018 at 7:55 PM - Trey Keller MD    _______________________________

## 2018-04-04 NOTE — ED TRIAGE NOTES
Pt. States \"I got choked eating on Saturday and now I'm having chest pain that's radiating towards my left shoulder\".

## 2018-04-04 NOTE — ED NOTES
I performed a brief evaluation, including history and physical, of the patient here in triage and I have determined that pt will need further treatment and evaluation from the main side ER physician. I have placed initial orders to help in expediting patients care. April 04, 2018 at 6:55 PM - DAVID Clark        There were no vitals taken for this visit.

## 2018-04-05 LAB
ATRIAL RATE: 86 BPM
CALCULATED P AXIS, ECG09: 33 DEGREES
CALCULATED R AXIS, ECG10: 4 DEGREES
CALCULATED T AXIS, ECG11: 51 DEGREES
DIAGNOSIS, 93000: NORMAL
P-R INTERVAL, ECG05: 146 MS
Q-T INTERVAL, ECG07: 358 MS
QRS DURATION, ECG06: 82 MS
QTC CALCULATION (BEZET), ECG08: 428 MS
VENTRICULAR RATE, ECG03: 86 BPM

## 2018-04-05 NOTE — DISCHARGE INSTRUCTIONS
Your pain is likely related to esophagus. We recommend you follow-up with your GI physician. Chest Pain: Care Instructions  Your Care Instructions    There are many things that can cause chest pain. Some are not serious and will get better on their own in a few days. But some kinds of chest pain need more testing and treatment. Your doctor may have recommended a follow-up visit in the next 8 to 12 hours. If you are not getting better, you may need more tests or treatment. Even though your doctor has released you, you still need to watch for any problems. The doctor carefully checked you, but sometimes problems can develop later. If you have new symptoms or if your symptoms do not get better, get medical care right away. If you have worse or different chest pain or pressure that lasts more than 5 minutes or you passed out (lost consciousness), call 911 or seek other emergency help right away. A medical visit is only one step in your treatment. Even if you feel better, you still need to do what your doctor recommends, such as going to all suggested follow-up appointments and taking medicines exactly as directed. This will help you recover and help prevent future problems. How can you care for yourself at home? · Rest until you feel better. · Take your medicine exactly as prescribed. Call your doctor if you think you are having a problem with your medicine. · Do not drive after taking a prescription pain medicine. When should you call for help? Call 911 if:  ? · You passed out (lost consciousness). ? · You have severe difficulty breathing. ? · You have symptoms of a heart attack. These may include:  ¨ Chest pain or pressure, or a strange feeling in your chest.  ¨ Sweating. ¨ Shortness of breath. ¨ Nausea or vomiting. ¨ Pain, pressure, or a strange feeling in your back, neck, jaw, or upper belly or in one or both shoulders or arms. ¨ Lightheadedness or sudden weakness.   ¨ A fast or irregular heartbeat. After you call 911, the  may tell you to chew 1 adult-strength or 2 to 4 low-dose aspirin. Wait for an ambulance. Do not try to drive yourself. ?Call your doctor today if:  ? · You have any trouble breathing. ? · Your chest pain gets worse. ? · You are dizzy or lightheaded, or you feel like you may faint. ? · You are not getting better as expected. ? · You are having new or different chest pain. Where can you learn more? Go to http://turner-doris.info/. Enter A120 in the search box to learn more about \"Chest Pain: Care Instructions. \"  Current as of: March 20, 2017  Content Version: 11.4  © 5558-8887 [employer]. Care instructions adapted under license by [facility] (which disclaims liability or warranty for this information). If you have questions about a medical condition or this instruction, always ask your healthcare professional. [place] [address] any warranty or liability for your use of this information.

## 2018-04-09 ENCOUNTER — ANESTHESIA EVENT (OUTPATIENT)
Dept: ENDOSCOPY | Age: 63
End: 2018-04-09
Payer: MEDICARE

## 2018-04-10 ENCOUNTER — HOSPITAL ENCOUNTER (OUTPATIENT)
Age: 63
Setting detail: OUTPATIENT SURGERY
Discharge: HOME | End: 2018-04-10
Attending: INTERNAL MEDICINE | Admitting: INTERNAL MEDICINE
Payer: MEDICARE

## 2018-04-10 ENCOUNTER — ANESTHESIA (OUTPATIENT)
Dept: ENDOSCOPY | Age: 63
End: 2018-04-10
Payer: MEDICARE

## 2018-04-10 VITALS
RESPIRATION RATE: 22 BRPM | DIASTOLIC BLOOD PRESSURE: 62 MMHG | TEMPERATURE: 98.5 F | OXYGEN SATURATION: 97 % | HEART RATE: 80 BPM | SYSTOLIC BLOOD PRESSURE: 127 MMHG

## 2018-04-10 PROCEDURE — 74011250636 HC RX REV CODE- 250/636: Performed by: NURSE ANESTHETIST, CERTIFIED REGISTERED

## 2018-04-10 PROCEDURE — 74011000250 HC RX REV CODE- 250: Performed by: NURSE ANESTHETIST, CERTIFIED REGISTERED

## 2018-04-10 PROCEDURE — 74011250636 HC RX REV CODE- 250/636

## 2018-04-10 PROCEDURE — 76060000031 HC ANESTHESIA FIRST 0.5 HR: Performed by: INTERNAL MEDICINE

## 2018-04-10 PROCEDURE — 76040000019: Performed by: INTERNAL MEDICINE

## 2018-04-10 RX ORDER — SODIUM CHLORIDE 0.9 % (FLUSH) 0.9 %
5-10 SYRINGE (ML) INJECTION EVERY 8 HOURS
Status: DISCONTINUED | OUTPATIENT
Start: 2018-04-10 | End: 2018-04-10 | Stop reason: HOSPADM

## 2018-04-10 RX ORDER — PROPOFOL 10 MG/ML
INJECTION, EMULSION INTRAVENOUS AS NEEDED
Status: DISCONTINUED | OUTPATIENT
Start: 2018-04-10 | End: 2018-04-10 | Stop reason: HOSPADM

## 2018-04-10 RX ORDER — SODIUM CHLORIDE, SODIUM LACTATE, POTASSIUM CHLORIDE, CALCIUM CHLORIDE 600; 310; 30; 20 MG/100ML; MG/100ML; MG/100ML; MG/100ML
75 INJECTION, SOLUTION INTRAVENOUS CONTINUOUS
Status: DISCONTINUED | OUTPATIENT
Start: 2018-04-10 | End: 2018-04-10 | Stop reason: HOSPADM

## 2018-04-10 RX ORDER — HYDROMORPHONE HYDROCHLORIDE 2 MG/ML
1 INJECTION, SOLUTION INTRAMUSCULAR; INTRAVENOUS; SUBCUTANEOUS ONCE
Status: COMPLETED | OUTPATIENT
Start: 2018-04-10 | End: 2018-04-10

## 2018-04-10 RX ORDER — SODIUM CHLORIDE 0.9 % (FLUSH) 0.9 %
5-10 SYRINGE (ML) INJECTION AS NEEDED
Status: DISCONTINUED | OUTPATIENT
Start: 2018-04-10 | End: 2018-04-10 | Stop reason: HOSPADM

## 2018-04-10 RX ORDER — LIDOCAINE HYDROCHLORIDE 10 MG/ML
0.1 INJECTION, SOLUTION EPIDURAL; INFILTRATION; INTRACAUDAL; PERINEURAL AS NEEDED
Status: DISCONTINUED | OUTPATIENT
Start: 2018-04-10 | End: 2018-04-10 | Stop reason: HOSPADM

## 2018-04-10 RX ORDER — INSULIN LISPRO 100 [IU]/ML
INJECTION, SOLUTION INTRAVENOUS; SUBCUTANEOUS ONCE
Status: DISCONTINUED | OUTPATIENT
Start: 2018-04-10 | End: 2018-04-10 | Stop reason: HOSPADM

## 2018-04-10 RX ORDER — FLUMAZENIL 0.1 MG/ML
0.2 INJECTION INTRAVENOUS
Status: DISCONTINUED | OUTPATIENT
Start: 2018-04-10 | End: 2018-04-10 | Stop reason: HOSPADM

## 2018-04-10 RX ORDER — EPINEPHRINE 0.1 MG/ML
1 INJECTION INTRACARDIAC; INTRAVENOUS
Status: DISCONTINUED | OUTPATIENT
Start: 2018-04-10 | End: 2018-04-10 | Stop reason: HOSPADM

## 2018-04-10 RX ORDER — DEXTROMETHORPHAN/PSEUDOEPHED 2.5-7.5/.8
1.2 DROPS ORAL
Status: DISCONTINUED | OUTPATIENT
Start: 2018-04-10 | End: 2018-04-10 | Stop reason: HOSPADM

## 2018-04-10 RX ORDER — NALOXONE HYDROCHLORIDE 0.4 MG/ML
0.4 INJECTION, SOLUTION INTRAMUSCULAR; INTRAVENOUS; SUBCUTANEOUS
Status: DISCONTINUED | OUTPATIENT
Start: 2018-04-10 | End: 2018-04-10 | Stop reason: HOSPADM

## 2018-04-10 RX ORDER — HYDROMORPHONE HYDROCHLORIDE 2 MG/ML
INJECTION, SOLUTION INTRAMUSCULAR; INTRAVENOUS; SUBCUTANEOUS
Status: COMPLETED
Start: 2018-04-10 | End: 2018-04-10

## 2018-04-10 RX ORDER — ATROPINE SULFATE 0.1 MG/ML
0.5 INJECTION INTRAVENOUS
Status: DISCONTINUED | OUTPATIENT
Start: 2018-04-10 | End: 2018-04-10 | Stop reason: HOSPADM

## 2018-04-10 RX ADMIN — SODIUM CHLORIDE, SODIUM LACTATE, POTASSIUM CHLORIDE, AND CALCIUM CHLORIDE 75 ML/HR: 600; 310; 30; 20 INJECTION, SOLUTION INTRAVENOUS at 08:57

## 2018-04-10 RX ADMIN — HYDROMORPHONE HYDROCHLORIDE 1 MG: 2 INJECTION, SOLUTION INTRAMUSCULAR; INTRAVENOUS; SUBCUTANEOUS at 10:19

## 2018-04-10 RX ADMIN — PROPOFOL 100 MG: 10 INJECTION, EMULSION INTRAVENOUS at 09:31

## 2018-04-10 RX ADMIN — FAMOTIDINE 20 MG: 10 INJECTION INTRAVENOUS at 08:57

## 2018-04-10 NOTE — IP AVS SNAPSHOT
[address] [facility] 
 
 
 [address] [address] [address] [address] [phone] Patient: Lonie Heimlich MRN: [id number] About your hospitalization You were admitted on:  April 10, 2018 You last received care in the:  [facility] You were discharged on:  April 10, 2018 Why you were hospitalized Your primary diagnosis was:  Not on File Follow-up Information Follow up With Details Comments Contact Info Max Mireles DO   [address] [id number] 
[phone] Ciara Tyler MD   [address] [place] [address] [address] 200 [facility] 
[phone] Your Scheduled Appointments Tuesday April 10, 2018 ENDOSCOPY with Ciara Tyler MD  
[facility] ([facility]) [address] [address] [phone] Discharge Orders None A check alexa indicates which time of day the medication should be taken. My Medications CHANGE how you take these medications Instructions Each Dose to Equal  
 Morning Noon Evening Bedtime  
 esomeprazole 40 mg capsule Commonly known as:  NexIUM What changed:  when to take this Your last dose was: Your next dose is: Take 1 Cap by mouth Before breakfast and dinner. 40 mg CONTINUE taking these medications Instructions Each Dose to Equal  
 Morning Noon Evening Bedtime  
 chlorthalidone 25 mg tablet Commonly known as:  Daya Presto Your last dose was: Your next dose is: Take 1 Tab by mouth daily. 25 mg COREG 25 mg tablet Generic drug:  carvedilol Your last dose was: Your next dose is: Take 25 mg by mouth two (2) times daily (with meals). 25 mg  
    
   
   
   
  
 CYMBALTA 60 mg capsule Generic drug:  DULoxetine Your last dose was: Your next dose is: Take 60 mg by mouth daily. 60 mg DEMEROL 100 mg tablet Generic drug:  meperidine Your last dose was: Your next dose is: Take 100 mg by mouth every four (4) hours as needed for Pain. 100 mg  
    
   
   
   
  
 LIPITOR 40 mg tablet Generic drug:  atorvastatin Your last dose was: Your next dose is: Take 40 mg by mouth daily. 40 mg  
    
   
   
   
  
 nitroglycerin 0.3 mg SL tablet Commonly known as:  NITROSTAT Your last dose was: Your next dose is: 0.3 mg.  
 0.3 mg  
    
   
   
   
  
 PLAVIX 75 mg Tab Generic drug:  clopidogrel Your last dose was: Your next dose is: Take 75 mg by mouth. 75 mg PriLOSEC 10 mg capsule Generic drug:  omeprazole Your last dose was: Your next dose is: Take 10 mg by mouth daily. 10 mg  
    
   
   
   
  
 tamsulosin 0.4 mg capsule Commonly known as:  FLOMAX Your last dose was: Your next dose is: Take 1 Cap by mouth daily (after dinner). 0.4 mg  
    
   
   
   
  
 VALIUM 10 mg tablet Generic drug:  diazePAM  
   
Your last dose was: Your next dose is: Take 10 mg by mouth every six (6) hours as needed for Anxiety. 10 mg Opioid Education Prescription Opioids: What You Need to Know: 
 
Prescription opioids can be used to help relieve moderate-to-severe pain and are often prescribed following a surgery or injury, or for certain health conditions. These medications can be an important part of treatment but also come with serious risks. Opioids are strong pain medicines. Examples include hydrocodone, oxycodone, fentanyl, and morphine. Heroin is an example of an illegal opioid.   It is important to work with your health care provider to make sure you are getting the safest, most effective care. WHAT ARE THE RISKS AND SIDE EFFECTS OF OPIOID USE? Prescription opioids carry serious risks of addiction and overdose, especially with prolonged use. An opioid overdose, often marked by slow breathing, can cause sudden death. The use of prescription opioids can have a number of side effects as well, even when taken as directed. · Tolerance-meaning you might need to take more of a medication for the same pain relief · Physical dependence-meaning you have symptoms of withdrawal when the medication is stopped. Withdrawal symptoms can include nausea, sweating, chills, diarrhea, stomach cramps, and muscle aches. Withdrawal can last up to several weeks, depending on which drug you took and how long you took it. · Increased sensitivity to pain · Constipation · Nausea, vomiting, and dry mouth · Sleepiness and dizziness · Confusion · Depression · Low levels of testosterone that can result in lower sex drive, energy, and strength · Itching and sweating RISKS ARE GREATER WITH:      
· History of drug misuse, substance use disorder, or overdose · Mental health conditions (such as depression or anxiety) · Sleep apnea · Older age (72 years or older) · Pregnancy Avoid alcohol while taking prescription opioids. Also, unless specifically advised by your health care provider, medications to avoid include: · Benzodiazepines (such as Xanax or Valium) · Muscle relaxants (such as Soma or Flexeril) · Hypnotics (such as Ambien or Lunesta) · Other prescription opioids KNOW YOUR OPTIONS Talk to your health care provider about ways to manage your pain that don't involve prescription opioids. Some of these options may actually work better and have fewer risks and side effects. Options may include: 
· Pain relievers such as acetaminophen, ibuprofen, and naproxen · Some medications that are also used for depression or seizures · Physical therapy and exercise · Counseling to help patients learn how to cope better with triggers of pain and stress. · Application of heat or cold compress · Massage therapy · Relaxation techniques Be Informed Make sure you know the name of your medication, how much and how often to take it, and its potential risks & side effects. IF YOU ARE PRESCRIBED OPIOIDS FOR PAIN: 
· Never take opioids in greater amounts or more often than prescribed. Remember the goal is not to be pain-free but to manage your pain at a tolerable level. · Follow up with your primary care provider to: · Work together to create a plan on how to manage your pain. · Talk about ways to help manage your pain that don't involve prescription opioids. · Talk about any and all concerns and side effects. · Help prevent misuse and abuse. · Never sell or share prescription opioids · Help prevent misuse and abuse. · Store prescription opioids in a secure place and out of reach of others (this may include visitors, children, friends, and family). · Safely dispose of unused/unwanted prescription opioids: Find your community drug take-back program or your pharmacy mail-back program, or flush them down the toilet, following guidance from the Food and Drug Administration (www.fda.gov/Drugs/ResourcesForYou). · Visit www.cdc.gov/drugoverdose to learn about the risks of opioid abuse and overdose. · If you believe you may be struggling with addiction, tell your health care provider and ask for guidance or call [address] [place] [address] at [phone]-SDIN. Discharge Instructions Upper GI Endoscopy: What to Expect at [facility] Your Recovery After you have an endoscopy, you will stay at the hospital or clinic for 1 to 2 hours. This will allow the medicine to wear off.  You will be able to go home after your doctor or nurse checks to make sure you are not having any problems. You may have to stay overnight if you had treatment during the test. You may have a sore throat for a day or two after the test. 
This care sheet gives you a general idea about what to expect after the test. 
How can you care for yourself at home? Activity · Rest as much as you need to after you go home. · You should be able to go back to your usual activities the day after the test. 
Diet · Follow your doctor's directions for eating after the test. 
· Drink plenty of fluids (unless your doctor has told you not to). Medications · If you have a sore throat the day after the test, use an over-the-counter spray to numb your throat. Follow-up care is a key part of your treatment and safety. Be sure to make and go to all appointments, and call your doctor if you are having problems. It's also a good idea to know your test results and keep a list of the medicines you take. When should you call for help? Call 911 anytime you think you may need emergency care. For example, call if: 
· You passed out (lost consciousness). · You cough up blood. · You vomit blood or what looks like coffee grounds. · You pass maroon or very bloody stools. Call your doctor now or seek immediate medical care if: 
· You have trouble swallowing. · You have belly pain. · Your stools are black and tarlike or have streaks of blood. · You are sick to your stomach or cannot keep fluids down. Watch closely for changes in your health, and be sure to contact your doctor if: 
· Your throat still hurts after a day or two. · You do not get better as expected. Where can you learn more? Go to Avinger.be Enter [phone] in the search box to learn more about \"Upper GI Endoscopy: What to Expect at Home. \"  
© 7272-2462 Healthwise, Incorporated.  Care instructions adapted under license by [address] [place] [address] [employer] (which disclaims liability or warranty for this information). This care instruction is for use with your licensed healthcare professional. If you have questions about a medical condition or this instruction, always ask your healthcare professional. [address]  any warranty or liability for your use of this information. Content Version: 48.1.332701; Current as of: November 14, 2014 DISCHARGE SUMMARY from Nurse POST-PROCEDURE INSTRUCTIONS: 
 
Call your Physician if you: 
? Observe any excess bleeding. ? Develop a temperature over 100.5o F. 
? Experience abdominal, shoulder or chest pain. ? Notice any signs of decreased circulation or nerve impairment to an extremity such as a change in color, persistent numbness, tingling, coldness or increase in pain. ? Vomit blood or you have nausea and vomiting lasting longer than 4 hours. ? Are unable to take medications. ? Are unable to urinate within 8 hours after discharge following general anesthesia or intravenous sedation. For the next 24 hours after receiving general anesthesia or intravenous sedation, or while taking prescription Narcotics, limit your activities: 
? Do NOT drive a motor vehicle, operate hazard machinery or power tools, or perform tasks that require coordination. The medication you received during your procedure may have some effect on your mental awareness. ? Do NOT make important personal or business decisions. The medication you received during your procedure may have some effect on your mental awareness. ? Do NOT drink alcoholic beverages. These drinks do not mix well with the medications that have been given to you. ? Upon discharge from the hospital, you must be accompanied by a responsible adult. ? Resume your diet as directed by your physician. ? Resume medications as your physician has prescribed. ?  Please give a list of your current medications to your Primary Care Provider. ? Please update this list whenever your medications are discontinued, doses are changed, or new medications (including over-the-counter products) are added. ? Please carry medication information at all times in case of emergency situations. These are general instructions for a healthy lifestyle: No smoking/ No tobacco products/ Avoid exposure to second hand smoke. ? Surgeon General's Warning:  Quitting smoking now greatly reduces serious risk to your health. Obesity, smoking, and a sedentary lifestyle greatly increase your risk for illness. ? A healthy diet, regular physical exercise & weight monitoring are important for maintaining a healthy lifestyle ? You may be retaining fluid if you have a history of heart failure or if you experience any of the following symptoms:  Weight gain of 3 pounds or more overnight or 5 pounds in a week, increased swelling in our hands or feet or shortness of breath while lying flat in bed. Please call your doctor as soon as you notice any of these symptoms; do not wait until your next office visit. Recognize signs and symptoms of STROKE: 
F  -  Face looks uneven A  -  Arms unable to move or move unevenly S  -  Speech slurred or non-existent T  -  Time to call 911 - as soon as signs and symptoms begin - DO NOT go back to bed or wait to see If you get better - TIME IS BRAIN. Colorectal Screening ? Colorectal cancer almost always develops from precancerous polyps (abnormal growths) in the colon or rectum. Screening tests can find precancerous polyps, so that they can be removed before they turn into cancer. Screening tests can also find colorectal cancer early, when treatment works best. 
? Speak with your physician about when you should begin screening and how often you should be tested ? Additional Information If you have questions, please call [phone].  Remember, [address] is NOT to be used for urgent needs. For medical emergencies, dial 911. Educational references and/or instructions provided during this visit included: 
 
see attachedments APPOINTMENTS: 
 
Please make a follow-up appointment with your physician. Discharge information has been reviewed with the patient and responsible party. The patient and responsible party verbalized understanding. Introducing [facility]! Catalina Cottrell introduces [employer] patient portal. Now you can access parts of your medical record, email your doctor's office, and request medication refills online. 1. In your internet browser, go to [email or web address] 2. Click on the First Time User? Click Here link in the Sign In box. You will see the New Member Sign Up page. 3. Enter your [employer] Access Code exactly as it appears below. You will not need to use this code after youve completed the sign-up process. If you do not sign up before the expiration date, you must request a new code. · [employer] Access Code: [email or web address] Expires: 7/3/2018  7:10 PM 
 
4. Enter the last four digits of your Social Security Number (xxxx) and Date of Birth (mm/dd/yyyy) as indicated and click Submit. You will be taken to the next sign-up page. 5. Create a [employer] ID. This will be your [employer] login ID and cannot be changed, so think of one that is secure and easy to remember. 6. Create a [employer] password. You can change your password at any time. 7. Enter your Password Reset Question and Answer. This can be used at a later time if you forget your password. 8. Enter your e-mail address. You will receive e-mail notification when new information is available in [address] 9. Click Sign Up. You can now view and download portions of your medical record. 10. Click the Download Summary menu link to download a portable copy of your medical information. If you have questions, please visit the Frequently Asked Questions section of the MyChart website. Remember, [address] is NOT to be used for urgent needs. For medical emergencies, dial 911. Now available from your iPhone and Android! Introducing Jason Gongora As a [facility] patient, I wanted to make you aware of our electronic visit tool called Jason Gongora. Allen Bernarda 24/7 allows you to connect within minutes with a medical provider 24 hours a day, seven days a week via a mobile device or tablet or logging into a secure website from your computer. You can access Jason Gongora from anywhere in the [place]. A virtual visit might be right for you when you have a simple condition and feel like you just dont want to get out of bed, or cant get away from work for an appointment, when your regular [facility] provider is not available (evenings, weekends or holidays), or when youre out of town and need minor care. Electronic visits cost only $49 and if the [facility] [employer] provider determines a prescription is needed to treat your condition, one can be electronically transmitted to a nearby pharmacy*. Please take a moment to enroll today if you have not already done so. The enrollment process is free and takes just a few minutes. To enroll, please download the [employer] deisy to your tablet or phone, or visit [email or web address] to enroll on your computer. And, as an [address] [place] [address] patient with a [employer] account, the results of your visits will be scanned into your electronic medical record and your primary care provider will be able to view the scanned results. We urge you to continue to see your regular [facility] provider for your ongoing medical care.   And while your primary care provider may not be the one available when you seek a Jason Gongora virtual visit, the peace of mind you get from getting a real diagnosis real time can be priceless. For more information on Jason Gongora, view our Frequently Asked Questions (FAQs) at [email or web address]. Sincerely, 
 
Leesa Mcneal MD 
Chief Medical Officer [address] *:  certain medications cannot be prescribed via Jason Gongora Providers Seen During Your Hospitalization Provider Specialty Primary office phone Va Tobar MD Gastroenterology [phone] Your Primary Care Physician (PCP) Primary Care Physician Office Phone Office Fax Patricia Pendleton [phone] You are allergic to the following Allergen Reactions Bee Sting (Sting, Bee) Hives Shortness of Breath Codeine Hives Shortness of Breath Has tolerated Hydromorphone. Haldol (Haloperidol Lactate) Hives Shortness of Breath Haloperidol Hives Cough Keflex (Cephalexin) Diarrhea No Allergy Information Available Other (comments) Other reaction(s): hives Shellfish Containing Products Hives Stadol (Butorphanol Tartrate) Hives Shortness of Breath Rash Vicodin (Hydrocodone-Acetaminophen) Hives Shortness of Breath Rash Recent Documentation Smoking Status Former Smoker Emergency Contacts Name Discharge Info Relation Home Work Mobile [facility] DISCHARGE CAREGIVER [3] Spouse [3] [phone] Patient Belongings The following personal items are in your possession at time of discharge: 
  Dental Appliances: None Discharge Instructions Attachments/References ESOPHAGEAL DILATION: POST-OP (ENGLISH) GERD (ENGLISH) GASTRITIS (ENGLISH) Patient Handouts Esophageal Dilation: What to Expect at [facility] Your Recovery After you have esophageal dilation, you will stay at the hospital or surgery center for 1 to 2 hours. This will allow the medicine to wear off. You will be able to go home after your doctor or nurse checks to make sure you are not having any problems. This care sheet gives you a general idea about how long it will take for you to recover. But each person recovers at a different pace. Follow the steps below to get better as quickly as possible. How can you care for yourself at home? Activity ? · Rest as much as you need to after you go home. ? · You should be able to go back to your usual activities the day after the procedure. Diet ? · Follow your doctor's directions for eating after the procedure. ? · Drink plenty of fluids (unless your doctor has told you not to). Medicines ? · Your doctor will tell you if and when you can restart your medicines. He or she will also give you instructions about taking any new medicines. ? · If you take blood thinners, such as warfarin (Coumadin), clopidogrel (Plavix), or aspirin, be sure to talk to your doctor. He or she will tell you if and when to start taking those medicines again. Make sure that you understand exactly what your doctor wants you to do. ? · If you have a sore throat the day after the procedure, use an over-the-counter spray to numb your throat. Sucking on throat lozenges and gargling with warm salt water may also help relieve your symptoms. Follow-up care is a key part of your treatment and safety. Be sure to make and go to all appointments, and call your doctor if you are having problems. It's also a good idea to know your test results and keep a list of the medicines you take. When should you call for help? Call 911 anytime you think you may need emergency care. For example, call if: 
? · You passed out (lost consciousness). ? · You have trouble breathing. ? · Your stools are maroon or very bloody ? Call your doctor now or seek immediate medical care if: 
? · You have new or worse belly pain. ? · You have a fever. ? · You have new or more blood in your stools. ? · You are sick to your stomach and cannot drink fluids. ? · You cannot pass stools or gas. ? · You have pain that does not get better after you take pain medicine. ? Watch closely for changes in your health, and be sure to contact your doctor if: 
? · Your throat still hurts after a day or two. ? · You do not get better as expected. Where can you learn more? Go to http://turner-doris.info/. Enter S564 in the search box to learn more about \"Esophageal Dilation: What to Expect at Home. \" Current as of: May 12, 2017 Content Version: 11.4 © 7725-5879 [employer]. Care instructions adapted under license by [employer] (which disclaims liability or warranty for this information). If you have questions about a medical condition or this instruction, always ask your healthcare professional. [address] [address] any warranty or liability for your use of this information. Gastroesophageal Reflux Disease (GERD): Care Instructions Your Care Instructions Gastroesophageal reflux disease (GERD) is the backward flow of stomach acid into the esophagus. The esophagus is the tube that leads from your throat to your stomach. A one-way valve prevents the stomach acid from moving up into this tube. When you have GERD, this valve does not close tightly enough. If you have mild GERD symptoms including heartburn, you may be able to control the problem with antacids or over-the-counter medicine. Changing your diet, losing weight, and making other lifestyle changes can also help reduce symptoms. Follow-up care is a key part of your treatment and safety. Be sure to make and go to all appointments, and call your doctor if you are having problems. It's also a good idea to know your test results and keep a list of the medicines you take. How can you care for yourself at home? · Take your medicines exactly as prescribed. Call your doctor if you think you are having a problem with your medicine. · Your doctor may recommend over-the-counter medicine. For mild or occasional indigestion, antacids, such as Tums, Gaviscon, Mylanta, or Maalox, may help. Your doctor also may recommend over-the-counter acid reducers, such as Pepcid AC, Tagamet HB, Zantac 75, or Prilosec. Read and follow all instructions on the label. If you use these medicines often, talk with your doctor. · Change your eating habits. ¨ It's best to eat several small meals instead of two or three large meals. ¨ After you eat, wait 2 to 3 hours before you lie down. ¨ Chocolate, mint, and alcohol can make GERD worse. ¨ Spicy foods, foods that have a lot of acid (like tomatoes and oranges), and coffee can make GERD symptoms worse in some people. If your symptoms are worse after you eat a certain food, you may want to stop eating that food to see if your symptoms get better. · Do not smoke or chew tobacco. Smoking can make GERD worse. If you need help quitting, talk to your doctor about stop-smoking programs and medicines. These can increase your chances of quitting for good. · If you have GERD symptoms at night, raise the head of your bed 6 to 8 inches by putting the frame on blocks or placing a foam wedge under the head of your mattress. (Adding extra pillows does not work.) · Do not wear tight clothing around your middle. · Lose weight if you need to. Losing just 5 to 10 pounds can help. When should you call for help? Call your doctor now or seek immediate medical care if: 
? · You have new or different belly pain. ? · Your stools are black and tarlike or have streaks of blood. ? Watch closely for changes in your health, and be sure to contact your doctor if: 
? · Your symptoms have not improved after 2 days. ? · Food seems to catch in your throat or chest.  
Where can you learn more? Go to http://turner-doris.info/. Enter E579 in the search box to learn more about \"Gastroesophageal Reflux Disease (GERD): Care Instructions. \" Current as of: May 12, 2017 Content Version: 11.4 © 1640-3002 [employer]. Care instructions adapted under license by [employer] (which disclaims liability or warranty for this information). If you have questions about a medical condition or this instruction, always ask your healthcare professional. [address] any warranty or liability for your use of this information. Gastritis: Care Instructions Your Care Instructions Gastritis is a sore and upset stomach. It happens when something irritates the stomach lining. Many things can cause it. These include an infection such as the flu or something you ate or drank. Medicines or a sore on the lining of the stomach (ulcer) also can cause it. Your belly may bloat and ache. You may belch, vomit, and feel sick to your stomach. You should be able to relieve the problem by taking medicine. And it may help to change your diet. If gastritis lasts, your doctor may prescribe medicine. Follow-up care is a key part of your treatment and safety. Be sure to make and go to all appointments, and call your doctor if you are having problems. It's also a good idea to know your test results and keep a list of the medicines you take. How can you care for yourself at home? · If your doctor prescribed antibiotics, take them as directed. Do not stop taking them just because you feel better. You need to take the full course of antibiotics. · Be safe with medicines. If your doctor prescribed medicine to decrease stomach acid, take it as directed. Call your doctor if you think you are having a problem with your medicine.  
· Do not take any other medicine, including over-the-counter pain relievers, without talking to your doctor first. 
 · If your doctor recommends over-the-counter medicine to reduce stomach acid, such as Pepcid AC, Prilosec, Tagamet HB, or Zantac 75, follow the directions on the label. · Drink plenty of fluids (enough so that your urine is light yellow or clear like water) to prevent dehydration. Choose water and other caffeine-free clear liquids. If you have kidney, heart, or liver disease and have to limit fluids, talk with your doctor before you increase the amount of fluids you drink. · Limit how much alcohol you drink. · Avoid coffee, tea, cola drinks, chocolate, and other foods with caffeine. They increase stomach acid. When should you call for help? Call 911 anytime you think you may need emergency care. For example, call if: 
? · You vomit blood or what looks like coffee grounds. ? · You pass maroon or very bloody stools. ?Call your doctor now or seek immediate medical care if: 
? · You start breathing fast and have not produced urine in the last 8 hours. ? · You cannot keep fluids down. ? Watch closely for changes in your health, and be sure to contact your doctor if: 
? · You do not get better as expected. Where can you learn more? Go to http://turner-doris.info/. Enter [id number] in the search box to learn more about \"Gastritis: Care Instructions. \" Current as of: May 12, 2017 Content Version: 11.4 © 7273-5092 [employer]. Care instructions adapted under license by [employer] (which disclaims liability or warranty for this information). If you have questions about a medical condition or this instruction, always ask your healthcare professional. [place] [address] any warranty or liability for your use of this information. Please provide this summary of care documentation to your next provider. Signatures-by signing, you are acknowledging that this After Visit Summary has been reviewed with you and you have received a copy. Patient Signature:  ____________________________________________________________ Date:  ____________________________________________________________  
  
Arna [place] Provider Signature:  ____________________________________________________________ Date:  ____________________________________________________________

## 2018-04-10 NOTE — ANESTHESIA PREPROCEDURE EVALUATION
Anesthetic History               Review of Systems / Medical History  Patient summary reviewed and pertinent labs reviewed    Pulmonary          Smoker         Neuro/Psych       CVA  TIA     Cardiovascular    Hypertension: well controlled        Dysrhythmias   CAD and cardiac stents    Exercise tolerance: >4 METS     GI/Hepatic/Renal     GERD: poorly controlled      PUD and liver disease     Endo/Other        Arthritis     Other Findings   Comments: Documentation of current medication  Current medications obtained, documented and obtained? YES      Risk Factors for Postoperative nausea/vomiting:       History of postoperative nausea/vomiting? NO       Female? NO       Motion sickness? NO       Intended opioid administration for postoperative analgesia? NO      Smoking Abstinence:  Current Smoker? YES  Elective Surgery? YES  Seen preoperatively by anesthesiologist or proxy prior to day of surgery? YES  Pt abstained from smoking 24 hours prior to anesthesia?  NO    Preventive care/screening for High Blood Pressure:  Aged 18 years and older: YES  Screened for high blood pressure: YES  Patients with high blood pressure referred to primary care provider   for BP management: YES                 Physical Exam    Airway  Mallampati: III  TM Distance: 4 - 6 cm  Neck ROM: decreased range of motion   Mouth opening: Diminished (comment)     Cardiovascular    Rhythm: regular  Rate: normal         Dental    Dentition: Poor dentition     Pulmonary  Breath sounds clear to auscultation               Abdominal  GI exam deferred       Other Findings            Anesthetic Plan    ASA: 3  Anesthesia type: MAC            Anesthetic plan and risks discussed with: Patient

## 2018-04-10 NOTE — H&P
[facility]   [email or web address]      Impression: 1. Dysphagia  2. GERD  3. Esop dysmotility      Plan:     1. EGD/Dil with MAC      Chief Complaint: Dysphagia      HPI:  Rosalia Rojas is a 60 y.o. male who is being seen preop for recurrent dysphagia secondary to spasm in the esophagus. He is responsive to dilation.     PMH:   Past Medical History:   Diagnosis Date    Altered mental status     Arthritis     Arthropathy     Back pain     Bilateral kidney stones     Burn     ON HANDS    CAD (coronary artery disease) 1999    MI    3 cardiac stents    Calcium nephrolithiasis     Calculus of kidney     Cardiac arrhythmia     Cerebral artery occlusion with cerebral infarction ([address]     Chest pain     Cigarette smoker     Cirrhosis, alcoholic (HCC)     Cocaine abuse, episodic use     Colon polyp     Diarrhea     DJD (degenerative joint disease)     Drug-seeking behavior     Dyskinesia     Esophageal disorder     Esophageal erosions     Flank pain, acute     Foot ulcer, right (HCC)     Gastric ulcer     GERD (gastroesophageal reflux disease)     Gross hematuria     Head trauma     Hearing reduced     Hematuria     Herniated disc     X 10 IN BACK    Hiatus hernia syndrome     History of kidney stones     HNP (herniated nucleus pulposus)     Hyperlipemia     Hypertension     Hypokalemia     Infection     Jaw fracture (HCC)     Kidney stones     Knee pain     Left ureteral stone     Muscle atrophy     Myocardial infarction ([address] 1999    N&V (nausea and vomiting)     Nocturia     Nondependent alcohol abuse, in remission     Nutcracker esophagus     Other ill-defined conditions(799.89)     dysphagia    Renal stone     Slurred speech     Stroke ([address] 1999    questionable TIA    Swallowing difficulty     Syncopal episodes     Syncope and collapse     Tobacco dependence     Unspecified adverse effect of anesthesia     AT TIMES ESOPHAGUS SPASMS AFTER PROCEDURES    Unspecified cerebral artery occlusion with cerebral infarction        PSH:   Past Surgical History:   Procedure Laterality Date    HX APPENDECTOMY      9YEARS OLD    HX CHOLECYSTECTOMY  2009    HX CORONARY STENT PLACEMENT  2008    3 STENTS PLACED    HX ENDOSCOPY      with Dilatation, multiple times    HX HEART CATHETERIZATION  2012    EVERYTHING LOOKED GOOD AT THIS POINT    HX HEENT      \"2 jaw surgeries\"    HX HERNIA REPAIR      ABDOMEN    HX KNEE ARTHROSCOPY  9/2012    LEFT    HX MOHS PROCEDURES  2016    HX ORTHOPAEDIC  12/12    left knee    HX OTHER SURGICAL      ESOPHAGUS DILATATION    HX ROTATOR CUFF REPAIR Right     x2    HX UROLOGICAL  07/18/2016    SPA-Cystoscopy,URETEROSCOPY W/ LITHOTRIPSY, Dr Gisele Stern  UROLOGICAL  10/10/2016    SL-Cystoscopy with removal of ureteral stent, Dr Gisele Stern  UROLOGICAL  01/30/2017    SPAH-Cystoscopy, Left retrograde pyelography, Left diagnostic ureteroscopy, Left 6 x 26 cm double-J ureteral stent placement,Right retrograde pyelography, Right ureteroscopic stone extraction,Right 6 x 24 cm double-J ureteral stent placement, Interpretation of urography,Intraoperative fluoro less than 1 hour,          UROLOGICAL  02/08/2017    SL-CYSTOURETHROSCOPY WITH STENT REMOVAL, Dr [facility]       Social HX:   Social History     Social History    Marital status:      Spouse name: N/A    Number of children: N/A    Years of education: N/A     Occupational History    Not on file.      Social History Main Topics    Smoking status: Former Smoker     Packs/day: 0.25     Years: 20.00     Types: Cigarettes    Smokeless tobacco: Never Used    Alcohol use No      Comment: \"none in over 21 years\"    Drug use: No      Comment: cocaine 2011    Sexual activity: Yes     Partners: Female     Other Topics Concern    Not on file     Social History Narrative       FHX:   Family History   Problem Relation Age of Onset    Diabetes Father     Heart Disease Father     Stroke Father        Allergy:   Allergies   Allergen Reactions    Bee Sting [Sting, Bee] Hives and Shortness of Breath    Codeine Hives and Shortness of Breath     Has tolerated Hydromorphone.  Haldol [Haloperidol Lactate] Hives and Shortness of Breath    Haloperidol Hives and Cough    Keflex [Cephalexin] Diarrhea    No Allergy Information Available Other (comments)     Other reaction(s): hives    Shellfish Containing Products Hives    Stadol [Butorphanol Tartrate] Hives, Shortness of Breath and Rash    Vicodin [Hydrocodone-Acetaminophen] Hives, Shortness of Breath and Rash       Home Medications:     Prescriptions Prior to Admission   Medication Sig    omeprazole (PRILOSEC) 10 mg capsule Take 10 mg by mouth daily.  nitroglycerin (NITROSTAT) 0.3 mg SL tablet 0.3 mg.    meperidine (DEMEROL) 100 mg tablet Take 100 mg by mouth every four (4) hours as needed for Pain.  carvedilol (COREG) 25 mg tablet Take 25 mg by mouth two (2) times daily (with meals).  atorvastatin (LIPITOR) 40 mg tablet Take 40 mg by mouth daily.  DULoxetine (CYMBALTA) 60 mg capsule Take 60 mg by mouth daily.  chlorthalidone (HYGROTEN) 25 mg tablet Take 1 Tab by mouth daily.  diazePAM (VALIUM) 10 mg tablet Take 10 mg by mouth every six (6) hours as needed for Anxiety.  clopidogrel (PLAVIX) 75 mg tab Take 75 mg by mouth.  tamsulosin (FLOMAX) 0.4 mg capsule Take 1 Cap by mouth daily (after dinner).  esomeprazole (NEXIUM) 40 mg capsule Take 1 Cap by mouth Before breakfast and dinner. (Patient taking differently: Take 40 mg by mouth daily.)       Review of Systems:     Constitutional: No fevers, chills, weight loss, fatigue. Cardiovascular: No chest pain, heart palpitations. Respiratory: No cough, SOB, wheezing, chest discomfort, orthopnea. Gastrointestinal: Chronic dysphagia       Musculoskeletal: No weakness, arthralgias, wasting. Allergies: As noted. Visit Vitals    /76    Pulse 75    Temp 98.5 °F (36.9 °C)    Resp 16    SpO2 98%       Physical Assessment:     constitutional: appearance: well developed, well nourished, normal habitus, no deformities, in no acute distress. ENMT: mouth: normal oral mucosa,lips and gums; good dentition. oropharynx: normal tongue, hard and soft palate; posterior pharynx without erithema, exudate or lesions. respiratory: effort: normal chest excursion; no intercostal retraction or accessory muscle use. cardiovascular: abdominal aorta: normal size and position; no bruits. palpation: PMI of normal size and position; normal rhythm; no thrill or murmurs. abdominal: abdomen: normal consistency; no tenderness or masses. hernias: no hernias appreciated. liver: normal size and consistency. spleen: not palpable. rectal: hemoccult/guaiac: not performed. musculoskeletal: digits and nails: no clubbing, cyanosis, petechiae or other inflammatory conditions. psychiatric: orientation: oriented to time, space and person. Ciara Tyler MD, M.D. [facility], [address] [place] [address]  Pager [phone]  [email or web address]

## 2018-04-10 NOTE — DISCHARGE INSTRUCTIONS
Upper GI Endoscopy: What to Expect at [address] [place] [address]  After you have an endoscopy, you will stay at the hospital or clinic for 1 to 2 hours. This will allow the medicine to wear off. You will be able to go home after your doctor or nurse checks to make sure you are not having any problems. You may have to stay overnight if you had treatment during the test. You may have a sore throat for a day or two after the test.  This care sheet gives you a general idea about what to expect after the test.  How can you care for yourself at home? Activity  · Rest as much as you need to after you go home. · You should be able to go back to your usual activities the day after the test.  Diet  · Follow your doctor's directions for eating after the test.  · Drink plenty of fluids (unless your doctor has told you not to). Medications  · If you have a sore throat the day after the test, use an over-the-counter spray to numb your throat. Follow-up care is a key part of your treatment and safety. Be sure to make and go to all appointments, and call your doctor if you are having problems. It's also a good idea to know your test results and keep a list of the medicines you take. When should you call for help? Call 911 anytime you think you may need emergency care. For example, call if:  · You passed out (lost consciousness). · You cough up blood. · You vomit blood or what looks like coffee grounds. · You pass maroon or very bloody stools. Call your doctor now or seek immediate medical care if:  · You have trouble swallowing. · You have belly pain. · Your stools are black and tarlike or have streaks of blood. · You are sick to your stomach or cannot keep fluids down. Watch closely for changes in your health, and be sure to contact your doctor if:  · Your throat still hurts after a day or two. · You do not get better as expected. Where can you learn more?    Go to DealExplorer.be  Enter J454 in the search box to learn more about \"Upper GI Endoscopy: What to Expect at Home. \"   © 7696-1389 [employer]. Care instructions adapted under license by [employer] (which disclaims liability or warranty for this information). This care instruction is for use with your licensed healthcare professional. If you have questions about a medical condition or this instruction, always ask your healthcare professional. [address]  any warranty or liability for your use of this information. Content Version: 84.9.188845; Current as of: November 14, 2014    DISCHARGE SUMMARY from Nurse     POST-PROCEDURE INSTRUCTIONS:    Call your Physician if you:  ? Observe any excess bleeding. ? Develop a temperature over 100.5o F.  ? Experience abdominal, shoulder or chest pain. ? Notice any signs of decreased circulation or nerve impairment to an extremity such as a change in color, persistent numbness, tingling, coldness or increase in pain. ? Vomit blood or you have nausea and vomiting lasting longer than 4 hours. ? Are unable to take medications. ? Are unable to urinate within 8 hours after discharge following general anesthesia or intravenous sedation. For the next 24 hours after receiving general anesthesia or intravenous sedation, or while taking prescription Narcotics, limit your activities:  ? Do NOT drive a motor vehicle, operate hazard machinery or power tools, or perform tasks that require coordination. The medication you received during your procedure may have some effect on your mental awareness. ? Do NOT make important personal or business decisions. The medication you received during your procedure may have some effect on your mental awareness. ? Do NOT drink alcoholic beverages. These drinks do not mix well with the medications that have been given to you. ? Upon discharge from the hospital, you must be accompanied by a responsible adult. ?  Resume your diet as directed by your physician. ? Resume medications as your physician has prescribed. ? Please give a list of your current medications to your Primary Care Provider. ? Please update this list whenever your medications are discontinued, doses are changed, or new medications (including over-the-counter products) are added. ? Please carry medication information at all times in case of emergency situations. These are general instructions for a healthy lifestyle:    No smoking/ No tobacco products/ Avoid exposure to second hand smoke.  Surgeon General's Warning:  Quitting smoking now greatly reduces serious risk to your health. Obesity, smoking, and a sedentary lifestyle greatly increase your risk for illness.  A healthy diet, regular physical exercise & weight monitoring are important for maintaining a healthy lifestyle   You may be retaining fluid if you have a history of heart failure or if you experience any of the following symptoms:  Weight gain of 3 pounds or more overnight or 5 pounds in a week, increased swelling in our hands or feet or shortness of breath while lying flat in bed. Please call your doctor as soon as you notice any of these symptoms; do not wait until your next office visit. Recognize signs and symptoms of STROKE:  F  -  Face looks uneven  A  -  Arms unable to move or move unevenly  S  -  Speech slurred or non-existent  T  -  Time to call 911 - as soon as signs and symptoms begin - DO NOT go back to bed or wait to see If you get better - TIME IS BRAIN. Colorectal Screening   Colorectal cancer almost always develops from precancerous polyps (abnormal growths) in the colon or rectum. Screening tests can find precancerous polyps, so that they can be removed before they turn into cancer.  Screening tests can also find colorectal cancer early, when treatment works best.  Vinicio Koehler Speak with your physician about when you should begin screening and how often you should be tested  Vinicio Koehler   Additional Information    If you have questions, please call [phone]. Remember, MyChart is NOT to be used for urgent needs. For medical emergencies, dial 911. Educational references and/or instructions provided during this visit included:    see attachedments      APPOINTMENTS:    Please make a follow-up appointment with your physician. Discharge information has been reviewed with the patient and responsible party. The patient and responsible party verbalized understanding.

## 2018-04-10 NOTE — PERIOP NOTES
Dr William Bro notified of patient's complaints of pain post op, verbal order for dilaudid 1mg IVP received. Dr. William Bro assessed patient at bedside prior to administration. Pt. Medicated, now reports pain is 7/10, tolerable level of pain for him per patient.

## 2018-04-10 NOTE — PROCEDURES
[place]  [facility], [address]      Brief Procedure Note    Retia Muta  1955  [id number]    Date of Procedure: 4/10/2018    Preoperative diagnosis: 787.20 - R13.10,  Dysphagia    Postoperative diagnosis:  Grade 3 esophagitis, gastritis, Esophageal dilation with 54F Sheralyn Shake,     Type of Anesthesia: MAC (monitered anesthesia care)    Description of Findings: same as post op dx    Procedure: Procedure(s):  UPPER ENDOSCOPY / dilation    :  Dr. Jelani Vasquez MD    Assistant(s): [unfilled]    Type of Anesthesia:MAC     EBL:None    Specimens: * No specimens in log *    Findings: See printed and scanned procedure note    Complications: None    Dr. Jelani Vasquez MD  4/10/2018  9:35 AM

## 2018-04-10 NOTE — ANESTHESIA POSTPROCEDURE EVALUATION
Post-Anesthesia Evaluation and Assessment    Patient: Yanely Bay MRN: [id number]  SSN: [id number]    YOB: 1955  Age: 60 y.o. Sex: male       Cardiovascular Function/Vital Signs  Visit Vitals    /73    Pulse 67    Temp 36.9 °C (98.5 °F)    Resp 19    SpO2 98%       Patient is status post MAC anesthesia for Procedure(s):  UPPER ENDOSCOPY / dilation. Nausea/Vomiting: None    Postoperative hydration reviewed and adequate. Pain:  Pain Scale 1: Numeric (0 - 10) (04/10/18 0856)  Pain Intensity 1: 0 (04/10/18 0856)   Managed    Neurological Status: At baseline    Mental Status and Level of Consciousness: Arousable    Pulmonary Status:   O2 Device: Nasal cannula (04/10/18 0934)   Adequate oxygenation and airway patent    Complications related to anesthesia: None    Post-anesthesia assessment completed.  No concerns    Signed By: King Patel MD     April 10, 2018

## 2018-05-18 ENCOUNTER — ANESTHESIA EVENT (OUTPATIENT)
Dept: ENDOSCOPY | Age: 63
End: 2018-05-18
Payer: MEDICARE

## 2018-05-21 ENCOUNTER — ANESTHESIA (OUTPATIENT)
Dept: ENDOSCOPY | Age: 63
End: 2018-05-21
Payer: MEDICARE

## 2018-05-21 ENCOUNTER — HOSPITAL ENCOUNTER (OUTPATIENT)
Age: 63
Setting detail: OUTPATIENT SURGERY
Discharge: HOME | End: 2018-05-21
Attending: INTERNAL MEDICINE | Admitting: INTERNAL MEDICINE
Payer: MEDICARE

## 2018-05-21 VITALS
HEART RATE: 69 BPM | HEIGHT: 67 IN | WEIGHT: 238 LBS | OXYGEN SATURATION: 97 % | SYSTOLIC BLOOD PRESSURE: 126 MMHG | BODY MASS INDEX: 37.35 KG/M2 | RESPIRATION RATE: 18 BRPM | DIASTOLIC BLOOD PRESSURE: 58 MMHG | TEMPERATURE: 97.7 F

## 2018-05-21 PROCEDURE — 74011250636 HC RX REV CODE- 250/636: Performed by: ANESTHESIOLOGY

## 2018-05-21 PROCEDURE — 74011000250 HC RX REV CODE- 250

## 2018-05-21 PROCEDURE — 74011250636 HC RX REV CODE- 250/636: Performed by: NURSE ANESTHETIST, CERTIFIED REGISTERED

## 2018-05-21 PROCEDURE — 74011250636 HC RX REV CODE- 250/636

## 2018-05-21 PROCEDURE — 74011000250 HC RX REV CODE- 250: Performed by: NURSE ANESTHETIST, CERTIFIED REGISTERED

## 2018-05-21 PROCEDURE — 76060000031 HC ANESTHESIA FIRST 0.5 HR: Performed by: INTERNAL MEDICINE

## 2018-05-21 PROCEDURE — 76040000019: Performed by: INTERNAL MEDICINE

## 2018-05-21 RX ORDER — EPINEPHRINE 0.1 MG/ML
1 INJECTION INTRACARDIAC; INTRAVENOUS
Status: DISCONTINUED | OUTPATIENT
Start: 2018-05-21 | End: 2018-05-21 | Stop reason: HOSPADM

## 2018-05-21 RX ORDER — SODIUM CHLORIDE 0.9 % (FLUSH) 0.9 %
5-10 SYRINGE (ML) INJECTION AS NEEDED
Status: DISCONTINUED | OUTPATIENT
Start: 2018-05-21 | End: 2018-05-21 | Stop reason: HOSPADM

## 2018-05-21 RX ORDER — FENTANYL CITRATE 50 UG/ML
50 INJECTION, SOLUTION INTRAMUSCULAR; INTRAVENOUS
Status: DISCONTINUED | OUTPATIENT
Start: 2018-05-21 | End: 2018-05-21 | Stop reason: HOSPADM

## 2018-05-21 RX ORDER — SODIUM CHLORIDE, SODIUM LACTATE, POTASSIUM CHLORIDE, CALCIUM CHLORIDE 600; 310; 30; 20 MG/100ML; MG/100ML; MG/100ML; MG/100ML
75 INJECTION, SOLUTION INTRAVENOUS CONTINUOUS
Status: DISCONTINUED | OUTPATIENT
Start: 2018-05-21 | End: 2018-05-21 | Stop reason: HOSPADM

## 2018-05-21 RX ORDER — MORPHINE SULFATE 10 MG/ML
5 INJECTION, SOLUTION INTRAMUSCULAR; INTRAVENOUS
Status: DISCONTINUED | OUTPATIENT
Start: 2018-05-21 | End: 2018-05-21 | Stop reason: HOSPADM

## 2018-05-21 RX ORDER — SODIUM CHLORIDE 0.9 % (FLUSH) 0.9 %
5-10 SYRINGE (ML) INJECTION EVERY 8 HOURS
Status: DISCONTINUED | OUTPATIENT
Start: 2018-05-21 | End: 2018-05-21 | Stop reason: HOSPADM

## 2018-05-21 RX ORDER — NALOXONE HYDROCHLORIDE 0.4 MG/ML
0.4 INJECTION, SOLUTION INTRAMUSCULAR; INTRAVENOUS; SUBCUTANEOUS
Status: DISCONTINUED | OUTPATIENT
Start: 2018-05-21 | End: 2018-05-21 | Stop reason: HOSPADM

## 2018-05-21 RX ORDER — PROPOFOL 10 MG/ML
INJECTION, EMULSION INTRAVENOUS AS NEEDED
Status: DISCONTINUED | OUTPATIENT
Start: 2018-05-21 | End: 2018-05-21 | Stop reason: HOSPADM

## 2018-05-21 RX ORDER — LIDOCAINE HYDROCHLORIDE 20 MG/ML
INJECTION, SOLUTION EPIDURAL; INFILTRATION; INTRACAUDAL; PERINEURAL AS NEEDED
Status: DISCONTINUED | OUTPATIENT
Start: 2018-05-21 | End: 2018-05-21 | Stop reason: HOSPADM

## 2018-05-21 RX ORDER — FLUMAZENIL 0.1 MG/ML
0.2 INJECTION INTRAVENOUS
Status: DISCONTINUED | OUTPATIENT
Start: 2018-05-21 | End: 2018-05-21 | Stop reason: HOSPADM

## 2018-05-21 RX ORDER — ONDANSETRON 2 MG/ML
4 INJECTION INTRAMUSCULAR; INTRAVENOUS ONCE
Status: COMPLETED | OUTPATIENT
Start: 2018-05-21 | End: 2018-05-21

## 2018-05-21 RX ORDER — ATROPINE SULFATE 0.1 MG/ML
0.5 INJECTION INTRAVENOUS
Status: DISCONTINUED | OUTPATIENT
Start: 2018-05-21 | End: 2018-05-21 | Stop reason: HOSPADM

## 2018-05-21 RX ORDER — DEXTROMETHORPHAN/PSEUDOEPHED 2.5-7.5/.8
1.2 DROPS ORAL
Status: DISCONTINUED | OUTPATIENT
Start: 2018-05-21 | End: 2018-05-21 | Stop reason: HOSPADM

## 2018-05-21 RX ADMIN — LIDOCAINE HYDROCHLORIDE 40 MG: 20 INJECTION, SOLUTION EPIDURAL; INFILTRATION; INTRACAUDAL; PERINEURAL at 07:59

## 2018-05-21 RX ADMIN — PROPOFOL 100 MG: 10 INJECTION, EMULSION INTRAVENOUS at 07:59

## 2018-05-21 RX ADMIN — ONDANSETRON 4 MG: 2 INJECTION INTRAMUSCULAR; INTRAVENOUS at 08:29

## 2018-05-21 RX ADMIN — FENTANYL CITRATE 50 MCG: 50 INJECTION INTRAMUSCULAR; INTRAVENOUS at 08:29

## 2018-05-21 RX ADMIN — SODIUM CHLORIDE, SODIUM LACTATE, POTASSIUM CHLORIDE, AND CALCIUM CHLORIDE 75 ML/HR: 600; 310; 30; 20 INJECTION, SOLUTION INTRAVENOUS at 07:35

## 2018-05-21 RX ADMIN — FAMOTIDINE 20 MG: 10 INJECTION INTRAVENOUS at 07:37

## 2018-05-21 NOTE — ANESTHESIA POSTPROCEDURE EVALUATION
Post-Anesthesia Evaluation and Assessment    Patient: Dalton Ortega MRN: [id number]  SSN: [id number]    YOB: 1955  Age: 60 y.o. Sex: male       Cardiovascular Function/Vital Signs  Visit Vitals    /58    Pulse 69    Temp 36.5 °C (97.7 °F)    Resp 18    Ht 5' 7\" (1.702 m)    Wt 108 kg (238 lb)    SpO2 97%    BMI 37.28 kg/m2       Patient is status post MAC anesthesia for Procedure(s):  UPPER ENDOSCOPY /  dilation. Nausea/Vomiting: None    Postoperative hydration reviewed and adequate. Pain:  Pain Scale 1: Numeric (0 - 10) (pt states pain is tolerable, ready to go home and rest) (05/21/18 0841)  Pain Intensity 1: 3 (05/21/18 0841)   Managed    Neurological Status: At baseline    Mental Status and Level of Consciousness: Arousable    Pulmonary Status:   O2 Device: Room air (05/21/18 0841)   Adequate oxygenation and airway patent    Complications related to anesthesia: None    Post-anesthesia assessment completed.  No concerns    Signed By: Lukasz Johnson MD     May 21, 2018

## 2018-05-21 NOTE — ANESTHESIA PREPROCEDURE EVALUATION
Anesthetic History   No history of anesthetic complications            Review of Systems / Medical History  Patient summary reviewed and pertinent labs reviewed    Pulmonary  Within defined limits                 Neuro/Psych       CVA: no residual symptoms       Cardiovascular    Hypertension          Past MI and cardiac stents (3 stents ~ 8 years ago)    Exercise tolerance: >4 METS     GI/Hepatic/Renal     GERD: well controlled      PUD and liver disease (Cirhiossis)     Endo/Other        Arthritis     Other Findings   Comments: Documentation of current medication  Current medications obtained, documented and obtained? YES      Risk Factors for Postoperative nausea/vomiting:       History of postoperative nausea/vomiting? NO       Female? NO       Motion sickness? NO       Intended opioid administration for postoperative analgesia? NO      Smoking Abstinence:  Current Smoker? YES  Elective Surgery? YES  Seen preoperatively by anesthesiologist or proxy prior to day of surgery? YES  Pt abstained from smoking 24 hours prior to anesthesia?  No    Preventive care/screening for High Blood Pressure:  Aged 18 years and older: YES  Screened for high blood pressure: YES  Patients with high blood pressure referred to primary care provider   for BP management: YES                 Physical Exam    Airway  Mallampati: II  TM Distance: 4 - 6 cm  Neck ROM: normal range of motion   Mouth opening: Normal     Cardiovascular  Regular rate and rhythm,  S1 and S2 normal,  no murmur, click, rub, or gallop             Dental  No notable dental hx       Pulmonary  Breath sounds clear to auscultation               Abdominal  GI exam deferred       Other Findings            Anesthetic Plan    ASA: 3  Anesthesia type: MAC          Induction: Intravenous  Anesthetic plan and risks discussed with: Patient

## 2018-05-21 NOTE — DISCHARGE INSTRUCTIONS
Upper GI Endoscopy: What to Expect at [address] [place] [address]  After you have an endoscopy, you will stay at the hospital or clinic for 1 to 2 hours. This will allow the medicine to wear off. You will be able to go home after your doctor or nurse checks to make sure you are not having any problems. You may have to stay overnight if you had treatment during the test. You may have a sore throat for a day or two after the test.  This care sheet gives you a general idea about what to expect after the test.  How can you care for yourself at home? Activity  · Rest as much as you need to after you go home. · You should be able to go back to your usual activities the day after the test.  Diet  · Follow your doctor's directions for eating after the test.  · Drink plenty of fluids (unless your doctor has told you not to). Medications  · If you have a sore throat the day after the test, use an over-the-counter spray to numb your throat. Follow-up care is a key part of your treatment and safety. Be sure to make and go to all appointments, and call your doctor if you are having problems. It's also a good idea to know your test results and keep a list of the medicines you take. When should you call for help? Call 911 anytime you think you may need emergency care. For example, call if:  · You passed out (lost consciousness). · You cough up blood. · You vomit blood or what looks like coffee grounds. · You pass maroon or very bloody stools. Call your doctor now or seek immediate medical care if:  · You have trouble swallowing. · You have belly pain. · Your stools are black and tarlike or have streaks of blood. · You are sick to your stomach or cannot keep fluids down. Watch closely for changes in your health, and be sure to contact your doctor if:  · Your throat still hurts after a day or two. · You do not get better as expected. Where can you learn more?    Go to [employer]  Enter J454 in the search box to learn more about \"Upper GI Endoscopy: What to Expect at Home. \"   © 3481-4671 [employer]. Care instructions adapted under license by [facility] [employer] (which disclaims liability or warranty for this information). This care instruction is for use with your licensed healthcare professional. If you have questions about a medical condition or this instruction, always ask your healthcare professional. [address] any warranty or liability for your use of this information. Content Version: 21.4.202291; Current as of: November 14, 2014    DISCHARGE SUMMARY from Nurse     POST-PROCEDURE INSTRUCTIONS:    Call your Physician if you:  ? Observe any excess bleeding. ? Develop a temperature over 100.5o F.  ? Experience abdominal, shoulder or chest pain. ? Notice any signs of decreased circulation or nerve impairment to an extremity such as a change in color, persistent numbness, tingling, coldness or increase in pain. ? Vomit blood or you have nausea and vomiting lasting longer than 4 hours. ? Are unable to take medications. ? Are unable to urinate within 8 hours after discharge following general anesthesia or intravenous sedation. For the next 24 hours after receiving general anesthesia or intravenous sedation, or while taking prescription Narcotics, limit your activities:  ? Do NOT drive a motor vehicle, operate hazard machinery or power tools, or perform tasks that require coordination. The medication you received during your procedure may have some effect on your mental awareness. ? Do NOT make important personal or business decisions. The medication you received during your procedure may have some effect on your mental awareness. ? Do NOT drink alcoholic beverages. These drinks do not mix well with the medications that have been given to you. ? Upon discharge from the hospital, you must be accompanied by a responsible adult. ?  Resume your diet as directed by your physician. ? Resume medications as your physician has prescribed. ? Please give a list of your current medications to your Primary Care Provider. ? Please update this list whenever your medications are discontinued, doses are changed, or new medications (including over-the-counter products) are added. ? Please carry medication information at all times in case of emergency situations. These are general instructions for a healthy lifestyle:    No smoking/ No tobacco products/ Avoid exposure to second hand smoke.  Surgeon General's Warning:  Quitting smoking now greatly reduces serious risk to your health. Obesity, smoking, and a sedentary lifestyle greatly increase your risk for illness.  A healthy diet, regular physical exercise & weight monitoring are important for maintaining a healthy lifestyle   You may be retaining fluid if you have a history of heart failure or if you experience any of the following symptoms:  Weight gain of 3 pounds or more overnight or 5 pounds in a week, increased swelling in our hands or feet or shortness of breath while lying flat in bed. Please call your doctor as soon as you notice any of these symptoms; do not wait until your next office visit. Recognize signs and symptoms of STROKE:  F  -  Face looks uneven  A  -  Arms unable to move or move unevenly  S  -  Speech slurred or non-existent  T  -  Time to call 911 - as soon as signs and symptoms begin - DO NOT go back to bed or wait to see If you get better - TIME IS BRAIN. Colorectal Screening   Colorectal cancer almost always develops from precancerous polyps (abnormal growths) in the colon or rectum. Screening tests can find precancerous polyps, so that they can be removed before they turn into cancer.  Screening tests can also find colorectal cancer early, when treatment works best.  [facility] Speak with your physician about when you should begin screening and how often you should be tested  [facility]   Additional Information    If you have questions, please call [phone]. Remember, MyChart is NOT to be used for urgent needs. For medical emergencies, dial 911. Educational references and/or instructions provided during this visit included:    see attachedments      APPOINTMENTS:    Please make a follow-up appointment with your physician. Discharge information has been reviewed with the patient and responsible party. The patient and responsible party verbalized understanding.

## 2018-05-21 NOTE — PROCEDURES
[email or web address]  [place], [address]      Brief Procedure Note    Primus Caraballo  1955  [id number]    Date of Procedure: 5/21/2018    Preoperative diagnosis: 530.5 - K22.4,  Dyskinesia of esophagus  786.50 - R07.9,  Chest pain, unspecified  787.20 - Dysphagia  530.11 - K21.0,  Gastro-esophageal reflux disease with esophagitis  414.00 - I25.10,  Coronary arteriosclerosis  305.90 - F19.10,  Drug abuse  278.00 - E66.9,  Z73.3,  Feeling stressed  305.1 - F17.200,  Smoker    Postoperative diagnosis: Gastritis, nonobstructive dysphagia s/p 54 Fr bougie dilation    Type of Anesthesia: MAC (Monitored anesthesia care)    Description of findings: same as post op dx    Procedure: Procedure(s):  UPPER ENDOSCOPY /  dilation    :  Dr. Olivia Francisco MD    Assistant(s): Endoscopy Technician-1: Solomon Mcdonald  Endoscopy RN-1: Keila Oro RN  Endoscopy RN-2: Mel Hayes RN    EBL:None    Specimens: * No specimens in log *    Findings: See printed and scanned procedure note    Complications: None    Dr. Olivia Francisco MD  5/21/2018  8:09 AM

## 2018-05-21 NOTE — IP AVS SNAPSHOT
[address] [facility], [place], operated by [facility] 
 
 
 [address] [address] [address] [address] [phone] Patient: Simona Evangelista MRN: [id number] WNN:[id number] About your hospitalization You were admitted on:  May 21, 2018 You last received care in the:  [facility] You were discharged on:  May 21, 2018 Why you were hospitalized Your primary diagnosis was:  Not on File Follow-up Information Follow up With Details Comments Contact Info Don [address] [facility] 
[phone] [facility] [place]   [address] [address] 
[phone] Discharge Orders None A check alexa indicates which time of day the medication should be taken. My Medications CHANGE how you take these medications Instructions Each Dose to Equal  
 Morning Noon Evening Bedtime  
 esomeprazole 40 mg capsule Commonly known as:  NexIUM What changed:  when to take this Your last dose was: Your next dose is: Take 1 Cap by mouth Before breakfast and dinner. 40 mg CONTINUE taking these medications Instructions Each Dose to Equal  
 Morning Noon Evening Bedtime  
 chlorthalidone 25 mg tablet Commonly known as:  Ulyess Cirri Your last dose was: Your next dose is: Take 1 Tab by mouth daily. 25 mg COREG 25 mg tablet Generic drug:  carvedilol Your last dose was: Your next dose is: Take 25 mg by mouth two (2) times daily (with meals). 25 mg  
    
   
   
   
  
 CYMBALTA 60 mg capsule Generic drug:  DULoxetine Your last dose was: Your next dose is: Take 60 mg by mouth daily. 60 mg DEMEROL 100 mg tablet Generic drug:  meperidine Your last dose was: Your next dose is: Take 100 mg by mouth every four (4) hours as needed for Pain. 100 mg  
    
   
   
   
  
 LIPITOR 40 mg tablet Generic drug:  atorvastatin Your last dose was: Your next dose is: Take 40 mg by mouth daily. 40 mg  
    
   
   
   
  
 nitroglycerin 0.3 mg SL tablet Commonly known as:  NITROSTAT Your last dose was: Your next dose is: 0.3 mg.  
 0.3 mg  
    
   
   
   
  
 PLAVIX 75 mg Tab Generic drug:  clopidogrel Your last dose was: Your next dose is: Take 75 mg by mouth. 75 mg PriLOSEC 10 mg capsule Generic drug:  omeprazole Your last dose was: Your next dose is: Take 10 mg by mouth daily. 10 mg  
    
   
   
   
  
 tamsulosin 0.4 mg capsule Commonly known as:  FLOMAX Your last dose was: Your next dose is: Take 1 Cap by mouth daily (after dinner). 0.4 mg  
    
   
   
   
  
 VALIUM 10 mg tablet Generic drug:  diazePAM  
   
Your last dose was: Your next dose is: Take 10 mg by mouth every six (6) hours as needed for Anxiety. 10 mg Opioid Education Prescription Opioids: What You Need to Know: 
 
Prescription opioids can be used to help relieve moderate-to-severe pain and are often prescribed following a surgery or injury, or for certain health conditions. These medications can be an important part of treatment but also come with serious risks. Opioids are strong pain medicines. Examples include hydrocodone, oxycodone, fentanyl, and morphine. Heroin is an example of an illegal opioid. It is important to work with your health care provider to make sure you are getting the safest, most effective care. WHAT ARE THE RISKS AND SIDE EFFECTS OF OPIOID USE?  
Prescription opioids carry serious risks of addiction and overdose, especially with prolonged use. An opioid overdose, often marked by slow breathing, can cause sudden death. The use of prescription opioids can have a number of side effects as well, even when taken as directed. · Tolerance-meaning you might need to take more of a medication for the same pain relief · Physical dependence-meaning you have symptoms of withdrawal when the medication is stopped. Withdrawal symptoms can include nausea, sweating, chills, diarrhea, stomach cramps, and muscle aches. Withdrawal can last up to several weeks, depending on which drug you took and how long you took it. · Increased sensitivity to pain · Constipation · Nausea, vomiting, and dry mouth · Sleepiness and dizziness · Confusion · Depression · Low levels of testosterone that can result in lower sex drive, energy, and strength · Itching and sweating RISKS ARE GREATER WITH:      
· History of drug misuse, substance use disorder, or overdose · Mental health conditions (such as depression or anxiety) · Sleep apnea · Older age (72 years or older) · Pregnancy Avoid alcohol while taking prescription opioids. Also, unless specifically advised by your health care provider, medications to avoid include: · Benzodiazepines (such as Xanax or Valium) · Muscle relaxants (such as Soma or Flexeril) · Hypnotics (such as Ambien or Lunesta) · Other prescription opioids KNOW YOUR OPTIONS Talk to your health care provider about ways to manage your pain that don't involve prescription opioids. Some of these options may actually work better and have fewer risks and side effects. Options may include: 
· Pain relievers such as acetaminophen, ibuprofen, and naproxen · Some medications that are also used for depression or seizures · Physical therapy and exercise · Counseling to help patients learn how to cope better with triggers of pain and stress. · Application of heat or cold compress · Massage therapy · Relaxation techniques Be Informed Make sure you know the name of your medication, how much and how often to take it, and its potential risks & side effects. IF YOU ARE PRESCRIBED OPIOIDS FOR PAIN: 
· Never take opioids in greater amounts or more often than prescribed. Remember the goal is not to be pain-free but to manage your pain at a tolerable level. · Follow up with your primary care provider to: · Work together to create a plan on how to manage your pain. · Talk about ways to help manage your pain that don't involve prescription opioids. · Talk about any and all concerns and side effects. · Help prevent misuse and abuse. · Never sell or share prescription opioids · Help prevent misuse and abuse. · Store prescription opioids in a secure place and out of reach of others (this may include visitors, children, friends, and family). · Safely dispose of unused/unwanted prescription opioids: Find your community drug take-back program or your pharmacy mail-back program, or flush them down the toilet, following guidance from the Food and Drug Administration (www.fda.gov/Drugs/ResourcesForYou). · Visit www.cdc.gov/drugoverdose to learn about the risks of opioid abuse and overdose. · If you believe you may be struggling with addiction, tell your health care provider and ask for guidance or call [address] [place], [place] [address] at [phone]-AZZM. Discharge Instructions Upper GI Endoscopy: What to Expect at [facility] Your Recovery After you have an endoscopy, you will stay at the hospital or clinic for 1 to 2 hours. This will allow the medicine to wear off. You will be able to go home after your doctor or nurse checks to make sure you are not having any problems.  
You may have to stay overnight if you had treatment during the test. You may have a sore throat for a day or two after the test. 
 This care sheet gives you a general idea about what to expect after the test. 
How can you care for yourself at home? Activity · Rest as much as you need to after you go home. · You should be able to go back to your usual activities the day after the test. 
Diet · Follow your doctor's directions for eating after the test. 
· Drink plenty of fluids (unless your doctor has told you not to). Medications · If you have a sore throat the day after the test, use an over-the-counter spray to numb your throat. Follow-up care is a key part of your treatment and safety. Be sure to make and go to all appointments, and call your doctor if you are having problems. It's also a good idea to know your test results and keep a list of the medicines you take. When should you call for help? Call 911 anytime you think you may need emergency care. For example, call if: 
· You passed out (lost consciousness). · You cough up blood. · You vomit blood or what looks like coffee grounds. · You pass maroon or very bloody stools. Call your doctor now or seek immediate medical care if: 
· You have trouble swallowing. · You have belly pain. · Your stools are black and tarlike or have streaks of blood. · You are sick to your stomach or cannot keep fluids down. Watch closely for changes in your health, and be sure to contact your doctor if: 
· Your throat still hurts after a day or two. · You do not get better as expected. Where can you learn more? Go to [employer]be Enter [phone] in the search box to learn more about \"Upper GI Endoscopy: What to Expect at Home. \"  
© 7302-5908 Healthwise, Incorporated. Care instructions adapted under license by Cyn Paz (which disclaims liability or warranty for this information).  This care instruction is for use with your licensed healthcare professional. If you have questions about a medical condition or this instruction, always ask your healthcare professional. [address] [address] any warranty or liability for your use of this information. Content Version: 86.3.878753; Current as of: November 14, 2014 DISCHARGE SUMMARY from Nurse POST-PROCEDURE INSTRUCTIONS: 
 
Call your Physician if you: 
? Observe any excess bleeding. ? Develop a temperature over 100.5o F. 
? Experience abdominal, shoulder or chest pain. ? Notice any signs of decreased circulation or nerve impairment to an extremity such as a change in color, persistent numbness, tingling, coldness or increase in pain. ? Vomit blood or you have nausea and vomiting lasting longer than 4 hours. ? Are unable to take medications. ? Are unable to urinate within 8 hours after discharge following general anesthesia or intravenous sedation. For the next 24 hours after receiving general anesthesia or intravenous sedation, or while taking prescription Narcotics, limit your activities: 
? Do NOT drive a motor vehicle, operate hazard machinery or power tools, or perform tasks that require coordination. The medication you received during your procedure may have some effect on your mental awareness. ? Do NOT make important personal or business decisions. The medication you received during your procedure may have some effect on your mental awareness. ? Do NOT drink alcoholic beverages. These drinks do not mix well with the medications that have been given to you. ? Upon discharge from the hospital, you must be accompanied by a responsible adult. ? Resume your diet as directed by your physician. ? Resume medications as your physician has prescribed. ? Please give a list of your current medications to your Primary Care Provider. ? Please update this list whenever your medications are discontinued, doses are changed, or new medications (including over-the-counter products) are added. ? Please carry medication information at all times in case of emergency situations. These are general instructions for a healthy lifestyle: No smoking/ No tobacco products/ Avoid exposure to second hand smoke. ? Surgeon General's Warning:  Quitting smoking now greatly reduces serious risk to your health. Obesity, smoking, and a sedentary lifestyle greatly increase your risk for illness. ? A healthy diet, regular physical exercise & weight monitoring are important for maintaining a healthy lifestyle ? You may be retaining fluid if you have a history of heart failure or if you experience any of the following symptoms:  Weight gain of 3 pounds or more overnight or 5 pounds in a week, increased swelling in our hands or feet or shortness of breath while lying flat in bed. Please call your doctor as soon as you notice any of these symptoms; do not wait until your next office visit. Recognize signs and symptoms of STROKE: 
F  -  Face looks uneven A  -  Arms unable to move or move unevenly S  -  Speech slurred or non-existent T  -  Time to call 911 - as soon as signs and symptoms begin - DO NOT go back to bed or wait to see If you get better - TIME IS BRAIN. Colorectal Screening ? Colorectal cancer almost always develops from precancerous polyps (abnormal growths) in the colon or rectum. Screening tests can find precancerous polyps, so that they can be removed before they turn into cancer. Screening tests can also find colorectal cancer early, when treatment works best. 
? Speak with your physician about when you should begin screening and how often you should be tested ? Additional Information If you have questions, please call [phone]. Remember, [employer] is NOT to be used for urgent needs. For medical emergencies, dial 911. Educational references and/or instructions provided during this visit included: 
 
see attachedments APPOINTMENTS: 
 
 Please make a follow-up appointment with your physician. Discharge information has been reviewed with the patient and responsible party. The patient and responsible party verbalized understanding. Introducing \[facility] [place] [facility]\"" & HEALTH SERVICES! [facility] introduces [employer] patient portal. Now you can access parts of your medical record, email your doctor's office, and request medication refills online. 1. In your internet browser, go to [email or web address] 2. Click on the First Time User? Click Here link in the Sign In box. You will see the New Member Sign Up page. 3. Enter your S[employer] Access Code exactly as it appears below. You will not need to use this code after youve completed the sign-up process. If you do not sign up before the expiration date, you must request a new code. · [employer] Access Code: [email or web address] Expires: 7/3/2018  7:10 PM 
 
4. Enter the last four digits of your Social Security Number (xxxx) and Date of Birth (mm/dd/yyyy) as indicated and click Submit. You will be taken to the next sign-up page. 5. Create a Sion Power ID. This will be your [employer] login ID and cannot be changed, so think of one that is secure and easy to remember. 6. Create a [employer] password. You can change your password at any time. 7. Enter your Password Reset Question and Answer. This can be used at a later time if you forget your password. 8. Enter your e-mail address. You will receive e-mail notification when new information is available in [address] 9. Click Sign Up. You can now view and download portions of your medical record. 10. Click the Download Summary menu link to download a portable copy of your medical information. If you have questions, please visit the Frequently Asked Questions section of the S[employer] website. Remember, S[employer] Power is NOT to be used for urgent needs. For medical emergencies, dial 911. Now available from your iPhone and Android! Introducing Jason Gongora As a [employer] patient, I wanted to make you aware of our electronic visit tool called Jason Gongora. [employer] 24/7 allows you to connect within minutes with a medical provider 24 hours a day, seven days a week via a mobile device or tablet or logging into a secure website from your computer. You can access Jason Gongora from anywhere in the [place]. A virtual visit might be right for you when you have a simple condition and feel like you just dont want to get out of bed, or cant get away from work for an appointment, when your regular [employer] provider is not available (evenings, weekends or holidays), or when youre out of town and need minor care. Electronic visits cost only $49 and if the [employer] 24/7 provider determines a prescription is needed to treat your condition, one can be electronically transmitted to a nearby pharmacy*. Please take a moment to enroll today if you have not already done so. The enrollment process is free and takes just a few minutes. To enroll, please download the [employer] 24/7 deisy to your tablet or phone, or visit [email or web address] to enroll on your computer. And, as an [address] [place] [address] patient with a [employer] account, the results of your visits will be scanned into your electronic medical record and your primary care provider will be able to view the scanned results. We urge you to continue to see your regular [employer] provider for your ongoing medical care. And while your primary care provider may not be the one available when you seek a Jason Gongora virtual visit, the peace of mind you get from getting a real diagnosis real time can be priceless. For more information on Jason Gongora, view our Frequently Asked Questions (FAQs) at [email or web address]. Sincerely, 
 
Hi Haile MD 
Chief Medical Officer Jose Enrique Koehler *:  certain medications cannot be prescribed via Jason Gongora Providers Seen During Your Hospitalization Provider Specialty Primary office phone Merritt Campo MD Gastroenterology [phone] Your Primary Care Physician (PCP) Primary Care Physician Office Phone Office Fax Venancio Rajput [phone] You are allergic to the following Allergen Reactions Bee Sting (Sting, Bee) Hives Shortness of Breath Codeine Hives Shortness of Breath Has tolerated Hydromorphone. Haldol (Haloperidol Lactate) Hives Shortness of Breath Haloperidol Hives Cough Keflex (Cephalexin) Diarrhea No Allergy Information Available Other (comments) Other reaction(s): hives Shellfish Containing Products Hives Stadol (Butorphanol Tartrate) Hives Shortness of Breath Rash Vicodin (Hydrocodone-Acetaminophen) Hives Shortness of Breath Rash Recent Documentation Height Weight BMI Smoking Status 1.702 m 108 kg 37.28 kg/m2 Former Smoker Emergency Contacts Name Discharge Info Relation Home Work Mobile [facility] DISCHARGE CAREGIVER [3] Spouse [3] [phone] Patient Belongings The following personal items are in your possession at time of discharge: 
  Dental Appliances: None  Visual Aid: None Discharge Instructions Attachments/References ESOPHAGEAL DILATION: POST-OP (ENGLISH) Patient Handouts Esophageal Dilation: What to Expect at [facility] Your Recovery After you have esophageal dilation, you will stay at the hospital or surgery center for 1 to 2 hours. This will allow the medicine to wear off. You will be able to go home after your doctor or nurse checks to make sure you are not having any problems.  
This care sheet gives you a general idea about how long it will take for you to recover. But each person recovers at a different pace. Follow the steps below to get better as quickly as possible. How can you care for yourself at home? Activity ? · Rest as much as you need to after you go home. ? · You should be able to go back to your usual activities the day after the procedure. Diet ? · Follow your doctor's directions for eating after the procedure. ? · Drink plenty of fluids (unless your doctor has told you not to). Medicines ? · Your doctor will tell you if and when you can restart your medicines. He or she will also give you instructions about taking any new medicines. ? · If you take blood thinners, such as warfarin (Coumadin), clopidogrel (Plavix), or aspirin, be sure to talk to your doctor. He or she will tell you if and when to start taking those medicines again. Make sure that you understand exactly what your doctor wants you to do. ? · If you have a sore throat the day after the procedure, use an over-the-counter spray to numb your throat. Sucking on throat lozenges and gargling with warm salt water may also help relieve your symptoms. Follow-up care is a key part of your treatment and safety. Be sure to make and go to all appointments, and call your doctor if you are having problems. It's also a good idea to know your test results and keep a list of the medicines you take. When should you call for help? Call 911 anytime you think you may need emergency care. For example, call if: 
? · You passed out (lost consciousness). ? · You have trouble breathing. ? · Your stools are maroon or very bloody ? Call your doctor now or seek immediate medical care if: 
? · You have new or worse belly pain. ? · You have a fever. ? · You have new or more blood in your stools. ? · You are sick to your stomach and cannot drink fluids. ? · You cannot pass stools or gas. ? · You have pain that does not get better after you take pain medicine. ?Watch closely for changes in your health, and be sure to contact your doctor if: 
? · Your throat still hurts after a day or two. ? · You do not get better as expected. Where can you learn more? Go to http://turner-doris.info/. Enter T667 in the search box to learn more about \"Esophageal Dilation: What to Expect at Home. \" Current as of: May 12, 2017 Content Version: 11.4 © 2006-2017 Healthwise, Incorporated. Care instructions adapted under license by [employer] (which disclaims liability or warranty for this information). If you have questions about a medical condition or this instruction, always ask your healthcare professional. [address] any warranty or liability for your use of this information. Please provide this summary of care documentation to your next provider. Signatures-by signing, you are acknowledging that this After Visit Summary has been reviewed with you and you have received a copy. Patient Signature:  ____________________________________________________________ Date:  ____________________________________________________________  
  
Carter Mayorga Provider Signature:  ____________________________________________________________ Date:  ____________________________________________________________

## 2018-05-21 NOTE — H&P
History and Physical reviewed; I have examined the patient and there are no pertinent changes. Olivia Francisco MD, MD   7:37 AM 5/21/2018  [facility], [address] [place] [address]  [email or web address] [facility]

## 2018-07-06 ENCOUNTER — ANESTHESIA EVENT (OUTPATIENT)
Dept: ENDOSCOPY | Age: 63
End: 2018-07-06
Payer: MEDICARE

## 2018-07-09 ENCOUNTER — ANESTHESIA (OUTPATIENT)
Dept: ENDOSCOPY | Age: 63
End: 2018-07-09
Payer: MEDICARE

## 2018-07-09 ENCOUNTER — HOSPITAL ENCOUNTER (OUTPATIENT)
Age: 63
Setting detail: OUTPATIENT SURGERY
Discharge: HOME | End: 2018-07-09
Attending: INTERNAL MEDICINE | Admitting: INTERNAL MEDICINE
Payer: MEDICARE

## 2018-07-09 VITALS
DIASTOLIC BLOOD PRESSURE: 65 MMHG | SYSTOLIC BLOOD PRESSURE: 124 MMHG | HEIGHT: 67 IN | OXYGEN SATURATION: 100 % | HEART RATE: 67 BPM | TEMPERATURE: 97.6 F | WEIGHT: 238 LBS | RESPIRATION RATE: 11 BRPM | BODY MASS INDEX: 37.35 KG/M2

## 2018-07-09 PROCEDURE — 77030009426 HC FCPS BIOP ENDOSC BSC -B: Performed by: INTERNAL MEDICINE

## 2018-07-09 PROCEDURE — 88305 TISSUE EXAM BY PATHOLOGIST: CPT | Performed by: INTERNAL MEDICINE

## 2018-07-09 PROCEDURE — 74011000250 HC RX REV CODE- 250

## 2018-07-09 PROCEDURE — 74011000250 HC RX REV CODE- 250: Performed by: NURSE ANESTHETIST, CERTIFIED REGISTERED

## 2018-07-09 PROCEDURE — 74011250636 HC RX REV CODE- 250/636: Performed by: ANESTHESIOLOGY

## 2018-07-09 PROCEDURE — 76060000031 HC ANESTHESIA FIRST 0.5 HR: Performed by: INTERNAL MEDICINE

## 2018-07-09 PROCEDURE — 74011250636 HC RX REV CODE- 250/636: Performed by: NURSE ANESTHETIST, CERTIFIED REGISTERED

## 2018-07-09 PROCEDURE — 74011250636 HC RX REV CODE- 250/636

## 2018-07-09 PROCEDURE — 76040000019: Performed by: INTERNAL MEDICINE

## 2018-07-09 RX ORDER — SODIUM CHLORIDE 0.9 % (FLUSH) 0.9 %
5-10 SYRINGE (ML) INJECTION AS NEEDED
Status: DISCONTINUED | OUTPATIENT
Start: 2018-07-09 | End: 2018-07-09 | Stop reason: HOSPADM

## 2018-07-09 RX ORDER — SODIUM CHLORIDE, SODIUM LACTATE, POTASSIUM CHLORIDE, CALCIUM CHLORIDE 600; 310; 30; 20 MG/100ML; MG/100ML; MG/100ML; MG/100ML
75 INJECTION, SOLUTION INTRAVENOUS CONTINUOUS
Status: DISCONTINUED | OUTPATIENT
Start: 2018-07-09 | End: 2018-07-09 | Stop reason: HOSPADM

## 2018-07-09 RX ORDER — SODIUM CHLORIDE, SODIUM LACTATE, POTASSIUM CHLORIDE, CALCIUM CHLORIDE 600; 310; 30; 20 MG/100ML; MG/100ML; MG/100ML; MG/100ML
INJECTION, SOLUTION INTRAVENOUS
Status: DISCONTINUED | OUTPATIENT
Start: 2018-07-09 | End: 2018-07-09 | Stop reason: HOSPADM

## 2018-07-09 RX ORDER — SODIUM CHLORIDE 0.9 % (FLUSH) 0.9 %
5-10 SYRINGE (ML) INJECTION EVERY 8 HOURS
Status: DISCONTINUED | OUTPATIENT
Start: 2018-07-09 | End: 2018-07-09 | Stop reason: HOSPADM

## 2018-07-09 RX ORDER — GLYCOPYRROLATE 0.2 MG/ML
INJECTION INTRAMUSCULAR; INTRAVENOUS AS NEEDED
Status: DISCONTINUED | OUTPATIENT
Start: 2018-07-09 | End: 2018-07-09 | Stop reason: HOSPADM

## 2018-07-09 RX ORDER — ATROPINE SULFATE 0.1 MG/ML
0.5 INJECTION INTRAVENOUS
Status: DISCONTINUED | OUTPATIENT
Start: 2018-07-09 | End: 2018-07-09 | Stop reason: HOSPADM

## 2018-07-09 RX ORDER — SODIUM CHLORIDE 0.9 % (FLUSH) 0.9 %
5-10 SYRINGE (ML) INJECTION AS NEEDED
Status: DISCONTINUED | OUTPATIENT
Start: 2018-07-09 | End: 2018-07-09 | Stop reason: SDUPTHER

## 2018-07-09 RX ORDER — PROPOFOL 10 MG/ML
INJECTION, EMULSION INTRAVENOUS AS NEEDED
Status: DISCONTINUED | OUTPATIENT
Start: 2018-07-09 | End: 2018-07-09 | Stop reason: HOSPADM

## 2018-07-09 RX ORDER — NALOXONE HYDROCHLORIDE 0.4 MG/ML
0.1 INJECTION, SOLUTION INTRAMUSCULAR; INTRAVENOUS; SUBCUTANEOUS
Status: DISCONTINUED | OUTPATIENT
Start: 2018-07-09 | End: 2018-07-09 | Stop reason: HOSPADM

## 2018-07-09 RX ORDER — HYDROMORPHONE HYDROCHLORIDE 2 MG/ML
1 INJECTION, SOLUTION INTRAMUSCULAR; INTRAVENOUS; SUBCUTANEOUS ONCE
Status: COMPLETED | OUTPATIENT
Start: 2018-07-09 | End: 2018-07-09

## 2018-07-09 RX ORDER — FLUMAZENIL 0.1 MG/ML
0.2 INJECTION INTRAVENOUS
Status: DISCONTINUED | OUTPATIENT
Start: 2018-07-09 | End: 2018-07-09 | Stop reason: HOSPADM

## 2018-07-09 RX ORDER — EPINEPHRINE 0.1 MG/ML
1 INJECTION INTRACARDIAC; INTRAVENOUS
Status: DISCONTINUED | OUTPATIENT
Start: 2018-07-09 | End: 2018-07-09 | Stop reason: HOSPADM

## 2018-07-09 RX ORDER — DEXTROMETHORPHAN/PSEUDOEPHED 2.5-7.5/.8
1.2 DROPS ORAL
Status: DISCONTINUED | OUTPATIENT
Start: 2018-07-09 | End: 2018-07-09 | Stop reason: HOSPADM

## 2018-07-09 RX ORDER — SODIUM CHLORIDE 0.9 % (FLUSH) 0.9 %
5-10 SYRINGE (ML) INJECTION EVERY 8 HOURS
Status: DISCONTINUED | OUTPATIENT
Start: 2018-07-09 | End: 2018-07-09 | Stop reason: SDUPTHER

## 2018-07-09 RX ORDER — LIDOCAINE HYDROCHLORIDE 20 MG/ML
INJECTION, SOLUTION EPIDURAL; INFILTRATION; INTRACAUDAL; PERINEURAL AS NEEDED
Status: DISCONTINUED | OUTPATIENT
Start: 2018-07-09 | End: 2018-07-09 | Stop reason: HOSPADM

## 2018-07-09 RX ORDER — NALOXONE HYDROCHLORIDE 0.4 MG/ML
0.4 INJECTION, SOLUTION INTRAMUSCULAR; INTRAVENOUS; SUBCUTANEOUS
Status: DISCONTINUED | OUTPATIENT
Start: 2018-07-09 | End: 2018-07-09 | Stop reason: HOSPADM

## 2018-07-09 RX ADMIN — SODIUM CHLORIDE, SODIUM LACTATE, POTASSIUM CHLORIDE, AND CALCIUM CHLORIDE 75 ML/HR: 600; 310; 30; 20 INJECTION, SOLUTION INTRAVENOUS at 08:01

## 2018-07-09 RX ADMIN — GLYCOPYRROLATE 0.2 MG: 0.2 INJECTION INTRAMUSCULAR; INTRAVENOUS at 08:03

## 2018-07-09 RX ADMIN — SODIUM CHLORIDE, SODIUM LACTATE, POTASSIUM CHLORIDE, CALCIUM CHLORIDE: 600; 310; 30; 20 INJECTION, SOLUTION INTRAVENOUS at 08:03

## 2018-07-09 RX ADMIN — PROPOFOL 50 MG: 10 INJECTION, EMULSION INTRAVENOUS at 08:11

## 2018-07-09 RX ADMIN — PROPOFOL 50 MG: 10 INJECTION, EMULSION INTRAVENOUS at 08:06

## 2018-07-09 RX ADMIN — FAMOTIDINE 20 MG: 10 INJECTION INTRAVENOUS at 08:02

## 2018-07-09 RX ADMIN — HYDROMORPHONE HYDROCHLORIDE 1 MG: 2 INJECTION, SOLUTION INTRAMUSCULAR; INTRAVENOUS; SUBCUTANEOUS at 08:31

## 2018-07-09 RX ADMIN — PROPOFOL 20 MG: 10 INJECTION, EMULSION INTRAVENOUS at 08:14

## 2018-07-09 RX ADMIN — LIDOCAINE HYDROCHLORIDE 60 MG: 20 INJECTION, SOLUTION EPIDURAL; INFILTRATION; INTRACAUDAL; PERINEURAL at 08:05

## 2018-07-09 NOTE — H&P
History and Physical reviewed; I have examined the patient and there are no pertinent changes. Benoit Almaraz MD, MD   7:42 AM 7/9/2018  [facility], [address] [place] [address]  [email or web address]

## 2018-07-09 NOTE — PERIOP NOTES
Patient left ambulatory with steady gait and wife to drive. No complaints or distress noted. Patient ID bracelet removed & placed in shredder box.

## 2018-07-09 NOTE — IP AVS SNAPSHOT
[address] [place] [address] [address] Z[address] Alonzo [id number] [phone] Patient: Magi Champion MRN: [id number] XAX:[id number] About your hospitalization You were admitted on:  July 9, 2018 You last received care in the:  [facility] You were discharged on:  July 9, 2018 Why you were hospitalized Your primary diagnosis was:  Not on File Follow-up Information Follow up With Details Comments Contact Info Don Andu[address] [place] [address] 
[phone] Discharge Orders None A check alexa indicates which time of day the medication should be taken. My Medications ASK your doctor about these medications Instructions Each Dose to Equal  
 Morning Noon Evening Bedtime  
 chlorthalidone 25 mg tablet Commonly known as:  Yani Ugarte Your last dose was: Your next dose is: Take 1 Tab by mouth daily. 25 mg COREG 25 mg tablet Generic drug:  carvedilol Your last dose was: Your next dose is: Take 25 mg by mouth two (2) times daily (with meals). 25 mg  
    
   
   
   
  
 CYMBALTA 60 mg capsule Generic drug:  DULoxetine Your last dose was: Your next dose is: Take 60 mg by mouth daily. 60 mg DEMEROL 100 mg tablet Generic drug:  meperidine Your last dose was: Your next dose is: Take 100 mg by mouth every four (4) hours as needed for Pain. 100 mg  
    
   
   
   
  
 esomeprazole 40 mg capsule Commonly known as:  NexIUM Your last dose was: Your next dose is: Take 1 Cap by mouth Before breakfast and dinner. 40 mg  
    
   
   
   
  
 LIPITOR 40 mg tablet Generic drug:  atorvastatin Your last dose was: Your next dose is: Take 40 mg by mouth daily. 40 mg  
    
   
   
   
  
 nitroglycerin 0.3 mg SL tablet Commonly known as:  NITROSTAT Your last dose was: Your next dose is: 0.3 mg.  
 0.3 mg  
    
   
   
   
  
 PLAVIX 75 mg Tab Generic drug:  clopidogrel Your last dose was: Your next dose is: Take 75 mg by mouth. 75 mg PriLOSEC 10 mg capsule Generic drug:  omeprazole Your last dose was: Your next dose is: Take 10 mg by mouth daily. 10 mg  
    
   
   
   
  
 tamsulosin 0.4 mg capsule Commonly known as:  FLOMAX Your last dose was: Your next dose is: Take 1 Cap by mouth daily (after dinner). 0.4 mg  
    
   
   
   
  
 VALIUM 10 mg tablet Generic drug:  diazePAM  
   
Your last dose was: Your next dose is: Take 10 mg by mouth every six (6) hours as needed for Anxiety. 10 mg Opioid Education Prescription Opioids: What You Need to Know: 
 
Prescription opioids can be used to help relieve moderate-to-severe pain and are often prescribed following a surgery or injury, or for certain health conditions. These medications can be an important part of treatment but also come with serious risks. Opioids are strong pain medicines. Examples include hydrocodone, oxycodone, fentanyl, and morphine. Heroin is an example of an illegal opioid. It is important to work with your health care provider to make sure you are getting the safest, most effective care. WHAT ARE THE RISKS AND SIDE EFFECTS OF OPIOID USE? Prescription opioids carry serious risks of addiction and overdose, especially with prolonged use. An opioid overdose, often marked by slow breathing, can cause sudden death. The use of prescription opioids can have a number of side effects as well, even when taken as directed.  
  
· Tolerance-meaning you might need to take more of a medication for the same pain relief · Physical dependence-meaning you have symptoms of withdrawal when the medication is stopped. Withdrawal symptoms can include nausea, sweating, chills, diarrhea, stomach cramps, and muscle aches. Withdrawal can last up to several weeks, depending on which drug you took and how long you took it. · Increased sensitivity to pain · Constipation · Nausea, vomiting, and dry mouth · Sleepiness and dizziness · Confusion · Depression · Low levels of testosterone that can result in lower sex drive, energy, and strength · Itching and sweating RISKS ARE GREATER WITH:      
· History of drug misuse, substance use disorder, or overdose · Mental health conditions (such as depression or anxiety) · Sleep apnea · Older age (72 years or older) · Pregnancy Avoid alcohol while taking prescription opioids. Also, unless specifically advised by your health care provider, medications to avoid include: · Benzodiazepines (such as Xanax or Valium) · Muscle relaxants (such as Soma or Flexeril) · Hypnotics (such as Ambien or Lunesta) · Other prescription opioids KNOW YOUR OPTIONS Talk to your health care provider about ways to manage your pain that don't involve prescription opioids. Some of these options may actually work better and have fewer risks and side effects. Options may include: 
· Pain relievers such as acetaminophen, ibuprofen, and naproxen · Some medications that are also used for depression or seizures · Physical therapy and exercise · Counseling to help patients learn how to cope better with triggers of pain and stress. · Application of heat or cold compress · Massage therapy · Relaxation techniques Be Informed Make sure you know the name of your medication, how much and how often to take it, and its potential risks & side effects. IF YOU ARE PRESCRIBED OPIOIDS FOR PAIN: 
· Never take opioids in greater amounts or more often than prescribed. Remember the goal is not to be pain-free but to manage your pain at a tolerable level. · Follow up with your primary care provider to: · Work together to create a plan on how to manage your pain. · Talk about ways to help manage your pain that don't involve prescription opioids. · Talk about any and all concerns and side effects. · Help prevent misuse and abuse. · Never sell or share prescription opioids · Help prevent misuse and abuse. · Store prescription opioids in a secure place and out of reach of others (this may include visitors, children, friends, and family). · Safely dispose of unused/unwanted prescription opioids: Find your community drug take-back program or your pharmacy mail-back program, or flush them down the toilet, following guidance from the Food and Drug Administration (www.fda.gov/Drugs/ResourcesForYou). · Visit www.cdc.gov/drugoverdose to learn about the risks of opioid abuse and overdose. · If you believe you may be struggling with addiction, tell your health care provider and ask for guidance or call [address] [place], [place] [address]rose [facility] at [phone]. Discharge Instructions Upper GI Endoscopy: What to Expect at [facility] Your Recovery After you have an endoscopy, you will stay at the hospital or clinic for 1 to 2 hours. This will allow the medicine to wear off. You will be able to go home after your doctor or nurse checks to make sure you are not having any problems. You may have to stay overnight if you had treatment during the test. You may have a sore throat for a day or two after the test. 
This care sheet gives you a general idea about what to expect after the test. 
How can you care for yourself at home? Activity · Rest as much as you need to after you go home. · You should be able to go back to your usual activities the day after the test. 
Diet · Follow your doctor's directions for eating after the test. 
· Drink plenty of fluids (unless your doctor has told you not to). Medications · If you have a sore throat the day after the test, use an over-the-counter spray to numb your throat. Follow-up care is a key part of your treatment and safety. Be sure to make and go to all appointments, and call your doctor if you are having problems. It's also a good idea to know your test results and keep a list of the medicines you take. When should you call for help? Call 911 anytime you think you may need emergency care. For example, call if: 
· You passed out (lost consciousness). · You cough up blood. · You vomit blood or what looks like coffee grounds. · You pass maroon or very bloody stools. Call your doctor now or seek immediate medical care if: 
· You have trouble swallowing. · You have belly pain. · Your stools are black and tarlike or have streaks of blood. · You are sick to your stomach or cannot keep fluids down. Watch closely for changes in your health, and be sure to contact your doctor if: 
· Your throat still hurts after a day or two. · You do not get better as expected. Where can you learn more? Go to Agensys.be Enter [phone] in the search box to learn more about \"Upper GI Endoscopy: What to Expect at Home. \"  
© 8783-9887 Healthwise, Incorporated. Care instructions adapted under license by [employer] (which disclaims liability or warranty for this information). This care instruction is for use with your licensed healthcare professional. If you have questions about a medical condition or this instruction, always ask your healthcare professional. [place] [address] any warranty or liability for your use of this information. Content Version: 88.7.804803; Current as of: November 14, 2014 Esophageal Dilation: What to Expect at [facility] Your Recovery After you have esophageal dilation, you will stay at the hospital or surgery center for 1 to 2 hours. This will allow the medicine to wear off. You will be able to go home after your doctor or nurse checks to make sure you are not having any problems. This care sheet gives you a general idea about how long it will take for you to recover. But each person recovers at a different pace. Follow the steps below to get better as quickly as possible. How can you care for yourself at home? Activity ? · Rest as much as you need to after you go home. ? · You should be able to go back to your usual activities the day after the procedure. Diet ? · Follow your doctor's directions for eating after the procedure. ? · Drink plenty of fluids (unless your doctor has told you not to). Medicines ? · Your doctor will tell you if and when you can restart your medicines. He or she will also give you instructions about taking any new medicines. ? · If you take blood thinners, such as warfarin (Coumadin), clopidogrel (Plavix), or aspirin, be sure to talk to your doctor. He or she will tell you if and when to start taking those medicines again. Make sure that you understand exactly what your doctor wants you to do. ? · If you have a sore throat the day after the procedure, use an over-the-counter spray to numb your throat. Sucking on throat lozenges and gargling with warm salt water may also help relieve your symptoms. Follow-up care is a key part of your treatment and safety. Be sure to make and go to all appointments, and call your doctor if you are having problems. It's also a good idea to know your test results and keep a list of the medicines you take. When should you call for help? Call 911 anytime you think you may need emergency care. For example, call if: 
? · You passed out (lost consciousness). ? · You have trouble breathing. ? · Your stools are maroon or very bloody ?Call your doctor now or seek immediate medical care if: 
? · You have new or worse belly pain. ? · You have a fever. ? · You have new or more blood in your stools. ? · You are sick to your stomach and cannot drink fluids. ? · You cannot pass stools or gas. ? · You have pain that does not get better after you take pain medicine. ? Watch closely for changes in your health, and be sure to contact your doctor if: 
? · Your throat still hurts after a day or two. ? · You do not get better as expected. Where can you learn more? Go to http://turner-doris.info/. Enter A315 in the search box to learn more about \"Esophageal Dilation: What to Expect at Home. \" Current as of: May 12, 2017 Content Version: 11.4 © 7961-5135 [employer]. Care instructions adapted under license by [employer] (which disclaims liability or warranty for this information). If you have questions about a medical condition or this instruction, always ask your healthcare professional. [place] [address] any warranty or liability for your use of this information. DISCHARGE SUMMARY from Nurse POST-PROCEDURE INSTRUCTIONS: 
 
Call your Physician if you: 
? Observe any excess bleeding. ? Develop a temperature over 100.5o F. 
? Experience abdominal, shoulder or chest pain. ? Notice any signs of decreased circulation or nerve impairment to an extremity such as a change in color, persistent numbness, tingling, coldness or increase in pain. ? Vomit blood or you have nausea and vomiting lasting longer than 4 hours. ? Are unable to take medications. ? Are unable to urinate within 8 hours after discharge following general anesthesia or intravenous sedation.  
 
For the next 24 hours after receiving general anesthesia or intravenous sedation, or while taking prescription Narcotics, limit your activities: 
? Do NOT drive a motor vehicle, operate hazard machinery or power tools, or perform tasks that require coordination. The medication you received during your procedure may have some effect on your mental awareness. ? Do NOT make important personal or business decisions. The medication you received during your procedure may have some effect on your mental awareness. ? Do NOT drink alcoholic beverages. These drinks do not mix well with the medications that have been given to you. ? Upon discharge from the hospital, you must be accompanied by a responsible adult. ? Resume your diet as directed by your physician. ? Resume medications as your physician has prescribed. ? Please give a list of your current medications to your Primary Care Provider. ? Please update this list whenever your medications are discontinued, doses are changed, or new medications (including over-the-counter products) are added. ? Please carry medication information at all times in case of emergency situations. These are general instructions for a healthy lifestyle: No smoking/ No tobacco products/ Avoid exposure to second hand smoke. ? Surgeon General's Warning:  Quitting smoking now greatly reduces serious risk to your health. Obesity, smoking, and a sedentary lifestyle greatly increase your risk for illness. ? A healthy diet, regular physical exercise & weight monitoring are important for maintaining a healthy lifestyle ? You may be retaining fluid if you have a history of heart failure or if you experience any of the following symptoms:  Weight gain of 3 pounds or more overnight or 5 pounds in a week, increased swelling in our hands or feet or shortness of breath while lying flat in bed. Please call your doctor as soon as you notice any of these symptoms; do not wait until your next office visit. Recognize signs and symptoms of STROKE: 
F  -  Face looks uneven A  -  Arms unable to move or move unevenly S  -  Speech slurred or non-existent T  -  Time to call 911 - as soon as signs and symptoms begin - DO NOT go back to bed or wait to see If you get better - TIME IS BRAIN. Colorectal Screening ? Colorectal cancer almost always develops from precancerous polyps (abnormal growths) in the colon or rectum. Screening tests can find precancerous polyps, so that they can be removed before they turn into cancer. Screening tests can also find colorectal cancer early, when treatment works best. 
? Speak with your physician about when you should begin screening and how often you should be tested ? Additional Information If you have questions, please call [phone]. Remember, [employer] [facility] is NOT to be used for urgent needs. For medical emergencies, dial 911. Educational references and/or instructions provided during this visit included: 
 
Dilation APPOINTMENTS: 
 
Please make a follow-up appointment with your physician. Discharge information has been reviewed with the patient. The patient verbalized understanding. Introducing [facility] SERVICES! [facility] introduces [employer] [facility] patient portal. Now you can access parts of your medical record, email your doctor's office, and request medication refills online. 1. In your internet browser, go to [email or web address] 2. Click on the First Time User? Click Here link in the Sign In box. You will see the New Member Sign Up page. 3. Enter your [employer] [facility] Access Code exactly as it appears below. You will not need to use this code after youve completed the sign-up process. If you do not sign up before the expiration date, you must request a new code. · [employer] [facility] Access Code: [email or web address] Expires: 10/3/2018  4:26 PM 
 
4. Enter the last four digits of your Social Security Number (xxxx) and Date of Birth (mm/dd/yyyy) as indicated and click Submit. You will be taken to the next sign-up page. 5. Create a [employer] [facility] ID.  This will be your [employer] [facility] login ID and cannot be changed, so think of one that is secure and easy to remember. 6. Create a [employer] password. You can change your password at any time. 7. Enter your Password Reset Question and Answer. This can be used at a later time if you forget your password. 8. Enter your e-mail address. You will receive e-mail notification when new information is available in [address] 9. Click Sign Up. You can now view and download portions of your medical record. 10. Click the Download Summary menu link to download a portable copy of your medical information. If you have questions, please visit the Frequently Asked Questions section of the [employer] website. Remember, [employer] is NOT to be used for urgent needs. For medical emergencies, dial 911. Now available from your iPhone and Android! Introducing Jason Gongora As a [facility] patient, I wanted to make you aware of our electronic visit tool called Jason Gongora. [facility]/EidoSearch allows you to connect within minutes with a medical provider 24 hours a day, seven days a week via a mobile device or tablet or logging into a secure website from your computer. You can access Jason Gongora from anywhere in the [place]. A virtual visit might be right for you when you have a simple condition and feel like you just dont want to get out of bed, or cant get away from work for an appointment, when your regular [facility] provider is not available (evenings, weekends or holidays), or when youre out of town and need minor care. Electronic visits cost only $49 and if the [employer] provider determines a prescription is needed to treat your condition, one can be electronically transmitted to a nearby pharmacy*. Please take a moment to enroll today if you have not already done so. The enrollment process is free and takes just a few minutes.   To enroll, please download the [employer]idoSearch deisy to your tablet or phone, or visit [email or web address] to enroll on your computer. And, as an [address] [place] [address] patient with a [employer] account, the results of your visits will be scanned into your electronic medical record and your primary care provider will be able to view the scanned results. We urge you to continue to see your regular [facility] provider for your ongoing medical care. And while your primary care provider may not be the one available when you seek a [facility]ement[employer] virtual visit, the peace of mind you get from getting a real diagnosis real time can be priceless. For more information on [employer], view our Frequently Asked Questions (FAQs) at [email or web address]. Sincerely, 
 
Saran Johnson MD 
Chief Medical Officer [facility] Kari Koehler *:  certain medications cannot be prescribed via [employer]ement[employer] Providers Seen During Your Hospitalization Provider Specialty Primary office phone Jessy Kasper MD Gastroenterology [phone] Your Primary Care Physician (PCP) Primary Care Physician Office Phone Office Fax Jason Morgan [phone] You are allergic to the following Allergen Reactions Bee Sting (Sting, Bee) Hives Shortness of Breath Codeine Hives Shortness of Breath Has tolerated Hydromorphone. Haldol (Haloperidol Lactate) Hives Shortness of Breath Haloperidol Hives Cough Keflex (Cephalexin) Diarrhea No Allergy Information Available Other (comments) Other reaction(s): hives Shellfish Containing Products Hives Stadol (Butorphanol Tartrate) Hives Shortness of Breath Rash Vicodin (Hydrocodone-Acetaminophen) Hives Shortness of Breath Rash Recent Documentation Height Weight BMI Smoking Status 1.702 m 108 kg 37.28 kg/m2 Former Smoker Emergency Contacts Name Discharge Info Relation Home Work Mobile Angeles Watters(Ex-Wife) DISCHARGE CAREGIVER [3] Friend [5] [phone] Patient Belongings The following personal items are in your possession at time of discharge: 
  Dental Appliances: None  Visual Aid: None Please provide this summary of care documentation to your next provider. Signatures-by signing, you are acknowledging that this After Visit Summary has been reviewed with you and you have received a copy. Patient Signature:  ____________________________________________________________ Date:  ____________________________________________________________  
  
[facility] Provider Signature:  ____________________________________________________________ Date:  ____________________________________________________________

## 2018-07-09 NOTE — ANESTHESIA PREPROCEDURE EVALUATION
Anesthetic History     PONV          Review of Systems / Medical History  Patient summary reviewed, nursing notes reviewed and pertinent labs reviewed    Pulmonary  Within defined limits                 Neuro/Psych       CVA  TIA     Cardiovascular            Dysrhythmias   CAD         GI/Hepatic/Renal     GERD      PUD and liver disease     Endo/Other        Arthritis     Other Findings   Comments: Documentation of current medication  Current medications obtained, documented and obtained? YES      Risk Factors for Postoperative nausea/vomiting:       History of postoperative nausea/vomiting? YES       Female? NO       Motion sickness? NO       Intended opioid administration for postoperative analgesia? YES      Smoking Abstinence:  Current Smoker? YES  Elective Surgery? YES  Seen preoperatively by anesthesiologist or proxy prior to day of surgery? YES  Pt abstained from smoking 24 hours prior to anesthesia?  NO    Preventive care/screening for High Blood Pressure:  Aged 18 years and older: YES  Screened for high blood pressure: YES  Patients with high blood pressure referred to primary care provider   for BP management: YES                 Physical Exam    Airway  Mallampati: II  TM Distance: 4 - 6 cm  Neck ROM: normal range of motion   Mouth opening: Normal     Cardiovascular    Rhythm: regular  Rate: normal         Dental  No notable dental hx       Pulmonary  Breath sounds clear to auscultation               Abdominal  GI exam deferred       Other Findings            Anesthetic Plan    ASA: 3  Anesthesia type: MAC          Induction: Intravenous  Anesthetic plan and risks discussed with: Patient

## 2018-07-09 NOTE — ANESTHESIA POSTPROCEDURE EVALUATION
Post-Anesthesia Evaluation and Assessment    Patient: Gail Agarwal MRN: [id number]  SSN: [id number]    YOB: 1955  Age: 58 y.o. Sex: male       Cardiovascular Function/Vital Signs  Visit Vitals    /67    Pulse 74    Temp 36.5 °C (97.7 °F)    Resp 14    Ht 5' 7\" (1.702 m)    Wt 108 kg (238 lb)    SpO2 95%    BMI 37.28 kg/m2       Patient is status post MAC anesthesia for Procedure(s):  UPPER ENDOSCOPY /  dilation and biopsies. Nausea/Vomiting: None    Postoperative hydration reviewed and adequate. Pain:  Pain Scale 1: Numeric (0 - 10) (07/09/18 0728)  Pain Intensity 1: 0 (07/09/18 0728)   Managed    Neurological Status: At baseline    Mental Status and Level of Consciousness: Arousable    Pulmonary Status:   O2 Device: Room air (07/09/18 3181)   Adequate oxygenation and airway patent    Complications related to anesthesia: None    Post-anesthesia assessment completed.  No concerns    Signed By: Alexia Velazquez MD     July 9, 2018

## 2018-07-09 NOTE — PROCEDURES
[email or web address] [address]  [place], [address]      Brief Procedure Note    Randell Wong  1955  [id number]    Date of Procedure: 7/9/2018    Preoperative diagnosis: 787.20 - Dysphagia  530.11 - K21.0,  Gastro-esophageal reflux disease with esophagitis  414.00 - I25.10,  Coronary arteriosclerosis  305.90 - F19.10,  Drug abuse  278.00 - E66.9,  Z73.3,  Feeling stressed  305.1 - F17.200,  Smoker    Postoperative diagnosis: gastric retention of food, non-obstructive dysphagia s/p 54 Fr [address] dilation    Type of Anesthesia: MAC (Monitored anesthesia care)    Description of findings: same as post op dx    Procedure: Procedure(s):  UPPER ENDOSCOPY /  dilation and biopsies    :  Dr. Benoit Almaraz MD    Assistant(s): Endoscopy Technician-1: Rox Clifton  Endoscopy RN-1: Helen Sandoval RN; Zenon Malcolm RN    EBL:None    Specimens:   ID Type Source Tests Collected by Time Destination   1 : antrum body bx Preservative Gastric  Benoit Almaraz MD 7/9/2018 0813 Pathology   2 : esophagus bx Preservative Esophagus  Benoit Almaraz MD 7/9/2018 1749 Pathology       Findings: See printed and scanned procedure note    Complications: None    Dr. Benoit Almaraz MD  7/9/2018  8:28 AM

## 2018-07-09 NOTE — DISCHARGE INSTRUCTIONS
Upper GI Endoscopy: What to Expect at [address] [place] [address]  After you have an endoscopy, you will stay at the hospital or clinic for 1 to 2 hours. This will allow the medicine to wear off. You will be able to go home after your doctor or nurse checks to make sure you are not having any problems. You may have to stay overnight if you had treatment during the test. You may have a sore throat for a day or two after the test.  This care sheet gives you a general idea about what to expect after the test.  How can you care for yourself at home? Activity  · Rest as much as you need to after you go home. · You should be able to go back to your usual activities the day after the test.  Diet  · Follow your doctor's directions for eating after the test.  · Drink plenty of fluids (unless your doctor has told you not to). Medications  · If you have a sore throat the day after the test, use an over-the-counter spray to numb your throat. Follow-up care is a key part of your treatment and safety. Be sure to make and go to all appointments, and call your doctor if you are having problems. It's also a good idea to know your test results and keep a list of the medicines you take. When should you call for help? Call 911 anytime you think you may need emergency care. For example, call if:  · You passed out (lost consciousness). · You cough up blood. · You vomit blood or what looks like coffee grounds. · You pass maroon or very bloody stools. Call your doctor now or seek immediate medical care if:  · You have trouble swallowing. · You have belly pain. · Your stools are black and tarlike or have streaks of blood. · You are sick to your stomach or cannot keep fluids down. Watch closely for changes in your health, and be sure to contact your doctor if:  · Your throat still hurts after a day or two. · You do not get better as expected. Where can you learn more?    Go to DealExplorer.be  Enter J454 in the search box to learn more about \"Upper GI Endoscopy: What to Expect at Home. \"   © 0321-2280 [employer]. Care instructions adapted under license by [employer] (which disclaims liability or warranty for this information). This care instruction is for use with your licensed healthcare professional. If you have questions about a medical condition or this instruction, always ask your healthcare professional. [address] any warranty or liability for your use of this information. Content Version: 28.4.864084; Current as of: November 14, 2014     Esophageal Dilation: What to Expect at [address] [facility]  After you have esophageal dilation, you will stay at the hospital or surgery center for 1 to 2 hours. This will allow the medicine to wear off. You will be able to go home after your doctor or nurse checks to make sure you are not having any problems. This care sheet gives you a general idea about how long it will take for you to recover. But each person recovers at a different pace. Follow the steps below to get better as quickly as possible. How can you care for yourself at home? Activity  ? · Rest as much as you need to after you go home. ? · You should be able to go back to your usual activities the day after the procedure. Diet  ? · Follow your doctor's directions for eating after the procedure. ? · Drink plenty of fluids (unless your doctor has told you not to). Medicines  ? · Your doctor will tell you if and when you can restart your medicines. He or she will also give you instructions about taking any new medicines. ? · If you take blood thinners, such as warfarin (Coumadin), clopidogrel (Plavix), or aspirin, be sure to talk to your doctor. He or she will tell you if and when to start taking those medicines again. Make sure that you understand exactly what your doctor wants you to do.    ? · If you have a sore throat the day after the procedure, use an over-the-counter spray to numb your throat. Sucking on throat lozenges and gargling with warm salt water may also help relieve your symptoms. Follow-up care is a key part of your treatment and safety. Be sure to make and go to all appointments, and call your doctor if you are having problems. It's also a good idea to know your test results and keep a list of the medicines you take. When should you call for help? Call 911 anytime you think you may need emergency care. For example, call if:  ? · You passed out (lost consciousness). ? · You have trouble breathing. ? · Your stools are maroon or very bloody   ? Call your doctor now or seek immediate medical care if:  ? · You have new or worse belly pain. ? · You have a fever. ? · You have new or more blood in your stools. ? · You are sick to your stomach and cannot drink fluids. ? · You cannot pass stools or gas. ? · You have pain that does not get better after you take pain medicine. ? Watch closely for changes in your health, and be sure to contact your doctor if:  ? · Your throat still hurts after a day or two. ? · You do not get better as expected. Where can you learn more? Go to http://turner-doris.info/. Enter N050 in the search box to learn more about \"Esophageal Dilation: What to Expect at Home. \"  Current as of: May 12, 2017  Content Version: 11.4  © 5791-6886 [employer]. Care instructions adapted under license by [employer] (which disclaims liability or warranty for this information). If you have questions about a medical condition or this instruction, always ask your healthcare professional. [place] [address] any warranty or liability for your use of this information. DISCHARGE SUMMARY from Nurse     POST-PROCEDURE INSTRUCTIONS:    Call your Physician if you:  ? Observe any excess bleeding. ? Develop a temperature over 100.5o F.  ?  Experience abdominal, shoulder or chest pain.  ? Notice any signs of decreased circulation or nerve impairment to an extremity such as a change in color, persistent numbness, tingling, coldness or increase in pain. ? Vomit blood or you have nausea and vomiting lasting longer than 4 hours. ? Are unable to take medications. ? Are unable to urinate within 8 hours after discharge following general anesthesia or intravenous sedation. For the next 24 hours after receiving general anesthesia or intravenous sedation, or while taking prescription Narcotics, limit your activities:  ? Do NOT drive a motor vehicle, operate hazard machinery or power tools, or perform tasks that require coordination. The medication you received during your procedure may have some effect on your mental awareness. ? Do NOT make important personal or business decisions. The medication you received during your procedure may have some effect on your mental awareness. ? Do NOT drink alcoholic beverages. These drinks do not mix well with the medications that have been given to you. ? Upon discharge from the hospital, you must be accompanied by a responsible adult. ? Resume your diet as directed by your physician. ? Resume medications as your physician has prescribed. ? Please give a list of your current medications to your Primary Care Provider. ? Please update this list whenever your medications are discontinued, doses are changed, or new medications (including over-the-counter products) are added. ? Please carry medication information at all times in case of emergency situations. These are general instructions for a healthy lifestyle:    No smoking/ No tobacco products/ Avoid exposure to second hand smoke.  Surgeon General's Warning:  Quitting smoking now greatly reduces serious risk to your health. Obesity, smoking, and a sedentary lifestyle greatly increase your risk for illness.    A healthy diet, regular physical exercise & weight monitoring are important for maintaining a healthy lifestyle   You may be retaining fluid if you have a history of heart failure or if you experience any of the following symptoms:  Weight gain of 3 pounds or more overnight or 5 pounds in a week, increased swelling in our hands or feet or shortness of breath while lying flat in bed. Please call your doctor as soon as you notice any of these symptoms; do not wait until your next office visit. Recognize signs and symptoms of STROKE:  F  -  Face looks uneven  A  -  Arms unable to move or move unevenly  S  -  Speech slurred or non-existent  T  -  Time to call 911 - as soon as signs and symptoms begin - DO NOT go back to bed or wait to see If you get better - TIME IS BRAIN. Colorectal Screening   Colorectal cancer almost always develops from precancerous polyps (abnormal growths) in the colon or rectum. Screening tests can find precancerous polyps, so that they can be removed before they turn into cancer. Screening tests can also find colorectal cancer early, when treatment works best.  [facility] Speak with your physician about when you should begin screening and how often you should be tested     Additional Information    If you have questions, please call [phone]. Remember, [email or web address] is NOT to be used for urgent needs. For medical emergencies, dial 911. Educational references and/or instructions provided during this visit included:    Dilation      APPOINTMENTS:    Please make a follow-up appointment with your physician. Discharge information has been reviewed with the patient. The patient verbalized understanding.

## 2018-07-13 ENCOUNTER — HOSPITAL ENCOUNTER (OUTPATIENT)
Dept: NUCLEAR MEDICINE | Age: 63
Discharge: HOME | End: 2018-07-13
Attending: INTERNAL MEDICINE
Payer: MEDICARE

## 2018-07-13 DIAGNOSIS — R13.10 DYSPHAGIA: ICD-10-CM

## 2018-07-13 PROCEDURE — 78264 GASTRIC EMPTYING IMG STUDY: CPT

## 2018-08-26 ENCOUNTER — HOSPITAL ENCOUNTER (EMERGENCY)
Age: 63
Discharge: HOME | End: 2018-08-26
Attending: EMERGENCY MEDICINE
Payer: MEDICARE

## 2018-08-26 ENCOUNTER — APPOINTMENT (OUTPATIENT)
Dept: GENERAL RADIOLOGY | Age: 63
End: 2018-08-26
Attending: EMERGENCY MEDICINE
Payer: MEDICARE

## 2018-08-26 VITALS
HEART RATE: 64 BPM | RESPIRATION RATE: 15 BRPM | TEMPERATURE: 97 F | DIASTOLIC BLOOD PRESSURE: 90 MMHG | SYSTOLIC BLOOD PRESSURE: 151 MMHG | OXYGEN SATURATION: 95 %

## 2018-08-26 DIAGNOSIS — R10.13 ABDOMINAL PAIN, EPIGASTRIC: ICD-10-CM

## 2018-08-26 DIAGNOSIS — R07.9 CHEST PAIN, UNSPECIFIED TYPE: Primary | ICD-10-CM

## 2018-08-26 LAB
ANION GAP SERPL CALC-SCNC: 8 MMOL/L (ref 3–18)
ATRIAL RATE: 79 BPM
BASOPHILS # BLD: 0 K/UL (ref 0–0.1)
BASOPHILS NFR BLD: 0 % (ref 0–2)
BNP SERPL-MCNC: 244 PG/ML (ref 0–900)
BUN SERPL-MCNC: 10 MG/DL (ref 7–18)
BUN/CREAT SERPL: 12 (ref 12–20)
CALCIUM SERPL-MCNC: 10.3 MG/DL (ref 8.5–10.1)
CALCULATED P AXIS, ECG09: 22 DEGREES
CALCULATED R AXIS, ECG10: -9 DEGREES
CALCULATED T AXIS, ECG11: 42 DEGREES
CHLORIDE SERPL-SCNC: 107 MMOL/L (ref 100–108)
CK MB CFR SERPL CALC: NORMAL % (ref 0–4)
CK MB SERPL-MCNC: <1 NG/ML (ref 5–25)
CK SERPL-CCNC: 150 U/L (ref 39–308)
CO2 SERPL-SCNC: 27 MMOL/L (ref 21–32)
CREAT SERPL-MCNC: 0.86 MG/DL (ref 0.6–1.3)
DIAGNOSIS, 93000: NORMAL
DIFFERENTIAL METHOD BLD: ABNORMAL
EOSINOPHIL # BLD: 0.4 K/UL (ref 0–0.4)
EOSINOPHIL NFR BLD: 6 % (ref 0–5)
ERYTHROCYTE [DISTWIDTH] IN BLOOD BY AUTOMATED COUNT: 13.8 % (ref 11.6–14.5)
GLUCOSE SERPL-MCNC: 137 MG/DL (ref 74–99)
HCT VFR BLD AUTO: 41.9 % (ref 36–48)
HGB BLD-MCNC: 15.4 G/DL (ref 13–16)
LIPASE SERPL-CCNC: 229 U/L (ref 73–393)
LYMPHOCYTES # BLD: 2.6 K/UL (ref 0.9–3.6)
LYMPHOCYTES NFR BLD: 36 % (ref 21–52)
MCH RBC QN AUTO: 32.6 PG (ref 24–34)
MCHC RBC AUTO-ENTMCNC: 36.8 G/DL (ref 31–37)
MCV RBC AUTO: 88.8 FL (ref 74–97)
MONOCYTES # BLD: 0.6 K/UL (ref 0.05–1.2)
MONOCYTES NFR BLD: 8 % (ref 3–10)
NEUTS SEG # BLD: 3.5 K/UL (ref 1.8–8)
NEUTS SEG NFR BLD: 50 % (ref 40–73)
P-R INTERVAL, ECG05: 146 MS
PLATELET # BLD AUTO: 109 K/UL (ref 135–420)
PMV BLD AUTO: 10 FL (ref 9.2–11.8)
POTASSIUM SERPL-SCNC: 3.7 MMOL/L (ref 3.5–5.5)
Q-T INTERVAL, ECG07: 376 MS
QRS DURATION, ECG06: 82 MS
QTC CALCULATION (BEZET), ECG08: 431 MS
RBC # BLD AUTO: 4.72 M/UL (ref 4.7–5.5)
SODIUM SERPL-SCNC: 142 MMOL/L (ref 136–145)
TROPONIN I BLD-MCNC: <0.04 NG/ML (ref 0–0.08)
TROPONIN I SERPL-MCNC: <0.02 NG/ML (ref 0–0.04)
VENTRICULAR RATE, ECG03: 79 BPM
WBC # BLD AUTO: 7.2 K/UL (ref 4.6–13.2)

## 2018-08-26 PROCEDURE — 96374 THER/PROPH/DIAG INJ IV PUSH: CPT

## 2018-08-26 PROCEDURE — 93005 ELECTROCARDIOGRAM TRACING: CPT

## 2018-08-26 PROCEDURE — 71045 X-RAY EXAM CHEST 1 VIEW: CPT

## 2018-08-26 PROCEDURE — 96376 TX/PRO/DX INJ SAME DRUG ADON: CPT

## 2018-08-26 PROCEDURE — 82550 ASSAY OF CK (CPK): CPT | Performed by: EMERGENCY MEDICINE

## 2018-08-26 PROCEDURE — 74011250636 HC RX REV CODE- 250/636: Performed by: EMERGENCY MEDICINE

## 2018-08-26 PROCEDURE — 74011250636 HC RX REV CODE- 250/636

## 2018-08-26 PROCEDURE — 85025 COMPLETE CBC W/AUTO DIFF WBC: CPT | Performed by: EMERGENCY MEDICINE

## 2018-08-26 PROCEDURE — 96375 TX/PRO/DX INJ NEW DRUG ADDON: CPT

## 2018-08-26 PROCEDURE — 84484 ASSAY OF TROPONIN QUANT: CPT | Performed by: EMERGENCY MEDICINE

## 2018-08-26 PROCEDURE — 83690 ASSAY OF LIPASE: CPT | Performed by: EMERGENCY MEDICINE

## 2018-08-26 PROCEDURE — 80048 BASIC METABOLIC PNL TOTAL CA: CPT | Performed by: EMERGENCY MEDICINE

## 2018-08-26 PROCEDURE — 99285 EMERGENCY DEPT VISIT HI MDM: CPT

## 2018-08-26 PROCEDURE — 83880 ASSAY OF NATRIURETIC PEPTIDE: CPT | Performed by: EMERGENCY MEDICINE

## 2018-08-26 RX ORDER — MORPHINE SULFATE 4 MG/ML
4 INJECTION, SOLUTION INTRAMUSCULAR; INTRAVENOUS
Status: COMPLETED | OUTPATIENT
Start: 2018-08-26 | End: 2018-08-26

## 2018-08-26 RX ORDER — ONDANSETRON 2 MG/ML
INJECTION INTRAMUSCULAR; INTRAVENOUS
Status: COMPLETED
Start: 2018-08-26 | End: 2018-08-26

## 2018-08-26 RX ORDER — ONDANSETRON HYDROCHLORIDE 4 MG/2ML
4 INJECTION, SOLUTION INTRAMUSCULAR; INTRAVENOUS
Status: COMPLETED | OUTPATIENT
Start: 2018-08-26 | End: 2018-08-26

## 2018-08-26 RX ORDER — LIDOCAINE HYDROCHLORIDE 20 MG/ML
15 SOLUTION OROPHARYNGEAL
Status: DISCONTINUED | OUTPATIENT
Start: 2018-08-26 | End: 2018-08-26 | Stop reason: HOSPADM

## 2018-08-26 RX ADMIN — MORPHINE SULFATE 4 MG: 4 INJECTION, SOLUTION INTRAMUSCULAR; INTRAVENOUS at 08:58

## 2018-08-26 RX ADMIN — ONDANSETRON 4 MG: 2 INJECTION, SOLUTION INTRAMUSCULAR; INTRAVENOUS at 05:14

## 2018-08-26 RX ADMIN — MORPHINE SULFATE 4 MG: 4 INJECTION, SOLUTION INTRAMUSCULAR; INTRAVENOUS at 06:38

## 2018-08-26 RX ADMIN — MORPHINE SULFATE 4 MG: 4 INJECTION, SOLUTION INTRAMUSCULAR; INTRAVENOUS at 05:14

## 2018-08-26 RX ADMIN — ONDANSETRON HYDROCHLORIDE 4 MG: 4 INJECTION, SOLUTION INTRAMUSCULAR; INTRAVENOUS at 05:14

## 2018-08-26 NOTE — ED TRIAGE NOTES
Pt has 3 stents in heart, pt became restless at 0100, unable to swallow , pt also has a \"nutcracker esophagus\" was unable to keep any liquid or food down.

## 2018-08-26 NOTE — ED PROVIDER NOTES
EMERGENCY DEPARTMENT HISTORY AND PHYSICAL EXAM    4:24 AM      Date: 8/26/2018  Patient Name: Tao Suarez    History of Presenting Illness     Chief Complaint   Patient presents with    Chest Pain       History Provided By: Patient    Chief Complaint: difficulty swallowing  Duration: 4 hours   Timing:  Acute on chronic   Location: throat  Quality:   Severity:   Modifying Factors: Nutcracker esophagus   Associated Symptoms: Unable to tolerate PO       Additional History (Context): Tao Suarez is a 58 y.o. male with a \"Nutcracker esophagus\" presents to the ED complaining of acute on chronic, difficulty swallowing x 4 hours. 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 Patient usually follows up with his GI every 6 weeks but has not been able to see him yet for this 6 week period. He describes his pain as more of his esophagus rather than chest. Patient reports associated nausea, dry-heaving. No other complaints or concerns at this time. PCP: Alli Helton, DO    Current Outpatient Prescriptions   Medication Sig Dispense Refill    omeprazole (PRILOSEC) 10 mg capsule Take 10 mg by mouth daily.  chlorthalidone (HYGROTEN) 25 mg tablet Take 1 Tab by mouth daily. 30 Tab 11    diazePAM (VALIUM) 10 mg tablet Take 10 mg by mouth every six (6) hours as needed for Anxiety.  clopidogrel (PLAVIX) 75 mg tab Take 75 mg by mouth.  tamsulosin (FLOMAX) 0.4 mg capsule Take 1 Cap by mouth daily (after dinner). 90 Cap 3    nitroglycerin (NITROSTAT) 0.3 mg SL tablet 0.3 mg.      meperidine (DEMEROL) 100 mg tablet Take 100 mg by mouth every four (4) hours as needed for Pain.  esomeprazole (NEXIUM) 40 mg capsule Take 1 Cap by mouth Before breakfast and dinner. (Patient taking differently: Take 40 mg by mouth daily. ) 180 Cap 4    carvedilol (COREG) 25 mg tablet Take 25 mg by mouth two (2) times daily (with meals).       atorvastatin (LIPITOR) 40 mg tablet Take 40 mg by mouth daily.  DULoxetine (CYMBALTA) 60 mg capsule Take 60 mg by mouth daily.            Past History     Past Medical History:  Past Medical History:   Diagnosis Date    Altered mental status     Arthritis     Arthropathy     Back pain     Bilateral kidney stones     Burn     ON HANDS    CAD (coronary artery disease) 1999    MI    3 cardiac stents    Calcium nephrolithiasis     Calculus of kidney     Cardiac arrhythmia     Cerebral artery occlusion with cerebral infarction ([address]     Chest pain     Cigarette smoker     Cirrhosis, alcoholic (HCC)     Cocaine abuse, episodic use     Colon polyp     Diarrhea     DJD (degenerative joint disease)     Drug-seeking behavior     Dyskinesia     Esophageal disorder     Esophageal erosions     Flank pain, acute     Foot ulcer, right (HCC)     Gastric ulcer     GERD (gastroesophageal reflux disease)     Gross hematuria     Head trauma     Hearing reduced     Hematuria     Herniated disc     X 10 IN BACK    Hiatus hernia syndrome     History of kidney stones     HNP (herniated nucleus pulposus)     Hyperlipemia     Hypertension     Hypokalemia     Infection     Jaw fracture (HCC)     Kidney stones     Knee pain     Left ureteral stone     Muscle atrophy     Myocardial infarction ([address] 1999    N&V (nausea and vomiting)     Nocturia     Nondependent alcohol abuse, in remission     Nutcracker esophagus     Other ill-defined conditions(799.89)     dysphagia    Renal stone     Slurred speech     Stroke ([address] 1999    questionable TIA    Swallowing difficulty     Syncopal episodes     Syncope and collapse     Tobacco dependence     Unspecified adverse effect of anesthesia     AT TIMES ESOPHAGUS SPASMS AFTER PROCEDURES    Unspecified cerebral artery occlusion with cerebral infarction        Past Surgical History:  Past Surgical History:   Procedure Laterality Date    HX APPENDECTOMY      9YEARS OLD    HX CHOLECYSTECTOMY  2009    HX CORONARY STENT PLACEMENT  2008    3 STENTS PLACED    HX ENDOSCOPY      with Dilatation, multiple times    HX HEART CATHETERIZATION  2012    EVERYTHING LOOKED GOOD AT THIS POINT    HX HEENT      \"2 jaw surgeries\"    HX HERNIA REPAIR      ABDOMEN    HX KNEE ARTHROSCOPY  9/2012    LEFT    HX MOHS PROCEDURES  2016    HX ORTHOPAEDIC  12/12    left knee    HX OTHER SURGICAL      ESOPHAGUS DILATATION    HX ROTATOR CUFF REPAIR Right     x2    HX UROLOGICAL  07/18/2016    [facility]-Cystoscopy,URETEROSCOPY W/ LITHOTRIPSY, Dr Da Worley  UROLOGICAL  10/10/2016    [facility]-Cystoscopy with removal of ureteral stent, Dr Da Worley  UROLOGICAL  01/30/2017    [facility]-Cystoscopy, Left retrograde pyelography, Left diagnostic ureteroscopy, Left 6 x 26 cm double-J ureteral stent placement,Right retrograde pyelography, Right ureteroscopic stone extraction,Right 6 x 24 cm double-J ureteral stent placement, Interpretation of urography,Intraoperative fluoro less than 1 hour,          UROLOGICAL  02/08/2017    [facility]-CYSTOURETHROSCOPY WITH STENT REMOVAL, Dr John Wolf       Family History:  Family History   Problem Relation Age of Onset    Diabetes Father     Heart Disease Father     Stroke Father        Social History:  Social History   Substance Use Topics    Smoking status: Former Smoker     Packs/day: 0.00     Years: 20.00     Types: Cigarettes    Smokeless tobacco: Never Used      Comment: Patient states he smokes 2-3 cigarettes per day    Alcohol use No      Comment: \"none in over 20 years\"       Allergies: Allergies   Allergen Reactions    Bee Sting [Sting, Bee] Hives and Shortness of Breath    Codeine Hives and Shortness of Breath     Has tolerated Hydromorphone.      Haldol [Haloperidol Lactate] Hives and Shortness of Breath    Haloperidol Hives and Cough    Keflex [Cephalexin] Diarrhea    No Allergy Information Available Other (comments)     Other reaction(s): hives    Shellfish Containing Products Hives    Stadol [Butorphanol Tartrate] Hives, Shortness of Breath and Rash    Vicodin [Hydrocodone-Acetaminophen] Hives, Shortness of Breath and Rash         Review of Systems     Review of Systems   Constitutional: Negative for activity change and appetite change. HENT: Positive for trouble swallowing (difficulty). Negative for congestion. Eyes: Negative for visual disturbance. Respiratory: Negative for cough and shortness of breath. Cardiovascular: Negative for chest pain. Gastrointestinal: Positive for nausea and vomiting (dry-heaving). Negative for abdominal pain and diarrhea. Genitourinary: Negative for dysuria. Musculoskeletal: Negative for arthralgias and myalgias. Skin: Negative for rash. Neurological: Negative for weakness and numbness. All other systems reviewed and are negative. Physical Exam     Visit Vitals    /90    Pulse 64    Temp 97 °F (36.1 °C)    Resp 15    SpO2 95%       Physical Exam   Constitutional: He is oriented to person, place, and time. He appears well-developed and well-nourished. HENT:   Head: Normocephalic and atraumatic. Mouth/Throat: Oropharynx is clear and moist.   Eyes: Conjunctivae are normal.   Neck: Normal range of motion. Neck supple. No JVD present. Cardiovascular: Normal rate, regular rhythm, normal heart sounds and intact distal pulses. No murmur heard. Pulmonary/Chest: Effort normal and breath sounds normal.   Abdominal: Soft. Bowel sounds are normal. He exhibits no distension. There is no tenderness. Musculoskeletal: Normal range of motion. He exhibits no deformity. Lymphadenopathy:     He has no cervical adenopathy. Neurological: He is alert and oriented to person, place, and time. Coordination normal.   Skin: Skin is warm and dry. No rash noted. Psychiatric: He has a normal mood and affect. Nursing note and vitals reviewed.         Diagnostic Study Results     Labs -  Recent Results (from the past 12 hour(s))   EKG, 12 LEAD, INITIAL    Collection Time: 08/26/18  3:56 AM   Result Value Ref Range    Ventricular Rate 79 BPM    Atrial Rate 79 BPM    P-R Interval 146 ms    QRS Duration 82 ms    Q-T Interval 376 ms    QTC Calculation (Bezet) 431 ms    Calculated P Axis 22 degrees    Calculated R Axis -9 degrees    Calculated T Axis 42 degrees    Diagnosis       Normal sinus rhythm  Normal ECG  When compared with ECG of 04-APR-2018 18:48,  No significant change was found     CBC WITH AUTOMATED DIFF    Collection Time: 08/26/18  4:20 AM   Result Value Ref Range    WBC 7.2 4.6 - 13.2 K/uL    RBC 4.72 4.70 - 5.50 M/uL    HGB 15.4 13.0 - 16.0 g/dL    HCT 41.9 36.0 - 48.0 %    MCV 88.8 74.0 - 97.0 FL    MCH 32.6 24.0 - 34.0 PG    MCHC 36.8 31.0 - 37.0 g/dL    RDW 13.8 11.6 - 14.5 %    PLATELET 739 (L) 541 - 420 K/uL    MPV 10.0 9.2 - 11.8 FL    NEUTROPHILS 50 40 - 73 %    LYMPHOCYTES 36 21 - 52 %    MONOCYTES 8 3 - 10 %    EOSINOPHILS 6 (H) 0 - 5 %    BASOPHILS 0 0 - 2 %    ABS. NEUTROPHILS 3.5 1.8 - 8.0 K/UL    ABS. LYMPHOCYTES 2.6 0.9 - 3.6 K/UL    ABS. MONOCYTES 0.6 0.05 - 1.2 K/UL    ABS. EOSINOPHILS 0.4 0.0 - 0.4 K/UL    ABS.  BASOPHILS 0.0 0.0 - 0.1 K/UL    DF AUTOMATED     METABOLIC PANEL, BASIC    Collection Time: 08/26/18  4:20 AM   Result Value Ref Range    Sodium 142 136 - 145 mmol/L    Potassium 3.7 3.5 - 5.5 mmol/L    Chloride 107 100 - 108 mmol/L    CO2 27 21 - 32 mmol/L    Anion gap 8 3.0 - 18 mmol/L    Glucose 137 (H) 74 - 99 mg/dL    BUN 10 7.0 - 18 MG/DL    Creatinine 0.86 0.6 - 1.3 MG/DL    BUN/Creatinine ratio 12 12 - 20      GFR est AA >60 >60 ml/min/1.73m2    GFR est non-AA >60 >60 ml/min/1.73m2    Calcium 10.3 (H) 8.5 - 10.1 MG/DL   CARDIAC PANEL,(CK, CKMB & TROPONIN)    Collection Time: 08/26/18  4:20 AM   Result Value Ref Range     39 - 308 U/L    CK - MB <1.0 <3.6 ng/ml    CK-MB Index  0.0 - 4.0 %     CALCULATION NOT PERFORMED WHEN RESULT IS BELOW LINEAR LIMIT Troponin-I, Qt. <0.02 0.0 - 0.045 NG/ML   LIPASE    Collection Time: 08/26/18  4:20 AM   Result Value Ref Range    Lipase 229 73 - 393 U/L   NT-PRO BNP    Collection Time: 08/26/18  4:20 AM   Result Value Ref Range    NT pro- 0 - 900 PG/ML       Radiologic Studies -   XR CHEST PORT    (Results Pending)         Medical Decision Making   I am the first provider for this patient. I reviewed the vital signs, available nursing notes, past medical history, past surgical history, family history and social history. Vital Signs - Reviewed the patient's vital signs. Pulse Oximetry Analysis -  97% on room air (Interpretation) wnl    Cardiac Monitor:  Rate: 86 bpm  Rhythm:  Normal Sinus Rhythm     EKG: Interpreted by Dr. Slava Fulton at 04:11  Normal sinus rhythm, rate 79 bpm,  ms, QTc 431 ms. No acute ST or T wave changes, no STEMI. Records Reviewed: Nursing Notes (Time of Review: 4:24 AM)    ED Course: Progress Notes, Reevaluation, and Consults:      Provider Notes (Medical Decision Making):   Patient is a 58year old male with a \"Nutcracker esophagus\" presenting with acute on chronic, difficulty swallowing x 4 hours. 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 Patient usually follows up with his GI every 6 weeks but has not been able to see him yet for this 6 week period. He describes his pain as more of his esophagus rather than chest. Patient reports associated nausea, dry-heaving. Appears well at this time. Differential Diagnosis: Will consider ACS, GERD, Pericarditis/myocarditis, pleuritis, bronchitis or other respiratory infection, pneumothorax, pneumonia, MSK pain. Given the patient's history and exam, I do not suspect PE, aortic dissection, pancreatitis, esophageal rupture, pericardial tamponate, mediastenitis.       Testing: Pro BNP, Lipase, Cardiac Panel, CMP, CBC, CXR, EKG   Treatments: IV morphine         Diagnosis     Clinical Impression:   1. Chest pain, unspecified type        Disposition: 7:32 AM : Pt care transferred to Dr. Pranay Millan  ,ED provider. History of patient complaint(s), available diagnostic reports and current treatment plan has been discussed thoroughly. Bedside rounding on patient occured : no . Intended disposition of patient : Home  Pending diagnostics reports and/or labs (please list): second troponin pending      Follow-up Information     Follow up With Details Comments Contact Info    [facility]  If symptoms worsen Adam Erickson  [phone]           Patient's Medications   Start Taking    No medications on file   Continue Taking    ATORVASTATIN (LIPITOR) 40 MG TABLET    Take 40 mg by mouth daily. CARVEDILOL (COREG) 25 MG TABLET    Take 25 mg by mouth two (2) times daily (with meals). CHLORTHALIDONE (HYGROTEN) 25 MG TABLET    Take 1 Tab by mouth daily. CLOPIDOGREL (PLAVIX) 75 MG TAB    Take 75 mg by mouth. DIAZEPAM (VALIUM) 10 MG TABLET    Take 10 mg by mouth every six (6) hours as needed for Anxiety. DULOXETINE (CYMBALTA) 60 MG CAPSULE    Take 60 mg by mouth daily. ESOMEPRAZOLE (NEXIUM) 40 MG CAPSULE    Take 1 Cap by mouth Before breakfast and dinner. MEPERIDINE (DEMEROL) 100 MG TABLET    Take 100 mg by mouth every four (4) hours as needed for Pain. NITROGLYCERIN (NITROSTAT) 0.3 MG SL TABLET    0.3 mg.    OMEPRAZOLE (PRILOSEC) 10 MG CAPSULE    Take 10 mg by mouth daily. TAMSULOSIN (FLOMAX) 0.4 MG CAPSULE    Take 1 Cap by mouth daily (after dinner).    These Medications have changed    No medications on file   Stop Taking    No medications on file     _______________________________    Attestations:  [address] acting as a scribe for and in the presence of Kaylyn Woody MD      August 26, 2018 at 4:24 AM       Provider Attestation:      I personally performed the services described in the documentation, reviewed the documentation, as recorded by the scribe in my presence, and it accurately and completely records my words and actions.  August 26, 2018 at 4:24 AM - Ryley Arrington MD  _______________________________

## 2018-08-26 NOTE — ED NOTES
Patient complain of chest pain and inability to swallow \"My food gets stuck in my throat, so I have to have my esophageus dilated every 6 weeks\"

## 2018-08-26 NOTE — ED NOTES
6:58 AM :Pt care assumed from Dr. Tiffany Friedman, ED provider. Pt complaint(s), current treatment plan, progression and available diagnostic results have been discussed thoroughly. Rounding occurred: yes  Intended Disposition: TBD   Pending diagnostic reports and/or labs (please list): 2nd Troponin    Pt developed midsternal cp around 1 am ; took 3 ntg and came to the Ed. No n/v. No diaphoresis. No fever or chills. Pt pendign secodn trop. General; AOX3. Pulmonary; CTA-B. Cardiac: RRR no MRG; Abd S/NT/ND. Plan:  Labs/obs    8:16 AM Upon rounding, pt reports that his pain is back. He says \"my esophagus is cramping up. \" Reports that the pain shoots to the left towards his heart. cxr napd. EKG: nsr at 79; nl axis. Nl int. No kameron/d. No hypertrophy  No change 4/418  persistnat pain; trop neg; refer back to GI/pcp. 9am pt feels 100% better, trop neg x2/doubt dissection or PE; pt with hx of eshageal pain due to stricture is overdue for dilation with dr Kurtis Escamilla. No abd pain       Scribe [employer] acting as a scribe for and in the presence of Sharon Monteiro MD     August 26, 2018 at [facility] AM       Provider Attestation:      I personally performed the services described in the documentation, reviewed the documentation, as recorded by the scribe in my presence, and it accurately and completely records my words and actions.  August 26, 2018 at 6:59 AM - Sharon Monteiro MD

## 2018-08-26 NOTE — DISCHARGE INSTRUCTIONS
Chest Pain: Care Instructions  Your Care Instructions    There are many things that can cause chest pain. Some are not serious and will get better on their own in a few days. But some kinds of chest pain need more testing and treatment. Your doctor may have recommended a follow-up visit in the next 8 to 12 hours. If you are not getting better, you may need more tests or treatment. Even though your doctor has released you, you still need to watch for any problems. The doctor carefully checked you, but sometimes problems can develop later. If you have new symptoms or if your symptoms do not get better, get medical care right away. If you have worse or different chest pain or pressure that lasts more than 5 minutes or you passed out (lost consciousness), call 911 or seek other emergency help right away. A medical visit is only one step in your treatment. Even if you feel better, you still need to do what your doctor recommends, such as going to all suggested follow-up appointments and taking medicines exactly as directed. This will help you recover and help prevent future problems. How can you care for yourself at home? · Rest until you feel better. · Take your medicine exactly as prescribed. Call your doctor if you think you are having a problem with your medicine. · Do not drive after taking a prescription pain medicine. When should you call for help? Call 911 if:    · You passed out (lost consciousness).     · You have severe difficulty breathing.     · You have symptoms of a heart attack. These may include:  ¨ Chest pain or pressure, or a strange feeling in your chest.  ¨ Sweating. ¨ Shortness of breath. ¨ Nausea or vomiting. ¨ Pain, pressure, or a strange feeling in your back, neck, jaw, or upper belly or in one or both shoulders or arms. ¨ Lightheadedness or sudden weakness. ¨ A fast or irregular heartbeat.   After you call 911, the  may tell you to chew 1 adult-strength or 2 to 4 low-dose aspirin. Wait for an ambulance. Do not try to drive yourself.    Call your doctor today if:    · You have any trouble breathing.     · Your chest pain gets worse.     · You are dizzy or lightheaded, or you feel like you may faint.     · You are not getting better as expected.     · You are having new or different chest pain. Where can you learn more? Go to http://turner-doris.info/. Enter A120 in the search box to learn more about \"Chest Pain: Care Instructions. \"  Current as of: November 20, 2017  Content Version: 11.7  © 6256-1786 [employer]. Care instructions adapted under license by [employer] (which disclaims liability or warranty for this information). If you have questions about a medical condition or this instruction, always ask your healthcare professional. [address] any warranty or liability for your use of this information.

## 2018-09-17 ENCOUNTER — ANESTHESIA EVENT (OUTPATIENT)
Dept: ENDOSCOPY | Age: 63
End: 2018-09-17
Payer: MEDICARE

## 2018-09-18 ENCOUNTER — ANESTHESIA (OUTPATIENT)
Dept: ENDOSCOPY | Age: 63
End: 2018-09-18
Payer: MEDICARE

## 2018-09-18 ENCOUNTER — HOSPITAL ENCOUNTER (OUTPATIENT)
Age: 63
Setting detail: OUTPATIENT SURGERY
Discharge: HOME | End: 2018-09-18
Attending: INTERNAL MEDICINE | Admitting: INTERNAL MEDICINE
Payer: MEDICARE

## 2018-09-18 VITALS
SYSTOLIC BLOOD PRESSURE: 117 MMHG | RESPIRATION RATE: 14 BRPM | BODY MASS INDEX: 37.48 KG/M2 | HEART RATE: 72 BPM | WEIGHT: 238.8 LBS | HEIGHT: 67 IN | DIASTOLIC BLOOD PRESSURE: 75 MMHG | OXYGEN SATURATION: 94 % | TEMPERATURE: 97.7 F

## 2018-09-18 LAB
AMPHET UR QL SCN: NEGATIVE
BARBITURATES UR QL SCN: NEGATIVE
BENZODIAZ UR QL: POSITIVE
CANNABINOIDS UR QL SCN: NEGATIVE
COCAINE UR QL SCN: NEGATIVE
HDSCOM,HDSCOM: ABNORMAL
METHADONE UR QL: NEGATIVE
OPIATES UR QL: POSITIVE
PCP UR QL: NEGATIVE

## 2018-09-18 PROCEDURE — 77030018846 HC SOL IRR STRL H20 ICUM -A: Performed by: INTERNAL MEDICINE

## 2018-09-18 PROCEDURE — 88305 TISSUE EXAM BY PATHOLOGIST: CPT | Performed by: INTERNAL MEDICINE

## 2018-09-18 PROCEDURE — 74011000250 HC RX REV CODE- 250: Performed by: NURSE ANESTHETIST, CERTIFIED REGISTERED

## 2018-09-18 PROCEDURE — 74011250636 HC RX REV CODE- 250/636: Performed by: ANESTHESIOLOGY

## 2018-09-18 PROCEDURE — 74011250636 HC RX REV CODE- 250/636

## 2018-09-18 PROCEDURE — 74011250636 HC RX REV CODE- 250/636: Performed by: NURSE ANESTHETIST, CERTIFIED REGISTERED

## 2018-09-18 PROCEDURE — 80307 DRUG TEST PRSMV CHEM ANLYZR: CPT | Performed by: NURSE ANESTHETIST, CERTIFIED REGISTERED

## 2018-09-18 PROCEDURE — 76060000031 HC ANESTHESIA FIRST 0.5 HR: Performed by: INTERNAL MEDICINE

## 2018-09-18 PROCEDURE — 77030019988 HC FCPS ENDOSC DISP BSC -B: Performed by: INTERNAL MEDICINE

## 2018-09-18 PROCEDURE — 77030008565 HC TBNG SUC IRR ERBE -B: Performed by: INTERNAL MEDICINE

## 2018-09-18 PROCEDURE — 76040000019: Performed by: INTERNAL MEDICINE

## 2018-09-18 RX ORDER — DEXTROSE 50 % IN WATER (D50W) INTRAVENOUS SYRINGE
25-50 AS NEEDED
Status: DISCONTINUED | OUTPATIENT
Start: 2018-09-18 | End: 2018-09-18 | Stop reason: HOSPADM

## 2018-09-18 RX ORDER — SODIUM CHLORIDE 0.9 % (FLUSH) 0.9 %
5-10 SYRINGE (ML) INJECTION AS NEEDED
Status: DISCONTINUED | OUTPATIENT
Start: 2018-09-18 | End: 2018-09-18 | Stop reason: HOSPADM

## 2018-09-18 RX ORDER — SODIUM CHLORIDE, SODIUM LACTATE, POTASSIUM CHLORIDE, CALCIUM CHLORIDE 600; 310; 30; 20 MG/100ML; MG/100ML; MG/100ML; MG/100ML
75 INJECTION, SOLUTION INTRAVENOUS CONTINUOUS
Status: DISCONTINUED | OUTPATIENT
Start: 2018-09-18 | End: 2018-09-18 | Stop reason: HOSPADM

## 2018-09-18 RX ORDER — INSULIN LISPRO 100 [IU]/ML
INJECTION, SOLUTION INTRAVENOUS; SUBCUTANEOUS ONCE
Status: DISCONTINUED | OUTPATIENT
Start: 2018-09-18 | End: 2018-09-18 | Stop reason: HOSPADM

## 2018-09-18 RX ORDER — SODIUM CHLORIDE 0.9 % (FLUSH) 0.9 %
5-10 SYRINGE (ML) INJECTION AS NEEDED
Status: CANCELLED | OUTPATIENT
Start: 2018-09-18

## 2018-09-18 RX ORDER — SODIUM CHLORIDE, SODIUM LACTATE, POTASSIUM CHLORIDE, CALCIUM CHLORIDE 600; 310; 30; 20 MG/100ML; MG/100ML; MG/100ML; MG/100ML
75 INJECTION, SOLUTION INTRAVENOUS CONTINUOUS
Status: CANCELLED | OUTPATIENT
Start: 2018-09-18

## 2018-09-18 RX ORDER — ONDANSETRON 2 MG/ML
4 INJECTION INTRAMUSCULAR; INTRAVENOUS ONCE
Status: CANCELLED | OUTPATIENT
Start: 2018-09-18 | End: 2018-09-18

## 2018-09-18 RX ORDER — MAGNESIUM SULFATE 100 %
4 CRYSTALS MISCELLANEOUS AS NEEDED
Status: DISCONTINUED | OUTPATIENT
Start: 2018-09-18 | End: 2018-09-18 | Stop reason: HOSPADM

## 2018-09-18 RX ORDER — SODIUM CHLORIDE 0.9 % (FLUSH) 0.9 %
5-10 SYRINGE (ML) INJECTION EVERY 8 HOURS
Status: DISCONTINUED | OUTPATIENT
Start: 2018-09-18 | End: 2018-09-18 | Stop reason: HOSPADM

## 2018-09-18 RX ORDER — PROPOFOL 10 MG/ML
INJECTION, EMULSION INTRAVENOUS AS NEEDED
Status: DISCONTINUED | OUTPATIENT
Start: 2018-09-18 | End: 2018-09-18 | Stop reason: HOSPADM

## 2018-09-18 RX ORDER — HYDROMORPHONE HYDROCHLORIDE 2 MG/ML
0.5 INJECTION, SOLUTION INTRAMUSCULAR; INTRAVENOUS; SUBCUTANEOUS ONCE
Status: CANCELLED | OUTPATIENT
Start: 2018-09-18 | End: 2018-09-18

## 2018-09-18 RX ORDER — HYDROMORPHONE HYDROCHLORIDE 2 MG/ML
0.5 INJECTION, SOLUTION INTRAMUSCULAR; INTRAVENOUS; SUBCUTANEOUS
Status: COMPLETED | OUTPATIENT
Start: 2018-09-18 | End: 2018-09-18

## 2018-09-18 RX ORDER — LIDOCAINE HYDROCHLORIDE 20 MG/ML
INJECTION, SOLUTION EPIDURAL; INFILTRATION; INTRACAUDAL; PERINEURAL AS NEEDED
Status: DISCONTINUED | OUTPATIENT
Start: 2018-09-18 | End: 2018-09-18 | Stop reason: HOSPADM

## 2018-09-18 RX ADMIN — HYDROMORPHONE HYDROCHLORIDE 0.5 MG: 2 INJECTION INTRAMUSCULAR; INTRAVENOUS; SUBCUTANEOUS at 08:41

## 2018-09-18 RX ADMIN — PROPOFOL 50 MG: 10 INJECTION, EMULSION INTRAVENOUS at 08:04

## 2018-09-18 RX ADMIN — SODIUM CHLORIDE, SODIUM LACTATE, POTASSIUM CHLORIDE, AND CALCIUM CHLORIDE 75 ML/HR: 600; 310; 30; 20 INJECTION, SOLUTION INTRAVENOUS at 06:59

## 2018-09-18 RX ADMIN — HYDROMORPHONE HYDROCHLORIDE 0.5 MG: 2 INJECTION INTRAMUSCULAR; INTRAVENOUS; SUBCUTANEOUS at 08:21

## 2018-09-18 RX ADMIN — FAMOTIDINE 20 MG: 10 INJECTION INTRAVENOUS at 06:59

## 2018-09-18 RX ADMIN — HYDROMORPHONE HYDROCHLORIDE 0.5 MG: 2 INJECTION INTRAMUSCULAR; INTRAVENOUS; SUBCUTANEOUS at 08:11

## 2018-09-18 RX ADMIN — PROPOFOL 50 MG: 10 INJECTION, EMULSION INTRAVENOUS at 08:00

## 2018-09-18 RX ADMIN — LIDOCAINE HYDROCHLORIDE 20 MG: 20 INJECTION, SOLUTION EPIDURAL; INFILTRATION; INTRACAUDAL; PERINEURAL at 08:04

## 2018-09-18 RX ADMIN — LIDOCAINE HYDROCHLORIDE 20 MG: 20 INJECTION, SOLUTION EPIDURAL; INFILTRATION; INTRACAUDAL; PERINEURAL at 08:00

## 2018-09-18 RX ADMIN — HYDROMORPHONE HYDROCHLORIDE 0.5 MG: 2 INJECTION INTRAMUSCULAR; INTRAVENOUS; SUBCUTANEOUS at 08:31

## 2018-09-18 NOTE — ANESTHESIA POSTPROCEDURE EVALUATION
Post-Anesthesia Evaluation & Assessment Visit Vitals  /64  Pulse 70  Temp 36.6 °C (97.9 °F)  Resp 14  
 Ht 5' 7\" (1.702 m)  Wt 108.3 kg (238 lb 12.8 oz)  SpO2 97%  BMI 37.4 kg/m2 Post-operative hydration adequate. Pain score (VAS): 0 Pain Scale 1: Numeric (0 - 10) (09/18/18 0848) Pain Intensity 1: 4 (09/18/18 0848) Managed. Mental status & Level of consciousness: returned to baseline Neurological status: returned to baseline Pulmonary status: airway patent, oxygen given as needed. Complications related to anesthesia: none Patient has met all discharge requirements. Additional comments: 
 
 
 
Lesvia Cortes MD 
September 18, 2018

## 2018-09-18 NOTE — H&P
[facility], [email or web address] Impression: 1. Esophageal spasm 2. Reflux Plan: 1. egd dil mac all risks benefits and alt discussed Chief Complaint:  
Esophageal chest pain HPI: 
Ilene Muñoz is a 58 y.o. male who is being seen on consult for esophageal chest pain . PMH:  
Past Medical History:  
Diagnosis Date  Altered mental status  Arthritis  Arthropathy  Back pain  Bilateral kidney stones  Burn ON HANDS  
 CAD (coronary artery disease) 1999 MI    3 cardiac stents  Calcium nephrolithiasis  Calculus of kidney  Cardiac arrhythmia  Cerebral artery occlusion with cerebral infarction ([address]  Chest pain  Cigarette smoker  Cirrhosis, alcoholic ([address]  Cocaine abuse, episodic use  Colon polyp  Diarrhea  DJD (degenerative joint disease)  Drug-seeking behavior  Dyskinesia  Esophageal disorder  Esophageal erosions  Flank pain, acute  Foot ulcer, right ([address]  Gastric ulcer  GERD (gastroesophageal reflux disease)  Gross hematuria  Head trauma  Hearing reduced  Hematuria  Herniated disc X 10 IN BACK  Hiatus hernia syndrome  History of kidney stones  HNP (herniated nucleus pulposus)  Hyperlipemia  Hypertension  Hypokalemia  Infection  Jaw fracture ([address]  Kidney stones  Knee pain  Left ureteral stone  Muscle atrophy  Myocardial infarction [facility]) 1999  N&V (nausea and vomiting)  Nocturia  Nondependent alcohol abuse, in remission  Nutcracker esophagus  Other ill-defined conditions(799.89) dysphagia  Renal stone  Slurred speech  Stroke [facility]) 1999  
 questionable TIA  Swallowing difficulty  Syncopal episodes  Syncope and collapse  Tobacco dependence  Unspecified adverse effect of anesthesia AT TIMES ESOPHAGUS SPASMS AFTER PROCEDURES  
 Unspecified cerebral artery occlusion with cerebral infarction PSH:  
Past Surgical History:  
Procedure Laterality Date  HX APPENDECTOMY    
 9YEARS OLD  
 HX CHOLECYSTECTOMY  2009  HX CORONARY STENT PLACEMENT  2008  
 3 STENTS PLACED  HX ENDOSCOPY    
 with Dilatation, multiple times [address] CATHETERIZATION  2012 EVERYTHING LOOKED GOOD AT THIS POINT  HX HEENT    
 \"2 jaw surgeries\"  HX HERNIA REPAIR    
 ABDOMEN  
 HX KNEE ARTHROSCOPY  9/2012 LEFT  
 HX MOHS PROCEDURES  2016  HX ORTHOPAEDIC  12/12  
 left knee  HX OTHER SURGICAL    
 ESOPHAGUS DILATATION  
 HX ROTATOR CUFF REPAIR Right   
 x2  
 HX UROLOGICAL  07/18/2016 [facility]-Cystoscopy,URETEROSCOPY W/ LITHOTRIPSY, Dr Emma Freitas   UROLOGICAL  10/10/2016 [facility]-Cystoscopy with removal of ureteral stent, Dr Emma Freitas   UROLOGICAL  01/30/2017 [facility]-Cystoscopy, Left retrograde pyelography, Left diagnostic ureteroscopy, Left 6 x 26 cm double-J ureteral stent placement,Right retrograde pyelography, Right ureteroscopic stone extraction,Right 6 x 24 cm double-J ureteral stent placement, Interpretation of urography,Intraoperative fluoro less than 1 hour,    UROLOGICAL  02/08/2017 [facility]-CYSTOURETHROSCOPY WITH STENT REMOVAL, Dr Emma Freitas Social HX:  
Social History Social History  Marital status:  Spouse name: N/A  
 Number of children: N/A  
 Years of education: N/A Occupational History  Not on file. Social History Main Topics  Smoking status: Former Smoker Packs/day: 0.00 Years: 20.00 Types: Cigarettes  Smokeless tobacco: Never Used Comment: Patient states he smokes 2-3 cigarettes per day  Alcohol use No  
   Comment: \"none in over 20 years\"  Drug use: No  
   Comment: cocaine 2011  Sexual activity: Yes  
  Partners: Female Other Topics Concern  Not on file Social History Narrative FHX:  
Family History Problem Relation Age of Onset  Diabetes Father  Heart Disease Father  Stroke Father Allergy: Allergies Allergen Reactions  Bee Sting [Sting, Bee] Hives and Shortness of Breath  Codeine Hives and Shortness of Breath Has tolerated Hydromorphone.  Haldol [Haloperidol Lactate] Hives and Shortness of Breath  Haloperidol Hives and Cough  Keflex [Cephalexin] Diarrhea  No Allergy Information Available Other (comments) Other reaction(s): hives  Shellfish Containing Products Hives  Stadol [Butorphanol Tartrate] Hives, Shortness of Breath and Rash  Vicodin [Hydrocodone-Acetaminophen] Hives, Shortness of Breath and Rash Home Medications:  
 
Prescriptions Prior to Admission Medication Sig  
 omeprazole (PRILOSEC) 10 mg capsule Take 10 mg by mouth daily.  diazePAM (VALIUM) 10 mg tablet Take 10 mg by mouth every six (6) hours as needed for Anxiety.  nitroglycerin (NITROSTAT) 0.3 mg SL tablet 0.3 mg.  
 meperidine (DEMEROL) 100 mg tablet Take 100 mg by mouth every four (4) hours as needed for Pain.  carvedilol (COREG) 25 mg tablet Take 25 mg by mouth two (2) times daily (with meals).  atorvastatin (LIPITOR) 40 mg tablet Take 40 mg by mouth daily.  DULoxetine (CYMBALTA) 60 mg capsule Take 60 mg by mouth daily.  chlorthalidone (HYGROTEN) 25 mg tablet Take 1 Tab by mouth daily.  clopidogrel (PLAVIX) 75 mg tab Take 75 mg by mouth. Review of Systems:  
 
Constitutional: No fevers, chills, weight loss, fatigue. Skin: No rashes, pruritis, jaundice, ulcerations, erythema. HENT: No headaches, nosebleeds, sinus pressure, rhinorrhea, sore throat. Eyes: No visual changes, blurred vision, eye pain, photophobia, jaundice. Cardiovascular: No chest pain, heart palpitations. Respiratory: No cough, SOB, wheezing, chest discomfort, orthopnea. Gastrointestinal: Reflux chest pain Genitourinary: No dysuria, bleeding, discharge, pyuria. Musculoskeletal: No weakness, arthralgias, wasting. Endo: No sweats. Heme: No bruising, easy bleeding. Allergies: As noted. Neurological: Cranial nerves intact. Alert and oriented. Gait not assessed. Psychiatric:  No anxiety, depression, hallucinations. Visit Vitals  /73  Pulse 74  Temp 97.8 °F (36.6 °C)  Resp 18  Ht 5' 7\" (1.702 m)  Wt 108.3 kg (238 lb 12.8 oz)  SpO2 96%  BMI 37.4 kg/m2 Physical Assessment:  
 
constitutional: appearance: well developed, well nourished, normal habitus, no deformities, in no acute distress. skin: inspection: no rashes, ulcers, icterus or other lesions; no clubbing or telangiectasias. palpation: no induration or subcutaneos nodules. eyes: inspection: normal conjunctivae and lids; no jaundice pupils: symmetrical, normoreactive to light, normal accommodation and size. ENMT: mouth: normal oral mucosa,lips and gums; good dentition. oropharynx: normal tongue, hard and soft palate; posterior pharynx without erythema, exudate or lesions. neck: no masses organomegaly or tenderness. respiratory: effort: normal chest excursion; no intercostal retraction or accessory muscle use. cardiovascular: abdominal aorta: normal size and position; no bruits. palpation: PMI of normal size and position; normal rhythm; no thrill or murmurs. abdominal: abdomen: normal consistency; no tenderness or masses. hernias: no hernias appreciated. liver: normal size and consistency. spleen: not palpable. rectal: hemoccult/guaiac: not performed. musculoskeletal: no deformities or muscle wasting  
lymphatic: axilae: not palpable. groin: not palpable. neck: within normal limits. other: not palpable. neurologic: cranial nerves: II-XII normal.  
psychiatric: judgement/insight: within normal limits. memory: within normal limits for recent and remote events.  mood and affect: no evidence of depression, anxiety or agitation. orientation: oriented to time, space and person. Basic Metabolic Profile No results for input(s): NA, K, CL, CO2, BUN, GLU, CA, MG, PHOS in the last 72 hours. No lab exists for component: CREAT  
  
CBC w/Diff No results for input(s): WBC, RBC, HGB, HCT, MCV, MCH, MCHC, RDW, PLT, HGBEXT, HCTEXT, PLTEXT in the last 72 hours. No lab exists for component: MPV No results for input(s): GRANS, LYMPH, EOS, PRO, MYELO, METAS, BLAST in the last 72 hours. No lab exists for component: MONO, BASO Hepatic Function No results for input(s): ALB, TP, TBILI, GPT, SGOT, AP, AML, LPSE in the last 72 hours. No lab exists for component: Adrien Herrera MD, M.D. [facility], [address] 
[email or web address]

## 2018-09-18 NOTE — ANESTHESIA PREPROCEDURE EVALUATION
Anesthetic History PONV Review of Systems / Medical History Patient summary reviewed and pertinent labs reviewed Pulmonary Smoker Neuro/Psych CVA TIA Cardiovascular Hypertension Dysrhythmias CAD Exercise tolerance: <4 METS 
  
GI/Hepatic/Renal 
  
GERD 
 
 
PUD and liver disease Endo/Other Arthritis Other Findings Comments: Documentation of current medication Current medications obtained, documented and obtained? YES Risk Factors for Postoperative nausea/vomiting: 
     History of postoperative nausea/vomiting? NO Female? NO Motion sickness? NO Intended opioid administration for postoperative analgesia? YES Smoking Abstinence: 
Current Smoker? YES Elective Surgery? YES Seen preoperatively by anesthesiologist or proxy prior to day of surgery? YES Pt abstained from smoking 24 hours prior to anesthesia? N/A Preventive care/screening for High Blood Pressure: 
Aged 18 years and older: YES Screened for high blood pressure: YES Patients with high blood pressure referred to primary care provider 
 for BP management: YES Physical Exam 
 
Airway Mallampati: II 
TM Distance: 4 - 6 cm Neck ROM: normal range of motion Mouth opening: Normal 
 
 Cardiovascular Regular rate and rhythm,  S1 and S2 normal,  no murmur, click, rub, or gallop Dental 
 
Dentition: Poor dentition Pulmonary Breath sounds clear to auscultation Abdominal 
GI exam deferred Other Findings Anesthetic Plan ASA: 3 Anesthesia type: MAC Induction: Intravenous Anesthetic plan and risks discussed with: Patient

## 2018-09-18 NOTE — DISCHARGE INSTRUCTIONS
Esophageal Dilation: What to Expect at [address] [place] [address]  After you have esophageal dilation, you will stay at the hospital or surgery center for 1 to 2 hours. This will allow the medicine to wear off. You will be able to go home after your doctor or nurse checks to make sure you are not having any problems. This care sheet gives you a general idea about how long it will take for you to recover. But each person recovers at a different pace. Follow the steps below to get better as quickly as possible. How can you care for yourself at home? Activity    · Rest as much as you need to after you go home.     · You should be able to go back to your usual activities the day after the procedure. Diet    · Follow your doctor's directions for eating after the procedure.     · Drink plenty of fluids (unless your doctor has told you not to). Medicines    · Your doctor will tell you if and when you can restart your medicines. He or she will also give you instructions about taking any new medicines.     · If you take blood thinners, such as warfarin (Coumadin), clopidogrel (Plavix), or aspirin, be sure to talk to your doctor. He or she will tell you if and when to start taking those medicines again. Make sure that you understand exactly what your doctor wants you to do.     · If you have a sore throat the day after the procedure, use an over-the-counter spray to numb your throat. Sucking on throat lozenges and gargling with warm salt water may also help relieve your symptoms. Follow-up care is a key part of your treatment and safety. Be sure to make and go to all appointments, and call your doctor if you are having problems. It's also a good idea to know your test results and keep a list of the medicines you take. When should you call for help? Call 911 anytime you think you may need emergency care.  For example, call if:    · You passed out (lost consciousness).     · You have trouble breathing.     · Your stools are maroon or very bloody    Call your doctor now or seek immediate medical care if:    · You have new or worse belly pain.     · You have a fever.     · You have new or more blood in your stools.     · You are sick to your stomach and cannot drink fluids.     · You cannot pass stools or gas.     · You have pain that does not get better after you take pain medicine.    Watch closely for changes in your health, and be sure to contact your doctor if:    · Your throat still hurts after a day or two.     · You do not get better as expected. Where can you learn more? Go to http://turner-doris.info/. Enter E301 in the search box to learn more about \"Esophageal Dilation: What to Expect at Home. \"  Current as of: May 12, 2017  Content Version: 11.7  © 2975-0082 [employer]. Care instructions adapted under license by [employer] (which disclaims liability or warranty for this information). If you have questions about a medical condition or this instruction, always ask your healthcare professional. [place] [address] any warranty or liability for your use of this information. Esophagitis: Care Instructions  Your Care Instructions    Esophagitis (say \"ih-sof-uh-JY-tus\") is irritation of the esophagus, the tube that carries food from your throat to your stomach. Acid reflux is the most common cause of this condition. When you have reflux, stomach acid and juices flow upward. This can cause pain or a burning feeling in your chest. You may have a sore throat. It may be hard to swallow. Other causes of this condition include some medicines and supplements. Allergies or an infection can also cause it. Your doctor will ask about your symptoms and past health. He or she might do tests to find the cause of your symptoms. Treatment depends on what is causing the problem. Treatment might include changing your diet or taking medicine to relieve your symptoms.  It might also include changing a medicine that is causing your symptoms. If you have reflux, medicine that reduces the stomach acid helps your body heal. It might take 1 to 3 weeks to heal.  Follow-up care is a key part of your treatment and safety. Be sure to make and go to all appointments, and call your doctor if you are having problems. It's also a good idea to know your test results and keep a list of the medicines you take. How can you care for yourself at home? · If you have acid reflux, your doctor may recommend that you:  ¨ Eat several small meals instead of two or three large meals. After you eat, wait 2 to 3 hours before you lie down. ¨ Avoid chocolate, mint, alcohol, and spicy foods. ¨ Don't smoke or use smokeless tobacco. Smoking can make this condition worse. If you need help quitting, talk to your doctor about stop-smoking programs and medicines. These can increase your chances of quitting for good. ¨ Raise the head of your bed 6 to 8 inches if you have symptoms at night. ¨ Lose weight if you are overweight. ¨ Take an over-the-counter antacid, such as Maalox, Mylanta, or Tums. Be careful when you take over-the-counter antacid medicines. Many of these medicines have aspirin in them. Read the label to make sure that you are not taking more than the recommended dose. Too much aspirin can be harmful. ¨ Take stronger acid reducers. Examples are famotidine (such as Pepcid), omeprazole (such as Prilosec), and ranitidine (such as Zantac). · If your condition is caused by infection, allergy, or other problems, use the medicine or treatments that your doctor recommends. · Be safe with medicines. Take your medicines exactly as prescribed. Call your doctor if you think you are having a problem with your medicine. When should you call for help?   Call your doctor now or seek immediate medical care if:    · You have new or worse belly pain.     · You are vomiting.    Watch closely for changes in your health, and be sure to contact your doctor if:    · You have new or worse symptoms of reflux.     · You have trouble or pain swallowing.     · You are losing weight.     · You do not get better as expected. Where can you learn more? Go to http://turner-doris.info/. Enter P340 in the search box to learn more about \"Esophagitis: Care Instructions. \"  Current as of: May 12, 2017  Content Version: 11.7  © 5237-3948 [employer]. Care instructions adapted under license by [employer] (which disclaims liability or warranty for this information). If you have questions about a medical condition or this instruction, always ask your healthcare professional. [place] [address] any warranty or liability for your use of this information. DISCHARGE SUMMARY from Nurse    PATIENT INSTRUCTIONS:    After general anesthesia or intravenous sedation, for 24 hours or while taking prescription Narcotics:  · Limit your activities  · Do not drive and operate hazardous machinery  · Do not make important personal or business decisions  · Do  not drink alcoholic beverages  · If you have not urinated within 8 hours after discharge, please contact your surgeon on call. Report the following to your surgeon:  · Excessive pain, swelling, redness or odor of or around the surgical area  · Temperature over 100.5  · Nausea and vomiting lasting longer than 4 hours or if unable to take medications  · Any signs of decreased circulation or nerve impairment to extremity: change in color, persistent  numbness, tingling, coldness or increase pain  · Any questions    *  Please give a list of your current medications to your Primary Care Provider. *  Please update this list whenever your medications are discontinued, doses are      changed, or new medications (including over-the-counter products) are added. *  Please carry medication information at all times in case of emergency situations.     These are general instructions for a healthy lifestyle:    No smoking/ No tobacco products/ Avoid exposure to second hand smoke  Surgeon General's Warning:  Quitting smoking now greatly reduces serious risk to your health. Obesity, smoking, and sedentary lifestyle greatly increases your risk for illness    A healthy diet, regular physical exercise & weight monitoring are important for maintaining a healthy lifestyle    You may be retaining fluid if you have a history of heart failure or if you experience any of the following symptoms:  Weight gain of 3 pounds or more overnight or 5 pounds in a week, increased swelling in our hands or feet or shortness of breath while lying flat in bed. Please call your doctor as soon as you notice any of these symptoms; do not wait until your next office visit. Recognize signs and symptoms of STROKE:    F-face looks uneven    A-arms unable to move or move unevenly    S-speech slurred or non-existent    T-time-call 911 as soon as signs and symptoms begin-DO NOT go       Back to bed or wait to see if you get better-TIME IS BRAIN. Warning Signs of HEART ATTACK     Call 911 if you have these symptoms:   Chest discomfort. Most heart attacks involve discomfort in the center of the chest that lasts more than a few minutes, or that goes away and comes back. It can feel like uncomfortable pressure, squeezing, fullness, or pain.  Discomfort in other areas of the upper body. Symptoms can include pain or discomfort in one or both arms, the back, neck, jaw, or stomach.  Shortness of breath with or without chest discomfort.  Other signs may include breaking out in a cold sweat, nausea, or lightheadedness. Don't wait more than five minutes to call 911 - MINUTES MATTER! Fast action can save your life. Calling 911 is almost always the fastest way to get lifesaving treatment.  Emergency Medical Services staff can begin treatment when they arrive -- up to an hour sooner than if someone gets to the hospital by car. The discharge information has been reviewed with the patient and spouse. The patient and spouse verbalized understanding. Discharge medications reviewed with the patient and spouse and appropriate educational materials and side effects teaching were provided.   ___________________________________________________________________________________________________________________________________

## 2018-09-18 NOTE — IP AVS SNAPSHOT
303 [facility] 
 
 
 [address] Patient: Wiley Fraser MRN: [id number] EPM:[id number] About your hospitalization You were admitted on:  September 18, 2018 You last received care in the:  [facility] PHASE 2 RECOVERY You were discharged on:  September 18, 2018 Why you were hospitalized Your primary diagnosis was:  Not on File Follow-up Information Follow up With Details Comments Contact Info Norma Land DO   [address] [address] 
[phone] Lucero Starr MD   [address] [place] [address] [address] 200 [facility] 
[phone] Discharge Orders None A check alexa indicates which time of day the medication should be taken. My Medications CONTINUE taking these medications Instructions Each Dose to Equal  
 Morning Noon Evening Bedtime  
 chlorthalidone 25 mg tablet Commonly known as:  Vilma Phoenix Your last dose was: Your next dose is: Take 1 Tab by mouth daily. 25 mg COREG 25 mg tablet Generic drug:  carvedilol Your last dose was: Your next dose is: Take 25 mg by mouth two (2) times daily (with meals). 25 mg  
    
   
   
   
  
 CYMBALTA 60 mg capsule Generic drug:  DULoxetine Your last dose was: Your next dose is: Take 60 mg by mouth daily. 60 mg DEMEROL 100 mg tablet Generic drug:  meperidine Your last dose was: Your next dose is: Take 100 mg by mouth every four (4) hours as needed for Pain. 100 mg  
    
   
   
   
  
 LIPITOR 40 mg tablet Generic drug:  atorvastatin Your last dose was: Your next dose is: Take 40 mg by mouth daily. 40 mg  
    
   
   
   
  
 nitroglycerin 0.3 mg SL tablet Commonly known as:  NITROSTAT Your last dose was: Your next dose is: 0.3 mg.  
 0.3 mg  
    
   
   
   
  
 PLAVIX 75 mg Tab Generic drug:  clopidogrel Your last dose was: Your next dose is: Take 75 mg by mouth. 75 mg PriLOSEC 10 mg capsule Generic drug:  omeprazole Your last dose was: Your next dose is: Take 10 mg by mouth daily. 10 mg  
    
   
   
   
  
 VALIUM 10 mg tablet Generic drug:  diazePAM  
   
Your last dose was: Your next dose is: Take 10 mg by mouth every six (6) hours as needed for Anxiety. 10 mg Opioid Education Prescription Opioids: What You Need to Know: 
 
Prescription opioids can be used to help relieve moderate-to-severe pain and are often prescribed following a surgery or injury, or for certain health conditions. These medications can be an important part of treatment but also come with serious risks. Opioids are strong pain medicines. Examples include hydrocodone, oxycodone, fentanyl, and morphine. Heroin is an example of an illegal opioid. It is important to work with your health care provider to make sure you are getting the safest, most effective care. WHAT ARE THE RISKS AND SIDE EFFECTS OF OPIOID USE? Prescription opioids carry serious risks of addiction and overdose, especially with prolonged use. An opioid overdose, often marked by slow breathing, can cause sudden death. The use of prescription opioids can have a number of side effects as well, even when taken as directed. · Tolerance-meaning you might need to take more of a medication for the same pain relief · Physical dependence-meaning you have symptoms of withdrawal when the medication is stopped. Withdrawal symptoms can include nausea, sweating, chills, diarrhea, stomach cramps, and muscle aches.   Withdrawal can last up to several weeks, depending on which drug you took and how long you took it. · Increased sensitivity to pain · Constipation · Nausea, vomiting, and dry mouth · Sleepiness and dizziness · Confusion · Depression · Low levels of testosterone that can result in lower sex drive, energy, and strength · Itching and sweating RISKS ARE GREATER WITH:      
· History of drug misuse, substance use disorder, or overdose · Mental health conditions (such as depression or anxiety) · Sleep apnea · Older age (72 years or older) · Pregnancy Avoid alcohol while taking prescription opioids. Also, unless specifically advised by your health care provider, medications to avoid include: · Benzodiazepines (such as Xanax or Valium) · Muscle relaxants (such as Soma or Flexeril) · Hypnotics (such as Ambien or Lunesta) · Other prescription opioids KNOW YOUR OPTIONS Talk to your health care provider about ways to manage your pain that don't involve prescription opioids. Some of these options may actually work better and have fewer risks and side effects. Options may include: 
· Pain relievers such as acetaminophen, ibuprofen, and naproxen · Some medications that are also used for depression or seizures · Physical therapy and exercise · Counseling to help patients learn how to cope better with triggers of pain and stress. · Application of heat or cold compress · Massage therapy · Relaxation techniques Be Informed Make sure you know the name of your medication, how much and how often to take it, and its potential risks & side effects. IF YOU ARE PRESCRIBED OPIOIDS FOR PAIN: 
· Never take opioids in greater amounts or more often than prescribed. Remember the goal is not to be pain-free but to manage your pain at a tolerable level. · Follow up with your primary care provider to: · Work together to create a plan on how to manage your pain. · Talk about ways to help manage your pain that don't involve prescription opioids. · Talk about any and all concerns and side effects. · Help prevent misuse and abuse. · Never sell or share prescription opioids · Help prevent misuse and abuse. · Store prescription opioids in a secure place and out of reach of others (this may include visitors, children, friends, and family). · Safely dispose of unused/unwanted prescription opioids: Find your community drug take-back program or your pharmacy mail-back program, or flush them down the toilet, following guidance from the Food and Drug Administration (www.fda.gov/Drugs/ResourcesForYou). · Visit www.cdc.gov/drugoverdose to learn about the risks of opioid abuse and overdose. · If you believe you may be struggling with addiction, tell your health care provider and ask for guidance or call [employer] at 4-701-380-YIMX. Discharge Instructions Esophageal Dilation: What to Expect at [facility] Your Recovery After you have esophageal dilation, you will stay at the hospital or surgery center for 1 to 2 hours. This will allow the medicine to wear off. You will be able to go home after your doctor or nurse checks to make sure you are not having any problems. This care sheet gives you a general idea about how long it will take for you to recover. But each person recovers at a different pace. Follow the steps below to get better as quickly as possible. How can you care for yourself at home? Activity 
  · Rest as much as you need to after you go home.  
  · You should be able to go back to your usual activities the day after the procedure. Diet 
  · Follow your doctor's directions for eating after the procedure.  
  · Drink plenty of fluids (unless your doctor has told you not to). Medicines 
  · Your doctor will tell you if and when you can restart your medicines. He or she will also give you instructions about taking any new medicines.  
  · If you take blood thinners, such as warfarin (Coumadin), clopidogrel (Plavix), or aspirin, be sure to talk to your doctor. He or she will tell you if and when to start taking those medicines again. Make sure that you understand exactly what your doctor wants you to do.  
  · If you have a sore throat the day after the procedure, use an over-the-counter spray to numb your throat. Sucking on throat lozenges and gargling with warm salt water may also help relieve your symptoms. Follow-up care is a key part of your treatment and safety. Be sure to make and go to all appointments, and call your doctor if you are having problems. It's also a good idea to know your test results and keep a list of the medicines you take. When should you call for help? Call 911 anytime you think you may need emergency care. For example, call if: 
  · You passed out (lost consciousness).  
  · You have trouble breathing.  
  · Your stools are maroon or very bloody  
 Call your doctor now or seek immediate medical care if: 
  · You have new or worse belly pain.  
  · You have a fever.  
  · You have new or more blood in your stools.  
  · You are sick to your stomach and cannot drink fluids.  
  · You cannot pass stools or gas.  
  · You have pain that does not get better after you take pain medicine.  
 Watch closely for changes in your health, and be sure to contact your doctor if: 
  · Your throat still hurts after a day or two.  
  · You do not get better as expected. Where can you learn more? Go to http://turner-doris.info/. Enter X518 in the search box to learn more about \"Esophageal Dilation: What to Expect at Home. \" Current as of: May 12, 2017 Content Version: 11.7 © 9839-7655 [employer].  Care instructions adapted under license by [employer] (which disclaims liability or warranty for this information). If you have questions about a medical condition or this instruction, always ask your healthcare professional. [address] any warranty or liability for your use of this information. Esophagitis: Care Instructions Your Care Instructions Esophagitis (say \"ih-sof-uh-JY-tus\") is irritation of the esophagus, the tube that carries food from your throat to your stomach. Acid reflux is the most common cause of this condition. When you have reflux, stomach acid and juices flow upward. This can cause pain or a burning feeling in your chest. You may have a sore throat. It may be hard to swallow. Other causes of this condition include some medicines and supplements. Allergies or an infection can also cause it. Your doctor will ask about your symptoms and past health. He or she might do tests to find the cause of your symptoms. Treatment depends on what is causing the problem. Treatment might include changing your diet or taking medicine to relieve your symptoms. It might also include changing a medicine that is causing your symptoms. If you have reflux, medicine that reduces the stomach acid helps your body heal. It might take 1 to 3 weeks to heal. 
Follow-up care is a key part of your treatment and safety. Be sure to make and go to all appointments, and call your doctor if you are having problems. It's also a good idea to know your test results and keep a list of the medicines you take. How can you care for yourself at home? · If you have acid reflux, your doctor may recommend that you: 
¨ Eat several small meals instead of two or three large meals. After you eat, wait 2 to 3 hours before you lie down. ¨ Avoid chocolate, mint, alcohol, and spicy foods. ¨ Don't smoke or use smokeless tobacco. Smoking can make this condition worse. If you need help quitting, talk to your doctor about stop-smoking programs and medicines.  These can increase your chances of quitting for good. 
¨ Raise the head of your bed 6 to 8 inches if you have symptoms at night. ¨ Lose weight if you are overweight. ¨ Take an over-the-counter antacid, such as Maalox, Mylanta, or Tums. Be careful when you take over-the-counter antacid medicines. Many of these medicines have aspirin in them. Read the label to make sure that you are not taking more than the recommended dose. Too much aspirin can be harmful. ¨ Take stronger acid reducers. Examples are famotidine (such as Pepcid), omeprazole (such as Prilosec), and ranitidine (such as Zantac). · If your condition is caused by infection, allergy, or other problems, use the medicine or treatments that your doctor recommends. · Be safe with medicines. Take your medicines exactly as prescribed. Call your doctor if you think you are having a problem with your medicine. When should you call for help? Call your doctor now or seek immediate medical care if: 
  · You have new or worse belly pain.  
  · You are vomiting.  
 Watch closely for changes in your health, and be sure to contact your doctor if: 
  · You have new or worse symptoms of reflux.  
  · You have trouble or pain swallowing.  
  · You are losing weight.  
  · You do not get better as expected. Where can you learn more? Go to http://turner-doris.info/. Enter Y213 in the search box to learn more about \"Esophagitis: Care Instructions. \" Current as of: May 12, 2017 Content Version: 11.7 © 7637-5576 "[employer] Incorporated. Care instructions adapted under license by [employer] (which disclaims liability or warranty for this information). If you have questions about a medical condition or this instruction, always ask your healthcare professional. [place] [address] any warranty or liability for your use of this information. DISCHARGE SUMMARY from Nurse PATIENT INSTRUCTIONS: 
 
 
F-face looks uneven A-arms unable to move or move unevenly S-speech slurred or non-existent T-time-call 911 as soon as signs and symptoms begin-DO NOT go Back to bed or wait to see if you get better-TIME IS BRAIN. Warning Signs of HEART ATTACK Call 911 if you have these symptoms: 
? Chest discomfort. Most heart attacks involve discomfort in the center of the chest that lasts more than a few minutes, or that goes away and comes back. It can feel like uncomfortable pressure, squeezing, fullness, or pain. ? Discomfort in other areas of the upper body. Symptoms can include pain or discomfort in one or both arms, the back, neck, jaw, or stomach. ? Shortness of breath with or without chest discomfort. ? Other signs may include breaking out in a cold sweat, nausea, or lightheadedness. Don't wait more than five minutes to call [address]! Fast action can save your life. Calling 911 is almost always the fastest way to get lifesaving treatment. Emergency Medical Services staff can begin treatment when they arrive  up to an hour sooner than if someone gets to the hospital by car. The discharge information has been reviewed with the patient and spouse. The patient and spouse verbalized understanding. Discharge medications reviewed with the patient and spouse and appropriate educational materials and side effects teaching were provided. ___________________________________________________________________________________________________________________________________ Introducing [facility]! Juan Gray introduces Suniva patient portal. Now you can access parts of your medical record, email your doctor's office, and request medication refills online. 1. In your internet browser, go to [email or web address] [email or web address]. Razer[employer]DGP [employer] 2. Click on the First Time User? Click Here link in the Sign In box. You will see the New Member Sign Up page. 3. Enter your Suniva Access Code exactly as it appears below.  You will not need to use this code after youve completed the sign-up process. If you do not sign up before the expiration date, you must request a new code. · Crowdwave Access Code: [email or web address] Expires: 10/3/2018  4:26 PM 
 
4. Enter the last four digits of your Social Security Number (xxxx) and Date of Birth (mm/dd/yyyy) as indicated and click Submit. You will be taken to the next sign-up page. 5. Create a Crowdwave ID. This will be your Crowdwave login ID and cannot be changed, so think of one that is secure and easy to remember. 6. Create a Crowdwave password. You can change your password at any time. 7. Enter your Password Reset Question and Answer. This can be used at a later time if you forget your password. 8. Enter your e-mail address. You will receive e-mail notification when new information is available in [address] 9. Click Sign Up. You can now view and download portions of your medical record. 10. Click the Download Summary menu link to download a portable copy of your medical information. If you have questions, please visit the Frequently Asked Questions section of the Crowdwave website. Remember, Crowdwave is NOT to be used for urgent needs. For medical emergencies, dial 911. Now available from your iPhone and Android! Introducing Jason Gongora As a [facility] patient, I wanted to make you aware of our electronic visit tool called Jason Danielkvnghaylee. [facility] 24/7 allows you to connect within minutes with a medical provider 24 hours a day, seven days a week via a mobile device or tablet or logging into a secure website from your computer. You can access Jason Gongora from anywhere in the [place].  
 
A virtual visit might be right for you when you have a simple condition and feel like you just dont want to get out of bed, or cant get away from work for an appointment, when your regular [facility] provider is not available (evenings, weekends or holidays), or when youre out of town and need minor care. Electronic visits cost only $49 and if the [facility] Vidmaker/7 provider determines a prescription is needed to treat your condition, one can be electronically transmitted to a nearby pharmacy*. Please take a moment to enroll today if you have not already done so. The enrollment process is free and takes just a few minutes. To enroll, please download the Alignable/[employer] deisy to your tablet or phone, or visit [email or web address] to enroll on your computer. And, as an [address] [place] [address] patient with a [employer] account, the results of your visits will be scanned into your electronic medical record and your primary care provider will be able to view the scanned results. We urge you to continue to see your regular [facility] provider for your ongoing medical care. And while your primary care provider may not be the one available when you seek a [facility] virtual visit, the peace of mind you get from getting a real diagnosis real time can be priceless. For more information on [employer], view our Frequently Asked Questions (FAQs) at [email or web address]. Sincerely, 
 
Paulo Dallas MD 
Chief Medical Officer [facility] Kari Koehler *:  certain medications cannot be prescribed via [facility] Providers Seen During Your Hospitalization Provider Specialty Primary office phone Barry Gr MD Gastroenterology [phone] Your Primary Care Physician (PCP) Primary Care Physician Office Phone Office Fax Nemo Constable [phone] You are allergic to the following Allergen Reactions Bee Sting (Sting, Bee) Hives Shortness of Breath Codeine Hives Shortness of Breath Has tolerated Hydromorphone. Haldol (Haloperidol Lactate) Hives Shortness of Breath Haloperidol Hives Cough Keflex (Cephalexin) Diarrhea No Allergy Information Available Other (comments) Other reaction(s): hives Shellfish Containing Products Hives Stadol (Butorphanol Tartrate) Hives Shortness of Breath Rash Vicodin (Hydrocodone-Acetaminophen) Hives Shortness of Breath Rash Recent Documentation Height Weight BMI Smoking Status 1.702 m 108.3 kg 37.4 kg/m2 Former Smoker Emergency Contacts Name Discharge Info Relation Home Work Mobile Angeles Watters(Ex-Wife) DISCHARGE CAREGIVER [3] Friend [5] [phone] Patient Belongings The following personal items are in your possession at time of discharge: 
  Dental Appliances: None  Visual Aid: None Please provide this summary of care documentation to your next provider. Signatures-by signing, you are acknowledging that this After Visit Summary has been reviewed with you and you have received a copy. Patient Signature:  ____________________________________________________________ Date:  ____________________________________________________________  
  
Alma Johnson Provider Signature:  ____________________________________________________________ Date:  ____________________________________________________________

## 2018-10-25 RX ORDER — MEPERIDINE HYDROCHLORIDE 50 MG/1
200 TABLET ORAL EVERY 12 HOURS
COMMUNITY
Start: 2018-08-13 | End: 2018-11-24

## 2018-10-29 ENCOUNTER — ANESTHESIA EVENT (OUTPATIENT)
Dept: ENDOSCOPY | Age: 63
End: 2018-10-29
Payer: MEDICARE

## 2018-10-30 ENCOUNTER — HOSPITAL ENCOUNTER (OUTPATIENT)
Age: 63
Setting detail: OUTPATIENT SURGERY
Discharge: HOME | End: 2018-10-30
Attending: INTERNAL MEDICINE | Admitting: INTERNAL MEDICINE
Payer: MEDICARE

## 2018-10-30 ENCOUNTER — ANESTHESIA (OUTPATIENT)
Dept: ENDOSCOPY | Age: 63
End: 2018-10-30
Payer: MEDICARE

## 2018-10-30 VITALS
OXYGEN SATURATION: 96 % | TEMPERATURE: 96.8 F | RESPIRATION RATE: 20 BRPM | HEART RATE: 80 BPM | DIASTOLIC BLOOD PRESSURE: 69 MMHG | SYSTOLIC BLOOD PRESSURE: 118 MMHG | HEIGHT: 68 IN | BODY MASS INDEX: 36.37 KG/M2 | WEIGHT: 240 LBS

## 2018-10-30 PROCEDURE — 77030008565 HC TBNG SUC IRR ERBE -B: Performed by: INTERNAL MEDICINE

## 2018-10-30 PROCEDURE — 77030018846 HC SOL IRR STRL H20 ICUM -A: Performed by: INTERNAL MEDICINE

## 2018-10-30 PROCEDURE — 74011250636 HC RX REV CODE- 250/636

## 2018-10-30 PROCEDURE — 76060000031 HC ANESTHESIA FIRST 0.5 HR: Performed by: INTERNAL MEDICINE

## 2018-10-30 PROCEDURE — 74011250636 HC RX REV CODE- 250/636: Performed by: NURSE ANESTHETIST, CERTIFIED REGISTERED

## 2018-10-30 PROCEDURE — 81025 URINE PREGNANCY TEST: CPT | Performed by: NURSE ANESTHETIST, CERTIFIED REGISTERED

## 2018-10-30 PROCEDURE — 74011000250 HC RX REV CODE- 250: Performed by: NURSE ANESTHETIST, CERTIFIED REGISTERED

## 2018-10-30 PROCEDURE — 76040000019: Performed by: INTERNAL MEDICINE

## 2018-10-30 PROCEDURE — 80307 DRUG TEST PRSMV CHEM ANLYZR: CPT | Performed by: ANESTHESIOLOGY

## 2018-10-30 RX ORDER — SODIUM CHLORIDE, SODIUM LACTATE, POTASSIUM CHLORIDE, CALCIUM CHLORIDE 600; 310; 30; 20 MG/100ML; MG/100ML; MG/100ML; MG/100ML
75 INJECTION, SOLUTION INTRAVENOUS CONTINUOUS
Status: DISCONTINUED | OUTPATIENT
Start: 2018-10-30 | End: 2018-11-02 | Stop reason: HOSPADM

## 2018-10-30 RX ORDER — SODIUM CHLORIDE 0.9 % (FLUSH) 0.9 %
5-10 SYRINGE (ML) INJECTION AS NEEDED
Status: DISCONTINUED | OUTPATIENT
Start: 2018-10-30 | End: 2018-11-02 | Stop reason: HOSPADM

## 2018-10-30 RX ORDER — LIDOCAINE HYDROCHLORIDE 20 MG/ML
INJECTION, SOLUTION EPIDURAL; INFILTRATION; INTRACAUDAL; PERINEURAL AS NEEDED
Status: DISCONTINUED | OUTPATIENT
Start: 2018-10-30 | End: 2018-10-30 | Stop reason: HOSPADM

## 2018-10-30 RX ORDER — MAGNESIUM SULFATE 100 %
4 CRYSTALS MISCELLANEOUS AS NEEDED
Status: DISCONTINUED | OUTPATIENT
Start: 2018-10-30 | End: 2018-11-02 | Stop reason: HOSPADM

## 2018-10-30 RX ORDER — PROPOFOL 10 MG/ML
INJECTION, EMULSION INTRAVENOUS AS NEEDED
Status: DISCONTINUED | OUTPATIENT
Start: 2018-10-30 | End: 2018-10-30 | Stop reason: HOSPADM

## 2018-10-30 RX ORDER — HYDROMORPHONE HYDROCHLORIDE 2 MG/ML
INJECTION, SOLUTION INTRAMUSCULAR; INTRAVENOUS; SUBCUTANEOUS AS NEEDED
Status: DISCONTINUED | OUTPATIENT
Start: 2018-10-30 | End: 2018-10-30 | Stop reason: HOSPADM

## 2018-10-30 RX ORDER — HYDROMORPHONE HYDROCHLORIDE 2 MG/ML
0.5 INJECTION, SOLUTION INTRAMUSCULAR; INTRAVENOUS; SUBCUTANEOUS
Status: DISCONTINUED | OUTPATIENT
Start: 2018-10-30 | End: 2018-11-02 | Stop reason: HOSPADM

## 2018-10-30 RX ORDER — DEXTROSE 50 % IN WATER (D50W) INTRAVENOUS SYRINGE
25-50 AS NEEDED
Status: DISCONTINUED | OUTPATIENT
Start: 2018-10-30 | End: 2018-11-02 | Stop reason: HOSPADM

## 2018-10-30 RX ORDER — SODIUM CHLORIDE, SODIUM LACTATE, POTASSIUM CHLORIDE, CALCIUM CHLORIDE 600; 310; 30; 20 MG/100ML; MG/100ML; MG/100ML; MG/100ML
75 INJECTION, SOLUTION INTRAVENOUS CONTINUOUS
Status: DISPENSED | OUTPATIENT
Start: 2018-10-30 | End: 2018-10-31

## 2018-10-30 RX ORDER — LIDOCAINE HYDROCHLORIDE 10 MG/ML
0.1 INJECTION, SOLUTION EPIDURAL; INFILTRATION; INTRACAUDAL; PERINEURAL AS NEEDED
Status: DISCONTINUED | OUTPATIENT
Start: 2018-10-30 | End: 2018-11-02 | Stop reason: HOSPADM

## 2018-10-30 RX ORDER — ONDANSETRON 2 MG/ML
4 INJECTION INTRAMUSCULAR; INTRAVENOUS ONCE
Status: ACTIVE | OUTPATIENT
Start: 2018-10-30 | End: 2018-10-30

## 2018-10-30 RX ORDER — SODIUM CHLORIDE 0.9 % (FLUSH) 0.9 %
5-10 SYRINGE (ML) INJECTION EVERY 8 HOURS
Status: DISCONTINUED | OUTPATIENT
Start: 2018-10-30 | End: 2018-11-02 | Stop reason: HOSPADM

## 2018-10-30 RX ADMIN — LIDOCAINE HYDROCHLORIDE 20 MG: 20 INJECTION, SOLUTION EPIDURAL; INFILTRATION; INTRACAUDAL; PERINEURAL at 10:42

## 2018-10-30 RX ADMIN — PROPOFOL 40 MG: 10 INJECTION, EMULSION INTRAVENOUS at 10:43

## 2018-10-30 RX ADMIN — FAMOTIDINE 20 MG: 10 INJECTION, SOLUTION INTRAVENOUS at 09:55

## 2018-10-30 RX ADMIN — SODIUM CHLORIDE, SODIUM LACTATE, POTASSIUM CHLORIDE, AND CALCIUM CHLORIDE 75 ML/HR: 600; 310; 30; 20 INJECTION, SOLUTION INTRAVENOUS at 09:55

## 2018-10-30 RX ADMIN — HYDROMORPHONE HYDROCHLORIDE 0.5 MG: 2 INJECTION, SOLUTION INTRAMUSCULAR; INTRAVENOUS; SUBCUTANEOUS at 10:42

## 2018-10-30 RX ADMIN — LIDOCAINE HYDROCHLORIDE 20 MG: 20 INJECTION, SOLUTION EPIDURAL; INFILTRATION; INTRACAUDAL; PERINEURAL at 10:43

## 2018-10-30 RX ADMIN — PROPOFOL 50 MG: 10 INJECTION, EMULSION INTRAVENOUS at 10:42

## 2018-10-30 RX ADMIN — HYDROMORPHONE HYDROCHLORIDE 0.5 MG: 2 INJECTION, SOLUTION INTRAMUSCULAR; INTRAVENOUS; SUBCUTANEOUS at 10:38

## 2018-10-30 NOTE — ANESTHESIA PREPROCEDURE EVALUATION
Anesthetic History PONV Review of Systems / Medical History Patient summary reviewed, nursing notes reviewed and pertinent labs reviewed Pulmonary Within defined limits Neuro/Psych CVA TIA Cardiovascular Hypertension CAD and cardiac stents Comments: H/o alcohol and drug abuse. GI/Hepatic/Renal 
  
GERD: poorly controlled Endo/Other Arthritis Other Findings Physical Exam 
 
Airway Mallampati: III 
TM Distance: 4 - 6 cm Neck ROM: normal range of motion Mouth opening: Normal 
 
 Cardiovascular Regular rate and rhythm,  S1 and S2 normal,  no murmur, click, rub, or gallop Rhythm: regular Rate: normal 
 
 
 
 Dental 
No notable dental hx Pulmonary Breath sounds clear to auscultation Abdominal 
GI exam deferred Other Findings Anesthetic Plan ASA: 3 Anesthesia type: MAC Induction: Intravenous Anesthetic plan and risks discussed with: Patient

## 2018-10-30 NOTE — H&P
[email or web address] Impression: 1.dysphagia esophageal spasm Plan: 1. egd dil mac possible botox all risks benefits and alt discussed Chief Complaint: dysphagia chest pains HPI: 
Hawa Mercado is a 58 y.o. male who is being seen on consult for dysphagia and esophageal spasms . PMH:  
Past Medical History:  
Diagnosis Date  Adverse effect of anesthesia   
 esophageal spasms  Altered mental status  Arthritis  Arthropathy  Back pain  Bilateral kidney stones  Burn ON HANDS  
 CAD (coronary artery disease) 1999 MI    3 cardiac stents  Calcium nephrolithiasis  Calculus of kidney  Cardiac arrhythmia  Cerebral artery occlusion with cerebral infarction ([address] 2010  
 no residual  
 Chest pain  Cigarette smoker  Cirrhosis, alcoholic ([address]  Cocaine abuse, episodic use ([address]  Colon polyp  Diarrhea  DJD (degenerative joint disease)  Drug-seeking behavior  Dyskinesia  Esophageal disorder  Esophageal erosions  Flank pain, acute  Foot ulcer, right ([address]  Gastric ulcer  GERD (gastroesophageal reflux disease)  Gross hematuria  Head trauma  Hearing reduced  Hematuria  Herniated disc X 10 IN BACK  Hiatus hernia syndrome  History of kidney stones  HNP (herniated nucleus pulposus)  Hyperlipemia  Hypertension  Hypokalemia  Infection  Jaw fracture ([address]  Kidney stones  Knee pain  Left ureteral stone  Muscle atrophy  Myocardial infarction [facility]) 1999  N&V (nausea and vomiting)  Nocturia  Nondependent alcohol abuse, in remission  Nutcracker esophagus  Other ill-defined conditions(799.89) dysphagia  Renal stone  Slurred speech  Stroke [facility]) 1999  
 questionable TIA  Swallowing difficulty  Syncopal episodes  Syncope and collapse  Tobacco dependence  Unspecified adverse effect of anesthesia AT TIMES ESOPHAGUS SPASMS AFTER PROCEDURES  
 Unspecified cerebral artery occlusion with cerebral infarction PSH:  
Past Surgical History:  
Procedure Laterality Date  HX APPENDECTOMY    
 9YEARS OLD  
 HX CHOLECYSTECTOMY  2009  HX CORONARY STENT PLACEMENT  2008  
 3 STENTS PLACED  HX ENDOSCOPY    
 with Dilatation, multiple times [address] CATHETERIZATION  2012 EVERYTHING LOOKED GOOD AT THIS POINT  HX HEENT    
 \"2 jaw surgeries\"  HX HERNIA REPAIR    
 ABDOMEN  
 HX KNEE ARTHROSCOPY  9/2012 LEFT  
 HX MOHS PROCEDURES  2016  HX ORTHOPAEDIC  12/12  
 left knee  HX OTHER SURGICAL    
 ESOPHAGUS DILATATION  
 HX ROTATOR CUFF REPAIR Right   
 x2  
 HX UROLOGICAL  07/18/2016 [facility]-Cystoscopy,URETEROSCOPY W/ LITHOTRIPSY, Dr Alejandro Emmanuel   UROLOGICAL  10/10/2016 [facility]-Cystoscopy with removal of ureteral stent, Dr Alejandro Emmanuel   UROLOGICAL  01/30/2017 [facility]-Cystoscopy, Left retrograde pyelography, Left diagnostic ureteroscopy, Left 6 x 26 cm double-J ureteral stent placement,Right retrograde pyelography, Right ureteroscopic stone extraction,Right 6 x 24 cm double-J ureteral stent placement, Interpretation of urography,Intraoperative fluoro less than 1 hour,    UROLOGICAL  02/08/2017 [facility]-CYSTOURETHROSCOPY WITH STENT REMOVAL, Dr Alejandro Emmanuel Social HX:  
Social History Socioeconomic History  Marital status:  Spouse name: Not on file  Number of children: Not on file  Years of education: Not on file  Highest education level: Not on file Social Needs  Financial resource strain: Not on file  Food insecurity - worry: Not on file  Food insecurity - inability: Not on file  Transportation needs - medical: Not on file  Transportation needs - non-medical: Not on file Occupational History  Not on file Tobacco Use  
  Smoking status: Current Every Day Smoker Packs/day: 0.00 Years: 20.00 Pack years: 0.00 Types: Cigarettes  Smokeless tobacco: Never Used  Tobacco comment: Patient states he smokes 2-3 cigarettes per day Substance and Sexual Activity  Alcohol use: No  
  Alcohol/week: 0.0 oz  
  Comment: \"none in over 20 years\"  Drug use: No  
  Comment: cocaine 2011  Sexual activity: Yes  
  Partners: Female Other Topics Concern  Not on file Social History Narrative  Not on file FHX:  
Family History Problem Relation Age of Onset  Diabetes Father  Heart Disease Father  Stroke Father Allergy: Allergies Allergen Reactions  Bee Sting [Sting, Bee] Hives and Shortness of Breath  Codeine Hives and Shortness of Breath Has tolerated Hydromorphone.  Haldol [Haloperidol Lactate] Hives and Shortness of Breath  Haloperidol Hives and Cough  Keflex [Cephalexin] Diarrhea  No Allergy Information Available Other (comments) Other reaction(s): hives  Shellfish Containing Products Hives  Stadol [Butorphanol Tartrate] Hives, Shortness of Breath and Rash  Vicodin [Hydrocodone-Acetaminophen] Hives, Shortness of Breath and Rash Home Medications: No medications prior to admission. Review of Systems:  
 
Constitutional: No fevers, chills, weight loss, fatigue. Skin: No rashes, pruritis, jaundice, ulcerations, erythema. HENT: No headaches, nosebleeds, sinus pressure, rhinorrhea, sore throat. Eyes: No visual changes, blurred vision, eye pain, photophobia, jaundice. Cardiovascular: No chest pain, heart palpitations. Respiratory: No cough, SOB, wheezing, chest discomfort, orthopnea. Gastrointestinal: Dysphagia Genitourinary: No dysuria, bleeding, discharge, pyuria. Musculoskeletal: No weakness, arthralgias, wasting. Endo: No sweats. Heme: No bruising, easy bleeding. Allergies: As noted. Neurological: Cranial nerves intact. Alert and oriented. Gait not assessed. Psychiatric:  No anxiety, depression, hallucinations. Visit Vitals Ht 5' 7\" (1.702 m) Wt 105.2 kg (232 lb) BMI 36.34 kg/m² Physical Assessment:  
 
constitutional: appearance: well developed, well nourished, normal habitus, no deformities, in no acute distress. skin: inspection: no rashes, ulcers, icterus or other lesions; no clubbing or telangiectasias. palpation: no induration or subcutaneos nodules. eyes: inspection: normal conjunctivae and lids; no jaundice pupils: symmetrical, normoreactive to light, normal accommodation and size. ENMT: mouth: normal oral mucosa,lips and gums; good dentition. oropharynx: normal tongue, hard and soft palate; posterior pharynx without erythema, exudate or lesions. neck: no masses organomegaly or tenderness. respiratory: effort: normal chest excursion; no intercostal retraction or accessory muscle use. cardiovascular: abdominal aorta: normal size and position; no bruits. palpation: PMI of normal size and position; normal rhythm; no thrill or murmurs. abdominal: abdomen: normal consistency; no tenderness or masses. hernias: no hernias appreciated. liver: normal size and consistency. spleen: not palpable. rectal: hemoccult/guaiac: not performed. musculoskeletal: no deformities or muscle wasting  
lymphatic: axilae: not palpable. groin: not palpable. neck: within normal limits. other: not palpable. neurologic: cranial nerves: II-XII normal.  
psychiatric: judgement/insight: within normal limits. memory: within normal limits for recent and remote events. mood and affect: no evidence of depression, anxiety or agitation. orientation: oriented to time, space and person. Basic Metabolic Profile No results for input(s): NA, K, CL, CO2, BUN, GLU, CA, MG, PHOS in the last 72 hours.  
 
No lab exists for component: CREAT  
  
CBC w/Diff   
 No results for input(s): WBC, RBC, HGB, HCT, MCV, MCH, MCHC, RDW, PLT, HGBEXT, HCTEXT, PLTEXT in the last 72 hours. No lab exists for component: MPV No results for input(s): GRANS, LYMPH, EOS, PRO, MYELO, METAS, BLAST in the last 72 hours. No lab exists for component: MONO, BASO Hepatic Function No results for input(s): ALB, TP, TBILI, GPT, SGOT, AP, AML, LPSE in the last 72 hours. No lab exists for component: Kacie Lemons MD, M.D. [facility], [address] [place] [address] 
[email or web address]

## 2018-10-30 NOTE — DISCHARGE INSTRUCTIONS
Esophageal Spasm: Care Instructions  Your Care Instructions    The esophagus is the tube that carries food from your mouth to your stomach. An esophageal spasm is when the muscles along the esophagus tighten in an irregular, painful way. Normally, the esophagus tightens in a coordinated manner to move food along and into the stomach. An esophageal spasm can prevent food from getting to the stomach. This leaves it stuck in the esophagus. The cause of esophageal spasm is not known, although it is more common in people who have gastroesophageal reflux disease (GERD). In some people, very hot or very cold foods can trigger a spasm. Follow-up care is a key part of your treatment and safety. Be sure to make and go to all appointments, and call your doctor if you are having problems. It's also a good idea to know your test results and keep a list of the medicines you take. How can you care for yourself at home? · Be safe with medicines. Take your medicines exactly as prescribed. Call your doctor if you think you are having a problem with your medicine. You will get more details on the specific medicines your doctor prescribes. · Treat other conditions that can make esophageal spasms worse, such as GERD. · To treat GERD:  ? Your doctor may recommend an over-the-counter medicine. For mild or occasional indigestion, it may help to take antacids, such as Tums, Gaviscon, Mylanta, or Maalox. Be careful when you take over-the-counter antacid medicines. Many of these medicines have aspirin in them. Read the label to make sure that you are not taking more than the recommended dose. Too much aspirin can be harmful. ? Your doctor also may recommend over-the-counter acid reducers, such as famotidine (Pepcid AC), cimetidine (Tagamet HB), ranitidine (Zantac 75 and Zantac 150), or omeprazole (Prilosec). ? Eat several small meals instead of two or three large meals. ?  After you eat, wait 2 to 3 hours before you lie down.  ? Chocolate, mint, and alcohol can make GERD worse. They relax the valve between the esophagus and the stomach. ? Spicy foods, foods that have a lot of acid (like tomatoes and oranges), and coffee can make GERD symptoms worse in some people. If your symptoms are worse after you eat a certain food, you may want to stop eating that food to see if your symptoms get better. ? Do not smoke or chew tobacco.  ? If you have GERD symptoms at night, raise the head of your bed 6 to 8 inches. You can do this by putting the frame on blocks. Or you can place a foam wedge under the head of your mattress. (Adding extra pillows does not work.)  ? Do not wear tight clothing around your middle. ? Lose weight if you need to. Losing just 5 to 10 pounds can help. · Ask your doctor about relaxation and controlled breathing exercises. These may help reduce symptoms. · Avoid very hot or cold foods if they trigger esophageal spasms. When should you call for help? Call your doctor now or seek immediate medical care if:    · You have new or worse belly pain.     · You are vomiting.    Watch closely for changes in your health, and be sure to contact your doctor if:    · You have new or worse symptoms of indigestion.     · You have trouble or pain swallowing.     · You are losing weight.     · You do not get better as expected. Where can you learn more? Go to http://turner-doris.info/. Enter T207 in the search box to learn more about \"Esophageal Spasm: Care Instructions. \"  Current as of: March 28, 2018  Content Version: 11.8  © 4221-6835 [employer]. Care instructions adapted under license by [employer] (which disclaims liability or warranty for this information). If you have questions about a medical condition or this instruction, always ask your healthcare professional. [address] any warranty or liability for your use of this information.     DISCHARGE SUMMARY from Nurse    PATIENT INSTRUCTIONS:    After general anesthesia or intravenous sedation, for 24 hours or while taking prescription Narcotics:  · Limit your activities  · Do not drive and operate hazardous machinery  · Do not make important personal or business decisions  · Do  not drink alcoholic beverages  · If you have not urinated within 8 hours after discharge, please contact your surgeon on call. Report the following to your surgeon:  · Excessive pain, swelling, redness or odor of or around the surgical area  · Temperature over 100.5  · Nausea and vomiting lasting longer than 4 hours or if unable to take medications  · Any signs of decreased circulation or nerve impairment to extremity: change in color, persistent  numbness, tingling, coldness or increase pain  · Any questions    *  Please give a list of your current medications to your Primary Care Provider. *  Please update this list whenever your medications are discontinued, doses are      changed, or new medications (including over-the-counter products) are added. *  Please carry medication information at all times in case of emergency situations. These are general instructions for a healthy lifestyle:    No smoking/ No tobacco products/ Avoid exposure to second hand smoke  Surgeon General's Warning:  Quitting smoking now greatly reduces serious risk to your health. Obesity, smoking, and sedentary lifestyle greatly increases your risk for illness    A healthy diet, regular physical exercise & weight monitoring are important for maintaining a healthy lifestyle    You may be retaining fluid if you have a history of heart failure or if you experience any of the following symptoms:  Weight gain of 3 pounds or more overnight or 5 pounds in a week, increased swelling in our hands or feet or shortness of breath while lying flat in bed.   Please call your doctor as soon as you notice any of these symptoms; do not wait until your next office visit. Recognize signs and symptoms of STROKE:    F-face looks uneven    A-arms unable to move or move unevenly    S-speech slurred or non-existent    T-time-call 911 as soon as signs and symptoms begin-DO NOT go       Back to bed or wait to see if you get better-TIME IS BRAIN. Warning Signs of HEART ATTACK     Call 911 if you have these symptoms:   Chest discomfort. Most heart attacks involve discomfort in the center of the chest that lasts more than a few minutes, or that goes away and comes back. It can feel like uncomfortable pressure, squeezing, fullness, or pain.  Discomfort in other areas of the upper body. Symptoms can include pain or discomfort in one or both arms, the back, neck, jaw, or stomach.  Shortness of breath with or without chest discomfort.  Other signs may include breaking out in a cold sweat, nausea, or lightheadedness. Don't wait more than five minutes to call 911 - MINUTES MATTER! Fast action can save your life. Calling 911 is almost always the fastest way to get lifesaving treatment. Emergency Medical Services staff can begin treatment when they arrive -- up to an hour sooner than if someone gets to the hospital by car. The discharge information has been reviewed with the patient and spouse. The patient and spouse verbalized understanding. Discharge medications reviewed with the patient and spouse and appropriate educational materials and side effects teaching were provided.   ___________________________________________________________________________________________________________________________________

## 2018-10-31 LAB — HCG UR QL: NEGATIVE

## 2018-11-23 ENCOUNTER — APPOINTMENT (OUTPATIENT)
Dept: GENERAL RADIOLOGY | Age: 63
End: 2018-11-23
Attending: PHYSICIAN ASSISTANT
Payer: MEDICARE

## 2018-11-23 ENCOUNTER — HOSPITAL ENCOUNTER (EMERGENCY)
Age: 63
Discharge: HOME | End: 2018-11-24
Attending: EMERGENCY MEDICINE
Payer: MEDICARE

## 2018-11-23 VITALS
RESPIRATION RATE: 16 BRPM | BODY MASS INDEX: 36.41 KG/M2 | WEIGHT: 232 LBS | TEMPERATURE: 98.1 F | SYSTOLIC BLOOD PRESSURE: 152 MMHG | DIASTOLIC BLOOD PRESSURE: 91 MMHG | OXYGEN SATURATION: 99 % | HEART RATE: 87 BPM | HEIGHT: 67 IN

## 2018-11-23 DIAGNOSIS — R07.9 CHEST PAIN, UNSPECIFIED TYPE: Primary | ICD-10-CM

## 2018-11-23 DIAGNOSIS — K22.9 ESOPHAGEAL DISORDER: ICD-10-CM

## 2018-11-23 LAB
ATRIAL RATE: 94 BPM
CALCULATED P AXIS, ECG09: 34 DEGREES
CALCULATED R AXIS, ECG10: -5 DEGREES
CALCULATED T AXIS, ECG11: 51 DEGREES
DIAGNOSIS, 93000: NORMAL
P-R INTERVAL, ECG05: 144 MS
Q-T INTERVAL, ECG07: 340 MS
QRS DURATION, ECG06: 78 MS
QTC CALCULATION (BEZET), ECG08: 425 MS
VENTRICULAR RATE, ECG03: 94 BPM

## 2018-11-23 PROCEDURE — 93005 ELECTROCARDIOGRAM TRACING: CPT

## 2018-11-23 PROCEDURE — 71046 X-RAY EXAM CHEST 2 VIEWS: CPT

## 2018-11-23 PROCEDURE — 99284 EMERGENCY DEPT VISIT MOD MDM: CPT

## 2018-11-23 RX ORDER — MORPHINE SULFATE 4 MG/ML
8 INJECTION INTRAVENOUS ONCE
Status: COMPLETED | OUTPATIENT
Start: 2018-11-23 | End: 2018-11-24

## 2018-11-23 RX ORDER — ONDANSETRON 2 MG/ML
8 INJECTION INTRAMUSCULAR; INTRAVENOUS
Status: COMPLETED | OUTPATIENT
Start: 2018-11-23 | End: 2018-11-24

## 2018-11-24 LAB
ALBUMIN SERPL-MCNC: 3.3 G/DL (ref 3.4–5)
ALBUMIN/GLOB SERPL: 0.8 {RATIO} (ref 0.8–1.7)
ALP SERPL-CCNC: 126 U/L (ref 45–117)
ALT SERPL-CCNC: 52 U/L (ref 16–61)
ANION GAP SERPL CALC-SCNC: 8 MMOL/L (ref 3–18)
AST SERPL-CCNC: 53 U/L (ref 15–37)
BASOPHILS # BLD: 0 K/UL (ref 0–0.1)
BASOPHILS NFR BLD: 1 % (ref 0–2)
BILIRUB SERPL-MCNC: 0.8 MG/DL (ref 0.2–1)
BUN SERPL-MCNC: 11 MG/DL (ref 7–18)
BUN/CREAT SERPL: 12 (ref 12–20)
CALCIUM SERPL-MCNC: 9.1 MG/DL (ref 8.5–10.1)
CHLORIDE SERPL-SCNC: 106 MMOL/L (ref 100–108)
CK MB CFR SERPL CALC: 0.7 % (ref 0–4)
CK MB SERPL-MCNC: 1.9 NG/ML (ref 5–25)
CK SERPL-CCNC: 267 U/L (ref 39–308)
CO2 SERPL-SCNC: 23 MMOL/L (ref 21–32)
CREAT SERPL-MCNC: 0.89 MG/DL (ref 0.6–1.3)
DIFFERENTIAL METHOD BLD: ABNORMAL
EOSINOPHIL # BLD: 0.2 K/UL (ref 0–0.4)
EOSINOPHIL NFR BLD: 3 % (ref 0–5)
ERYTHROCYTE [DISTWIDTH] IN BLOOD BY AUTOMATED COUNT: 13.5 % (ref 11.6–14.5)
GLOBULIN SER CALC-MCNC: 4 G/DL (ref 2–4)
GLUCOSE SERPL-MCNC: 80 MG/DL (ref 74–99)
HCT VFR BLD AUTO: 41.8 % (ref 36–48)
HGB BLD-MCNC: 15 G/DL (ref 13–16)
LYMPHOCYTES # BLD: 2.6 K/UL (ref 0.9–3.6)
LYMPHOCYTES NFR BLD: 34 % (ref 21–52)
MCH RBC QN AUTO: 32.1 PG (ref 24–34)
MCHC RBC AUTO-ENTMCNC: 35.9 G/DL (ref 31–37)
MCV RBC AUTO: 89.3 FL (ref 74–97)
MONOCYTES # BLD: 0.9 K/UL (ref 0.05–1.2)
MONOCYTES NFR BLD: 11 % (ref 3–10)
NEUTS SEG # BLD: 4 K/UL (ref 1.8–8)
NEUTS SEG NFR BLD: 51 % (ref 40–73)
PLATELET # BLD AUTO: 128 K/UL (ref 135–420)
PMV BLD AUTO: 10.9 FL (ref 9.2–11.8)
POTASSIUM SERPL-SCNC: 3.8 MMOL/L (ref 3.5–5.5)
PROT SERPL-MCNC: 7.3 G/DL (ref 6.4–8.2)
RBC # BLD AUTO: 4.68 M/UL (ref 4.7–5.5)
SODIUM SERPL-SCNC: 137 MMOL/L (ref 136–145)
TROPONIN I BLD-MCNC: <0.04 NG/ML (ref 0–0.08)
TROPONIN I SERPL-MCNC: <0.02 NG/ML (ref 0–0.04)
WBC # BLD AUTO: 7.6 K/UL (ref 4.6–13.2)

## 2018-11-24 PROCEDURE — 82553 CREATINE MB FRACTION: CPT

## 2018-11-24 PROCEDURE — 80053 COMPREHEN METABOLIC PANEL: CPT

## 2018-11-24 PROCEDURE — 85025 COMPLETE CBC W/AUTO DIFF WBC: CPT

## 2018-11-24 PROCEDURE — 84484 ASSAY OF TROPONIN QUANT: CPT

## 2018-11-24 PROCEDURE — 96375 TX/PRO/DX INJ NEW DRUG ADDON: CPT

## 2018-11-24 PROCEDURE — 74011250636 HC RX REV CODE- 250/636: Performed by: PHYSICIAN ASSISTANT

## 2018-11-24 PROCEDURE — 99284 EMERGENCY DEPT VISIT MOD MDM: CPT

## 2018-11-24 PROCEDURE — 96376 TX/PRO/DX INJ SAME DRUG ADON: CPT

## 2018-11-24 PROCEDURE — 96374 THER/PROPH/DIAG INJ IV PUSH: CPT

## 2018-11-24 RX ORDER — ONDANSETRON 4 MG/1
4 TABLET, ORALLY DISINTEGRATING ORAL
Qty: 15 TAB | Refills: 0 | Status: SHIPPED | OUTPATIENT
Start: 2018-11-24

## 2018-11-24 RX ORDER — OXYCODONE AND ACETAMINOPHEN 5; 325 MG/1; MG/1
1 TABLET ORAL
Qty: 6 TAB | Refills: 0 | Status: SHIPPED | OUTPATIENT
Start: 2018-11-24 | End: 2019-01-08

## 2018-11-24 RX ORDER — MORPHINE SULFATE 8 MG/ML
6 INJECTION INTRAVENOUS ONCE
Status: COMPLETED | OUTPATIENT
Start: 2018-11-24 | End: 2018-11-24

## 2018-11-24 RX ORDER — MORPHINE SULFATE 4 MG/ML
4 INJECTION INTRAVENOUS
Status: COMPLETED | OUTPATIENT
Start: 2018-11-24 | End: 2018-11-24

## 2018-11-24 RX ADMIN — MORPHINE SULFATE 4 MG: 4 INJECTION INTRAVENOUS at 02:20

## 2018-11-24 RX ADMIN — MORPHINE SULFATE 6.4 MG: 8 INJECTION INTRAVENOUS at 04:25

## 2018-11-24 RX ADMIN — ONDANSETRON 8 MG: 2 INJECTION INTRAMUSCULAR; INTRAVENOUS at 00:45

## 2018-11-24 RX ADMIN — MORPHINE SULFATE 8 MG: 4 INJECTION INTRAVENOUS at 00:45

## 2018-11-24 NOTE — ED NOTES
I have reviewed discharge instructions with patient. The patient verbalized understanding. Patient armband removed and shredded. Pt is ambulatory with no acute distress noted at this time, pt alert and oriented. Stable at time of discharge. Vital signs stable. Pt is being discharged with 2 prescriptions at this time. Wife to drive pt home.

## 2018-11-24 NOTE — ED PROVIDER NOTES
EMERGENCY DEPARTMENT HISTORY AND PHYSICAL EXAM 
 
Date: 11/23/2018 Patient Name: Peg Amaya History of Presenting Illness History Provided By: Patient Chief Complaint: Chest pain Duration: \"today\" Timing:  Constant Location: epigastric and mid chest  
 
Additional History (Context): Peg Amaya is a 58 y.o. male with PMHx of GERD, DJD, obesity, CAD with 3 stents placed, nutcracker esophagus, and stroke who presents with c/o moderate constant aching epigastric and mid chest pain that radiates through the back. Pt states the Sx started after eating a hamburger and felt like it something got stuck. Pt has a hx of needing is esophagus stretched every 6 weeks and Dr. Barak London does the stretching. Pt states he called Dr. Barak London today and spoke with Dr. Joshua Dawkins and he will schedule to see the patient on Monday to be stretched again. However, because of the pt's hx of CAD with stent placement he advise the patient come to the ED to rule out cardiac origin. Pt had 1 episode of vomiting. Pt denies SOB and diaphoresis. Denies any further complaints or symptoms at the moment. PCP: Carmela Paul, DO 
 
Current Outpatient Medications Medication Sig Dispense Refill  ondansetron (ZOFRAN ODT) 4 mg disintegrating tablet Take 1 Tab by mouth every eight (8) hours as needed for Nausea. 15 Tab 0  
 oxyCODONE-acetaminophen (PERCOCET) 5-325 mg per tablet Take 1 Tab by mouth every four (4) hours as needed for Pain. Max Daily Amount: 6 Tabs. 6 Tab 0  
 omeprazole (PRILOSEC) 10 mg capsule Take 10 mg by mouth two (2) times a day.  chlorthalidone (HYGROTEN) 25 mg tablet Take 1 Tab by mouth daily. 30 Tab 11  
 clopidogrel (PLAVIX) 75 mg tab Take 75 mg by mouth.  nitroglycerin (NITROSTAT) 0.3 mg SL tablet 0.3 mg.    
 carvedilol (COREG) 25 mg tablet Take 25 mg by mouth two (2) times daily (with meals).  atorvastatin (LIPITOR) 40 mg tablet Take 40 mg by mouth daily.  DULoxetine (CYMBALTA) 60 mg capsule Take 60 mg by mouth daily. Past History Past Medical History: 
Past Medical History:  
Diagnosis Date  Adverse effect of anesthesia   
 esophageal spasms  Altered mental status  Arthritis  Arthropathy  Back pain  Bilateral kidney stones  Burn ON HANDS  
 CAD (coronary artery disease) 1999 MI    3 cardiac stents  Calcium nephrolithiasis  Calculus of kidney  Cardiac arrhythmia  Cerebral artery occlusion with cerebral infarction ([address] 2010  
 no residual  
 Chest pain  Cigarette smoker  Cirrhosis, alcoholic ([facility]  Cocaine abuse, episodic use ([address]  Colon polyp  Diarrhea  DJD (degenerative joint disease)  Drug-seeking behavior  Dyskinesia  Esophageal disorder  Esophageal erosions  Flank pain, acute  Foot ulcer, right ([address]  Gastric ulcer  GERD (gastroesophageal reflux disease)  Gross hematuria  Head trauma  Hearing reduced  Hematuria  Herniated disc X 10 IN BACK  Hiatus hernia syndrome  History of kidney stones  HNP (herniated nucleus pulposus)  Hyperlipemia  Hypertension  Hypokalemia  Infection  Jaw fracture ([facility]  Kidney stones  Knee pain  Left ureteral stone  Muscle atrophy  Myocardial infarction [facility]) 1999  N&V (nausea and vomiting)  Nocturia  Nondependent alcohol abuse, in remission  Nutcracker esophagus  Other ill-defined conditions(799.89) dysphagia  Renal stone  Slurred speech  Stroke [facility]) 1999  
 questionable TIA  Swallowing difficulty  Syncopal episodes  Syncope and collapse  Tobacco dependence  Unspecified adverse effect of anesthesia AT TIMES ESOPHAGUS SPASMS AFTER PROCEDURES  
 Unspecified cerebral artery occlusion with cerebral infarction Past Surgical History: 
Past Surgical History: Procedure Laterality Date  HX APPENDECTOMY    
 9YEARS OLD  
 HX CHOLECYSTECTOMY  2009  HX CORONARY STENT PLACEMENT  2008  
 3 STENTS PLACED  HX ENDOSCOPY    
 with Dilatation, multiple times [address] CATHETERIZATION  2012 EVERYTHING LOOKED GOOD AT THIS POINT  HX HEENT    
 \"2 jaw surgeries\"  HX HERNIA REPAIR    
 ABDOMEN  
 HX KNEE ARTHROSCOPY  9/2012 LEFT  
 HX MOHS PROCEDURES  2016  HX ORTHOPAEDIC  12/12  
 left knee  HX OTHER SURGICAL    
 ESOPHAGUS DILATATION  
 HX ROTATOR CUFF REPAIR Right   
 x2  
 HX UROLOGICAL  07/18/2016 [facility]-Cystoscopy,URETEROSCOPY W/ LITHOTRIPSY, Dr Sumanth Locke   UROLOGICAL  10/10/2016 [facility]-Cystoscopy with removal of ureteral stent, Dr Sumanth Locke   UROLOGICAL  01/30/2017 [facility]-Cystoscopy, Left retrograde pyelography, Left diagnostic ureteroscopy, Left 6 x 26 cm double-J ureteral stent placement,Right retrograde pyelography, Right ureteroscopic stone extraction,Right 6 x 24 cm double-J ureteral stent placement, Interpretation of urography,Intraoperative fluoro less than 1 hour,    UROLOGICAL  02/08/2017 [facility]-CYSTOURETHROSCOPY WITH STENT REMOVAL, Dr Sumanth Locke Family History: 
Family History Problem Relation Age of Onset  Diabetes Father  Heart Disease Father  Stroke Father Social History: 
Social History Tobacco Use  Smoking status: Current Every Day Smoker Packs/day: 0.00 Years: 20.00 Pack years: 0.00 Types: Cigarettes  Smokeless tobacco: Never Used  Tobacco comment: Patient states he smokes 2-3 cigarettes per day Substance Use Topics  Alcohol use: No  
  Alcohol/week: 0.0 oz  
  Comment: \"none in over 20 years\"  Drug use: No  
  Comment: cocaine 2011 Allergies: Allergies Allergen Reactions  Bee Sting [Sting, Bee] Hives and Shortness of Breath  Codeine Hives and Shortness of Breath Has tolerated Hydromorphone.  Haldol [Haloperidol Lactate] Hives and Shortness of Breath  Haloperidol Hives and Cough  Keflex [Cephalexin] Diarrhea  No Allergy Information Available Other (comments) Other reaction(s): hives  Shellfish Containing Products Hives  Stadol [Butorphanol Tartrate] Hives, Shortness of Breath and Rash  Vicodin [Hydrocodone-Acetaminophen] Hives, Shortness of Breath and Rash Review of Systems Review of Systems Constitutional: Negative for diaphoresis. Respiratory: Negative for shortness of breath. Cardiovascular: Positive for chest pain (epigastric and mid). Gastrointestinal: Positive for vomiting. All other systems reviewed and are negative. Physical Exam  
 
Vitals:  
 11/23/18 2034 BP: (!) 152/91 Pulse: 87 Resp: 16 Temp: 98.1 °F (36.7 °C) SpO2: 99% Weight: 105.2 kg (232 lb) Height: 5' 7\" (1.702 m) Physical Exam  
Constitutional: He is oriented to person, place, and time. He appears well-developed and well-nourished. Mild pain distress HENT:  
Head: Normocephalic and atraumatic. Eyes: EOM are normal. Pupils are equal, round, and reactive to light. Neck: Neck supple. Cardiovascular: Normal rate and regular rhythm. Exam reveals no gallop and no friction rub. No murmur heard. No leg edema Pulmonary/Chest: Effort normal and breath sounds normal. No respiratory distress. He has no wheezes. He has no rales. He exhibits no tenderness. Abdominal: Soft. Bowel sounds are normal. He exhibits no distension and no mass. There is no tenderness. There is no rebound and no guarding. Musculoskeletal: Normal range of motion. He exhibits no tenderness or deformity. Lymphadenopathy:  
  He has no cervical adenopathy. Neurological: He is alert and oriented to person, place, and time. Skin: Skin is warm and dry. No rash noted. He is not diaphoretic. Psychiatric: He has a normal mood and affect.  His behavior is normal.  
 Nursing note and vitals reviewed. Diagnostic Study Results Labs - No results found for this or any previous visit (from the past 12 hour(s)). Radiologic Studies -  
XR CHEST PA LAT Final Result CT Results  (Last 48 hours) None CXR Results  (Last 48 hours)  
          
 11/23/18 2056  XR CHEST PA LAT Final result Impression:  IMPRESSION:  
   
1. No clearly acute findings. Basilar streaky densities, atelectasis versus  
infiltrate. Narrative:  EXAMINATION: Chest 2 views INDICATION: Chest pain COMPARISON: 8/26/2018 FINDINGS: Frontal and lateral views of the chest obtained. No consolidation. Mediastinal silhouette and pulmonary vasculature unremarkable. Bibasilar streaky  
densities. No evidence of pneumothorax. No acute osseous findings. Right humeral  
head surgical anchors. Medical Decision Making I am the first provider for this patient. I reviewed the vital signs, available nursing notes, past medical history, past surgical history, family history and social history. Vital Signs-Reviewed the patient's vital signs. EKG: Interpreted by the EP. and PA Row[place]-Jimbo Rate 94, rhythm:  Sinus rhythm with PACs, no STEMI, no significant changes from prior on 8/26/2018 Records Reviewed: Nursing Notes Procedures: 
Procedures Provider Notes (Medical Decision Making):  
Progress:  Patient with improvement of pain, but still with some discomfort. Would like another round of pain meds. Labs are reassuring. Will trend trop. Shakila Macdonald Ala Progress: Patient will continued improvement of symptoms. Would like just a little more pain meds and then would like to go home. Trending Trops are negative. Shakila aMcdonald Ala Impression:  Chest pain, esophageal disorder. Trops are negative, EKG is reassuring.   Plan to have him follow with PCP, cards, GI for further management. He is a reliable patient and will return if he worsens at all. DAVID Alcantara 
 
MED RECONCILIATION: 
No current facility-administered medications for this encounter. Current Outpatient Medications Medication Sig  
 ondansetron (ZOFRAN ODT) 4 mg disintegrating tablet Take 1 Tab by mouth every eight (8) hours as needed for Nausea.  oxyCODONE-acetaminophen (PERCOCET) 5-325 mg per tablet Take 1 Tab by mouth every four (4) hours as needed for Pain. Max Daily Amount: 6 Tabs.  omeprazole (PRILOSEC) 10 mg capsule Take 10 mg by mouth two (2) times a day.  chlorthalidone (HYGROTEN) 25 mg tablet Take 1 Tab by mouth daily.  clopidogrel (PLAVIX) 75 mg tab Take 75 mg by mouth.  nitroglycerin (NITROSTAT) 0.3 mg SL tablet 0.3 mg.  
 carvedilol (COREG) 25 mg tablet Take 25 mg by mouth two (2) times daily (with meals).  atorvastatin (LIPITOR) 40 mg tablet Take 40 mg by mouth daily.  DULoxetine (CYMBALTA) 60 mg capsule Take 60 mg by mouth daily. Disposition: 
discharged DISCHARGE NOTE:  
 
Pt has been reexamined. Patient has no new complaints, changes, or physical findings. Care plan outlined and precautions discussed. Results were reviewed with the patient. All medications were reviewed with the patient; will d/c home. All of pt's questions and concerns were addressed. Patient was instructed and agrees to follow up with GI on Monday, as well as to return to the ED upon further deterioration. Patient is ready to go home. Follow-up Information Follow up With Specialties Details Why Contact Info [facility] EMERGENCY DEPT Emergency Medicine  If symptoms worsen [address] [id number] 
[phone] Mariola Bolden,  Osteopathic Medicine In 1 week  [address] [place] [id number] [phone] Hamzah Sainz MD Gastroenterology In 2 days  [address] [place] [address] [address] [facility] 
[phone] Discharge Medication List as of 11/24/2018  4:49 AM  
  
START taking these medications Details  
ondansetron (ZOFRAN ODT) 4 mg disintegrating tablet Take 1 Tab by mouth every eight (8) hours as needed for Nausea. , Print, Disp-15 Tab, R-0  
  
oxyCODONE-acetaminophen (PERCOCET) 5-325 mg per tablet Take 1 Tab by mouth every four (4) hours as needed for Pain. Max Daily Amount: 6 Tabs., Print, Disp-6 Tab, R-0  
  
  
CONTINUE these medications which have NOT CHANGED Details  
omeprazole (PRILOSEC) 10 mg capsule Take 10 mg by mouth two (2) times a day., Historical Med  
  
chlorthalidone (HYGROTEN) 25 mg tablet Take 1 Tab by mouth daily. , Print, Disp-30 Tab, R-11  
  
clopidogrel (PLAVIX) 75 mg tab Take 75 mg by mouth., Historical Med  
  
nitroglycerin (NITROSTAT) 0.3 mg SL tablet 0.3 mg., Historical Med  
  
carvedilol (COREG) 25 mg tablet Take 25 mg by mouth two (2) times daily (with meals). , Historical Med  
  
atorvastatin (LIPITOR) 40 mg tablet Take 40 mg by mouth daily. Historical Med, 40 mg DULoxetine (CYMBALTA) 60 mg capsule Take 60 mg by mouth daily. Historical Med, 60 mg  
  
  
STOP taking these medications  
  
 meperidine (DEMEROL) 50 mg tablet Comments:  
Reason for Stopping:   
   
 diazePAM (VALIUM) 10 mg tablet Comments:  
Reason for Stopping:   
   
  
 
 
 
 
 
Diagnosis Clinical Impression: 1. Chest pain, unspecified type 2. Esophageal disorder

## 2018-11-24 NOTE — DISCHARGE INSTRUCTIONS

## 2018-11-28 ENCOUNTER — ANESTHESIA EVENT (OUTPATIENT)
Dept: ENDOSCOPY | Age: 63
End: 2018-11-28
Payer: MEDICARE

## 2018-11-29 ENCOUNTER — HOSPITAL ENCOUNTER (OUTPATIENT)
Age: 63
Setting detail: OUTPATIENT SURGERY
Discharge: HOME | End: 2018-11-29
Attending: INTERNAL MEDICINE | Admitting: INTERNAL MEDICINE
Payer: MEDICARE

## 2018-11-29 ENCOUNTER — HOSPITAL ENCOUNTER (OUTPATIENT)
Dept: LAB | Age: 63
Discharge: HOME | End: 2018-11-29
Payer: MEDICARE

## 2018-11-29 ENCOUNTER — ANESTHESIA (OUTPATIENT)
Dept: ENDOSCOPY | Age: 63
End: 2018-11-29
Payer: MEDICARE

## 2018-11-29 VITALS
OXYGEN SATURATION: 98 % | HEART RATE: 75 BPM | SYSTOLIC BLOOD PRESSURE: 156 MMHG | BODY MASS INDEX: 37.2 KG/M2 | HEIGHT: 67 IN | TEMPERATURE: 98 F | DIASTOLIC BLOOD PRESSURE: 64 MMHG | RESPIRATION RATE: 14 BRPM | WEIGHT: 237 LBS

## 2018-11-29 LAB
AMPHET UR QL SCN: NEGATIVE
BARBITURATES UR QL SCN: NEGATIVE
BENZODIAZ UR QL: NEGATIVE
CANNABINOIDS UR QL SCN: NEGATIVE
COCAINE UR QL SCN: NEGATIVE
HDSCOM,HDSCOM: NORMAL
METHADONE UR QL: NEGATIVE
OPIATES UR QL: NEGATIVE
PCP UR QL: NEGATIVE

## 2018-11-29 PROCEDURE — 80307 DRUG TEST PRSMV CHEM ANLYZR: CPT

## 2018-11-29 PROCEDURE — 76040000019: Performed by: INTERNAL MEDICINE

## 2018-11-29 PROCEDURE — 74011250636 HC RX REV CODE- 250/636: Performed by: ANESTHESIOLOGY

## 2018-11-29 PROCEDURE — 74011250636 HC RX REV CODE- 250/636: Performed by: NURSE ANESTHETIST, CERTIFIED REGISTERED

## 2018-11-29 PROCEDURE — 77030009426 HC FCPS BIOP ENDOSC BSC -B: Performed by: INTERNAL MEDICINE

## 2018-11-29 PROCEDURE — 88305 TISSUE EXAM BY PATHOLOGIST: CPT

## 2018-11-29 PROCEDURE — 76060000031 HC ANESTHESIA FIRST 0.5 HR: Performed by: INTERNAL MEDICINE

## 2018-11-29 PROCEDURE — 36415 COLL VENOUS BLD VENIPUNCTURE: CPT

## 2018-11-29 PROCEDURE — 74011250636 HC RX REV CODE- 250/636

## 2018-11-29 RX ORDER — SODIUM CHLORIDE, SODIUM LACTATE, POTASSIUM CHLORIDE, CALCIUM CHLORIDE 600; 310; 30; 20 MG/100ML; MG/100ML; MG/100ML; MG/100ML
INJECTION, SOLUTION INTRAVENOUS
Status: DISCONTINUED | OUTPATIENT
Start: 2018-11-29 | End: 2018-11-29 | Stop reason: HOSPADM

## 2018-11-29 RX ORDER — LIDOCAINE HYDROCHLORIDE 10 MG/ML
0.1 INJECTION, SOLUTION EPIDURAL; INFILTRATION; INTRACAUDAL; PERINEURAL AS NEEDED
Status: DISCONTINUED | OUTPATIENT
Start: 2018-11-29 | End: 2018-11-29 | Stop reason: HOSPADM

## 2018-11-29 RX ORDER — INSULIN LISPRO 100 [IU]/ML
INJECTION, SOLUTION INTRAVENOUS; SUBCUTANEOUS ONCE
Status: DISCONTINUED | OUTPATIENT
Start: 2018-11-29 | End: 2018-11-29 | Stop reason: HOSPADM

## 2018-11-29 RX ORDER — SODIUM CHLORIDE 0.9 % (FLUSH) 0.9 %
5-10 SYRINGE (ML) INJECTION AS NEEDED
Status: DISCONTINUED | OUTPATIENT
Start: 2018-11-29 | End: 2018-11-29 | Stop reason: HOSPADM

## 2018-11-29 RX ORDER — PROPOFOL 10 MG/ML
INJECTION, EMULSION INTRAVENOUS AS NEEDED
Status: DISCONTINUED | OUTPATIENT
Start: 2018-11-29 | End: 2018-11-29 | Stop reason: HOSPADM

## 2018-11-29 RX ORDER — SODIUM CHLORIDE, SODIUM LACTATE, POTASSIUM CHLORIDE, CALCIUM CHLORIDE 600; 310; 30; 20 MG/100ML; MG/100ML; MG/100ML; MG/100ML
75 INJECTION, SOLUTION INTRAVENOUS CONTINUOUS
Status: DISCONTINUED | OUTPATIENT
Start: 2018-11-29 | End: 2018-11-29 | Stop reason: HOSPADM

## 2018-11-29 RX ORDER — HYDROMORPHONE HYDROCHLORIDE 2 MG/ML
0.5 INJECTION, SOLUTION INTRAMUSCULAR; INTRAVENOUS; SUBCUTANEOUS
Status: COMPLETED | OUTPATIENT
Start: 2018-11-29 | End: 2018-11-29

## 2018-11-29 RX ORDER — SODIUM CHLORIDE 0.9 % (FLUSH) 0.9 %
5-10 SYRINGE (ML) INJECTION EVERY 8 HOURS
Status: DISCONTINUED | OUTPATIENT
Start: 2018-11-29 | End: 2018-11-29 | Stop reason: HOSPADM

## 2018-11-29 RX ORDER — SODIUM CHLORIDE, SODIUM LACTATE, POTASSIUM CHLORIDE, CALCIUM CHLORIDE 600; 310; 30; 20 MG/100ML; MG/100ML; MG/100ML; MG/100ML
100 INJECTION, SOLUTION INTRAVENOUS CONTINUOUS
Status: DISCONTINUED | OUTPATIENT
Start: 2018-11-29 | End: 2018-11-29 | Stop reason: HOSPADM

## 2018-11-29 RX ADMIN — HYDROMORPHONE HYDROCHLORIDE 0.5 MG: 2 INJECTION, SOLUTION INTRAMUSCULAR; INTRAVENOUS; SUBCUTANEOUS at 12:11

## 2018-11-29 RX ADMIN — PROPOFOL 100 MG: 10 INJECTION, EMULSION INTRAVENOUS at 11:45

## 2018-11-29 RX ADMIN — FAMOTIDINE 20 MG: 10 INJECTION, SOLUTION INTRAVENOUS at 11:37

## 2018-11-29 RX ADMIN — HYDROMORPHONE HYDROCHLORIDE 0.5 MG: 2 INJECTION, SOLUTION INTRAMUSCULAR; INTRAVENOUS; SUBCUTANEOUS at 12:30

## 2018-11-29 RX ADMIN — SODIUM CHLORIDE, SODIUM LACTATE, POTASSIUM CHLORIDE, CALCIUM CHLORIDE: 600; 310; 30; 20 INJECTION, SOLUTION INTRAVENOUS at 11:41

## 2018-11-29 RX ADMIN — SODIUM CHLORIDE, SODIUM LACTATE, POTASSIUM CHLORIDE, AND CALCIUM CHLORIDE 100 ML/HR: 600; 310; 30; 20 INJECTION, SOLUTION INTRAVENOUS at 11:37

## 2018-11-29 RX ADMIN — PROPOFOL 30 MG: 10 INJECTION, EMULSION INTRAVENOUS at 11:48

## 2018-11-29 NOTE — ANESTHESIA PREPROCEDURE EVALUATION
Anesthetic History PONV Review of Systems / Medical History Pulmonary Smoker Neuro/Psych Cardiovascular Hypertension: well controlled CAD and cardiac stents Exercise tolerance: >4 METS 
  
GI/Hepatic/Renal 
  
GERD: poorly controlled PUD and liver disease Endo/Other Arthritis Other Findings Physical Exam 
 
Airway Mallampati: III Neck ROM: decreased range of motion Mouth opening: Diminished (comment) Cardiovascular Rhythm: regular Rate: normal 
 
 
 
 Dental 
 
Dentition: Poor dentition and Implants Pulmonary Breath sounds clear to auscultation Abdominal 
GI exam deferred Other Findings Anesthetic Plan ASA: 3 Anesthesia type: MAC Anesthetic plan and risks discussed with: Patient

## 2018-11-29 NOTE — H&P
[email or web address] Impression: 1.dysphagia esophageal spasm 2. Hx cad 3. Hx drug use Plan: 1. egd dila mac all risks benfits and alt discussed 2. Stat uds Chief Complaint: chest pain HPI: 
Alejandra Gifford is a 58 y.o. male who is being seen on consult for dysphagia and chest pain PMH:  
Past Medical History:  
Diagnosis Date  Adverse effect of anesthesia   
 esophageal spasms  Altered mental status  Arthritis  Arthropathy  Back pain  Bilateral kidney stones  Burn ON HANDS  
 CAD (coronary artery disease) 1999 MI    3 cardiac stents  Calcium nephrolithiasis  Calculus of kidney  Cardiac arrhythmia  Cerebral artery occlusion with cerebral infarction ([address] 2010  
 no residual  
 Chest pain  Cigarette smoker  Cirrhosis, alcoholic ([address]  Cocaine abuse, episodic use ([address]  Colon polyp  Diarrhea  DJD (degenerative joint disease)  Drug-seeking behavior  Dyskinesia  Esophageal disorder  Esophageal erosions  Flank pain, acute  Foot ulcer, right ([address]  Gastric ulcer  GERD (gastroesophageal reflux disease)  Gross hematuria  Head trauma  Hearing reduced  Hematuria  Herniated disc X 10 IN BACK  Hiatus hernia syndrome  History of kidney stones  HNP (herniated nucleus pulposus)  Hyperlipemia  Hypertension  Hypokalemia  Infection  Jaw fracture ([address]  Kidney stones  Knee pain  Left ureteral stone  Muscle atrophy  Myocardial infarction [facility]) 1999  N&V (nausea and vomiting)  Nocturia  Nondependent alcohol abuse, in remission  Nutcracker esophagus  Other ill-defined conditions(799.89) dysphagia  Renal stone  Slurred speech  Stroke [facility]) 1999  
 questionable TIA  Swallowing difficulty  Syncopal episodes  Syncope and collapse  Tobacco dependence  Unspecified adverse effect of anesthesia AT TIMES ESOPHAGUS SPASMS AFTER PROCEDURES  
 Unspecified cerebral artery occlusion with cerebral infarction PSH:  
Past Surgical History:  
Procedure Laterality Date  HX APPENDECTOMY    
 9YEARS OLD  
 HX CHOLECYSTECTOMY  2009  HX CORONARY STENT PLACEMENT  2008  
 3 STENTS PLACED  HX ENDOSCOPY    
 with Dilatation, multiple times [address] CATHETERIZATION  2012 EVERYTHING LOOKED GOOD AT THIS POINT  HX HEENT    
 \"2 jaw surgeries\"  HX HERNIA REPAIR    
 ABDOMEN  
 HX KNEE ARTHROSCOPY  9/2012 LEFT  
 HX MOHS PROCEDURES  2016  HX ORTHOPAEDIC  12/12  
 left knee  HX OTHER SURGICAL    
 ESOPHAGUS DILATATION  
 HX ROTATOR CUFF REPAIR Right   
 x2  
 HX UROLOGICAL  07/18/2016 [facility]-Cystoscopy,URETEROSCOPY W/ LITHOTRIPSY, Dr Zackery Yi   UROLOGICAL  10/10/2016 [facility]-Cystoscopy with removal of ureteral stent, Dr Zackery Yi   UROLOGICAL  01/30/2017 [facility]-Cystoscopy, Left retrograde pyelography, Left diagnostic ureteroscopy, Left 6 x 26 cm double-J ureteral stent placement,Right retrograde pyelography, Right ureteroscopic stone extraction,Right 6 x 24 cm double-J ureteral stent placement, Interpretation of urography,Intraoperative fluoro less than 1 hour,    UROLOGICAL  02/08/2017 [facility]-CYSTOURETHROSCOPY WITH STENT REMOVAL, Dr Zackery Yi Social HX:  
Social History Socioeconomic History  Marital status:  Spouse name: Not on file  Number of children: Not on file  Years of education: Not on file  Highest education level: Not on file Social Needs  Financial resource strain: Not on file  Food insecurity - worry: Not on file  Food insecurity - inability: Not on file  Transportation needs - medical: Not on file  Transportation needs - non-medical: Not on file Occupational History  Not on file Tobacco Use  
  Smoking status: Current Every Day Smoker Packs/day: 0.00 Years: 20.00 Pack years: 0.00 Types: Cigarettes  Smokeless tobacco: Never Used  Tobacco comment: Patient states he smokes 2-3 cigarettes per day Substance and Sexual Activity  Alcohol use: No  
  Alcohol/week: 0.0 oz  
  Comment: \"none in over 20 years\"  Drug use: No  
  Comment: cocaine 2011  Sexual activity: Yes  
  Partners: Female Other Topics Concern  Not on file Social History Narrative  Not on file FHX:  
Family History Problem Relation Age of Onset  Diabetes Father  Heart Disease Father  Stroke Father Allergy: Allergies Allergen Reactions  Bee Sting [Sting, Bee] Hives and Shortness of Breath  Codeine Hives and Shortness of Breath Has tolerated Hydromorphone.  Haldol [Haloperidol Lactate] Hives and Shortness of Breath  Haloperidol Hives and Cough  Keflex [Cephalexin] Diarrhea  No Allergy Information Available Other (comments) Other reaction(s): hives  Shellfish Containing Products Hives  Stadol [Butorphanol Tartrate] Hives, Shortness of Breath and Rash  Vicodin [Hydrocodone-Acetaminophen] Hives, Shortness of Breath and Rash Home Medications: No medications prior to admission. Review of Systems:  
 
Constitutional: No fevers, chills, weight loss, fatigue. Skin: No rashes, pruritis, jaundice, ulcerations, erythema. HENT: No headaches, nosebleeds, sinus pressure, rhinorrhea, sore throat. Eyes: No visual changes, blurred vision, eye pain, photophobia, jaundice. Cardiovascular: Non exertional  chest pain, no heart palpitations. Respiratory: No cough, SOB, wheezing, chest discomfort, orthopnea. Gastrointestinal: reflux Genitourinary: No dysuria, bleeding, discharge, pyuria. Musculoskeletal: No weakness, arthralgias, wasting. Endo: No sweats. Heme: No bruising, easy bleeding. Allergies: As noted. Neurological: Cranial nerves intact. Alert and oriented. Gait not assessed. Psychiatric:  No anxiety, depression, hallucinations. There were no vitals taken for this visit. Physical Assessment:  
 
constitutional: appearance: well developed, well nourished, obese habitus, no deformities, in no acute distress. skin: inspection: no rashes, ulcers, icterus or other lesions; no clubbing or telangiectasias. palpation: no induration or subcutaneos nodules. eyes: inspection: normal conjunctivae and lids; no jaundice pupils: symmetrical, normoreactive to light, normal accommodation and size. ENMT: mouth: normal oral mucosa,lips and gums; good dentition. oropharynx: normal tongue, hard and soft palate; posterior pharynx without erythema, exudate or lesions. neck: no masses organomegaly or tenderness. respiratory: effort: normal chest excursion; no intercostal retraction or accessory muscle use. cardiovascular: abdominal aorta: normal size and position; no bruits. palpation: PMI of normal size and position; normal rhythm; no thrill or murmurs. abdominal: abdomen: normal consistency; no tenderness or masses. hernias: no hernias appreciated. liver: normal size and consistency. spleen: not palpable. rectal: hemoccult/guaiac: not performed. musculoskeletal: no deformities or muscle wasting  
lymphatic: axilae: not palpable. groin: not palpable. neck: within normal limits. other: not palpable. neurologic: cranial nerves: II-XII normal.  
psychiatric: judgement/insight: within normal limits. memory: within normal limits for recent and remote events. mood and affect: no evidence of depression, anxiety or agitation. orientation: oriented to time, space and person. Basic Metabolic Profile No results for input(s): NA, K, CL, CO2, BUN, GLU, CA, MG, PHOS in the last 72 hours.  
 
No lab exists for component: CREAT  
  
CBC w/Diff   
 No results for input(s): WBC, RBC, HGB, HCT, MCV, MCH, MCHC, RDW, PLT, HGBEXT, HCTEXT, PLTEXT in the last 72 hours. No lab exists for component: MPV No results for input(s): GRANS, LYMPH, EOS, PRO, MYELO, METAS, BLAST in the last 72 hours. No lab exists for component: MONO, BASO Hepatic Function No results for input(s): ALB, TP, TBILI, GPT, SGOT, AP, AML, LPSE in the last 72 hours. No lab exists for component: Clay Collins MD, M.D. [facility] Benita [place] 1948, [address] 
[email or web address]

## 2018-11-29 NOTE — ANESTHESIA POSTPROCEDURE EVALUATION
Procedure(s): UPPER ENDOSCOPY / dilation & biopsies . Anesthesia Post Evaluation Multimodal analgesia: multimodal analgesia used between 6 hours prior to anesthesia start to PACU discharge Patient location during evaluation: PACU Patient participation: complete - patient participated Level of consciousness: awake Pain management: satisfactory to patient Airway patency: patent Anesthetic complications: no 
Cardiovascular status: acceptable Respiratory status: acceptable Hydration status: acceptable Visit Vitals /47 Pulse 87 Temp 36.7 °C (98 °F) Resp 22 Ht 5' 7\" (1.702 m) Wt 107.5 kg (237 lb) SpO2 97% BMI 37.12 kg/m²

## 2018-11-29 NOTE — DISCHARGE INSTRUCTIONS
Esophageal Dilation: What to Expect at [address] [place] [address]  After you have esophageal dilation, you will stay at the hospital or surgery center for 1 to 2 hours. This will allow the medicine to wear off. You will be able to go home after your doctor or nurse checks to make sure you are not having any problems. This care sheet gives you a general idea about how long it will take for you to recover. But each person recovers at a different pace. Follow the steps below to get better as quickly as possible. How can you care for yourself at home? Activity    · Rest as much as you need to after you go home.     · You should be able to go back to your usual activities the day after the procedure. Diet    · Follow your doctor's directions for eating after the procedure.     · Drink plenty of fluids (unless your doctor has told you not to). Medicines    · Your doctor will tell you if and when you can restart your medicines. He or she will also give you instructions about taking any new medicines.     · If you take blood thinners, such as warfarin (Coumadin), clopidogrel (Plavix), or aspirin, be sure to talk to your doctor. He or she will tell you if and when to start taking those medicines again. Make sure that you understand exactly what your doctor wants you to do.     · If you have a sore throat the day after the procedure, use an over-the-counter spray to numb your throat. Sucking on throat lozenges and gargling with warm salt water may also help relieve your symptoms. Follow-up care is a key part of your treatment and safety. Be sure to make and go to all appointments, and call your doctor if you are having problems. It's also a good idea to know your test results and keep a list of the medicines you take. When should you call for help? Call 911 anytime you think you may need emergency care.  For example, call if:    · You passed out (lost consciousness).     · You have trouble breathing.     · Your stools are maroon or very bloody    Call your doctor now or seek immediate medical care if:    · You have new or worse belly pain.     · You have a fever.     · You have new or more blood in your stools.     · You are sick to your stomach and cannot drink fluids.     · You cannot pass stools or gas.     · You have pain that does not get better after you take pain medicine.    Watch closely for changes in your health, and be sure to contact your doctor if:    · Your throat still hurts after a day or two.     · You do not get better as expected. Where can you learn more? Go to http://turner-doris.info/. Enter Q688 in the search box to learn more about \"Esophageal Dilation: What to Expect at Home. \"  Current as of: March 28, 2018  Content Version: 11.8  © 5933-7954 [employer]. Care instructions adapted under license by [employer] (which disclaims liability or warranty for this information). If you have questions about a medical condition or this instruction, always ask your healthcare professional. [place] [address] any warranty or liability for your use of this information. Upper GI Endoscopy: What to Expect at [address] [place] [address]  After you have an endoscopy, you will stay at the hospital or clinic for 1 to 2 hours. This will allow the medicine to wear off. You will be able to go home after your doctor or nurse checks to make sure you are not having any problems. You may have to stay overnight if you had treatment during the test. You may have a sore throat for a day or two after the test.  This care sheet gives you a general idea about what to expect after the test.  How can you care for yourself at home? Activity  · Rest as much as you need to after you go home.   · You should be able to go back to your usual activities the day after the test.  Diet  · Follow your doctor's directions for eating after the test.  · Drink plenty of fluids (unless your doctor has told you not to). Medications  · If you have a sore throat the day after the test, use an over-the-counter spray to numb your throat. Follow-up care is a key part of your treatment and safety. Be sure to make and go to all appointments, and call your doctor if you are having problems. It's also a good idea to know your test results and keep a list of the medicines you take. When should you call for help? Call 911 anytime you think you may need emergency care. For example, call if:  · You passed out (lost consciousness). · You cough up blood. · You vomit blood or what looks like coffee grounds. · You pass maroon or very bloody stools. Call your doctor now or seek immediate medical care if:  · You have trouble swallowing. · You have belly pain. · Your stools are black and tarlike or have streaks of blood. · You are sick to your stomach or cannot keep fluids down. Watch closely for changes in your health, and be sure to contact your doctor if:  · Your throat still hurts after a day or two. · You do not get better as expected. Where can you learn more? Go to MoneyExpert.be  Enter J454 in the search box to learn more about \"Upper GI Endoscopy: What to Expect at Home. \"   © 5041-7334 [employer]. Care instructions adapted under license by [employer] (which disclaims liability or warranty for this information). This care instruction is for use with your licensed healthcare professional. If you have questions about a medical condition or this instruction, always ask your healthcare professional. [place] [address] any warranty or liability for your use of this information. Content Version: 51.5.891283; Current as of: November 14, 2014    DISCHARGE SUMMARY from Nurse     POST-PROCEDURE INSTRUCTIONS:    Call your Physician if you:  ? Observe any excess bleeding. ? Develop a temperature over 100.5o F.  ?  Experience abdominal, shoulder or chest pain.  ? Notice any signs of decreased circulation or nerve impairment to an extremity such as a change in color, persistent numbness, tingling, coldness or increase in pain. ? Vomit blood or you have nausea and vomiting lasting longer than 4 hours. ? Are unable to take medications. ? Are unable to urinate within 8 hours after discharge following general anesthesia or intravenous sedation. For the next 24 hours after receiving general anesthesia or intravenous sedation, or while taking prescription Narcotics, limit your activities:  ? Do NOT drive a motor vehicle, operate hazard machinery or power tools, or perform tasks that require coordination. The medication you received during your procedure may have some effect on your mental awareness. ? Do NOT make important personal or business decisions. The medication you received during your procedure may have some effect on your mental awareness. ? Do NOT drink alcoholic beverages. These drinks do not mix well with the medications that have been given to you. ? Upon discharge from the hospital, you must be accompanied by a responsible adult. ? Resume your diet as directed by your physician. ? Resume medications as your physician has prescribed. ? Please give a list of your current medications to your Primary Care Provider. ? Please update this list whenever your medications are discontinued, doses are changed, or new medications (including over-the-counter products) are added. ? Please carry medication information at all times in case of emergency situations. These are general instructions for a healthy lifestyle:    No smoking/ No tobacco products/ Avoid exposure to second hand smoke.  Surgeon General's Warning:  Quitting smoking now greatly reduces serious risk to your health. Obesity, smoking, and a sedentary lifestyle greatly increase your risk for illness.    A healthy diet, regular physical exercise & weight monitoring are important for maintaining a healthy lifestyle   You may be retaining fluid if you have a history of heart failure or if you experience any of the following symptoms:  Weight gain of 3 pounds or more overnight or 5 pounds in a week, increased swelling in our hands or feet or shortness of breath while lying flat in bed. Please call your doctor as soon as you notice any of these symptoms; do not wait until your next office visit. Recognize signs and symptoms of STROKE:  F  -  Face looks uneven  A  -  Arms unable to move or move unevenly  S  -  Speech slurred or non-existent  T  -  Time to call 911 - as soon as signs and symptoms begin - DO NOT go back to bed or wait to see If you get better - TIME IS BRAIN. Colorectal Screening   Colorectal cancer almost always develops from precancerous polyps (abnormal growths) in the colon or rectum. Screening tests can find precancerous polyps, so that they can be removed before they turn into cancer. Screening tests can also find colorectal cancer early, when treatment works best.  Jerry Speak with your physician about when you should begin screening and how often you should be tested     Additional Information    If you have questions, please call [phone]. Remember, [employer] is NOT to be used for urgent needs. For medical emergencies, dial 911. Educational references and/or instructions provided during this visit included:    see attachedments    EGD with dilation # 47   APPOINTMENTS:    Please make a follow-up appointment with your physician. Discharge information has been reviewed with the patient and responsible party. The patient and responsible party verbalized understanding.

## 2018-12-29 ENCOUNTER — HOSPITAL ENCOUNTER (EMERGENCY)
Age: 63
Discharge: HOME | End: 2018-12-30
Attending: EMERGENCY MEDICINE
Payer: MEDICARE

## 2018-12-29 ENCOUNTER — APPOINTMENT (OUTPATIENT)
Dept: GENERAL RADIOLOGY | Age: 63
End: 2018-12-29
Attending: EMERGENCY MEDICINE
Payer: MEDICARE

## 2018-12-29 DIAGNOSIS — R07.89 ATYPICAL CHEST PAIN: Primary | ICD-10-CM

## 2018-12-29 LAB
ALBUMIN SERPL-MCNC: 3.3 G/DL (ref 3.4–5)
ALBUMIN/GLOB SERPL: 0.8 {RATIO} (ref 0.8–1.7)
ALP SERPL-CCNC: 145 U/L (ref 45–117)
ALT SERPL-CCNC: 45 U/L (ref 16–61)
ANION GAP SERPL CALC-SCNC: 6 MMOL/L (ref 3–18)
AST SERPL-CCNC: 50 U/L (ref 15–37)
BASOPHILS # BLD: 0 K/UL (ref 0–0.1)
BASOPHILS NFR BLD: 0 % (ref 0–2)
BILIRUB SERPL-MCNC: 0.7 MG/DL (ref 0.2–1)
BNP SERPL-MCNC: 436 PG/ML (ref 0–900)
BUN SERPL-MCNC: 8 MG/DL (ref 7–18)
BUN/CREAT SERPL: 10 (ref 12–20)
CALCIUM SERPL-MCNC: 8.7 MG/DL (ref 8.5–10.1)
CHLORIDE SERPL-SCNC: 110 MMOL/L (ref 100–108)
CK MB CFR SERPL CALC: 0.8 % (ref 0–4)
CK MB SERPL-MCNC: 1 NG/ML (ref 5–25)
CK SERPL-CCNC: 133 U/L (ref 39–308)
CO2 SERPL-SCNC: 24 MMOL/L (ref 21–32)
CREAT SERPL-MCNC: 0.78 MG/DL (ref 0.6–1.3)
DIFFERENTIAL METHOD BLD: ABNORMAL
EOSINOPHIL # BLD: 0.3 K/UL (ref 0–0.4)
EOSINOPHIL NFR BLD: 4 % (ref 0–5)
ERYTHROCYTE [DISTWIDTH] IN BLOOD BY AUTOMATED COUNT: 13.7 % (ref 11.6–14.5)
GLOBULIN SER CALC-MCNC: 4.3 G/DL (ref 2–4)
GLUCOSE SERPL-MCNC: 78 MG/DL (ref 74–99)
HCT VFR BLD AUTO: 44.5 % (ref 36–48)
HGB BLD-MCNC: 16 G/DL (ref 13–16)
LYMPHOCYTES # BLD: 2.5 K/UL (ref 0.9–3.6)
LYMPHOCYTES NFR BLD: 37 % (ref 21–52)
MAGNESIUM SERPL-MCNC: 2.1 MG/DL (ref 1.6–2.6)
MCH RBC QN AUTO: 32.2 PG (ref 24–34)
MCHC RBC AUTO-ENTMCNC: 36 G/DL (ref 31–37)
MCV RBC AUTO: 89.5 FL (ref 74–97)
MONOCYTES # BLD: 0.5 K/UL (ref 0.05–1.2)
MONOCYTES NFR BLD: 8 % (ref 3–10)
NEUTS SEG # BLD: 3.4 K/UL (ref 1.8–8)
NEUTS SEG NFR BLD: 51 % (ref 40–73)
PLATELET # BLD AUTO: 119 K/UL (ref 135–420)
PMV BLD AUTO: 10.8 FL (ref 9.2–11.8)
POTASSIUM SERPL-SCNC: 3.8 MMOL/L (ref 3.5–5.5)
PROT SERPL-MCNC: 7.6 G/DL (ref 6.4–8.2)
RBC # BLD AUTO: 4.97 M/UL (ref 4.7–5.5)
SODIUM SERPL-SCNC: 140 MMOL/L (ref 136–145)
TROPONIN I SERPL-MCNC: <0.02 NG/ML (ref 0–0.04)
WBC # BLD AUTO: 6.8 K/UL (ref 4.6–13.2)

## 2018-12-29 PROCEDURE — 96374 THER/PROPH/DIAG INJ IV PUSH: CPT

## 2018-12-29 PROCEDURE — 74011250636 HC RX REV CODE- 250/636

## 2018-12-29 PROCEDURE — 74011250636 HC RX REV CODE- 250/636: Performed by: EMERGENCY MEDICINE

## 2018-12-29 PROCEDURE — 83735 ASSAY OF MAGNESIUM: CPT

## 2018-12-29 PROCEDURE — 93005 ELECTROCARDIOGRAM TRACING: CPT

## 2018-12-29 PROCEDURE — 80053 COMPREHEN METABOLIC PANEL: CPT

## 2018-12-29 PROCEDURE — 85025 COMPLETE CBC W/AUTO DIFF WBC: CPT

## 2018-12-29 PROCEDURE — 74011250637 HC RX REV CODE- 250/637: Performed by: EMERGENCY MEDICINE

## 2018-12-29 PROCEDURE — 83880 ASSAY OF NATRIURETIC PEPTIDE: CPT

## 2018-12-29 PROCEDURE — 99284 EMERGENCY DEPT VISIT MOD MDM: CPT

## 2018-12-29 PROCEDURE — 96375 TX/PRO/DX INJ NEW DRUG ADDON: CPT

## 2018-12-29 PROCEDURE — 82550 ASSAY OF CK (CPK): CPT

## 2018-12-29 PROCEDURE — 71045 X-RAY EXAM CHEST 1 VIEW: CPT

## 2018-12-29 RX ORDER — ONDANSETRON 2 MG/ML
INJECTION INTRAMUSCULAR; INTRAVENOUS
Status: COMPLETED
Start: 2018-12-29 | End: 2018-12-29

## 2018-12-29 RX ORDER — MORPHINE SULFATE 2 MG/ML
4 INJECTION, SOLUTION INTRAMUSCULAR; INTRAVENOUS
Status: COMPLETED | OUTPATIENT
Start: 2018-12-29 | End: 2018-12-29

## 2018-12-29 RX ORDER — MORPHINE SULFATE 2 MG/ML
4 INJECTION, SOLUTION INTRAMUSCULAR; INTRAVENOUS
Status: COMPLETED | OUTPATIENT
Start: 2018-12-29 | End: 2018-12-30

## 2018-12-29 RX ORDER — GUAIFENESIN 100 MG/5ML
81 LIQUID (ML) ORAL
Status: COMPLETED | OUTPATIENT
Start: 2018-12-29 | End: 2018-12-29

## 2018-12-29 RX ORDER — ONDANSETRON 2 MG/ML
4 INJECTION INTRAMUSCULAR; INTRAVENOUS
Status: COMPLETED | OUTPATIENT
Start: 2018-12-29 | End: 2018-12-29

## 2018-12-29 RX ADMIN — ASPIRIN 81 MG 81 MG: 81 TABLET ORAL at 21:56

## 2018-12-29 RX ADMIN — ONDANSETRON 4 MG: 2 INJECTION INTRAMUSCULAR; INTRAVENOUS at 21:54

## 2018-12-29 RX ADMIN — MORPHINE SULFATE 4 MG: 2 INJECTION, SOLUTION INTRAMUSCULAR; INTRAVENOUS at 21:56

## 2018-12-30 VITALS
TEMPERATURE: 98 F | OXYGEN SATURATION: 98 % | SYSTOLIC BLOOD PRESSURE: 142 MMHG | HEART RATE: 71 BPM | DIASTOLIC BLOOD PRESSURE: 89 MMHG | RESPIRATION RATE: 17 BRPM

## 2018-12-30 LAB
ATRIAL RATE: 75 BPM
CALCULATED P AXIS, ECG09: 10 DEGREES
CALCULATED R AXIS, ECG10: -12 DEGREES
CALCULATED T AXIS, ECG11: 46 DEGREES
CK MB CFR SERPL CALC: NORMAL % (ref 0–4)
CK MB SERPL-MCNC: <1 NG/ML (ref 5–25)
CK SERPL-CCNC: 114 U/L (ref 39–308)
DIAGNOSIS, 93000: NORMAL
P-R INTERVAL, ECG05: 146 MS
Q-T INTERVAL, ECG07: 384 MS
QRS DURATION, ECG06: 80 MS
QTC CALCULATION (BEZET), ECG08: 428 MS
TROPONIN I SERPL-MCNC: <0.02 NG/ML (ref 0–0.04)
VENTRICULAR RATE, ECG03: 75 BPM

## 2018-12-30 PROCEDURE — 96376 TX/PRO/DX INJ SAME DRUG ADON: CPT

## 2018-12-30 RX ADMIN — MORPHINE SULFATE 4 MG: 2 INJECTION, SOLUTION INTRAMUSCULAR; INTRAVENOUS at 00:00

## 2018-12-30 NOTE — ED PROVIDER NOTES
EMERGENCY DEPARTMENT HISTORY AND PHYSICAL EXAM 
 
Date: 12/29/2018 Patient Name: Karsten Gonsales History of Presenting Illness Chief Complaint Patient presents with  Chest Pain History Provided By: Patient's Father Chief Complaint: CP and epigastric pain Duration:  Weeks Timing:  Waxing and Waning Location: chest and epigastric area Quality: Spasm Severity: Severe Modifying Factors: Says when he swallows it feels like something is stuck Associated Symptoms: N/V Additional History (Context):  
Karsten Gonsales is a 58 y.o. male with PMHX  esophagus, GERD, cocaine abuse, drug seeking behavior, MI, and stroke  presents to the emergency department C/O severe waxing and waning substernal CP and epigastric pain that feels like a spasm for the last week. Associated sxs include N/V. Pt states he has had sx similar to this in the past due to his h/o nutcracker esophagus. Reports when he swallows it feels like something is stuck. Was told by his cardiologist  if he has sx like this he is too take 3 nitro 5 minutes apart which he did PTA w/o relief of sx. He recently seen is cardiologist earlier this month for routine check up and everything was fine. Says he has been communicating w/ his GI specialist due to his esophagus issues being exacerbated recently. Did not take any asprin PTA. denies SOB, fever, chills, diarrhea, back pain, neck pain, urinary sx, weakness, numbness, and any other sxs or complaints. PCP: Alisha Calderon, DO 
 
Current Facility-Administered Medications Medication Dose Route Frequency Provider Last Rate Last Dose  morphine injection 4 mg  4 mg IntraVENous NOW Agustiady-Becker, Adella Siemens, DO      
 
Current Outpatient Medications Medication Sig Dispense Refill  ondansetron (ZOFRAN ODT) 4 mg disintegrating tablet Take 1 Tab by mouth every eight (8) hours as needed for Nausea.  15 Tab 0  
  oxyCODONE-acetaminophen (PERCOCET) 5-325 mg per tablet Take 1 Tab by mouth every four (4) hours as needed for Pain. Max Daily Amount: 6 Tabs. 6 Tab 0  
 omeprazole (PRILOSEC) 10 mg capsule Take 10 mg by mouth two (2) times a day.  chlorthalidone (HYGROTEN) 25 mg tablet Take 1 Tab by mouth daily. 30 Tab 11  
 clopidogrel (PLAVIX) 75 mg tab Take 75 mg by mouth.  nitroglycerin (NITROSTAT) 0.3 mg SL tablet 0.3 mg.    
 carvedilol (COREG) 25 mg tablet Take 25 mg by mouth two (2) times daily (with meals).  atorvastatin (LIPITOR) 40 mg tablet Take 40 mg by mouth daily.  DULoxetine (CYMBALTA) 60 mg capsule Take 60 mg by mouth daily. Past History Past Medical History: 
Past Medical History:  
Diagnosis Date  Adverse effect of anesthesia   
 esophageal spasms  Altered mental status  Arthritis  Arthropathy  Back pain  Bilateral kidney stones  Burn ON HANDS  
 CAD (coronary artery disease) 1999 [place]    3 cardiac stents  Calcium nephrolithiasis  Calculus of kidney  Cardiac arrhythmia  Cerebral artery occlusion with cerebral infarction ([address] 2010  
 no residual  
 Chest pain  Cigarette smoker  Cirrhosis, alcoholic ([address]  Cocaine abuse, episodic use ([address]  Colon polyp  Diarrhea  DJD (degenerative joint disease)  Drug-seeking behavior  Dyskinesia  Esophageal disorder  Esophageal erosions  Flank pain, acute  Foot ulcer, right ([address]  Gastric ulcer  GERD (gastroesophageal reflux disease)  Gross hematuria  Head trauma  Hearing reduced  Hematuria  Herniated disc X 10 IN BACK  Hiatus hernia syndrome  History of kidney stones  HNP (herniated nucleus pulposus)  Hyperlipemia  Hypertension  Hypokalemia  Infection  Jaw fracture ([address]  Kidney stones  Knee pain  Left ureteral stone  Muscle atrophy  Myocardial infarction [facility]) 1999  N&V (nausea and vomiting)  Nocturia  Nondependent alcohol abuse, in remission  Nutcracker esophagus  Other ill-defined conditions(799.89) dysphagia  Renal stone  Slurred speech  Stroke [facility]) 1999  
 questionable TIA  Swallowing difficulty  Syncopal episodes  Syncope and collapse  Tobacco dependence  Unspecified adverse effect of anesthesia AT TIMES ESOPHAGUS SPASMS AFTER PROCEDURES  
 Unspecified cerebral artery occlusion with cerebral infarction Past Surgical History: 
Past Surgical History:  
Procedure Laterality Date  HX APPENDECTOMY    
 9YEARS OLD  
 HX CHOLECYSTECTOMY  2009  HX CORONARY STENT PLACEMENT  2008  
 3 STENTS PLACED  HX ENDOSCOPY    
 with Dilatation, multiple times [address] CATHETERIZATION  2012 EVERYTHING LOOKED GOOD AT THIS POINT  HX HEENT    
 \"2 jaw surgeries\"  HX HERNIA REPAIR    
 ABDOMEN  
 HX KNEE ARTHROSCOPY  9/2012 LEFT  
 HX MOHS PROCEDURES  2016  HX ORTHOPAEDIC  12/12  
 left knee  HX OTHER SURGICAL    
 ESOPHAGUS DILATATION  
 HX ROTATOR CUFF REPAIR Right   
 x2  
 HX UROLOGICAL  07/18/2016 [facility]-Cystoscopy,URETEROSCOPY W/ LITHOTRIPSY, Dr Brian Valiente   UROLOGICAL  10/10/2016 [facility]-Cystoscopy with removal of ureteral stent, Dr Brian Valiente   UROLOGICAL  01/30/2017 [facility]-Cystoscopy, Left retrograde pyelography, Left diagnostic ureteroscopy, Left 6 x 26 cm double-J ureteral stent placement,Right retrograde pyelography, Right ureteroscopic stone extraction,Right 6 x 24 cm double-J ureteral stent placement, Interpretation of urography,Intraoperative fluoro less than 1 hour,    UROLOGICAL  02/08/2017 [facility]-CYSTOURETHROSCOPY WITH STENT REMOVAL, Dr Brian Valiente Family History: 
Family History Problem Relation Age of Onset  Diabetes Father  Heart Disease Father  Stroke Father Social History: 
Social History Tobacco Use  Smoking status: Current Every Day Smoker Packs/day: 0.00 Years: 20.00 Pack years: 0.00 Types: Cigarettes  Smokeless tobacco: Never Used  Tobacco comment: Patient states he smokes 2-3 cigarettes per day Substance Use Topics  Alcohol use: No  
  Alcohol/week: 0.0 oz  
  Comment: \"none in over 20 years\"  Drug use: No  
  Comment: cocaine 2011 Allergies: Allergies Allergen Reactions  Bee Sting [Sting, Bee] Hives and Shortness of Breath  Codeine Hives and Shortness of Breath Has tolerated Hydromorphone.  Haldol [Haloperidol Lactate] Hives and Shortness of Breath  Haloperidol Hives and Cough  Keflex [Cephalexin] Diarrhea  No Allergy Information Available Other (comments) Other reaction(s): hives  Shellfish Containing Products Hives  Stadol [Butorphanol Tartrate] Hives, Shortness of Breath and Rash  Vicodin [Hydrocodone-Acetaminophen] Hives, Shortness of Breath and Rash Review of Systems Review of Systems Constitutional: Negative for chills, fatigue and fever. HENT: Negative. Negative for sore throat. Eyes: Negative. Respiratory: Negative for cough and shortness of breath. Cardiovascular: Positive for chest pain. Negative for palpitations. Gastrointestinal: Positive for abdominal pain (episgastric), nausea and vomiting. Genitourinary: Negative for dysuria. Musculoskeletal: Negative. Skin: Negative. Neurological: Negative for dizziness, weakness, light-headedness and headaches. Psychiatric/Behavioral: Negative. All other systems reviewed and are negative. Physical Exam  
 
Vitals:  
 12/29/18 1951 12/29/18 1952 BP:  (!) 180/101 Pulse: 91   
Resp: 16 Temp: 98 °F (36.7 °C) SpO2: 98% Physical Exam 
 
Constitutional: elderly  male. Appear somewhat uncomfortable. Head: Normocephalic, Atraumatic Eyes: Pupils are equal, round, and reactive to light, EOMI Neck: Supple, non-tender Cardiovascular: Regular rate and rhythm, no murmurs, rubs, or gallops Chest: Normal work of breathing and chest excursion bilaterally Lungs: Clear to ausculation bilaterally, no wheezes, no rhonchi Abdomen: Soft, non tender, non distended, normoactive bowel sounds Back: No evidence of trauma or deformity Extremities: No evidence of trauma or deformity, no LE edema Skin: Warm and dry, normal cap refill Neuro: Alert and appropriate, CN intact, normal speech, strength and sensation full and symmetric bilaterally, normal gait, normal coordination Psychiatric: Normal mood and affect Diagnostic Study Results Labs - Recent Results (from the past 12 hour(s)) CBC WITH AUTOMATED DIFF Collection Time: 12/29/18  9:10 PM  
Result Value Ref Range WBC 6.8 4.6 - 13.2 K/uL  
 RBC 4.97 4.70 - 5.50 M/uL  
 HGB 16.0 13.0 - 16.0 g/dL HCT 44.5 36.0 - 48.0 % MCV 89.5 74.0 - 97.0 FL  
 MCH 32.2 24.0 - 34.0 PG  
 MCHC 36.0 31.0 - 37.0 g/dL  
 RDW 13.7 11.6 - 14.5 % PLATELET 495 (L) 845 - 420 K/uL MPV 10.8 9.2 - 11.8 FL  
 NEUTROPHILS 51 40 - 73 % LYMPHOCYTES 37 21 - 52 % MONOCYTES 8 3 - 10 % EOSINOPHILS 4 0 - 5 % BASOPHILS 0 0 - 2 %  
 ABS. NEUTROPHILS 3.4 1.8 - 8.0 K/UL  
 ABS. LYMPHOCYTES 2.5 0.9 - 3.6 K/UL  
 ABS. MONOCYTES 0.5 0.05 - 1.2 K/UL  
 ABS. EOSINOPHILS 0.3 0.0 - 0.4 K/UL  
 ABS. BASOPHILS 0.0 0.0 - 0.1 K/UL  
 DF AUTOMATED CARDIAC PANEL,(CK, CKMB & TROPONIN) Collection Time: 12/29/18  9:34 PM  
Result Value Ref Range  39 - 308 U/L  
 CK - MB 1.0 <3.6 ng/ml CK-MB Index 0.8 0.0 - 4.0 % Troponin-I, QT <0.02 0.0 - 0.045 NG/ML  
MAGNESIUM Collection Time: 12/29/18  9:34 PM  
Result Value Ref Range Magnesium 2.1 1.6 - 2.6 mg/dL METABOLIC PANEL, COMPREHENSIVE Collection Time: 12/29/18  9:34 PM  
Result Value Ref Range Sodium 140 136 - 145 mmol/L Potassium 3.8 3.5 - 5.5 mmol/L  Chloride 110 (H) 100 - 108 mmol/L  
 CO2 24 21 - 32 mmol/L Anion gap 6 3.0 - 18 mmol/L Glucose 78 74 - 99 mg/dL BUN 8 7.0 - 18 MG/DL Creatinine 0.78 0.6 - 1.3 MG/DL  
 BUN/Creatinine ratio 10 (L) 12 - 20 GFR est AA >60 >60 ml/min/1.73m2 GFR est non-AA >60 >60 ml/min/1.73m2 Calcium 8.7 8.5 - 10.1 MG/DL Bilirubin, total 0.7 0.2 - 1.0 MG/DL  
 ALT (SGPT) 45 16 - 61 U/L  
 AST (SGOT) 50 (H) 15 - 37 U/L Alk. phosphatase 145 (H) 45 - 117 U/L Protein, total 7.6 6.4 - 8.2 g/dL Albumin 3.3 (L) 3.4 - 5.0 g/dL Globulin 4.3 (H) 2.0 - 4.0 g/dL A-G Ratio 0.8 0.8 - 1.7 NT-PRO BNP Collection Time: 12/29/18  9:34 PM  
Result Value Ref Range NT pro- 0 - 900 PG/ML  
CARDIAC PANEL,(CK, CKMB & TROPONIN) Collection Time: 12/29/18 11:55 PM  
Result Value Ref Range  39 - 308 U/L  
 CK - MB <1.0 <3.6 ng/ml CK-MB Index  0.0 - 4.0 % CALCULATION NOT PERFORMED WHEN RESULT IS BELOW LINEAR LIMIT Troponin-I, QT <0.02 0.0 - 0.045 NG/ML Radiologic Studies -  
XR CHEST PORT Final Result Impression: 1. Streaky opacities in the lung bases are possibly atelectasis although  
superimposed infiltrate particularly in the left lung base is not excluded. CT Results  (Last 48 hours) None CXR Results  (Last 48 hours) 12/29/18 2113  XR CHEST PORT Final result Impression:  Impression: 1. Streaky opacities in the lung bases are possibly atelectasis although  
superimposed infiltrate particularly in the left lung base is not excluded. Narrative:     
Examination: Portable AP chest   
   
History: Chest pain Comparison: November 23, 2018 Findings: Streaky opacities in the lung bases are increased and likely  
atelectasis. No pleural effusion or pneumothorax. No pulmonary edema. Heart size  
is normal.  
   
  
  
 
 
 
Medical Decision Making I am the first provider for this patient. I reviewed the vital signs, available nursing notes, past medical history, past surgical history, family history and social history. Vital Signs-Reviewed the patient's vital signs. Pulse Oximetry Analysis - 98% on RA  
 
EKG interpretation: (Preliminary) 8:47 PM  
NSR. 75 BPM. Qtc 428 ms. No STEMI 
EKG read by Yuly Kang* 
 at 8:00 PM  
 
Records Reviewed: Nursing Notes and Old Medical Records Provider Notes:  
58 y.o. male w/ h/o CAD, status post PCI, and  esophagus who presents to the w/ spasm like substernal cp and epigastric pain since last week. He has been seen in the ED w/ similiar sx in the past. On exam the pt seems uncomfortable but w. appropriate vitals. Will obtain cardiac lab to rule out ACS. CXR. give baby Asprin, morphine, and reassess. Procedures: 
Procedures ED Course:  
9:23 PM  Initial assessment performed. The patients presenting problems have been discussed, and they are in agreement with the care plan formulated and outlined with them. I have encouraged them to ask questions as they arise throughout their visit. 10:10 PM 
RADIOLOGY FINDINGS Chest X-ray shows no acute process Pending review by Radiologist 
Recorded by Mary Lou Edwards, ED Scribe, as dictated by Yuly Kang* 
 
12:30 PM CXR w/ possible superimposed infiltrate but endorses no systemic sx, no leukocytosis or bandemia, afebrile. Will hold off on abx at this time. 2 sets of cardiac enzymes negative. Urged close fu with cardiology and GI. Diagnosis and Disposition DISCHARGE NOTE: 
Kayla Watters's  results have been reviewed with him. He has been counseled regarding his diagnosis, treatment, and plan. He verbally conveys understanding and agreement of the signs, symptoms, diagnosis, treatment and prognosis and additionally agrees to follow up as discussed.   He also agrees with the care-plan and conveys that all of his questions have been answered. I have also provided discharge instructions for him that include: educational information regarding their diagnosis and treatment, and list of reasons why they would want to return to the ED prior to their follow-up appointment, should his condition change. He has been provided with education for proper emergency department utilization. CLINICAL IMPRESSION: 
 
1. Atypical chest pain PLAN: 
1. D/C Home 2. Current Discharge Medication List  
  
 
3. Follow-up Information Follow up With Specialties Details Why Contact Info Dustin Lee DO Osteopathic Medicine Call in 2 days Follow up [address] 
[phone] Brian Preciado MD Cardiology Call Follow up [address] [facility] [address] [phone] oFx Esparza MD Gastroenterology Call Follow up [address] [address] [facility] EMERGENCY DEPT Emergency Medicine  If symptoms worsen, As needed [address] [facility] 
[phone]  
  
 
____________________________________ Scribe Attestation Ben Rm acting as a scribe for and in the presence of Ange Pickering* December 29, 2018 at 9:11 PM 
    
Provider Attestation:     
I personally performed the services described in the documentation, reviewed the documentation, as recorded by the scribe in my presence, and it accurately and completely records my words and actions.  December 29, 2018 at 9:11 PM - Ange Pickering*

## 2018-12-30 NOTE — DISCHARGE INSTRUCTIONS

## 2018-12-30 NOTE — ED NOTES
Patient is awake and alert in no acute distress at this time, is now for discharge home chest pain is better now told signs and symptoms to return to ED for or report to MD.

## 2019-01-17 ENCOUNTER — ANESTHESIA EVENT (OUTPATIENT)
Dept: ENDOSCOPY | Age: 64
End: 2019-01-17
Payer: MEDICARE

## 2019-01-18 ENCOUNTER — HOSPITAL ENCOUNTER (OUTPATIENT)
Age: 64
Setting detail: OUTPATIENT SURGERY
Discharge: HOME | End: 2019-01-18
Attending: INTERNAL MEDICINE | Admitting: INTERNAL MEDICINE
Payer: MEDICARE

## 2019-01-18 ENCOUNTER — ANESTHESIA (OUTPATIENT)
Dept: ENDOSCOPY | Age: 64
End: 2019-01-18
Payer: MEDICARE

## 2019-01-18 VITALS
BODY MASS INDEX: 33.65 KG/M2 | SYSTOLIC BLOOD PRESSURE: 161 MMHG | HEIGHT: 67 IN | OXYGEN SATURATION: 96 % | HEART RATE: 92 BPM | RESPIRATION RATE: 14 BRPM | WEIGHT: 214.4 LBS | DIASTOLIC BLOOD PRESSURE: 89 MMHG | TEMPERATURE: 97.2 F

## 2019-01-18 PROCEDURE — 77030019988 HC FCPS ENDOSC DISP BSC -B: Performed by: INTERNAL MEDICINE

## 2019-01-18 PROCEDURE — 77030018846 HC SOL IRR STRL H20 ICUM -A: Performed by: INTERNAL MEDICINE

## 2019-01-18 PROCEDURE — 74011000250 HC RX REV CODE- 250: Performed by: NURSE ANESTHETIST, CERTIFIED REGISTERED

## 2019-01-18 PROCEDURE — 76040000019: Performed by: INTERNAL MEDICINE

## 2019-01-18 PROCEDURE — 74011250636 HC RX REV CODE- 250/636

## 2019-01-18 PROCEDURE — 74011250636 HC RX REV CODE- 250/636: Performed by: NURSE ANESTHETIST, CERTIFIED REGISTERED

## 2019-01-18 PROCEDURE — 80307 DRUG TEST PRSMV CHEM ANLYZR: CPT

## 2019-01-18 PROCEDURE — 77030008565 HC TBNG SUC IRR ERBE -B: Performed by: INTERNAL MEDICINE

## 2019-01-18 PROCEDURE — 74011250636 HC RX REV CODE- 250/636: Performed by: ANESTHESIOLOGY

## 2019-01-18 PROCEDURE — 76060000031 HC ANESTHESIA FIRST 0.5 HR: Performed by: INTERNAL MEDICINE

## 2019-01-18 RX ORDER — SODIUM CHLORIDE 0.9 % (FLUSH) 0.9 %
5-40 SYRINGE (ML) INJECTION EVERY 8 HOURS
Status: DISCONTINUED | OUTPATIENT
Start: 2019-01-18 | End: 2019-01-18 | Stop reason: HOSPADM

## 2019-01-18 RX ORDER — PROPOFOL 10 MG/ML
INJECTION, EMULSION INTRAVENOUS
Status: DISCONTINUED | OUTPATIENT
Start: 2019-01-18 | End: 2019-01-18 | Stop reason: HOSPADM

## 2019-01-18 RX ORDER — HYDROMORPHONE HYDROCHLORIDE 2 MG/ML
2 INJECTION, SOLUTION INTRAMUSCULAR; INTRAVENOUS; SUBCUTANEOUS ONCE
Status: COMPLETED | OUTPATIENT
Start: 2019-01-18 | End: 2019-01-18

## 2019-01-18 RX ORDER — HYDROMORPHONE HYDROCHLORIDE 2 MG/ML
INJECTION, SOLUTION INTRAMUSCULAR; INTRAVENOUS; SUBCUTANEOUS
Status: COMPLETED
Start: 2019-01-18 | End: 2019-01-18

## 2019-01-18 RX ORDER — LIDOCAINE HYDROCHLORIDE 10 MG/ML
0.1 INJECTION, SOLUTION EPIDURAL; INFILTRATION; INTRACAUDAL; PERINEURAL AS NEEDED
Status: DISCONTINUED | OUTPATIENT
Start: 2019-01-18 | End: 2019-01-18 | Stop reason: HOSPADM

## 2019-01-18 RX ORDER — PROPOFOL 10 MG/ML
INJECTION, EMULSION INTRAVENOUS AS NEEDED
Status: DISCONTINUED | OUTPATIENT
Start: 2019-01-18 | End: 2019-01-18 | Stop reason: HOSPADM

## 2019-01-18 RX ORDER — LIDOCAINE HYDROCHLORIDE 20 MG/ML
INJECTION, SOLUTION EPIDURAL; INFILTRATION; INTRACAUDAL; PERINEURAL AS NEEDED
Status: DISCONTINUED | OUTPATIENT
Start: 2019-01-18 | End: 2019-01-18 | Stop reason: HOSPADM

## 2019-01-18 RX ORDER — SODIUM CHLORIDE 0.9 % (FLUSH) 0.9 %
5-40 SYRINGE (ML) INJECTION AS NEEDED
Status: DISCONTINUED | OUTPATIENT
Start: 2019-01-18 | End: 2019-01-18 | Stop reason: HOSPADM

## 2019-01-18 RX ORDER — SODIUM CHLORIDE, SODIUM LACTATE, POTASSIUM CHLORIDE, CALCIUM CHLORIDE 600; 310; 30; 20 MG/100ML; MG/100ML; MG/100ML; MG/100ML
75 INJECTION, SOLUTION INTRAVENOUS CONTINUOUS
Status: DISCONTINUED | OUTPATIENT
Start: 2019-01-18 | End: 2019-01-18 | Stop reason: HOSPADM

## 2019-01-18 RX ORDER — FENTANYL CITRATE 50 UG/ML
100 INJECTION, SOLUTION INTRAMUSCULAR; INTRAVENOUS ONCE
Status: COMPLETED | OUTPATIENT
Start: 2019-01-18 | End: 2019-01-18

## 2019-01-18 RX ORDER — INSULIN LISPRO 100 [IU]/ML
INJECTION, SOLUTION INTRAVENOUS; SUBCUTANEOUS ONCE
Status: DISCONTINUED | OUTPATIENT
Start: 2019-01-18 | End: 2019-01-18 | Stop reason: HOSPADM

## 2019-01-18 RX ADMIN — PROPOFOL 50 MG: 10 INJECTION, EMULSION INTRAVENOUS at 09:50

## 2019-01-18 RX ADMIN — HYDROMORPHONE HYDROCHLORIDE 2 MG: 2 INJECTION, SOLUTION INTRAMUSCULAR; INTRAVENOUS; SUBCUTANEOUS at 10:12

## 2019-01-18 RX ADMIN — SODIUM CHLORIDE, SODIUM LACTATE, POTASSIUM CHLORIDE, AND CALCIUM CHLORIDE 75 ML/HR: 600; 310; 30; 20 INJECTION, SOLUTION INTRAVENOUS at 09:32

## 2019-01-18 RX ADMIN — LIDOCAINE HYDROCHLORIDE 100 MG: 20 INJECTION, SOLUTION EPIDURAL; INFILTRATION; INTRACAUDAL; PERINEURAL at 09:46

## 2019-01-18 RX ADMIN — PROPOFOL 40 MG: 10 INJECTION, EMULSION INTRAVENOUS at 09:48

## 2019-01-18 RX ADMIN — PROPOFOL 100 MCG/KG/MIN: 10 INJECTION, EMULSION INTRAVENOUS at 09:48

## 2019-01-18 RX ADMIN — LIDOCAINE HYDROCHLORIDE 60 MG: 20 INJECTION, SOLUTION EPIDURAL; INFILTRATION; INTRACAUDAL; PERINEURAL at 09:51

## 2019-01-18 RX ADMIN — FENTANYL CITRATE 100 MCG: 50 INJECTION INTRAMUSCULAR; INTRAVENOUS at 11:10

## 2019-01-18 RX ADMIN — HYDROMORPHONE HYDROCHLORIDE 2 MG: 2 INJECTION INTRAMUSCULAR; INTRAVENOUS; SUBCUTANEOUS at 10:12

## 2019-01-18 RX ADMIN — FAMOTIDINE 20 MG: 10 INJECTION INTRAVENOUS at 09:32

## 2019-01-18 NOTE — H&P
[email or web address]  [phone]      History and Physical    Patient: Woody Dove MRN: [id number]  SSN: [id number]    YOB: 1955  Age: 58 y.o. Sex: male      Subjective:      Woody Dove is a 58 y.o. male who presents for dilation due to recurrent dysphagia.      Past Medical History:   Diagnosis Date    Adverse effect of anesthesia     esophageal spasms    Altered mental status     Arthritis     Arthropathy     Back pain     Bilateral kidney stones     Burn     ON HANDS    CAD (coronary artery disease) 1999    MI    3 cardiac stents    Calcium nephrolithiasis     Calculus of kidney     Cardiac arrhythmia     Cerebral artery occlusion with cerebral infarction ([address] 2010    no residual    Chest pain     Cigarette smoker     Cirrhosis, alcoholic (HCC)     Cocaine abuse, episodic use (HCC)     Colon polyp     Diarrhea     DJD (degenerative joint disease)     Drug-seeking behavior     Dyskinesia     Esophageal disorder     Esophageal erosions     Flank pain, acute     Foot ulcer, right (HCC)     Gastric ulcer     GERD (gastroesophageal reflux disease)     Gross hematuria     Head trauma     Hearing reduced     Hematuria     Herniated disc     X 10 IN BACK    Hiatus hernia syndrome     History of kidney stones     HNP (herniated nucleus pulposus)     Hyperlipemia     Hypertension     Hypokalemia     Infection     Jaw fracture (HCC)     Kidney stones     Knee pain     Left ureteral stone     Muscle atrophy     Myocardial infarction ([address] 1999    N&V (nausea and vomiting)     Nocturia     Nondependent alcohol abuse, in remission     Nutcracker esophagus     Other ill-defined conditions(799.89)     dysphagia    Renal stone     Slurred speech     Stroke ([address] 1999    questionable TIA    Swallowing difficulty     Syncopal episodes     Syncope and collapse     Tobacco dependence     Unspecified adverse effect of anesthesia     AT TIMES ESOPHAGUS SPASMS AFTER PROCEDURES    Unspecified cerebral artery occlusion with cerebral infarction      Past Surgical History:   Procedure Laterality Date    HX APPENDECTOMY      9YEARS OLD    HX CHOLECYSTECTOMY  2009    HX CORONARY STENT PLACEMENT  2008    3 STENTS PLACED    HX ENDOSCOPY      with Dilatation, multiple times    HX HEART CATHETERIZATION  2012    EVERYTHING LOOKED GOOD AT THIS POINT    HX HEENT      \"2 jaw surgeries\"    HX HERNIA REPAIR      ABDOMEN    HX KNEE ARTHROSCOPY  9/2012    LEFT    HX MOHS PROCEDURES  2016    HX ORTHOPAEDIC  12/12    left knee    HX OTHER SURGICAL      ESOPHAGUS DILATATION    HX ROTATOR CUFF REPAIR Right     x2    HX UROLOGICAL  07/18/2016    SPA-Cystoscopy,URETEROSCOPY W/ LITHOTRIPSY, Dr Nnamdi Montez  UROLOGICAL  10/10/2016    SL-Cystoscopy with removal of ureteral stent, Dr Nnamdi Montez  UROLOGICAL  01/30/2017    SPA-Cystoscopy, Left retrograde pyelography, Left diagnostic ureteroscopy, Left 6 x 26 cm double-J ureteral stent placement,Right retrograde pyelography, Right ureteroscopic stone extraction,Right 6 x 24 cm double-J ureteral stent placement, Interpretation of urography,Intraoperative fluoro less than 1 hour,          UROLOGICAL  02/08/2017    SL-CYSTOURETHROSCOPY WITH STENT REMOVAL, Dr Natalee Bill      Family History   Problem Relation Age of Onset    Diabetes Father     Heart Disease Father     Stroke Father      Social History     Tobacco Use    Smoking status: Current Every Day Smoker     Packs/day: 0.00     Years: 20.00     Pack years: 0.00     Types: Cigarettes    Smokeless tobacco: Never Used    Tobacco comment: Patient states he smokes 2-3 cigarettes per day   Substance Use Topics    Alcohol use: No     Alcohol/week: 0.0 oz     Comment: \"none in over 21 years\"      Prior to Admission medications    Medication Sig Start Date End Date Taking?  Authorizing Provider   meperidine (DEMEROL) 50 mg tablet Take 100 mg by mouth two (2) times a day. Yes Provider, Historical   diazePAM (VALIUM) 10 mg tablet Take 10 mg by mouth every six (6) hours as needed for Anxiety. Yes Provider, Historical   ondansetron (ZOFRAN ODT) 4 mg disintegrating tablet Take 1 Tab by mouth every eight (8) hours as needed for Nausea. 11/24/18  Yes DAVID Velazquez   omeprazole (PRILOSEC) 10 mg capsule Take 10 mg by mouth two (2) times a day. Yes Provider, Historical   chlorthalidone (HYGROTEN) 25 mg tablet Take 1 Tab by mouth daily. 8/22/17  Yes Sachi Ramos MD   nitroglycerin (NITROSTAT) 0.3 mg SL tablet 0.3 mg.   Yes Provider, Historical   carvedilol (COREG) 25 mg tablet Take 25 mg by mouth two (2) times daily (with meals). Yes Provider, Historical   atorvastatin (LIPITOR) 40 mg tablet Take 40 mg by mouth daily. Yes Provider, Historical   DULoxetine (CYMBALTA) 60 mg capsule Take 60 mg by mouth daily. Yes Provider, Historical   clopidogrel (PLAVIX) 75 mg tab Take 75 mg by mouth. Other, MD Jing        Allergies   Allergen Reactions    Bee Sting [Sting, Bee] Hives and Shortness of Breath    Codeine Hives and Shortness of Breath     Has tolerated Hydromorphone.  Haldol [Haloperidol Lactate] Hives and Shortness of Breath    Haloperidol Hives and Cough    Keflex [Cephalexin] Diarrhea    No Allergy Information Available Other (comments)     Other reaction(s): hives    Shellfish Containing Products Hives    Stadol [Butorphanol Tartrate] Hives, Shortness of Breath and Rash    Vicodin [Hydrocodone-Acetaminophen] Hives, Shortness of Breath and Rash       Review of Systems:  A comprehensive review of systems was negative except for that written in the History of Present Illness.     Objective:     Vitals:    01/14/19 1359   Weight: 107 kg (236 lb)   Height: 5' 7\" (1.702 m)        Physical Exam:  GENERAL: alert, cooperative, no distress, appears stated age  LUNG: clear to auscultation bilaterally  HEART: regular rate and rhythm, S1, S2 normal, no murmur, click, rub or gallop  ABDOMEN: soft, non-tender. Bowel sounds normal. No masses,  no organomegaly  NEUROLOGIC: alert & oriented x 3    Assessment:     1. Dysphagia    Plan:     1. EGD    Signed By: Ar Beauchamp MD     January 18, 2019      Ar Beauchamp MD  [facility] 1948, [address] [place] [address] - [phone]  [email or web address]

## 2019-01-18 NOTE — ANESTHESIA POSTPROCEDURE EVALUATION
Procedure(s): ENDOSCOPY with dilation using 54 fr voss. Anesthesia Post Evaluation Multimodal analgesia: multimodal analgesia used between 6 hours prior to anesthesia start to PACU discharge Patient location during evaluation: bedside Patient participation: complete - patient participated Level of consciousness: awake Pain management: adequate Airway patency: patent Anesthetic complications: no 
Cardiovascular status: stable Respiratory status: acceptable Hydration status: acceptable Post anesthesia nausea and vomiting:  controlled Visit Vitals /89 (BP 1 Location: Right arm, BP Patient Position: At rest) Pulse 92 Temp 36.2 °C (97.2 °F) Resp 14 Ht 5' 7\" (1.702 m) Wt 97.3 kg (214 lb 6.4 oz) SpO2 96% BMI 33.58 kg/m²

## 2019-01-18 NOTE — DISCHARGE INSTRUCTIONS
Esophagitis: Care Instructions  Your Care Instructions    Esophagitis (say \"ih-sof-uh-JY-tus\") is irritation of the esophagus, the tube that carries food from your throat to your stomach. Acid reflux is the most common cause of this condition. When you have reflux, stomach acid and juices flow upward. This can cause pain or a burning feeling in your chest. You may have a sore throat. It may be hard to swallow. Other causes of this condition include some medicines and supplements. Allergies or an infection can also cause it. Your doctor will ask about your symptoms and past health. He or she might do tests to find the cause of your symptoms. Treatment depends on what is causing the problem. Treatment might include changing your diet or taking medicine to relieve your symptoms. It might also include changing a medicine that is causing your symptoms. If you have reflux, medicine that reduces the stomach acid helps your body heal. It might take 1 to 3 weeks to heal.  Follow-up care is a key part of your treatment and safety. Be sure to make and go to all appointments, and call your doctor if you are having problems. It's also a good idea to know your test results and keep a list of the medicines you take. How can you care for yourself at home? · If you have acid reflux, your doctor may recommend that you:  ? Eat several small meals instead of two or three large meals. After you eat, wait 2 to 3 hours before you lie down. ? Avoid chocolate, mint, alcohol, and spicy foods. ? Don't smoke or use smokeless tobacco. Smoking can make this condition worse. If you need help quitting, talk to your doctor about stop-smoking programs and medicines. These can increase your chances of quitting for good. ? Raise the head of your bed 6 to 8 inches if you have symptoms at night. ? Lose weight if you are overweight. ? Take an over-the-counter antacid, such as Maalox, Mylanta, or Tums.  Be careful when you take over-the-counter antacid medicines. Many of these medicines have aspirin in them. Read the label to make sure that you are not taking more than the recommended dose. Too much aspirin can be harmful. ? Take stronger acid reducers. Examples are famotidine (such as Pepcid), omeprazole (such as Prilosec), and ranitidine (such as Zantac). · If your condition is caused by infection, allergy, or other problems, use the medicine or treatments that your doctor recommends. · Be safe with medicines. Take your medicines exactly as prescribed. Call your doctor if you think you are having a problem with your medicine. When should you call for help? Call your doctor now or seek immediate medical care if:    · You have new or worse belly pain.     · You are vomiting.    Watch closely for changes in your health, and be sure to contact your doctor if:    · You have new or worse symptoms of reflux.     · You have trouble or pain swallowing.     · You are losing weight.     · You do not get better as expected. Where can you learn more? Go to http://turner-doris.info/. Enter I195 in the search box to learn more about \"Esophagitis: Care Instructions. \"  Current as of: March 27, 2018  Content Version: 11.9  © 4378-2586 [employer] Care instructions adapted under license by [employer] (which disclaims liability or warranty for this information). If you have questions about a medical condition or this instruction, always ask your healthcare professional. [place] [address] any warranty or liability for your use of this information. Esophageal Dilation: What to Expect at [address] [place] [address]  After you have esophageal dilation, you will stay at the hospital or surgery center for 1 to 2 hours. This will allow the medicine to wear off. You will be able to go home after your doctor or nurse checks to make sure you are not having any problems.   This care sheet gives you a general idea about how long it will take for you to recover. But each person recovers at a different pace. Follow the steps below to get better as quickly as possible. How can you care for yourself at home? Activity    · Rest as much as you need to after you go home.     · You should be able to go back to your usual activities the day after the procedure. Diet    · Follow your doctor's directions for eating after the procedure.     · Drink plenty of fluids (unless your doctor has told you not to). Medicines    · Your doctor will tell you if and when you can restart your medicines. He or she will also give you instructions about taking any new medicines.     · If you take blood thinners, such as warfarin (Coumadin), clopidogrel (Plavix), or aspirin, be sure to talk to your doctor. He or she will tell you if and when to start taking those medicines again. Make sure that you understand exactly what your doctor wants you to do.     · If you have a sore throat the day after the procedure, use an over-the-counter spray to numb your throat. Sucking on throat lozenges and gargling with warm salt water may also help relieve your symptoms. Follow-up care is a key part of your treatment and safety. Be sure to make and go to all appointments, and call your doctor if you are having problems. It's also a good idea to know your test results and keep a list of the medicines you take. When should you call for help? Call 911 anytime you think you may need emergency care. For example, call if:    · You passed out (lost consciousness).     · You have trouble breathing.     · Your stools are maroon or very bloody    Call your doctor now or seek immediate medical care if:    · You have new or worse belly pain.     · You have a fever.     · You have new or more blood in your stools.     · You are sick to your stomach and cannot drink fluids.     · You cannot pass stools or gas.     · You have pain that does not get better after you take pain medicine.  Watch closely for changes in your health, and be sure to contact your doctor if:    · Your throat still hurts after a day or two.     · You do not get better as expected. Where can you learn more? Go to http://turner-doris.info/. Enter S631 in the search box to learn more about \"Esophageal Dilation: What to Expect at Home. \"  Current as of: March 27, 2018  Content Version: 11.9  © 1450-6597 [employer]. Care instructions adapted under license by [employer] (which disclaims liability or warranty for this information). If you have questions about a medical condition or this instruction, always ask your healthcare professional. [place] [address] any warranty or liability for your use of this information. DISCHARGE SUMMARY from Nurse    PATIENT INSTRUCTIONS:    After general anesthesia or intravenous sedation, for 24 hours or while taking prescription Narcotics:  · Limit your activities  · Do not drive and operate hazardous machinery  · Do not make important personal or business decisions  · Do  not drink alcoholic beverages  · If you have not urinated within 8 hours after discharge, please contact your surgeon on call. Report the following to your surgeon:  · Excessive pain, swelling, redness or odor of or around the surgical area  · Temperature over 100.5  · Nausea and vomiting lasting longer than 4 hours or if unable to take medications  · Any signs of decreased circulation or nerve impairment to extremity: change in color, persistent  numbness, tingling, coldness or increase pain  · Any questions      *  Please give a list of your current medications to your Primary Care Provider. *  Please update this list whenever your medications are discontinued, doses are      changed, or new medications (including over-the-counter products) are added. *  Please carry medication information at all times in case of emergency situations.     These are general instructions for a healthy lifestyle:    No smoking/ No tobacco products/ Avoid exposure to second hand smoke  Surgeon General's Warning:  Quitting smoking now greatly reduces serious risk to your health. Obesity, smoking, and sedentary lifestyle greatly increases your risk for illness    A healthy diet, regular physical exercise & weight monitoring are important for maintaining a healthy lifestyle    You may be retaining fluid if you have a history of heart failure or if you experience any of the following symptoms:  Weight gain of 3 pounds or more overnight or 5 pounds in a week, increased swelling in our hands or feet or shortness of breath while lying flat in bed. Please call your doctor as soon as you notice any of these symptoms; do not wait until your next office visit. Recognize signs and symptoms of STROKE:    F-face looks uneven    A-arms unable to move or move unevenly    S-speech slurred or non-existent    T-time-call 911 as soon as signs and symptoms begin-DO NOT go       Back to bed or wait to see if you get better-TIME IS BRAIN. Warning Signs of HEART ATTACK     Call 911 if you have these symptoms:   Chest discomfort. Most heart attacks involve discomfort in the center of the chest that lasts more than a few minutes, or that goes away and comes back. It can feel like uncomfortable pressure, squeezing, fullness, or pain.  Discomfort in other areas of the upper body. Symptoms can include pain or discomfort in one or both arms, the back, neck, jaw, or stomach.  Shortness of breath with or without chest discomfort.  Other signs may include breaking out in a cold sweat, nausea, or lightheadedness. Don't wait more than five minutes to call 911 - MINUTES MATTER! Fast action can save your life. Calling 911 is almost always the fastest way to get lifesaving treatment.  Emergency Medical Services staff can begin treatment when they arrive -- up to an hour sooner than if someone gets to the hospital by car. The discharge information has been reviewed with the patient and spouse. The patient and spouse verbalized understanding. Discharge medications reviewed with the patient and spouse and appropriate educational materials and side effects teaching were provided.   ___________________________________________________________________________________________________________________________________

## 2019-01-18 NOTE — ANESTHESIA PREPROCEDURE EVALUATION
Anesthetic History No history of anesthetic complications Review of Systems / Medical History Patient summary reviewed and pertinent labs reviewed Pulmonary Smoker Neuro/Psych  
 
 
 
TIA Cardiovascular Hypertension: well controlled Past MI and CAD (3 stents. Denies Angina) Exercise tolerance: >4 METS 
  
GI/Hepatic/Renal 
  
GERD: poorly controlled PUD and liver disease (Alcoholic cirhossis) Endo/Other Arthritis Comments: Last use of Marihuana ~ 8 years ago Other Findings Physical Exam 
 
Airway Mallampati: II 
TM Distance: 4 - 6 cm Neck ROM: normal range of motion Mouth opening: Normal 
 
 Cardiovascular Regular rate and rhythm,  S1 and S2 normal,  no murmur, click, rub, or gallop Dental 
 
Dentition: Caps/crowns and [employer] Pulmonary Breath sounds clear to auscultation Abdominal 
GI exam deferred Other Findings Anesthetic Plan ASA: 3 Anesthesia type: MAC Induction: Intravenous Anesthetic plan and risks discussed with: Patient

## 2019-01-18 NOTE — PROGRESS NOTES
Patient may resume Plavix tomorrow. Kojo Bar MD  [facility] [address] - [phone]  [email or web address]

## 2019-01-18 NOTE — PERIOP NOTES
Patient confirmed by two identifiers with discharge instructions prior too being provided to patient and daughter.

## 2019-01-18 NOTE — PROCEDURES
[email or web address]  [phone]        Brief Procedure Note    Tarik Sloan  1955  [id number]    Date of Procedure: 1/18/2019    Preoperative diagnosis: DYSPHAGIA    Postoperative diagnosis: Non-obstructive dysphagia, Dilation using 47 fr voss; esophagitis    Description of Findings: same    Sedation/Anesthesia: Monitored Anesthesia Care; See Anesthesia Note    Procedure: Procedure(s):  ENDOSCOPY with dilation using 54 fr voss    :  Dr. Evaristo Steiner MD    Assistant(s): [facility]: [facility]  Endoscopy RN-1: Ivory Horne RN; Joie Randolph, PIYUSH; Bertin Watts RN    EBL:None    Specimens: * No specimens in log *    Findings: See printed and scanned procedure note    Complications: None    Dr. Evaristo Steiner MD  1/18/2019  9:59 AM    Evaristo Steiner MD  [facility] [address] [phone]  [email or web address]

## 2019-01-18 NOTE — PERIOP NOTES
Dr. Junay Adkins notified of pt complaint of esophageal spasms rated a 9 on a 1-10 scale. Patient requesting another shot of dilaudid IV.

## 2019-03-05 ENCOUNTER — ANESTHESIA EVENT (OUTPATIENT)
Dept: ENDOSCOPY | Age: 64
End: 2019-03-05
Payer: MEDICARE

## 2019-03-06 ENCOUNTER — HOSPITAL ENCOUNTER (OUTPATIENT)
Age: 64
Setting detail: OUTPATIENT SURGERY
Discharge: HOME | End: 2019-03-06
Attending: INTERNAL MEDICINE | Admitting: INTERNAL MEDICINE
Payer: MEDICARE

## 2019-03-06 ENCOUNTER — ANESTHESIA (OUTPATIENT)
Dept: ENDOSCOPY | Age: 64
End: 2019-03-06
Payer: MEDICARE

## 2019-03-06 VITALS
DIASTOLIC BLOOD PRESSURE: 70 MMHG | RESPIRATION RATE: 20 BRPM | WEIGHT: 245.9 LBS | HEART RATE: 75 BPM | SYSTOLIC BLOOD PRESSURE: 138 MMHG | HEIGHT: 67 IN | OXYGEN SATURATION: 98 % | BODY MASS INDEX: 38.6 KG/M2 | TEMPERATURE: 97.2 F

## 2019-03-06 PROCEDURE — 76060000031 HC ANESTHESIA FIRST 0.5 HR: Performed by: INTERNAL MEDICINE

## 2019-03-06 PROCEDURE — 80307 DRUG TEST PRSMV CHEM ANLYZR: CPT

## 2019-03-06 PROCEDURE — 77030018846 HC SOL IRR STRL H20 ICUM -A: Performed by: INTERNAL MEDICINE

## 2019-03-06 PROCEDURE — 77030008565 HC TBNG SUC IRR ERBE -B: Performed by: INTERNAL MEDICINE

## 2019-03-06 PROCEDURE — 74011250636 HC RX REV CODE- 250/636: Performed by: NURSE ANESTHETIST, CERTIFIED REGISTERED

## 2019-03-06 PROCEDURE — 74011250636 HC RX REV CODE- 250/636

## 2019-03-06 PROCEDURE — 76040000019: Performed by: INTERNAL MEDICINE

## 2019-03-06 PROCEDURE — 74011000250 HC RX REV CODE- 250: Performed by: NURSE ANESTHETIST, CERTIFIED REGISTERED

## 2019-03-06 RX ORDER — HYDROMORPHONE HYDROCHLORIDE 2 MG/ML
0.5 INJECTION, SOLUTION INTRAMUSCULAR; INTRAVENOUS; SUBCUTANEOUS AS NEEDED
Status: COMPLETED | OUTPATIENT
Start: 2019-03-06 | End: 2019-03-06

## 2019-03-06 RX ORDER — NALOXONE HYDROCHLORIDE 0.4 MG/ML
0.1 INJECTION, SOLUTION INTRAMUSCULAR; INTRAVENOUS; SUBCUTANEOUS ONCE
Status: DISCONTINUED | OUTPATIENT
Start: 2019-03-06 | End: 2019-03-06 | Stop reason: HOSPADM

## 2019-03-06 RX ORDER — SODIUM CHLORIDE 0.9 % (FLUSH) 0.9 %
5-40 SYRINGE (ML) INJECTION AS NEEDED
Status: DISCONTINUED | OUTPATIENT
Start: 2019-03-06 | End: 2019-03-06 | Stop reason: HOSPADM

## 2019-03-06 RX ORDER — SODIUM CHLORIDE 0.9 % (FLUSH) 0.9 %
5-40 SYRINGE (ML) INJECTION EVERY 8 HOURS
Status: DISCONTINUED | OUTPATIENT
Start: 2019-03-06 | End: 2019-03-06 | Stop reason: HOSPADM

## 2019-03-06 RX ORDER — SODIUM CHLORIDE, SODIUM LACTATE, POTASSIUM CHLORIDE, CALCIUM CHLORIDE 600; 310; 30; 20 MG/100ML; MG/100ML; MG/100ML; MG/100ML
75 INJECTION, SOLUTION INTRAVENOUS CONTINUOUS
Status: DISCONTINUED | OUTPATIENT
Start: 2019-03-06 | End: 2019-03-06 | Stop reason: HOSPADM

## 2019-03-06 RX ADMIN — SODIUM CHLORIDE, SODIUM LACTATE, POTASSIUM CHLORIDE, AND CALCIUM CHLORIDE 75 ML/HR: 600; 310; 30; 20 INJECTION, SOLUTION INTRAVENOUS at 09:21

## 2019-03-06 RX ADMIN — HYDROMORPHONE HYDROCHLORIDE 0.5 MG: 2 INJECTION, SOLUTION INTRAMUSCULAR; INTRAVENOUS; SUBCUTANEOUS at 11:46

## 2019-03-06 RX ADMIN — HYDROMORPHONE HYDROCHLORIDE 0.5 MG: 2 INJECTION, SOLUTION INTRAMUSCULAR; INTRAVENOUS; SUBCUTANEOUS at 11:36

## 2019-03-06 RX ADMIN — FAMOTIDINE 20 MG: 10 INJECTION INTRAVENOUS at 09:21

## 2019-03-06 NOTE — ANESTHESIA POSTPROCEDURE EVALUATION
Procedure(s):  ENDOSCOPY WITH ESOPHAGEAL DILATATION.     Anesthesia Post Evaluation      Multimodal analgesia: multimodal analgesia used between 6 hours prior to anesthesia start to PACU discharge  Patient location during evaluation: bedside  Patient participation: complete - patient participated  Level of consciousness: awake  Pain management: adequate  Airway patency: patent  Anesthetic complications: no  Cardiovascular status: stable  Respiratory status: acceptable  Hydration status: acceptable  Post anesthesia nausea and vomiting:  controlled      Visit Vitals  /70 (BP Patient Position: At rest)   Pulse 75   Temp 36.2 °C (97.2 °F)   Resp 20   Ht 5' 7\" (1.702 m)   Wt 111.5 kg (245 lb 14.4 oz)   SpO2 98%   BMI 38.51 kg/m²

## 2019-03-06 NOTE — DISCHARGE INSTRUCTIONS
Esophageal Dilation: What to Expect at [address] [place] [address]  After you have esophageal dilation, you will stay at the hospital or surgery center for 1 to 2 hours. This will allow the medicine to wear off. You will be able to go home after your doctor or nurse checks to make sure you are not having any problems. This care sheet gives you a general idea about how long it will take for you to recover. But each person recovers at a different pace. Follow the steps below to get better as quickly as possible. How can you care for yourself at home? Activity    · Rest as much as you need to after you go home.     · You should be able to go back to your usual activities the day after the procedure. Diet    · Follow your doctor's directions for eating after the procedure.     · Drink plenty of fluids (unless your doctor has told you not to). Medicines    · Your doctor will tell you if and when you can restart your medicines. He or she will also give you instructions about taking any new medicines.     · If you take blood thinners, such as warfarin (Coumadin), clopidogrel (Plavix), or aspirin, be sure to talk to your doctor. He or she will tell you if and when to start taking those medicines again. Make sure that you understand exactly what your doctor wants you to do.     · If you have a sore throat the day after the procedure, use an over-the-counter spray to numb your throat. Sucking on throat lozenges and gargling with warm salt water may also help relieve your symptoms. Follow-up care is a key part of your treatment and safety. Be sure to make and go to all appointments, and call your doctor if you are having problems. It's also a good idea to know your test results and keep a list of the medicines you take. When should you call for help? Call 911 anytime you think you may need emergency care.  For example, call if:    · You passed out (lost consciousness).     · You have trouble breathing.     · Your stools are maroon or very bloody    Call your doctor now or seek immediate medical care if:    · You have new or worse belly pain.     · You have a fever.     · You have new or more blood in your stools.     · You are sick to your stomach and cannot drink fluids.     · You cannot pass stools or gas.     · You have pain that does not get better after you take pain medicine.    Watch closely for changes in your health, and be sure to contact your doctor if:    · Your throat still hurts after a day or two.     · You do not get better as expected. Where can you learn more? Go to http://turner-doris.info/. Enter N656 in the search box to learn more about \"Esophageal Dilation: What to Expect at Home. \"  Current as of: March 27, 2018  Content Version: 11.9  © 6596-3759 [employer]. Care instructions adapted under license by [employer] (which disclaims liability or warranty for this information). If you have questions about a medical condition or this instruction, always ask your healthcare professional. [place] [address] any warranty or liability for your use of this information. DISCHARGE SUMMARY from Nurse    PATIENT INSTRUCTIONS:    After general anesthesia or intravenous sedation, for 24 hours or while taking prescription Narcotics:  · Limit your activities  · Do not drive and operate hazardous machinery  · Do not make important personal or business decisions  · Do  not drink alcoholic beverages  · If you have not urinated within 8 hours after discharge, please contact your surgeon on call.     Report the following to your surgeon:  · Excessive pain, swelling, redness or odor of or around the surgical area  · Temperature over 100.5  · Nausea and vomiting lasting longer than 4 hours or if unable to take medications  · Any signs of decreased circulation or nerve impairment to extremity: change in color, persistent  numbness, tingling, coldness or increase pain  · Any questions    *  Please give a list of your current medications to your Primary Care Provider. *  Please update this list whenever your medications are discontinued, doses are      changed, or new medications (including over-the-counter products) are added. *  Please carry medication information at all times in case of emergency situations. These are general instructions for a healthy lifestyle:    No smoking/ No tobacco products/ Avoid exposure to second hand smoke  Surgeon General's Warning:  Quitting smoking now greatly reduces serious risk to your health. Obesity, smoking, and sedentary lifestyle greatly increases your risk for illness    A healthy diet, regular physical exercise & weight monitoring are important for maintaining a healthy lifestyle    You may be retaining fluid if you have a history of heart failure or if you experience any of the following symptoms:  Weight gain of 3 pounds or more overnight or 5 pounds in a week, increased swelling in our hands or feet or shortness of breath while lying flat in bed. Please call your doctor as soon as you notice any of these symptoms; do not wait until your next office visit. Recognize signs and symptoms of STROKE:    F-face looks uneven    A-arms unable to move or move unevenly    S-speech slurred or non-existent    T-time-call 911 as soon as signs and symptoms begin-DO NOT go       Back to bed or wait to see if you get better-TIME IS BRAIN. Warning Signs of HEART ATTACK     Call 911 if you have these symptoms:   Chest discomfort. Most heart attacks involve discomfort in the center of the chest that lasts more than a few minutes, or that goes away and comes back. It can feel like uncomfortable pressure, squeezing, fullness, or pain.  Discomfort in other areas of the upper body. Symptoms can include pain or discomfort in one or both arms, the back, neck, jaw, or stomach.  Shortness of breath with or without chest discomfort.    Other signs may include breaking out in a cold sweat, nausea, or lightheadedness. Don't wait more than five minutes to call 911 - MINUTES MATTER! Fast action can save your life. Calling 911 is almost always the fastest way to get lifesaving treatment. Emergency Medical Services staff can begin treatment when they arrive -- up to an hour sooner than if someone gets to the hospital by car. The discharge information has been reviewed with the patient and spouse. The patient and spouse verbalized understanding. Discharge medications reviewed with the patient and spouse and appropriate educational materials and side effects teaching were provided.   ___________________________________________________________________________________________________________________________________

## 2019-03-06 NOTE — H&P
[facility]   [email or web address]      Impression:   1.esoph spasm chest pain dysphagia reflux       Plan:     1.   egd dil on plavix (held today only) mac al risks benefits and alt discussed     Chief Complaint: dysphagia       HPI:  Herman Dixon is a 58 y.o. male who is being seen on consult for esoph spasm reflux.     PMH:   Past Medical History:   Diagnosis Date    Adverse effect of anesthesia     esophageal spasms    Altered mental status     Arthritis     Arthropathy     Back pain     Bilateral kidney stones     Burn     ON HANDS    CAD (coronary artery disease) 1999    MI    3 cardiac stents    Calcium nephrolithiasis     Calculus of kidney     Cardiac arrhythmia     Cerebral artery occlusion with cerebral infarction ([address] 2010    no residual    Chest pain     Cigarette smoker     Cirrhosis, alcoholic (HCC)     Cocaine abuse, episodic use (HCC)     Colon polyp     Diarrhea     DJD (degenerative joint disease)     Drug-seeking behavior     Dyskinesia     Esophageal disorder     Esophageal erosions     Flank pain, acute     Foot ulcer, right (HCC)     Gastric ulcer     GERD (gastroesophageal reflux disease)     Gross hematuria     Head trauma     Hearing reduced     Hematuria     Herniated disc     X 10 IN BACK    Hiatus hernia syndrome     History of kidney stones     HNP (herniated nucleus pulposus)     Hyperlipemia     Hypertension     Hypokalemia     Infection     Jaw fracture (HCC)     Kidney stones     Knee pain     Left ureteral stone     Muscle atrophy     Myocardial infarction ([address] 1999    N&V (nausea and vomiting)     Nocturia     Nondependent alcohol abuse, in remission     Nutcracker esophagus     Other ill-defined conditions(799.89)     dysphagia    Renal stone     Slurred speech     Stroke ([address] 1999    questionable TIA    Swallowing difficulty     Syncopal episodes     Syncope and collapse     Tobacco dependence     Unspecified adverse effect of anesthesia     AT TIMES ESOPHAGUS SPASMS AFTER PROCEDURES    Unspecified cerebral artery occlusion with cerebral infarction        PSH:   Past Surgical History:   Procedure Laterality Date    HX APPENDECTOMY      9YEARS OLD    HX CHOLECYSTECTOMY  2009    HX CORONARY STENT PLACEMENT  2008    3 STENTS PLACED    HX ENDOSCOPY      with Dilatation, multiple times    HX HEART CATHETERIZATION  2012    EVERYTHING LOOKED GOOD AT THIS POINT    HX HEENT      \"2 jaw surgeries\"    HX HERNIA REPAIR      ABDOMEN    HX KNEE ARTHROSCOPY  9/2012    LEFT    HX MOHS PROCEDURES  2016    HX ORTHOPAEDIC  12/12    left knee    HX OTHER SURGICAL      ESOPHAGUS DILATATION    HX ROTATOR CUFF REPAIR Right     x2    HX UROLOGICAL  07/18/2016    SPA-Cystoscopy,URETEROSCOPY W/ LITHOTRIPSY, Dr Nnamdi Montez  UROLOGICAL  10/10/2016    SL-Cystoscopy with removal of ureteral stent, Dr Nnamdi Montez  UROLOGICAL  01/30/2017    SPA-Cystoscopy, Left retrograde pyelography, Left diagnostic ureteroscopy, Left 6 x 26 cm double-J ureteral stent placement,Right retrograde pyelography, Right ureteroscopic stone extraction,Right 6 x 24 cm double-J ureteral stent placement, Interpretation of urography,Intraoperative fluoro less than 1 hour,          UROLOGICAL  02/08/2017    SL-CYSTOURETHROSCOPY WITH STENT REMOVAL, Dr Natalee Bill       Social HX:   Social History     Socioeconomic History    Marital status:      Spouse name: Not on file    Number of children: Not on file    Years of education: Not on file    Highest education level: Not on file   Social Needs    Financial resource strain: Not on file    Food insecurity - worry: Not on file    Food insecurity - inability: Not on file   [employer] needs - medical: Not on file   [employer] needs - non-medical: Not on file   Occupational History    Not on file   Tobacco Use    Smoking status: Current Every Day Smoker     Packs/day: 0.00     Years: 20.00     Pack years: 0.00     Types: Cigarettes    Smokeless tobacco: Never Used    Tobacco comment: Patient states he smokes 2-3 cigarettes per day   Substance and Sexual Activity    Alcohol use: No     Alcohol/week: 0.0 oz     Comment: \"none in over 20 years\"    Drug use: No     Comment: cocaine 2011    Sexual activity: Yes     Partners: Female   Other Topics Concern    Not on file   Social History Narrative    Not on file       FHX:   Family History   Problem Relation Age of Onset    Diabetes Father     Heart Disease Father     Stroke Father        Allergy:   Allergies   Allergen Reactions    Bee Sting [Sting, Bee] Hives and Shortness of Breath    Codeine Hives and Shortness of Breath     Has tolerated Hydromorphone.  Haldol [Haloperidol Lactate] Hives and Shortness of Breath    Haloperidol Hives and Cough    Keflex [Cephalexin] Diarrhea    No Allergy Information Available Other (comments)     Other reaction(s): hives    Shellfish Containing Products Hives    Stadol [Butorphanol Tartrate] Hives, Shortness of Breath and Rash    Vicodin [Hydrocodone-Acetaminophen] Hives, Shortness of Breath and Rash       Home Medications:     Medications Prior to Admission   Medication Sig    ondansetron (ZOFRAN ODT) 4 mg disintegrating tablet Take 1 Tab by mouth every eight (8) hours as needed for Nausea.  omeprazole (PRILOSEC) 10 mg capsule Take 10 mg by mouth two (2) times a day.  clopidogrel (PLAVIX) 75 mg tab Take 75 mg by mouth.  nitroglycerin (NITROSTAT) 0.3 mg SL tablet 0.3 mg.    carvedilol (COREG) 25 mg tablet Take 25 mg by mouth two (2) times daily (with meals).  atorvastatin (LIPITOR) 40 mg tablet Take 40 mg by mouth daily.  DULoxetine (CYMBALTA) 60 mg capsule Take 60 mg by mouth daily. Review of Systems:     Constitutional: No fevers, chills, weight loss, fatigue.    Skin: No rashes, pruritis, jaundice, ulcerations, erythema. HENT: No headaches, nosebleeds, sinus pressure, rhinorrhea, sore throat. Eyes: No visual changes, blurred vision, eye pain, photophobia, jaundice. Cardiovascular: No chest pain, heart palpitations. Respiratory: No cough, SOB, wheezing, chest discomfort, orthopnea. Gastrointestinal: Chest pain reflux    Genitourinary: No dysuria, bleeding, discharge, pyuria. Musculoskeletal: No weakness, arthralgias, wasting. Endo: No sweats. Heme: No bruising, easy bleeding. Allergies: As noted. Neurological: Cranial nerves intact. Alert and oriented. Gait not assessed. Psychiatric:  No anxiety, depression, hallucinations. Visit Vitals  Ht 5' 7\" (1.702 m)   Wt 97.1 kg (214 lb)   BMI 33.52 kg/m²       Physical Assessment:     constitutional: appearance: well developed, well nourished, normal habitus, no deformities, in no acute distress. skin: inspection: no rashes, ulcers, icterus or other lesions; no clubbing or telangiectasias. palpation: no induration or subcutaneos nodules. eyes: inspection: normal conjunctivae and lids; no jaundice pupils: symmetrical, normoreactive to light, normal accommodation and size. ENMT: mouth: normal oral mucosa,lips and gums; good dentition. oropharynx: normal tongue, hard and soft palate; posterior pharynx without erythema, exudate or lesions. neck: no masses organomegaly or tenderness. respiratory: effort: normal chest excursion; no intercostal retraction or accessory muscle use. cardiovascular: abdominal aorta: normal size and position; no bruits. palpation: PMI of normal size and position; normal rhythm; no thrill or murmurs. abdominal: abdomen: normal consistency; no tenderness or masses. hernias: no hernias appreciated. liver: normal size and consistency. spleen: not palpable. rectal: hemoccult/guaiac: not performed. musculoskeletal: no deformities or muscle wasting   lymphatic: axilae: not palpable.  groin: not palpable. neck: within normal limits. other: not palpable. neurologic: cranial nerves: II-XII normal.   psychiatric: judgement/insight: within normal limits. memory: within normal limits for recent and remote events. mood and affect: no evidence of depression, anxiety or agitation. orientation: oriented to time, space and person. Basic Metabolic Profile   No results for input(s): NA, K, CL, CO2, BUN, GLU, CA, MG, PHOS in the last 72 hours. No lab exists for component: CREAT      CBC w/Diff    No results for input(s): WBC, RBC, HGB, HCT, MCV, MCH, MCHC, RDW, PLT, HGBEXT, HCTEXT, PLTEXT in the last 72 hours. No lab exists for component: MPV No results for input(s): GRANS, LYMPH, EOS, PRO, MYELO, METAS, BLAST in the last 72 hours. No lab exists for component: MONO, BASO     Hepatic Function   No results for input(s): ALB, TP, TBILI, GPT, SGOT, AP, AML, LPSE in the last 72 hours. No lab exists for component: Dona Sewell MD, M.D. [facility], [address] [place] [address]  [email or web address]. [facility]

## 2019-03-06 NOTE — ANESTHESIA PREPROCEDURE EVALUATION
Anesthetic History               Review of Systems / Medical History  Patient summary reviewed and pertinent labs reviewed    Pulmonary          Smoker         Neuro/Psych       CVA: no residual symptoms       Cardiovascular    Hypertension: well controlled          CAD and cardiac stents (3 stents)    Exercise tolerance: >4 METS     GI/Hepatic/Renal     GERD: well controlled      PUD and liver disease (Cirhossis)     Endo/Other        Arthritis     Other Findings            Physical Exam    Airway  Mallampati: II  TM Distance: 4 - 6 cm  Neck ROM: normal range of motion   Mouth opening: Normal     Cardiovascular  Regular rate and rhythm,  S1 and S2 normal,  no murmur, click, rub, or gallop             Dental      Comments: Several missing   Pulmonary  Breath sounds clear to auscultation               Abdominal  GI exam deferred       Other Findings            Anesthetic Plan    ASA: 3  Anesthesia type: MAC          Induction: Intravenous  Anesthetic plan and risks discussed with: Patient

## 2019-03-31 ENCOUNTER — HOSPITAL ENCOUNTER (EMERGENCY)
Age: 64
Discharge: HOME | End: 2019-03-31
Attending: EMERGENCY MEDICINE
Payer: MEDICARE

## 2019-03-31 ENCOUNTER — APPOINTMENT (OUTPATIENT)
Dept: GENERAL RADIOLOGY | Age: 64
End: 2019-03-31
Attending: EMERGENCY MEDICINE
Payer: MEDICARE

## 2019-03-31 VITALS
DIASTOLIC BLOOD PRESSURE: 85 MMHG | OXYGEN SATURATION: 97 % | SYSTOLIC BLOOD PRESSURE: 153 MMHG | BODY MASS INDEX: 39 KG/M2 | RESPIRATION RATE: 13 BRPM | WEIGHT: 249 LBS | TEMPERATURE: 98.5 F | HEART RATE: 70 BPM

## 2019-03-31 DIAGNOSIS — R07.9 CHEST PAIN, UNSPECIFIED TYPE: Primary | ICD-10-CM

## 2019-03-31 LAB
ALBUMIN SERPL-MCNC: 3.2 G/DL (ref 3.4–5)
ALBUMIN/GLOB SERPL: 0.7 {RATIO} (ref 0.8–1.7)
ALP SERPL-CCNC: 172 U/L (ref 45–117)
ALT SERPL-CCNC: 62 U/L (ref 16–61)
ANION GAP SERPL CALC-SCNC: 8 MMOL/L (ref 3–18)
AST SERPL-CCNC: 61 U/L (ref 15–37)
ATRIAL RATE: 67 BPM
ATRIAL RATE: 83 BPM
BASOPHILS # BLD: 0 K/UL (ref 0–0.1)
BASOPHILS NFR BLD: 0 % (ref 0–2)
BILIRUB SERPL-MCNC: 0.7 MG/DL (ref 0.2–1)
BUN SERPL-MCNC: 17 MG/DL (ref 7–18)
BUN/CREAT SERPL: 16 (ref 12–20)
CALCIUM SERPL-MCNC: 10.4 MG/DL (ref 8.5–10.1)
CALCULATED P AXIS, ECG09: 21 DEGREES
CALCULATED P AXIS, ECG09: 9 DEGREES
CALCULATED R AXIS, ECG10: -15 DEGREES
CALCULATED R AXIS, ECG10: -9 DEGREES
CALCULATED T AXIS, ECG11: 28 DEGREES
CALCULATED T AXIS, ECG11: 38 DEGREES
CHLORIDE SERPL-SCNC: 108 MMOL/L (ref 100–108)
CK MB CFR SERPL CALC: 1.7 % (ref 0–4)
CK MB SERPL-MCNC: 1.6 NG/ML (ref 5–25)
CK SERPL-CCNC: 93 U/L (ref 39–308)
CO2 SERPL-SCNC: 24 MMOL/L (ref 21–32)
CREAT SERPL-MCNC: 1.07 MG/DL (ref 0.6–1.3)
DIAGNOSIS, 93000: NORMAL
DIAGNOSIS, 93000: NORMAL
DIFFERENTIAL METHOD BLD: ABNORMAL
EOSINOPHIL # BLD: 0.3 K/UL (ref 0–0.4)
EOSINOPHIL NFR BLD: 3 % (ref 0–5)
ERYTHROCYTE [DISTWIDTH] IN BLOOD BY AUTOMATED COUNT: 14.4 % (ref 11.6–14.5)
GLOBULIN SER CALC-MCNC: 4.4 G/DL (ref 2–4)
GLUCOSE SERPL-MCNC: 147 MG/DL (ref 74–99)
HCT VFR BLD AUTO: 44.7 % (ref 36–48)
HGB BLD-MCNC: 16.3 G/DL (ref 13–16)
LYMPHOCYTES # BLD: 2.8 K/UL (ref 0.9–3.6)
LYMPHOCYTES NFR BLD: 35 % (ref 21–52)
MCH RBC QN AUTO: 32.7 PG (ref 24–34)
MCHC RBC AUTO-ENTMCNC: 36.5 G/DL (ref 31–37)
MCV RBC AUTO: 89.6 FL (ref 74–97)
MONOCYTES # BLD: 0.8 K/UL (ref 0.05–1.2)
MONOCYTES NFR BLD: 10 % (ref 3–10)
NEUTS SEG # BLD: 4.2 K/UL (ref 1.8–8)
NEUTS SEG NFR BLD: 52 % (ref 40–73)
P-R INTERVAL, ECG05: 140 MS
P-R INTERVAL, ECG05: 140 MS
PLATELET # BLD AUTO: 106 K/UL (ref 135–420)
PMV BLD AUTO: 10.3 FL (ref 9.2–11.8)
POTASSIUM SERPL-SCNC: 4.1 MMOL/L (ref 3.5–5.5)
PROT SERPL-MCNC: 7.6 G/DL (ref 6.4–8.2)
Q-T INTERVAL, ECG07: 376 MS
Q-T INTERVAL, ECG07: 418 MS
QRS DURATION, ECG06: 82 MS
QRS DURATION, ECG06: 84 MS
QTC CALCULATION (BEZET), ECG08: 441 MS
QTC CALCULATION (BEZET), ECG08: 441 MS
RBC # BLD AUTO: 4.99 M/UL (ref 4.7–5.5)
SODIUM SERPL-SCNC: 140 MMOL/L (ref 136–145)
TROPONIN I BLD-MCNC: <0.04 NG/ML (ref 0–0.08)
TROPONIN I SERPL-MCNC: 0.02 NG/ML (ref 0–0.04)
TROPONIN I SERPL-MCNC: <0.02 NG/ML (ref 0–0.04)
VENTRICULAR RATE, ECG03: 67 BPM
VENTRICULAR RATE, ECG03: 83 BPM
WBC # BLD AUTO: 8 K/UL (ref 4.6–13.2)

## 2019-03-31 PROCEDURE — 99285 EMERGENCY DEPT VISIT HI MDM: CPT

## 2019-03-31 PROCEDURE — 96376 TX/PRO/DX INJ SAME DRUG ADON: CPT

## 2019-03-31 PROCEDURE — 84484 ASSAY OF TROPONIN QUANT: CPT

## 2019-03-31 PROCEDURE — 80053 COMPREHEN METABOLIC PANEL: CPT

## 2019-03-31 PROCEDURE — 96361 HYDRATE IV INFUSION ADD-ON: CPT

## 2019-03-31 PROCEDURE — 96375 TX/PRO/DX INJ NEW DRUG ADDON: CPT

## 2019-03-31 PROCEDURE — 74011250636 HC RX REV CODE- 250/636: Performed by: STUDENT IN AN ORGANIZED HEALTH CARE EDUCATION/TRAINING PROGRAM

## 2019-03-31 PROCEDURE — 85025 COMPLETE CBC W/AUTO DIFF WBC: CPT

## 2019-03-31 PROCEDURE — 74011250637 HC RX REV CODE- 250/637: Performed by: STUDENT IN AN ORGANIZED HEALTH CARE EDUCATION/TRAINING PROGRAM

## 2019-03-31 PROCEDURE — 71046 X-RAY EXAM CHEST 2 VIEWS: CPT

## 2019-03-31 PROCEDURE — 93005 ELECTROCARDIOGRAM TRACING: CPT

## 2019-03-31 PROCEDURE — 96374 THER/PROPH/DIAG INJ IV PUSH: CPT

## 2019-03-31 PROCEDURE — 82550 ASSAY OF CK (CPK): CPT

## 2019-03-31 RX ORDER — GUAIFENESIN 100 MG/5ML
324 LIQUID (ML) ORAL
Status: COMPLETED | OUTPATIENT
Start: 2019-03-31 | End: 2019-03-31

## 2019-03-31 RX ORDER — NITROGLYCERIN 0.4 MG/1
0.4 TABLET SUBLINGUAL
Status: COMPLETED | OUTPATIENT
Start: 2019-03-31 | End: 2019-03-31

## 2019-03-31 RX ORDER — MORPHINE SULFATE 2 MG/ML
4 INJECTION, SOLUTION INTRAMUSCULAR; INTRAVENOUS ONCE
Status: COMPLETED | OUTPATIENT
Start: 2019-03-31 | End: 2019-03-31

## 2019-03-31 RX ORDER — ONDANSETRON 2 MG/ML
4 INJECTION INTRAMUSCULAR; INTRAVENOUS
Status: COMPLETED | OUTPATIENT
Start: 2019-03-31 | End: 2019-03-31

## 2019-03-31 RX ORDER — MORPHINE SULFATE 4 MG/ML
4 INJECTION INTRAVENOUS
Status: COMPLETED | OUTPATIENT
Start: 2019-03-31 | End: 2019-03-31

## 2019-03-31 RX ADMIN — ASPIRIN 81 MG 324 MG: 81 TABLET ORAL at 04:31

## 2019-03-31 RX ADMIN — SODIUM CHLORIDE 1000 ML: 900 INJECTION, SOLUTION INTRAVENOUS at 04:34

## 2019-03-31 RX ADMIN — NITROGLYCERIN 0.4 MG: 0.4 TABLET, ORALLY DISINTEGRATING SUBLINGUAL at 04:30

## 2019-03-31 RX ADMIN — MORPHINE SULFATE 4 MG: 4 INJECTION INTRAVENOUS at 05:47

## 2019-03-31 RX ADMIN — ONDANSETRON 4 MG: 2 INJECTION INTRAMUSCULAR; INTRAVENOUS at 04:35

## 2019-03-31 RX ADMIN — MORPHINE SULFATE 4 MG: 2 INJECTION, SOLUTION INTRAMUSCULAR; INTRAVENOUS at 04:35

## 2019-03-31 NOTE — ED PROVIDER NOTES
EMERGENCY DEPARTMENT HISTORY AND PHYSICAL EXAM 
 
5:47 AM 
Date: 3/31/2019 Patient Name: Tao Suarez History of Presenting Illness Chief Complaint Patient presents with  Chest Pain History Provided By: Patient HPI: Tao Suarez is a 58 y.o. male with HTN, HLD, CAD, GERD,  \"nutcracker esophagus\" presenting with chest pain. Pt states \"I think this is my esophagus acting up\". Started last night at 11 pm. Took 3 nitro since his cardiologist told him to try this given it may be cardiac pain. This did not relieve the pain. Pt waited 3 hours prior to coming to the ED hoping the pain would resolve. He has nausea but denies diaphoresis or radiation of the pain. He states he has his esophagus dilated or has an endoscopy scheduled every 6 weeks. Denies exertional component to the pain. Was sleeping when the pain started. PCP: Army Dk DO No current facility-administered medications on file prior to encounter. Current Outpatient Medications on File Prior to Encounter Medication Sig Dispense Refill  ondansetron (ZOFRAN ODT) 4 mg disintegrating tablet Take 1 Tab by mouth every eight (8) hours as needed for Nausea. 15 Tab 0  
 omeprazole (PRILOSEC) 10 mg capsule Take 10 mg by mouth two (2) times a day.  clopidogrel (PLAVIX) 75 mg tab Take 75 mg by mouth.  nitroglycerin (NITROSTAT) 0.3 mg SL tablet 0.3 mg.    
 carvedilol (COREG) 25 mg tablet Take 25 mg by mouth two (2) times daily (with meals).  atorvastatin (LIPITOR) 40 mg tablet Take 40 mg by mouth daily.  DULoxetine (CYMBALTA) 60 mg capsule Take 60 mg by mouth daily. Past History Past Medical History: 
Past Medical History:  
Diagnosis Date  Adverse effect of anesthesia   
 esophageal spasms  Altered mental status  Arthritis  Arthropathy  Back pain  Bilateral kidney stones  Burn ON HANDS  
 CAD (coronary artery disease) 1999 MI    3 cardiac stents  Calcium nephrolithiasis  Calculus of kidney  Cardiac arrhythmia  Cerebral artery occlusion with cerebral infarction ([address] 2010  
 no residual  
 Chest pain  Cigarette smoker  Cirrhosis, alcoholic ([address]  Cocaine abuse, episodic use ([address]  Colon polyp  Diarrhea  DJD (degenerative joint disease)  Drug-seeking behavior  Dyskinesia  Esophageal disorder  Esophageal erosions  Flank pain, acute  Foot ulcer, right ([address]  Gastric ulcer  GERD (gastroesophageal reflux disease)  Gross hematuria  Head trauma  Hearing reduced  Hematuria  Herniated disc X 10 IN BACK  Hiatus hernia syndrome  History of kidney stones  HNP (herniated nucleus pulposus)  Hyperlipemia  Hypertension  Hypokalemia  Infection  Jaw fracture ([address]  Kidney stones  Knee pain  Left ureteral stone  Muscle atrophy  Myocardial infarction [facility]) 1999  N&V (nausea and vomiting)  Nocturia  Nondependent alcohol abuse, in remission  Nutcracker esophagus  Other ill-defined conditions(799.89) dysphagia  Renal stone  Slurred speech  Stroke [facility]) 1999  
 questionable TIA  Swallowing difficulty  Syncopal episodes  Syncope and collapse  Tobacco dependence  Unspecified adverse effect of anesthesia AT TIMES ESOPHAGUS SPASMS AFTER PROCEDURES  
 Unspecified cerebral artery occlusion with cerebral infarction Past Surgical History: 
Past Surgical History:  
Procedure Laterality Date  HX APPENDECTOMY    
 9YEARS OLD  
 HX CHOLECYSTECTOMY  2009  HX CORONARY STENT PLACEMENT  2008  
 3 STENTS PLACED  HX ENDOSCOPY    
 with Dilatation, multiple times [address] CATHETERIZATION  2012 EVERYTHING LOOKED GOOD AT THIS POINT  HX HEENT    
 \"2 jaw surgeries\"  HX HERNIA REPAIR    
 ABDOMEN  
 HX KNEE ARTHROSCOPY  9/2012 LEFT  
 HX MOHS PROCEDURES  2016  HX ORTHOPAEDIC  12/12  
 left knee  HX OTHER SURGICAL    
 ESOPHAGUS DILATATION  
 HX ROTATOR CUFF REPAIR Right   
 x2  
 HX UROLOGICAL  07/18/2016 [facility]-Cystoscopy,URETEROSCOPY W/ LITHOTRIPSY, Dr Kb Ayon   UROLOGICAL  10/10/2016 [facility]-Cystoscopy with removal of ureteral stent, Dr Kb Ayon   UROLOGICAL  01/30/2017 [facility]-Cystoscopy, Left retrograde pyelography, Left diagnostic ureteroscopy, Left 6 x 26 cm double-J ureteral stent placement,Right retrograde pyelography, Right ureteroscopic stone extraction,Right 6 x 24 cm double-J ureteral stent placement, Interpretation of urography,Intraoperative fluoro less than 1 hour,    UROLOGICAL  02/08/2017 [facility]-CYSTOURETHROSCOPY WITH STENT REMOVAL, Dr Kb Ayon Family History: 
Family History Problem Relation Age of Onset  Diabetes Father  Heart Disease Father  Stroke Father Social History: 
Social History Tobacco Use  Smoking status: Current Every Day Smoker Packs/day: 0.00 Years: 20.00 Pack years: 0.00 Types: Cigarettes  Smokeless tobacco: Never Used  Tobacco comment: Patient states he smokes 2-3 cigarettes per day Substance Use Topics  Alcohol use: No  
  Alcohol/week: 0.0 oz  
  Comment: \"none in over 20 years\"  Drug use: No  
  Types: Cocaine, Hallucinogenics, Opiates, Benzodiazepines Comment: cocaine 2011 Allergies: Allergies Allergen Reactions  Bee Sting [Sting, Bee] Hives and Shortness of Breath  Codeine Hives and Shortness of Breath Has tolerated Hydromorphone.  Haldol [Haloperidol Lactate] Hives and Shortness of Breath  Haloperidol Hives and Cough  Keflex [Cephalexin] Diarrhea  No Allergy Information Available Other (comments) Other reaction(s): hives  Shellfish Containing Products Hives  Stadol [Butorphanol Tartrate] Hives, Shortness of Breath and Rash  Vicodin [Hydrocodone-Acetaminophen] Hives, Shortness of Breath and Rash Review of Systems Constitutional: No fevers. Eyes: No visual symptoms. ENT: No sore throat, runny nose, or ear pain. +raspy voice Respiratory: No cough, dyspnea, or wheezing. Cardiovascular: +chest pain, No palpitations, or heaviness. Gastrointestinal: + nausea, No vomiting, diarrhea. Genitourinary: No dysuria, frequency, or urgency. Musculoskeletal: No joint pain or swelling. Integumentary: No rashes. Neurological: No headaches, sensory or motor symptoms. All other systems negative. Physical Exam  
 
Patient Vitals for the past 12 hrs: 
 Temp Pulse Resp BP SpO2  
03/31/19 0715  70 13 153/85 97 % 03/31/19 0615  68 15 145/68 97 % 03/31/19 0600  68 18 142/70 97 % 03/31/19 0545  76 15 153/71 98 % 03/31/19 0515  74 14 132/89 97 % 03/31/19 0500  71 16 146/68 95 % 03/31/19 0445  76 14 107/73 91 % 03/31/19 0435  77 18 123/82 96 % 03/31/19 0430  69 16 162/75 95 % 03/31/19 0420    (!) 134/93   
03/31/19 0330  68 18 135/85 95 % 03/31/19 0230 98.5 °F (36.9 °C) 94 17 (!) 156/91 95 % Physical Exam  
Constitutional: Appears well-developed. Is not diaphoretic. Eyes: Conjunctiva clear, EOMI Head: Normocephalic and atraumatic. Neck: Normal range of motion. No stridor or tracheal deviation. Cardiovascular: Intact distal pulses. RRR. No murmur. Pulmonary/Chest: Effort normal and no respiratory distress. CTAB. Abdominal: Non distended. Nontender. Obese. Soft Musculoskeletal: Normal range of motion. Exhibits no tenderness. No LE edema. Neurological: Conversant, alert. Skin: Skin is warm and dry. Psychiatric: Has a normal mood and affect. Behavior is normal.  
Nursing note and vitals reviewed. Diagnostic Study Results Labs - Recent Results (from the past 12 hour(s)) EKG, 12 LEAD, INITIAL Collection Time: 03/31/19  2:25 AM  
Result Value Ref Range Ventricular Rate 83 BPM  
 Atrial Rate 83 BPM  
 P-R Interval 140 ms QRS Duration 84 ms Q-T Interval 376 ms QTC Calculation (Bezet) 441 ms Calculated P Axis 9 degrees Calculated R Axis -15 degrees Calculated T Axis 38 degrees Diagnosis Normal sinus rhythm Minimal voltage criteria for LVH, may be normal variant Borderline ECG When compared with ECG of 29-DEC-2018 19:58, No significant change was found CBC WITH AUTOMATED DIFF Collection Time: 03/31/19  3:18 AM  
Result Value Ref Range WBC 8.0 4.6 - 13.2 K/uL  
 RBC 4.99 4.70 - 5.50 M/uL  
 HGB 16.3 (H) 13.0 - 16.0 g/dL HCT 44.7 36.0 - 48.0 % MCV 89.6 74.0 - 97.0 FL  
 MCH 32.7 24.0 - 34.0 PG  
 MCHC 36.5 31.0 - 37.0 g/dL  
 RDW 14.4 11.6 - 14.5 % PLATELET 797 (L) 935 - 420 K/uL MPV 10.3 9.2 - 11.8 FL  
 NEUTROPHILS 52 40 - 73 % LYMPHOCYTES 35 21 - 52 % MONOCYTES 10 3 - 10 % EOSINOPHILS 3 0 - 5 % BASOPHILS 0 0 - 2 %  
 ABS. NEUTROPHILS 4.2 1.8 - 8.0 K/UL  
 ABS. LYMPHOCYTES 2.8 0.9 - 3.6 K/UL  
 ABS. MONOCYTES 0.8 0.05 - 1.2 K/UL  
 ABS. EOSINOPHILS 0.3 0.0 - 0.4 K/UL  
 ABS. BASOPHILS 0.0 0.0 - 0.1 K/UL  
 DF AUTOMATED CARDIAC PANEL,(CK, CKMB & TROPONIN) Collection Time: 03/31/19  3:18 AM  
Result Value Ref Range CK 93 39 - 308 U/L  
 CK - MB 1.6 <3.6 ng/ml CK-MB Index 1.7 0.0 - 4.0 % Troponin-I, QT <0.02 0.0 - 9.760 NG/ML  
METABOLIC PANEL, COMPREHENSIVE Collection Time: 03/31/19  3:18 AM  
Result Value Ref Range Sodium 140 136 - 145 mmol/L Potassium 4.1 3.5 - 5.5 mmol/L Chloride 108 100 - 108 mmol/L  
 CO2 24 21 - 32 mmol/L Anion gap 8 3.0 - 18 mmol/L Glucose 147 (H) 74 - 99 mg/dL BUN 17 7.0 - 18 MG/DL Creatinine 1.07 0.6 - 1.3 MG/DL  
 BUN/Creatinine ratio 16 12 - 20 GFR est AA >60 >60 ml/min/1.73m2 GFR est non-AA >60 >60 ml/min/1.73m2 Calcium 10.4 (H) 8.5 - 10.1 MG/DL  Bilirubin, total 0.7 0.2 - 1.0 MG/DL  
 ALT (SGPT) 62 (H) 16 - 61 U/L  
 AST (SGOT) 61 (H) 15 - 37 U/L Alk. phosphatase 172 (H) 45 - 117 U/L Protein, total 7.6 6.4 - 8.2 g/dL Albumin 3.2 (L) 3.4 - 5.0 g/dL Globulin 4.4 (H) 2.0 - 4.0 g/dL A-G Ratio 0.7 (L) 0.8 - 1.7    
TROPONIN I Collection Time: 03/31/19  6:01 AM  
Result Value Ref Range Troponin-I, QT 0.02 0.0 - 0.045 NG/ML  
EKG, 12 LEAD, SUBSEQUENT Collection Time: 03/31/19  6:10 AM  
Result Value Ref Range Ventricular Rate 67 BPM  
 Atrial Rate 67 BPM  
 P-R Interval 140 ms QRS Duration 82 ms Q-T Interval 418 ms QTC Calculation (Bezet) 441 ms Calculated P Axis 21 degrees Calculated R Axis -9 degrees Calculated T Axis 28 degrees Diagnosis Normal sinus rhythm Normal ECG When compared with ECG of 31-MAR-2019 02:25, No significant change was found POC TROPONIN-I Collection Time: 03/31/19  7:23 AM  
Result Value Ref Range Troponin-I (POC) <0.04 0.00 - 0.08 ng/mL Radiologic Studies -  
XR CHEST PA LAT    (Results Pending) CT Results  (Last 48 hours) None CXR Results  (Last 48 hours) None Medical Decision Making ED Course: Progress Notes, Reevaluation, and Consults: 
 
7:32 AM pt standing in doorway requesting to be discharged. He states he is pain free. 5:47 AM Pt states the morphine x1 helped with pain, now he has some returning. Plan for repeat morphine and anticipate d/c home pending delta trop and EKG. Provider Notes (Medical Decision Making):  
Karis Masterson is a 58 y.o. male with HTN, HLD, CAD, GERD,  \"nutcracker esophagus\" presenting with chest pain NAD and nontoxic. Vitals reassuring, BP mildly elevated. EKG nonischemic x2. CXR no acute process on my read. Trop neg x2. Pain improved with morphine. Low suspicion for cardiac etiology at this time. Pt is followed closely by cards and GI and will call each Monday morning to schedule f/u.  Stressed importance of continuing home meds, using nitro for pain and return to ED for chest pain. Pt expressed understanding. Pt anxious to leave. All questions answered. Procedures: N/a Critical Care Time: N/a Current Outpatient Medications Medication Sig Dispense Refill  ondansetron (ZOFRAN ODT) 4 mg disintegrating tablet Take 1 Tab by mouth every eight (8) hours as needed for Nausea. 15 Tab 0  
 omeprazole (PRILOSEC) 10 mg capsule Take 10 mg by mouth two (2) times a day.  clopidogrel (PLAVIX) 75 mg tab Take 75 mg by mouth.  nitroglycerin (NITROSTAT) 0.3 mg SL tablet 0.3 mg.    
 carvedilol (COREG) 25 mg tablet Take 25 mg by mouth two (2) times daily (with meals).  atorvastatin (LIPITOR) 40 mg tablet Take 40 mg by mouth daily.  DULoxetine (CYMBALTA) 60 mg capsule Take 60 mg by mouth daily. Vital Signs-Reviewed the patient's vital signs. EKG: Interpreted by the EP. Time Interpreted: 0 Rate: 83 Rhythm: Normal Sinus Rhythm Interpretation:LAD, biphasic P in V1-3 Comparison: 12/29/18 Records Reviewed: Nursing Notes, Old Medical Records, Previous electrocardiograms, Previous Radiology Studies and Previous Laboratory Studies (Time of Review: 5:47 AM) 
-I am the first provider for this patient. 
-I reviewed the vital signs, available nursing notes, past medical history, past surgical history, family history and social history. Diagnosis Clinical Impression: 1. Chest pain, unspecified type Disposition: Home

## 2019-03-31 NOTE — DISCHARGE INSTRUCTIONS
You were seen for chest pain. Your pain improved while in the ED. You need to follow up with your cardiologist and GI doctor this week. Call and schedule an appointment on Monday. Return to the ED if your pain returns. You should continue to take all your home medications as previously prescribed. Patient Education        Chest Pain: Care Instructions  Your Care Instructions    There are many things that can cause chest pain. Some are not serious and will get better on their own in a few days. But some kinds of chest pain need more testing and treatment. Your doctor may have recommended a follow-up visit in the next 8 to 12 hours. If you are not getting better, you may need more tests or treatment. Even though your doctor has released you, you still need to watch for any problems. The doctor carefully checked you, but sometimes problems can develop later. If you have new symptoms or if your symptoms do not get better, get medical care right away. If you have worse or different chest pain or pressure that lasts more than 5 minutes or you passed out (lost consciousness), call 911 or seek other emergency help right away. A medical visit is only one step in your treatment. Even if you feel better, you still need to do what your doctor recommends, such as going to all suggested follow-up appointments and taking medicines exactly as directed. This will help you recover and help prevent future problems. How can you care for yourself at home? · Rest until you feel better. · Take your medicine exactly as prescribed. Call your doctor if you think you are having a problem with your medicine. · Do not drive after taking a prescription pain medicine. When should you call for help? Call 911 if:    · You passed out (lost consciousness).     · You have severe difficulty breathing.     · You have symptoms of a heart attack. These may include:  ?  Chest pain or pressure, or a strange feeling in your chest.  ? Sweating. ? Shortness of breath. ? Nausea or vomiting. ? Pain, pressure, or a strange feeling in your back, neck, jaw, or upper belly or in one or both shoulders or arms. ? Lightheadedness or sudden weakness. ? A fast or irregular heartbeat. After you call 911, the  may tell you to chew 1 adult-strength or 2 to 4 low-dose aspirin. Wait for an ambulance. Do not try to drive yourself.    Call your doctor today if:    · You have any trouble breathing.     · Your chest pain gets worse.     · You are dizzy or lightheaded, or you feel like you may faint.     · You are not getting better as expected.     · You are having new or different chest pain. Where can you learn more? Go to http://turner-doris.info/. Enter A120 in the search box to learn more about \"Chest Pain: Care Instructions. \"  Current as of: September 23, 2018  Content Version: 11.9  © 0991-6430 [employer]. Care instructions adapted under license by [employer] (which disclaims liability or warranty for this information). If you have questions about a medical condition or this instruction, always ask your healthcare professional. [place] [address] any warranty or liability for your use of this information.

## 2019-05-22 ENCOUNTER — APPOINTMENT (OUTPATIENT)
Dept: GENERAL RADIOLOGY | Age: 64
End: 2019-05-22
Attending: EMERGENCY MEDICINE
Payer: MEDICARE

## 2019-05-22 ENCOUNTER — HOSPITAL ENCOUNTER (EMERGENCY)
Age: 64
Discharge: HOME | End: 2019-05-22
Attending: EMERGENCY MEDICINE
Payer: MEDICARE

## 2019-05-22 VITALS
DIASTOLIC BLOOD PRESSURE: 79 MMHG | TEMPERATURE: 97 F | BODY MASS INDEX: 37.51 KG/M2 | SYSTOLIC BLOOD PRESSURE: 118 MMHG | OXYGEN SATURATION: 96 % | WEIGHT: 239 LBS | RESPIRATION RATE: 16 BRPM | HEART RATE: 87 BPM | HEIGHT: 67 IN

## 2019-05-22 DIAGNOSIS — R07.9 CHEST PAIN, UNSPECIFIED TYPE: Primary | ICD-10-CM

## 2019-05-22 LAB
ALBUMIN SERPL-MCNC: 3.1 G/DL (ref 3.4–5)
ALBUMIN/GLOB SERPL: 0.7 {RATIO} (ref 0.8–1.7)
ALP SERPL-CCNC: 173 U/L (ref 45–117)
ALT SERPL-CCNC: 72 U/L (ref 16–61)
ANION GAP SERPL CALC-SCNC: 8 MMOL/L (ref 3–18)
AST SERPL-CCNC: 84 U/L (ref 15–37)
ATRIAL RATE: 89 BPM
BILIRUB SERPL-MCNC: 1 MG/DL (ref 0.2–1)
BUN SERPL-MCNC: 12 MG/DL (ref 7–18)
BUN/CREAT SERPL: 14 (ref 12–20)
CALCIUM SERPL-MCNC: 9.3 MG/DL (ref 8.5–10.1)
CALCULATED P AXIS, ECG09: 60 DEGREES
CALCULATED R AXIS, ECG10: 10 DEGREES
CALCULATED T AXIS, ECG11: 64 DEGREES
CHLORIDE SERPL-SCNC: 109 MMOL/L (ref 100–108)
CK MB CFR SERPL CALC: 1.4 % (ref 0–4)
CK MB SERPL-MCNC: 1.4 NG/ML (ref 5–25)
CK SERPL-CCNC: 103 U/L (ref 39–308)
CO2 SERPL-SCNC: 21 MMOL/L (ref 21–32)
CREAT SERPL-MCNC: 0.83 MG/DL (ref 0.6–1.3)
DIAGNOSIS, 93000: NORMAL
ERYTHROCYTE [DISTWIDTH] IN BLOOD BY AUTOMATED COUNT: 14.1 % (ref 11.6–14.5)
GLOBULIN SER CALC-MCNC: 4.3 G/DL (ref 2–4)
GLUCOSE SERPL-MCNC: 109 MG/DL (ref 74–99)
HCT VFR BLD AUTO: 44.9 % (ref 36–48)
HGB BLD-MCNC: 16.3 G/DL (ref 13–16)
MCH RBC QN AUTO: 32.3 PG (ref 24–34)
MCHC RBC AUTO-ENTMCNC: 36.3 G/DL (ref 31–37)
MCV RBC AUTO: 88.9 FL (ref 74–97)
P-R INTERVAL, ECG05: 136 MS
PLATELET # BLD AUTO: 112 K/UL (ref 135–420)
PMV BLD AUTO: 10.5 FL (ref 9.2–11.8)
POTASSIUM SERPL-SCNC: 3.7 MMOL/L (ref 3.5–5.5)
PROT SERPL-MCNC: 7.4 G/DL (ref 6.4–8.2)
Q-T INTERVAL, ECG07: 358 MS
QRS DURATION, ECG06: 84 MS
QTC CALCULATION (BEZET), ECG08: 435 MS
RBC # BLD AUTO: 5.05 M/UL (ref 4.7–5.5)
SODIUM SERPL-SCNC: 138 MMOL/L (ref 136–145)
TROPONIN I SERPL-MCNC: <0.02 NG/ML (ref 0–0.04)
TROPONIN I SERPL-MCNC: <0.02 NG/ML (ref 0–0.04)
VENTRICULAR RATE, ECG03: 89 BPM
WBC # BLD AUTO: 8.2 K/UL (ref 4.6–13.2)

## 2019-05-22 PROCEDURE — 96376 TX/PRO/DX INJ SAME DRUG ADON: CPT

## 2019-05-22 PROCEDURE — 85027 COMPLETE CBC AUTOMATED: CPT

## 2019-05-22 PROCEDURE — 74011250636 HC RX REV CODE- 250/636

## 2019-05-22 PROCEDURE — 99284 EMERGENCY DEPT VISIT MOD MDM: CPT

## 2019-05-22 PROCEDURE — 80053 COMPREHEN METABOLIC PANEL: CPT

## 2019-05-22 PROCEDURE — 96375 TX/PRO/DX INJ NEW DRUG ADDON: CPT

## 2019-05-22 PROCEDURE — 74011250636 HC RX REV CODE- 250/636: Performed by: EMERGENCY MEDICINE

## 2019-05-22 PROCEDURE — 96374 THER/PROPH/DIAG INJ IV PUSH: CPT

## 2019-05-22 PROCEDURE — 82550 ASSAY OF CK (CPK): CPT

## 2019-05-22 PROCEDURE — 71045 X-RAY EXAM CHEST 1 VIEW: CPT

## 2019-05-22 PROCEDURE — 93005 ELECTROCARDIOGRAM TRACING: CPT

## 2019-05-22 RX ORDER — KETOROLAC TROMETHAMINE 15 MG/ML
15 INJECTION, SOLUTION INTRAMUSCULAR; INTRAVENOUS
Status: DISCONTINUED | OUTPATIENT
Start: 2019-05-22 | End: 2019-05-22 | Stop reason: HOSPADM

## 2019-05-22 RX ORDER — DIPHENHYDRAMINE HYDROCHLORIDE 50 MG/ML
INJECTION, SOLUTION INTRAMUSCULAR; INTRAVENOUS
Status: DISCONTINUED
Start: 2019-05-22 | End: 2019-05-22 | Stop reason: HOSPADM

## 2019-05-22 RX ORDER — MORPHINE SULFATE 4 MG/ML
INJECTION INTRAVENOUS
Status: DISCONTINUED
Start: 2019-05-22 | End: 2019-05-22 | Stop reason: HOSPADM

## 2019-05-22 RX ORDER — MORPHINE SULFATE 4 MG/ML
4 INJECTION INTRAVENOUS
Status: COMPLETED | OUTPATIENT
Start: 2019-05-22 | End: 2019-05-22

## 2019-05-22 RX ORDER — ONDANSETRON 2 MG/ML
INJECTION INTRAMUSCULAR; INTRAVENOUS
Status: DISCONTINUED
Start: 2019-05-22 | End: 2019-05-22 | Stop reason: HOSPADM

## 2019-05-22 RX ORDER — ONDANSETRON 2 MG/ML
4 INJECTION INTRAMUSCULAR; INTRAVENOUS
Status: COMPLETED | OUTPATIENT
Start: 2019-05-22 | End: 2019-05-22

## 2019-05-22 RX ORDER — DIPHENHYDRAMINE HYDROCHLORIDE 50 MG/ML
12.5 INJECTION, SOLUTION INTRAMUSCULAR; INTRAVENOUS
Status: COMPLETED | OUTPATIENT
Start: 2019-05-22 | End: 2019-05-22

## 2019-05-22 RX ADMIN — DIPHENHYDRAMINE HYDROCHLORIDE 12.5 MG: 50 INJECTION INTRAMUSCULAR; INTRAVENOUS at 01:48

## 2019-05-22 RX ADMIN — MORPHINE SULFATE 4 MG: 4 INJECTION INTRAVENOUS at 01:48

## 2019-05-22 RX ADMIN — MORPHINE SULFATE 4 MG: 4 INJECTION INTRAVENOUS at 02:17

## 2019-05-22 RX ADMIN — MORPHINE SULFATE 4 MG: 4 INJECTION INTRAVENOUS at 04:33

## 2019-05-22 RX ADMIN — ONDANSETRON 4 MG: 2 INJECTION INTRAMUSCULAR; INTRAVENOUS at 01:48

## 2019-05-22 NOTE — DISCHARGE INSTRUCTIONS

## 2019-05-22 NOTE — ED NOTES
Assumed care of pt at this time. Pt to ED c/o chest pain since 2200. Pt states hx of 3 stents in heart, and took 3 nitros PTA for the pain, with no relief. Pt states hx of \"nutcracker esophagus\" where his esophagus squeezes due to trauma he received to his esophagus when he was a , and states sometimes this can cause chest pain. Pt AOx4. NAD noted. VSS. ABCs intact. Will continue to monitor.

## 2019-05-22 NOTE — ED PROVIDER NOTES
EMERGENCY DEPARTMENT HISTORY AND PHYSICAL EXAM    5:17 AM      Date: 5/22/2019  Patient Name: Ene Oneal    History of Presenting Illness     Chief Complaint   Patient presents with    Chest Pain         History Provided By: Patient    Additional History (Context): Ene Oneal is a 58 y.o. male with history of coronary artery disease, alcoholic cirrhosis, nutcracker esophagus who presents with sharp retrosternal chest pain which started several hours prior to arrival, was not associated with fever, cough, congestion, shortness of breath, nausea, vomiting or other associated symptoms. He states he cannot tell if it is his esophagus or his heart that is causing the pain. He states he has an esophageal dilation scheduled for next week, but this is been put off several weeks due to other health issues. PCP: Dasha Benton DO    Current Facility-Administered Medications   Medication Dose Route Frequency Provider Last Rate Last Dose    ondansetron (ZOFRAN) 4 mg/2 mL injection             morphine 4 mg/mL injection             diphenhydrAMINE (BENADRYL) 50 mg/mL injection             morphine 4 mg/mL injection             ketorolac (TORADOL) injection 15 mg  15 mg IntraVENous NOW Kamran Nunes MD         Current Outpatient Medications   Medication Sig Dispense Refill    ondansetron (ZOFRAN ODT) 4 mg disintegrating tablet Take 1 Tab by mouth every eight (8) hours as needed for Nausea. 15 Tab 0    omeprazole (PRILOSEC) 10 mg capsule Take 10 mg by mouth two (2) times a day.  clopidogrel (PLAVIX) 75 mg tab Take 75 mg by mouth.  nitroglycerin (NITROSTAT) 0.3 mg SL tablet 0.3 mg.      carvedilol (COREG) 25 mg tablet Take 25 mg by mouth two (2) times daily (with meals).  atorvastatin (LIPITOR) 40 mg tablet Take 40 mg by mouth daily.  DULoxetine (CYMBALTA) 60 mg capsule Take 60 mg by mouth daily.            Past History     Past Medical History:  Past Medical History:   Diagnosis Date    Adverse effect of anesthesia     esophageal spasms    Altered mental status     Arthritis     Arthropathy     Back pain     Bilateral kidney stones     Burn     ON HANDS    CAD (coronary artery disease) 1999    MI    3 cardiac stents    Calcium nephrolithiasis     Calculus of kidney     Cardiac arrhythmia     Cerebral artery occlusion with cerebral infarction ([address] 2010    no residual    Chest pain     Cigarette smoker     Cirrhosis, alcoholic (HCC)     Cocaine abuse, episodic use (HCC)     Colon polyp     Diarrhea     DJD (degenerative joint disease)     Drug-seeking behavior     Dyskinesia     Esophageal disorder     Esophageal erosions     Flank pain, acute     Foot ulcer, right (HCC)     Gastric ulcer     GERD (gastroesophageal reflux disease)     Gross hematuria     Head trauma     Hearing reduced     Hematuria     Herniated disc     X 10 IN BACK    Hiatus hernia syndrome     History of kidney stones     HNP (herniated nucleus pulposus)     Hyperlipemia     Hypertension     Hypokalemia     Infection     [facility] fracture [facility])     Kidney stones     Knee pain     Left ureteral stone     Muscle atrophy     Myocardial infarction ([address] 1999    N&V (nausea and vomiting)     Nocturia     Nondependent alcohol abuse, in remission     Nutcracker esophagus     Other ill-defined conditions(799.89)     dysphagia    Renal stone     Slurred speech     Stroke ([address] 1999    questionable TIA    Swallowing difficulty     Syncopal episodes     Syncope and collapse     Tobacco dependence     Unspecified adverse effect of anesthesia     AT TIMES ESOPHAGUS SPASMS AFTER PROCEDURES    Unspecified cerebral artery occlusion with cerebral infarction        Past Surgical History:  Past Surgical History:   Procedure Laterality Date    HX APPENDECTOMY      9YEARS OLD    HX CHOLECYSTECTOMY  2009    HX CORONARY STENT PLACEMENT  2008    3 STENTS PLACED    HX ENDOSCOPY      with Dilatation, multiple times    HX HEART CATHETERIZATION  2012    EVERYTHING LOOKED GOOD AT THIS POINT    HX HEENT      \"2 jaw surgeries\"    HX HERNIA REPAIR      ABDOMEN    HX KNEE ARTHROSCOPY  9/2012    LEFT    HX MOHS PROCEDURES  2016    HX ORTHOPAEDIC  12/12    left knee    HX OTHER SURGICAL      ESOPHAGUS DILATATION    HX ROTATOR CUFF REPAIR Right     x2    HX UROLOGICAL  07/18/2016    [facility]-Cystoscopy,URETEROSCOPY W/ LITHOTRIPSY, Dr May Bernardo  UROLOGICAL  10/10/2016    [facility]-Cystoscopy with removal of ureteral stent, Dr May Bernardo HX UROLOGICAL  01/30/2017    [facility]-Cystoscopy, Left retrograde pyelography, Left diagnostic ureteroscopy, Left 6 x 26 cm double-J ureteral stent placement,Right retrograde pyelography, Right ureteroscopic stone extraction,Right 6 x 24 cm double-J ureteral stent placement, Interpretation of urography,Intraoperative fluoro less than 1 hour,          UROLOGICAL  02/08/2017    [facility]-CYSTOURETHROSCOPY WITH STENT REMOVAL, Dr Nicky Segal       Family History:  Family History   Problem Relation Age of Onset    Diabetes Father     Heart Disease Father     Stroke Father        Social History:  Social History     Tobacco Use    Smoking status: Current Every Day Smoker     Packs/day: 0.00     Years: 20.00     Pack years: 0.00     Types: Cigarettes    Smokeless tobacco: Never Used    Tobacco comment: Patient states he smokes 2-3 cigarettes per day   Substance Use Topics    Alcohol use: No     Alcohol/week: 0.0 oz     Comment: \"none in over 20 years\"    Drug use: No     Types: Cocaine, Hallucinogenics, Opiates, Benzodiazepines     Comment: cocaine 2011       Allergies: Allergies   Allergen Reactions    Bee Sting [Sting, Bee] Hives and Shortness of Breath    Codeine Hives and Shortness of Breath     Has tolerated Hydromorphone.      Haldol [Haloperidol Lactate] Hives and Shortness of Breath    Haloperidol Hives and Cough    Keflex [Cephalexin] Diarrhea    No Allergy Information Available Other (comments)     Other reaction(s): hives    Shellfish Containing Products Hives    Stadol [Butorphanol Tartrate] Hives, Shortness of Breath and Rash    Vicodin [Hydrocodone-Acetaminophen] Hives, Shortness of Breath and Rash         Review of Systems       Review of Systems   Constitutional: Negative for activity change and appetite change. HENT: Negative for congestion. Eyes: Negative for visual disturbance. Respiratory: Negative for cough and shortness of breath. Cardiovascular: Positive for chest pain. Gastrointestinal: Negative for abdominal pain, diarrhea, nausea and vomiting. Genitourinary: Negative for dysuria. Musculoskeletal: Negative for arthralgias and myalgias. Skin: Negative for rash. Neurological: Negative for weakness and numbness. Physical Exam     Visit Vitals  /79   Pulse 87   Temp 97 °F (36.1 °C)   Resp 16   Ht 5' 7\" (1.702 m)   Wt 108.4 kg (239 lb)   SpO2 96%   BMI 37.43 kg/m²         Physical Exam   Constitutional: He is oriented to person, place, and time. He appears well-developed and well-nourished. HENT:   Head: Normocephalic and atraumatic. Mouth/Throat: Oropharynx is clear and moist.   Eyes: Conjunctivae are normal.   Neck: Normal range of motion. Neck supple. No JVD present. Cardiovascular: Normal rate, regular rhythm, normal heart sounds and intact distal pulses. No murmur heard. Pulmonary/Chest: Effort normal and breath sounds normal.   Abdominal: Soft. Bowel sounds are normal. He exhibits no distension. There is no tenderness. Musculoskeletal: Normal range of motion. He exhibits no deformity. Lymphadenopathy:     He has no cervical adenopathy. Neurological: He is alert and oriented to person, place, and time. Coordination normal.   Skin: Skin is warm and dry. No rash noted. Psychiatric: He has a normal mood and affect. Nursing note and vitals reviewed.         Diagnostic Study Results     Labs -  Recent Results (from the past 12 hour(s))   EKG, 12 LEAD, INITIAL    Collection Time: 05/22/19 12:26 AM   Result Value Ref Range    Ventricular Rate 89 BPM    Atrial Rate 89 BPM    P-R Interval 136 ms    QRS Duration 84 ms    Q-T Interval 358 ms    QTC Calculation (Bezet) 435 ms    Calculated P Axis 60 degrees    Calculated R Axis 10 degrees    Calculated T Axis 64 degrees    Diagnosis       Normal sinus rhythm  Normal ECG  When compared with ECG of 31-MAR-2019 06:10,  No significant change was found     CBC W/O DIFF    Collection Time: 05/22/19  1:01 AM   Result Value Ref Range    WBC 8.2 4.6 - 13.2 K/uL    RBC 5.05 4.70 - 5.50 M/uL    HGB 16.3 (H) 13.0 - 16.0 g/dL    HCT 44.9 36.0 - 48.0 %    MCV 88.9 74.0 - 97.0 FL    MCH 32.3 24.0 - 34.0 PG    MCHC 36.3 31.0 - 37.0 g/dL    RDW 14.1 11.6 - 14.5 %    PLATELET 999 (L) 220 - 420 K/uL    MPV 10.5 9.2 - 23.2 FL   METABOLIC PANEL, COMPREHENSIVE    Collection Time: 05/22/19  1:01 AM   Result Value Ref Range    Sodium 138 136 - 145 mmol/L    Potassium 3.7 3.5 - 5.5 mmol/L    Chloride 109 (H) 100 - 108 mmol/L    CO2 21 21 - 32 mmol/L    Anion gap 8 3.0 - 18 mmol/L    Glucose 109 (H) 74 - 99 mg/dL    BUN 12 7.0 - 18 MG/DL    Creatinine 0.83 0.6 - 1.3 MG/DL    BUN/Creatinine ratio 14 12 - 20      GFR est AA >60 >60 ml/min/1.73m2    GFR est non-AA >60 >60 ml/min/1.73m2    Calcium 9.3 8.5 - 10.1 MG/DL    Bilirubin, total 1.0 0.2 - 1.0 MG/DL    ALT (SGPT) 72 (H) 16 - 61 U/L    AST (SGOT) 84 (H) 15 - 37 U/L    Alk.  phosphatase 173 (H) 45 - 117 U/L    Protein, total 7.4 6.4 - 8.2 g/dL    Albumin 3.1 (L) 3.4 - 5.0 g/dL    Globulin 4.3 (H) 2.0 - 4.0 g/dL    A-G Ratio 0.7 (L) 0.8 - 1.7     CARDIAC PANEL,(CK, CKMB & TROPONIN)    Collection Time: 05/22/19  1:01 AM   Result Value Ref Range     39 - 308 U/L    CK - MB 1.4 <3.6 ng/ml    CK-MB Index 1.4 0.0 - 4.0 %    Troponin-I, QT <0.02 0.0 - 0.045 NG/ML   TROPONIN I    Collection Time: 05/22/19  3:30 AM   Result Value Ref Range Troponin-I, QT <0.02 0.0 - 0.045 NG/ML       Radiologic Studies -   XR CHEST PORT    (Results Pending)         Medical Decision Making   I am the first provider for this patient. I reviewed the vital signs, available nursing notes, past medical history, past surgical history, family history and social history. Vital Signs-Reviewed the patient's vital signs. EKG:  Normal sinus rhythm, rate 89, , QTc 435. No acute ST or T wave changes, no STEMI. Records Reviewed: Nursing Notes and Old Medical Records (Time of Review: 5:17 AM)      Provider Notes (Medical Decision Making):   Cheryl Thompson is a 58 y.o. male with history of coronary artery disease, alcoholic cirrhosis, nutcracker esophagus who presents with sharp retrosternal chest pain which started several hours prior to arrival, was not associated with fever, cough, congestion, shortness of breath, nausea, vomiting or other associated symptoms. He states he cannot tell if it is his esophagus or his heart that is causing the pain. He states he has an esophageal dilation scheduled for next week, but this is been put off several weeks due to other health issues. I noted his medical record he does have some of frequent emergency department visits for similar complaint. Differential Diagnosis: Will consider ACS, GERD, Pericarditis/myocarditis, pleuritis, bronchitis or other respiratory infection, pneumothorax, pneumonia, MSK pain. Given the patient's history and exam, I do not suspect PE, aortic dissection, pancreatitis, esophageal rupture, pericardial tamponate, mediastenitis. Testing: CXR, ECG, CBC, CMP, Troponin. Treatments: IV morphine, Benadryl, Zofran    Re-evaluations: All studies including 2 troponins have been negative. He has had a total of 12 mg of morphine with some relief. At this time I do not believe the symptoms are cardiac related and he will not require admission or further testing.   Patient to follow-up with his gastroenterologist.The patient will be discharged home. Findings were discussed at length and questions were answered. Information on all newly prescribed medications was given. the patient was instructed to follow-up with his GI, or return to the Emergency Department with any worsened symptoms or concerns. Return precautions were given. Diagnosis     Clinical Impression:   1. Chest pain, unspecified type        Disposition: Discharge    Follow-up Information     Follow up With Specialties Details Why Contact Info    Love Jasso MD Gastroenterology Call  [address] [facility] [address] [address] [facility] EMERGENCY DEPT Emergency Medicine  If symptoms worsen [phone]  [phone]           Discharge Medication List as of 5/22/2019  4:43 AM      CONTINUE these medications which have NOT CHANGED    Details   ondansetron (ZOFRAN ODT) 4 mg disintegrating tablet Take 1 Tab by mouth every eight (8) hours as needed for Nausea. , Print, Disp-15 Tab, R-0      omeprazole (PRILOSEC) 10 mg capsule Take 10 mg by mouth two (2) times a day., Historical Med      clopidogrel (PLAVIX) 75 mg tab Take 75 mg by mouth., Historical Med      nitroglycerin (NITROSTAT) 0.3 mg SL tablet 0.3 mg., Historical Med      carvedilol (COREG) 25 mg tablet Take 25 mg by mouth two (2) times daily (with meals). , Historical Med      atorvastatin (LIPITOR) 40 mg tablet Take 40 mg by mouth daily. Historical Med, 40 mg      DULoxetine (CYMBALTA) 60 mg capsule Take 60 mg by mouth daily.   Historical Med, 60 mg           _______________________________    Attestations:  Mauro Son MD acting as a scribe for and in the presence of No att. providers found      May 22, 2019 at 5:24 AM       Provider Attestation:      I personally performed the services described in the documentation, reviewed the documentation, as recorded by the scribe in my presence, and it accurately and completely records my words and actions.  May 22, 2019 at 5:24 AM - No att. providers found    _______________________________

## 2019-05-22 NOTE — ED TRIAGE NOTES
Patient states he started having a squeezing chest pain about 2230 last night.   States he took 3 nitro with no relief

## 2019-05-26 ENCOUNTER — ANESTHESIA EVENT (OUTPATIENT)
Dept: ENDOSCOPY | Age: 64
End: 2019-05-26
Payer: MEDICARE

## 2019-05-28 ENCOUNTER — HOSPITAL ENCOUNTER (OUTPATIENT)
Age: 64
Setting detail: OUTPATIENT SURGERY
Discharge: HOME | End: 2019-05-28
Attending: INTERNAL MEDICINE | Admitting: INTERNAL MEDICINE
Payer: MEDICARE

## 2019-05-28 ENCOUNTER — ANESTHESIA (OUTPATIENT)
Dept: ENDOSCOPY | Age: 64
End: 2019-05-28
Payer: MEDICARE

## 2019-05-28 VITALS
TEMPERATURE: 97.6 F | HEART RATE: 77 BPM | HEIGHT: 67 IN | SYSTOLIC BLOOD PRESSURE: 126 MMHG | WEIGHT: 240 LBS | RESPIRATION RATE: 18 BRPM | DIASTOLIC BLOOD PRESSURE: 83 MMHG | OXYGEN SATURATION: 96 % | BODY MASS INDEX: 37.67 KG/M2

## 2019-05-28 PROCEDURE — 74011250636 HC RX REV CODE- 250/636

## 2019-05-28 PROCEDURE — 76060000031 HC ANESTHESIA FIRST 0.5 HR: Performed by: INTERNAL MEDICINE

## 2019-05-28 PROCEDURE — 76040000019: Performed by: INTERNAL MEDICINE

## 2019-05-28 PROCEDURE — 74011250636 HC RX REV CODE- 250/636: Performed by: NURSE ANESTHETIST, CERTIFIED REGISTERED

## 2019-05-28 RX ORDER — SODIUM CHLORIDE, SODIUM LACTATE, POTASSIUM CHLORIDE, CALCIUM CHLORIDE 600; 310; 30; 20 MG/100ML; MG/100ML; MG/100ML; MG/100ML
50 INJECTION, SOLUTION INTRAVENOUS CONTINUOUS
Status: DISCONTINUED | OUTPATIENT
Start: 2019-05-28 | End: 2019-05-28 | Stop reason: HOSPADM

## 2019-05-28 RX ORDER — FLUMAZENIL 0.1 MG/ML
0.2 INJECTION INTRAVENOUS
Status: DISCONTINUED | OUTPATIENT
Start: 2019-05-28 | End: 2019-05-28 | Stop reason: HOSPADM

## 2019-05-28 RX ORDER — DEXTROMETHORPHAN/PSEUDOEPHED 2.5-7.5/.8
1.2 DROPS ORAL
Status: DISCONTINUED | OUTPATIENT
Start: 2019-05-28 | End: 2019-05-28 | Stop reason: HOSPADM

## 2019-05-28 RX ORDER — LIDOCAINE HYDROCHLORIDE 20 MG/ML
INJECTION, SOLUTION EPIDURAL; INFILTRATION; INTRACAUDAL; PERINEURAL AS NEEDED
Status: DISCONTINUED | OUTPATIENT
Start: 2019-05-28 | End: 2019-05-28 | Stop reason: HOSPADM

## 2019-05-28 RX ORDER — SODIUM CHLORIDE 0.9 % (FLUSH) 0.9 %
5-40 SYRINGE (ML) INJECTION AS NEEDED
Status: DISCONTINUED | OUTPATIENT
Start: 2019-05-28 | End: 2019-05-28 | Stop reason: HOSPADM

## 2019-05-28 RX ORDER — SODIUM CHLORIDE, SODIUM LACTATE, POTASSIUM CHLORIDE, CALCIUM CHLORIDE 600; 310; 30; 20 MG/100ML; MG/100ML; MG/100ML; MG/100ML
75 INJECTION, SOLUTION INTRAVENOUS CONTINUOUS
Status: CANCELLED | OUTPATIENT
Start: 2019-05-28

## 2019-05-28 RX ORDER — HYDROMORPHONE HYDROCHLORIDE 2 MG/ML
0.5 INJECTION, SOLUTION INTRAMUSCULAR; INTRAVENOUS; SUBCUTANEOUS
Status: CANCELLED | OUTPATIENT
Start: 2019-05-28

## 2019-05-28 RX ORDER — SODIUM CHLORIDE, SODIUM LACTATE, POTASSIUM CHLORIDE, CALCIUM CHLORIDE 600; 310; 30; 20 MG/100ML; MG/100ML; MG/100ML; MG/100ML
INJECTION, SOLUTION INTRAVENOUS
Status: DISCONTINUED | OUTPATIENT
Start: 2019-05-28 | End: 2019-05-28 | Stop reason: HOSPADM

## 2019-05-28 RX ORDER — NALOXONE HYDROCHLORIDE 0.4 MG/ML
0.4 INJECTION, SOLUTION INTRAMUSCULAR; INTRAVENOUS; SUBCUTANEOUS
Status: DISCONTINUED | OUTPATIENT
Start: 2019-05-28 | End: 2019-05-28 | Stop reason: HOSPADM

## 2019-05-28 RX ORDER — SODIUM CHLORIDE 0.9 % (FLUSH) 0.9 %
5-40 SYRINGE (ML) INJECTION EVERY 8 HOURS
Status: DISCONTINUED | OUTPATIENT
Start: 2019-05-28 | End: 2019-05-28 | Stop reason: HOSPADM

## 2019-05-28 RX ORDER — SODIUM CHLORIDE 0.9 % (FLUSH) 0.9 %
5-40 SYRINGE (ML) INJECTION AS NEEDED
Status: CANCELLED | OUTPATIENT
Start: 2019-05-28

## 2019-05-28 RX ORDER — ONDANSETRON 2 MG/ML
4 INJECTION INTRAMUSCULAR; INTRAVENOUS ONCE
Status: CANCELLED | OUTPATIENT
Start: 2019-05-28 | End: 2019-05-28

## 2019-05-28 RX ORDER — SODIUM CHLORIDE 0.9 % (FLUSH) 0.9 %
5-40 SYRINGE (ML) INJECTION EVERY 8 HOURS
Status: CANCELLED | OUTPATIENT
Start: 2019-05-28

## 2019-05-28 RX ORDER — PROPOFOL 10 MG/ML
INJECTION, EMULSION INTRAVENOUS AS NEEDED
Status: DISCONTINUED | OUTPATIENT
Start: 2019-05-28 | End: 2019-05-28 | Stop reason: HOSPADM

## 2019-05-28 RX ORDER — EPINEPHRINE 0.1 MG/ML
1 INJECTION INTRACARDIAC; INTRAVENOUS
Status: DISCONTINUED | OUTPATIENT
Start: 2019-05-28 | End: 2019-05-28 | Stop reason: HOSPADM

## 2019-05-28 RX ORDER — ATROPINE SULFATE 0.1 MG/ML
0.5 INJECTION INTRAVENOUS
Status: DISCONTINUED | OUTPATIENT
Start: 2019-05-28 | End: 2019-05-28 | Stop reason: HOSPADM

## 2019-05-28 RX ADMIN — LIDOCAINE HYDROCHLORIDE 100 MG: 20 INJECTION, SOLUTION EPIDURAL; INFILTRATION; INTRACAUDAL; PERINEURAL at 12:05

## 2019-05-28 RX ADMIN — FAMOTIDINE 20 MG: 10 INJECTION INTRAVENOUS at 11:55

## 2019-05-28 RX ADMIN — PROPOFOL 150 MG: 10 INJECTION, EMULSION INTRAVENOUS at 12:05

## 2019-05-28 RX ADMIN — SODIUM CHLORIDE, SODIUM LACTATE, POTASSIUM CHLORIDE, CALCIUM CHLORIDE: 600; 310; 30; 20 INJECTION, SOLUTION INTRAVENOUS at 12:01

## 2019-05-28 RX ADMIN — PROPOFOL 100 MG: 10 INJECTION, EMULSION INTRAVENOUS at 12:06

## 2019-05-28 RX ADMIN — SODIUM CHLORIDE, SODIUM LACTATE, POTASSIUM CHLORIDE, AND CALCIUM CHLORIDE 50 ML/HR: 600; 310; 30; 20 INJECTION, SOLUTION INTRAVENOUS at 11:54

## 2019-05-28 NOTE — PROCEDURES
[place]  [facility], [address]      Brief Procedure Note    Karis Masterson  1955  [id number]    Date of Procedure: 5/28/2019    Preoperative diagnosis: 787.20 - R13.10,  Dysphagia    Postoperative diagnosis:  Grade 2 esophagitis. Dilation 54 FR [place]es. Hiatal Hernia.  Non-obstructive Dysphagia    Type of Anesthesia: MAC (monitered anesthesia care)    Description of Findings: same as post op dx    Procedure: Procedure(s):  UPPER ENDOSCOPY / Dilation    :  Dr. Dm Grijalva MD    Assistant(s): [unfilled]    Type of Anesthesia:MAC     EBL:None    Specimens: * No specimens in log *    Findings: See printed and scanned procedure note    Complications: None    Dr. Dm Grijalva MD  5/28/2019  12:09 PM

## 2019-05-28 NOTE — ANESTHESIA PREPROCEDURE EVALUATION
Relevant Problems   No relevant active problems       Anesthetic History     PONV          Review of Systems / Medical History  Patient summary reviewed, nursing notes reviewed and pertinent labs reviewed    Pulmonary  Within defined limits                 Neuro/Psych       CVA  TIA     Cardiovascular    Hypertension        Dysrhythmias   CAD    Exercise tolerance: >4 METS     GI/Hepatic/Renal     GERD      PUD and liver disease     Endo/Other        Arthritis     Other Findings              Physical Exam    Airway  Mallampati: II  TM Distance: 4 - 6 cm  Neck ROM: normal range of motion   Mouth opening: Normal     Cardiovascular  Regular rate and rhythm,  S1 and S2 normal,  no murmur, click, rub, or gallop  Rhythm: regular  Rate: normal         Dental    Dentition: Poor dentition  Comments: Missing several throughout   Pulmonary  Breath sounds clear to auscultation               Abdominal  GI exam deferred       Other Findings            Anesthetic Plan    ASA: 3  Anesthesia type: MAC          Induction: Intravenous  Anesthetic plan and risks discussed with: Patient

## 2019-05-28 NOTE — DISCHARGE INSTRUCTIONS
Upper GI Endoscopy: What to Expect at [address] [place] [address]  After you have an endoscopy, you will stay at the hospital or clinic for 1 to 2 hours. This will allow the medicine to wear off. You will be able to go home after your doctor or nurse checks to make sure you are not having any problems. You may have to stay overnight if you had treatment during the test. You may have a sore throat for a day or two after the test.  This care sheet gives you a general idea about what to expect after the test.  How can you care for yourself at home? Activity  · Rest as much as you need to after you go home. · You should be able to go back to your usual activities the day after the test.  Diet  · Follow your doctor's directions for eating after the test.  · Drink plenty of fluids (unless your doctor has told you not to). Medications  · If you have a sore throat the day after the test, use an over-the-counter spray to numb your throat. Follow-up care is a key part of your treatment and safety. Be sure to make and go to all appointments, and call your doctor if you are having problems. It's also a good idea to know your test results and keep a list of the medicines you take. When should you call for help? Call 911 anytime you think you may need emergency care. For example, call if:  · You passed out (lost consciousness). · You cough up blood. · You vomit blood or what looks like coffee grounds. · You pass maroon or very bloody stools. Call your doctor now or seek immediate medical care if:  · You have trouble swallowing. · You have belly pain. · Your stools are black and tarlike or have streaks of blood. · You are sick to your stomach or cannot keep fluids down. Watch closely for changes in your health, and be sure to contact your doctor if:  · Your throat still hurts after a day or two. · You do not get better as expected. Where can you learn more?    Go to [employer]  Enter J454 in the search box to learn more about \"Upper GI Endoscopy: What to Expect at Home. \"   © 3096-0066 [employer]. Care instructions adapted under license by [facility] (which disclaims liability or warranty for this information). This care instruction is for use with your licensed healthcare professional. If you have questions about a medical condition or this instruction, always ask your healthcare professional. [address] any warranty or liability for your use of this information. Content Version: 64.9.212231; Current as of: November 14, 2014    DISCHARGE SUMMARY from Nurse     POST-PROCEDURE INSTRUCTIONS:    Call your Physician if you:  ? Observe any excess bleeding. ? Develop a temperature over 100.5o F.  ? Experience abdominal, shoulder or chest pain. ? Notice any signs of decreased circulation or nerve impairment to an extremity such as a change in color, persistent numbness, tingling, coldness or increase in pain. ? Vomit blood or you have nausea and vomiting lasting longer than 4 hours. ? Are unable to take medications. ? Are unable to urinate within 8 hours after discharge following general anesthesia or intravenous sedation. For the next 24 hours after receiving general anesthesia or intravenous sedation, or while taking prescription Narcotics, limit your activities:  ? Do NOT drive a motor vehicle, operate hazard machinery or power tools, or perform tasks that require coordination. The medication you received during your procedure may have some effect on your mental awareness. ? Do NOT make important personal or business decisions. The medication you received during your procedure may have some effect on your mental awareness. ? Do NOT drink alcoholic beverages. These drinks do not mix well with the medications that have been given to you. ? Upon discharge from the hospital, you must be accompanied by a responsible adult. ?  Resume your diet as directed by your physician. ? Resume medications as your physician has prescribed. ? Please give a list of your current medications to your Primary Care Provider. ? Please update this list whenever your medications are discontinued, doses are changed, or new medications (including over-the-counter products) are added. ? Please carry medication information at all times in case of emergency situations. These are general instructions for a healthy lifestyle:    No smoking/ No tobacco products/ Avoid exposure to second hand smoke.  Surgeon General's Warning:  Quitting smoking now greatly reduces serious risk to your health. Obesity, smoking, and a sedentary lifestyle greatly increase your risk for illness.  A healthy diet, regular physical exercise & weight monitoring are important for maintaining a healthy lifestyle   You may be retaining fluid if you have a history of heart failure or if you experience any of the following symptoms:  Weight gain of 3 pounds or more overnight or 5 pounds in a week, increased swelling in our hands or feet or shortness of breath while lying flat in bed. Please call your doctor as soon as you notice any of these symptoms; do not wait until your next office visit. Recognize signs and symptoms of STROKE:  F  -  Face looks uneven  A  -  Arms unable to move or move unevenly  S  -  Speech slurred or non-existent  T  -  Time to call 911 - as soon as signs and symptoms begin - DO NOT go back to bed or wait to see If you get better - TIME IS BRAIN. Colorectal Screening   Colorectal cancer almost always develops from precancerous polyps (abnormal growths) in the colon or rectum. Screening tests can find precancerous polyps, so that they can be removed before they turn into cancer.  Screening tests can also find colorectal cancer early, when treatment works best.  Vinicio Koehler Speak with your physician about when you should begin screening and how often you should be tested  Vinicio Koehler   Additional Information    If you have questions, please call [phone]. Remember, MyChart is NOT to be used for urgent needs. For medical emergencies, dial 911. Discharge information has been reviewed with the patient. The patient verbalized understanding.

## 2019-05-28 NOTE — H&P
[facility]    [email or web address]      Impression: 1. Esophageal spasm. Plan:     1. EGD/Dil with MAC      Chief Complaint: Esophageal spasm      HPI:  Ang Maria is a 71 y.o. male who is being seen preop for chronic esophageal spasm.   Has underlying stable CAD    PMH:   Past Medical History:   Diagnosis Date    Adverse effect of anesthesia     esophageal spasms    Altered mental status     Arthritis     Arthropathy     Back pain     Bilateral kidney stones     Burn     ON HANDS    CAD (coronary artery disease) 1999    MI    3 cardiac stents    Calcium nephrolithiasis     Calculus of kidney     Cardiac arrhythmia     Cerebral artery occlusion with cerebral infarction ([address] 2010    no residual    Chest pain     Cigarette smoker     Cirrhosis, alcoholic (HCC)     Cocaine abuse, episodic use (HCC)     Colon polyp     Diarrhea     DJD (degenerative joint disease)     Drug-seeking behavior     Dyskinesia     Esophageal disorder     Esophageal erosions     Flank pain, acute     Foot ulcer, right (HCC)     Gastric ulcer     GERD (gastroesophageal reflux disease)     Gross hematuria     Head trauma     Hearing reduced     Hematuria     Herniated disc     X 10 IN BACK    Hiatus hernia syndrome     History of kidney stones     HNP (herniated nucleus pulposus)     Hyperlipemia     Hypertension     Hypokalemia     Infection     Jaw fracture (HCC)     Kidney stones     Knee pain     Left ureteral stone     Muscle atrophy     Myocardial infarction ([address] 1999    N&V (nausea and vomiting)     Nocturia     Nondependent alcohol abuse, in remission     Nutcracker esophagus     Other ill-defined conditions(799.89)     dysphagia    Renal stone     Slurred speech     Stroke ([address] 1999    questionable TIA    Swallowing difficulty     Syncopal episodes     Syncope and collapse     Tobacco dependence     Unspecified adverse effect of anesthesia     AT TIMES ESOPHAGUS SPASMS AFTER PROCEDURES    Unspecified cerebral artery occlusion with cerebral infarction        PSH:   Past Surgical History:   Procedure Laterality Date    HX APPENDECTOMY      9YEARS OLD    HX CHOLECYSTECTOMY  2009    HX CORONARY STENT PLACEMENT  2008    3 STENTS PLACED    HX ENDOSCOPY      with Dilatation, multiple times    HX HEART CATHETERIZATION  2012    EVERYTHING LOOKED GOOD AT THIS POINT    HX HEENT      \"2 jaw surgeries\"    HX HERNIA REPAIR      ABDOMEN    HX KNEE ARTHROSCOPY  9/2012    LEFT    HX MOHS PROCEDURES  2016    HX ORTHOPAEDIC  12/12    left knee    HX OTHER SURGICAL      ESOPHAGUS DILATATION    HX ROTATOR CUFF REPAIR Right     x2    HX UROLOGICAL  07/18/2016    SP[facility]-Cystoscopy,URETEROSCOPY W/ LITHOTRIPSY, Dr Deb Pedro  UROLOGICAL  10/10/2016    SL-Cystoscopy with removal of ureteral stent, Dr Deb Pedro  UROLOGICAL  01/30/2017    SPA-Cystoscopy, Left retrograde pyelography, Left diagnostic ureteroscopy, Left 6 x 26 cm double-J ureteral stent placement,Right retrograde pyelography, Right ureteroscopic stone extraction,Right 6 x 24 cm double-J ureteral stent placement, Interpretation of urography,Intraoperative fluoro less than 1 hour,          UROLOGICAL  02/08/2017    SL-CYSTOURETHROSCOPY WITH STENT REMOVAL, Dr Lavell Pham       Social HX:   Social History     Socioeconomic History    Marital status: UNKNOWN     Spouse name: Not on file    Number of children: Not on file    Years of education: Not on file    Highest education level: Not on file   Occupational History    Not on file   Social Needs    Financial resource strain: Not on file    Food insecurity:     Worry: Not on file     Inability: Not on file    Transportation needs:     Medical: Not on file     Non-medical: Not on file   Tobacco Use    Smoking status: Current Every Day Smoker     Packs/day: 0.00     Years: 20.00     Pack years: 0.00 Types: Cigarettes    Smokeless tobacco: Never Used    Tobacco comment: Patient states he smokes 2-3 cigarettes per day   Substance and Sexual Activity    Alcohol use: No     Alcohol/week: 0.0 oz     Comment: \"none in over 20 years\"    Drug use: No     Types: Cocaine, Hallucinogenics, Opiates, Benzodiazepines     Comment: cocaine 2011    Sexual activity: Yes     Partners: Female   Lifestyle    Physical activity:     Days per week: Not on file     Minutes per session: Not on file    Stress: Not on file   Relationships    Social connections:     Talks on phone: Not on file     Gets together: Not on file     Attends Yazdanism service: Not on file     Active member of club or organization: Not on file     Attends meetings of clubs or organizations: Not on file     Relationship status: Not on file    Intimate partner violence:     Fear of current or ex partner: Not on file     Emotionally abused: Not on file     Physically abused: Not on file     Forced sexual activity: Not on file   Other Topics Concern    Not on file   Social History Narrative    Not on file       FHX:   Family History   Problem Relation Age of Onset    Diabetes Father     Heart Disease Father     Stroke Father        Allergy:   Allergies   Allergen Reactions    Bee Sting [Sting, Bee] Hives and Shortness of Breath    Codeine Hives and Shortness of Breath     Has tolerated Hydromorphone.      Haldol [Haloperidol Lactate] Hives and Shortness of Breath    Haloperidol Hives and Cough    Keflex [Cephalexin] Diarrhea    No Allergy Information Available Other (comments)     Other reaction(s): hives    Shellfish Containing Products Hives    Stadol [Butorphanol Tartrate] Hives, Shortness of Breath and Rash    Vicodin [Hydrocodone-Acetaminophen] Hives, Shortness of Breath and Rash       Home Medications:     Medications Prior to Admission   Medication Sig    ondansetron (ZOFRAN ODT) 4 mg disintegrating tablet Take 1 Tab by mouth every eight (8) hours as needed for Nausea.  omeprazole (PRILOSEC) 10 mg capsule Take 10 mg by mouth two (2) times a day.  clopidogrel (PLAVIX) 75 mg tab Take 75 mg by mouth.  nitroglycerin (NITROSTAT) 0.3 mg SL tablet 0.3 mg.    carvedilol (COREG) 25 mg tablet Take 25 mg by mouth two (2) times daily (with meals).  atorvastatin (LIPITOR) 40 mg tablet Take 40 mg by mouth daily.  DULoxetine (CYMBALTA) 60 mg capsule Take 60 mg by mouth daily. Review of Systems:     Constitutional: No fevers, chills, weight loss, fatigue. Cardiovascular: No chest pain, heart palpitations. Respiratory: No cough, SOB, wheezing, chest discomfort, orthopnea. Gastrointestinal: Esophageal spasm       Musculoskeletal: No weakness, arthralgias, wasting. Allergies: As noted. Visit Vitals  Ht 5' 7\" (1.702 m)   Wt 108.9 kg (240 lb)   BMI 37.59 kg/m²       Physical Assessment:     constitutional: appearance: well developed, well nourished, normal habitus, no deformities, in no acute distress. ENMT: mouth: normal oral mucosa,lips and gums; good dentition. oropharynx: normal tongue, hard and soft palate; posterior pharynx without erithema, exudate or lesions. respiratory: effort: normal chest excursion; no intercostal retraction or accessory muscle use. cardiovascular: abdominal aorta: normal size and position; no bruits. palpation: PMI of normal size and position; normal rhythm; no thrill or murmurs. abdominal: abdomen: normal consistency; no tenderness or masses. hernias: no hernias appreciated. liver: normal size and consistency. spleen: not palpable. rectal: hemoccult/guaiac: not performed. musculoskeletal: digits and nails: no clubbing, cyanosis, petechiae or other inflammatory conditions. psychiatric: orientation: oriented to time, space and person. Adrianne Meigs, MD, M.D.    [facility] [address], [facility]  [phone]  Pager [phone]  [email or web address]

## 2019-05-28 NOTE — ANESTHESIA POSTPROCEDURE EVALUATION
Procedure(s):  UPPER ENDOSCOPY / Dilation. MAC    Anesthesia Post Evaluation        Patient location during evaluation: PACU  Level of consciousness: awake  Pain management: satisfactory to patient  Airway patency: patent  Anesthetic complications: no  Cardiovascular status: acceptable  Respiratory status: acceptable  Hydration status: acceptable  Post anesthesia nausea and vomiting:  none      Vitals Value Taken Time   /83 5/28/2019 12:40 PM   Temp     Pulse 74 5/28/2019 12:42 PM   Resp 18 5/28/2019 12:42 PM   SpO2 97 % 5/28/2019 12:42 PM   Vitals shown include unvalidated device data.

## 2019-06-30 ENCOUNTER — HOSPITAL ENCOUNTER (EMERGENCY)
Age: 64
Discharge: HOME | End: 2019-07-01
Attending: EMERGENCY MEDICINE
Payer: MEDICARE

## 2019-06-30 VITALS
RESPIRATION RATE: 16 BRPM | OXYGEN SATURATION: 95 % | SYSTOLIC BLOOD PRESSURE: 131 MMHG | TEMPERATURE: 98.2 F | DIASTOLIC BLOOD PRESSURE: 82 MMHG | HEART RATE: 92 BPM

## 2019-06-30 DIAGNOSIS — K22.2 ESOPHAGEAL STRICTURE: Primary | ICD-10-CM

## 2019-06-30 PROCEDURE — 99283 EMERGENCY DEPT VISIT LOW MDM: CPT

## 2019-06-30 PROCEDURE — 93005 ELECTROCARDIOGRAM TRACING: CPT

## 2019-07-01 ENCOUNTER — APPOINTMENT (OUTPATIENT)
Dept: GENERAL RADIOLOGY | Age: 64
End: 2019-07-01
Attending: EMERGENCY MEDICINE
Payer: MEDICARE

## 2019-07-01 LAB
ALBUMIN SERPL-MCNC: 2.9 G/DL (ref 3.4–5)
ALBUMIN/GLOB SERPL: 0.7 {RATIO} (ref 0.8–1.7)
ALP SERPL-CCNC: 171 U/L (ref 45–117)
ALT SERPL-CCNC: 55 U/L (ref 16–61)
ANION GAP SERPL CALC-SCNC: 7 MMOL/L (ref 3–18)
AST SERPL-CCNC: 60 U/L (ref 15–37)
ATRIAL RATE: 95 BPM
BASOPHILS # BLD: 0 K/UL (ref 0–0.1)
BASOPHILS NFR BLD: 1 % (ref 0–2)
BILIRUB SERPL-MCNC: 1 MG/DL (ref 0.2–1)
BUN SERPL-MCNC: 13 MG/DL (ref 7–18)
BUN/CREAT SERPL: 14 (ref 12–20)
CALCIUM SERPL-MCNC: 9.7 MG/DL (ref 8.5–10.1)
CALCULATED P AXIS, ECG09: 51 DEGREES
CALCULATED R AXIS, ECG10: -18 DEGREES
CALCULATED T AXIS, ECG11: 49 DEGREES
CHLORIDE SERPL-SCNC: 112 MMOL/L (ref 100–108)
CK MB CFR SERPL CALC: 0.9 % (ref 0–4)
CK MB SERPL-MCNC: 1.2 NG/ML (ref 5–25)
CK SERPL-CCNC: 131 U/L (ref 39–308)
CO2 SERPL-SCNC: 25 MMOL/L (ref 21–32)
CREAT SERPL-MCNC: 0.92 MG/DL (ref 0.6–1.3)
DIAGNOSIS, 93000: NORMAL
DIFFERENTIAL METHOD BLD: ABNORMAL
EOSINOPHIL # BLD: 0.3 K/UL (ref 0–0.4)
EOSINOPHIL NFR BLD: 4 % (ref 0–5)
ERYTHROCYTE [DISTWIDTH] IN BLOOD BY AUTOMATED COUNT: 13.8 % (ref 11.6–14.5)
GLOBULIN SER CALC-MCNC: 4 G/DL (ref 2–4)
GLUCOSE SERPL-MCNC: 131 MG/DL (ref 74–99)
HCT VFR BLD AUTO: 40.9 % (ref 36–48)
HGB BLD-MCNC: 14.6 G/DL (ref 13–16)
LYMPHOCYTES # BLD: 2.8 K/UL (ref 0.9–3.6)
LYMPHOCYTES NFR BLD: 38 % (ref 21–52)
MCH RBC QN AUTO: 32.2 PG (ref 24–34)
MCHC RBC AUTO-ENTMCNC: 35.7 G/DL (ref 31–37)
MCV RBC AUTO: 90.1 FL (ref 74–97)
MONOCYTES # BLD: 0.5 K/UL (ref 0.05–1.2)
MONOCYTES NFR BLD: 7 % (ref 3–10)
NEUTS SEG # BLD: 3.7 K/UL (ref 1.8–8)
NEUTS SEG NFR BLD: 50 % (ref 40–73)
P-R INTERVAL, ECG05: 150 MS
PLATELET # BLD AUTO: 100 K/UL (ref 135–420)
PMV BLD AUTO: 10.4 FL (ref 9.2–11.8)
POTASSIUM SERPL-SCNC: 3.3 MMOL/L (ref 3.5–5.5)
PROT SERPL-MCNC: 6.9 G/DL (ref 6.4–8.2)
Q-T INTERVAL, ECG07: 354 MS
QRS DURATION, ECG06: 80 MS
QTC CALCULATION (BEZET), ECG08: 444 MS
RBC # BLD AUTO: 4.54 M/UL (ref 4.7–5.5)
SODIUM SERPL-SCNC: 144 MMOL/L (ref 136–145)
TROPONIN I SERPL-MCNC: <0.02 NG/ML (ref 0–0.04)
VENTRICULAR RATE, ECG03: 95 BPM
WBC # BLD AUTO: 7.4 K/UL (ref 4.6–13.2)

## 2019-07-01 PROCEDURE — 82550 ASSAY OF CK (CPK): CPT

## 2019-07-01 PROCEDURE — 74011250636 HC RX REV CODE- 250/636: Performed by: EMERGENCY MEDICINE

## 2019-07-01 PROCEDURE — 74011250636 HC RX REV CODE- 250/636

## 2019-07-01 PROCEDURE — 96374 THER/PROPH/DIAG INJ IV PUSH: CPT

## 2019-07-01 PROCEDURE — 96375 TX/PRO/DX INJ NEW DRUG ADDON: CPT

## 2019-07-01 PROCEDURE — 71045 X-RAY EXAM CHEST 1 VIEW: CPT

## 2019-07-01 PROCEDURE — 71046 X-RAY EXAM CHEST 2 VIEWS: CPT

## 2019-07-01 PROCEDURE — 80053 COMPREHEN METABOLIC PANEL: CPT

## 2019-07-01 PROCEDURE — 85025 COMPLETE CBC W/AUTO DIFF WBC: CPT

## 2019-07-01 PROCEDURE — 96376 TX/PRO/DX INJ SAME DRUG ADON: CPT

## 2019-07-01 RX ORDER — ONDANSETRON 4 MG/1
4 TABLET, FILM COATED ORAL
Qty: 12 TAB | Refills: 0 | Status: ON HOLD | OUTPATIENT
Start: 2019-07-01 | End: 2019-07-23

## 2019-07-01 RX ORDER — ONDANSETRON 2 MG/ML
INJECTION INTRAMUSCULAR; INTRAVENOUS
Status: COMPLETED
Start: 2019-07-01 | End: 2019-07-01

## 2019-07-01 RX ORDER — MORPHINE SULFATE 4 MG/ML
4 INJECTION INTRAVENOUS
Status: COMPLETED | OUTPATIENT
Start: 2019-07-01 | End: 2019-07-01

## 2019-07-01 RX ORDER — ONDANSETRON 2 MG/ML
4 INJECTION INTRAMUSCULAR; INTRAVENOUS
Status: COMPLETED | OUTPATIENT
Start: 2019-07-01 | End: 2019-07-01

## 2019-07-01 RX ORDER — MORPHINE SULFATE 4 MG/ML
INJECTION INTRAVENOUS
Status: COMPLETED
Start: 2019-07-01 | End: 2019-07-01

## 2019-07-01 RX ADMIN — MORPHINE SULFATE 4 MG: 4 INJECTION INTRAVENOUS at 03:23

## 2019-07-01 RX ADMIN — MORPHINE SULFATE 4 MG: 4 INJECTION INTRAVENOUS at 01:43

## 2019-07-01 RX ADMIN — ONDANSETRON 4 MG: 2 INJECTION INTRAMUSCULAR; INTRAVENOUS at 01:41

## 2019-07-01 NOTE — ED PROVIDER NOTES
EMERGENCY DEPARTMENT HISTORY AND PHYSICAL EXAM    1:20 AM      Date: 6/30/2019  Patient Name: Luana White    History of Presenting Illness     Chief Complaint   Patient presents with    Chest Pain         History Provided By: Patient    Additional History (Context): Luana White is a 71 y.o. male with hypertension, stroke and History of esophageal strictures who presents with chest tightness that seems to be related to his esophageal strictures. Patient sees Dr. Waylon Osorio. Patient last ate at 7 PM and had a choking attack at that time. Patient has had water and coffee since then with no problems. Patient states last esophageal dilatation was 8 weeks ago. He states he has one scheduled for 23 July with Dr. Raquel Florez. Patient smokes 5 cigarettes/day. He denies any alcohol recreational drug use. Drake Alex PCP: Merle Guevara, DO    Current Facility-Administered Medications   Medication Dose Route Frequency Provider Last Rate Last Dose    morphine injection 4 mg  4 mg IntraVENous NOW Judge Madi Hess, DO         Current Outpatient Medications   Medication Sig Dispense Refill    ondansetron hcl (ZOFRAN) 4 mg tablet Take 1 Tab by mouth every eight (8) hours as needed for Nausea. 12 Tab 0    ondansetron (ZOFRAN ODT) 4 mg disintegrating tablet Take 1 Tab by mouth every eight (8) hours as needed for Nausea. 15 Tab 0    omeprazole (PRILOSEC) 10 mg capsule Take 10 mg by mouth two (2) times a day.  clopidogrel (PLAVIX) 75 mg tab Take 75 mg by mouth.  nitroglycerin (NITROSTAT) 0.3 mg SL tablet 0.3 mg.      carvedilol (COREG) 25 mg tablet Take 25 mg by mouth two (2) times daily (with meals).  atorvastatin (LIPITOR) 40 mg tablet Take 40 mg by mouth daily.  DULoxetine (CYMBALTA) 60 mg capsule Take 60 mg by mouth daily.            Past History     Past Medical History:  Past Medical History:   Diagnosis Date    Adverse effect of anesthesia     esophageal spasms    Altered mental status     Arthritis     Arthropathy     Back pain     Bilateral kidney stones     Burn     ON HANDS    CAD (coronary artery disease) 1999    MI    3 cardiac stents    Calcium nephrolithiasis     Calculus of kidney     Cardiac arrhythmia     Cerebral artery occlusion with cerebral infarction ([address] 2010    no residual    Chest pain     Cigarette smoker     Cirrhosis, alcoholic (HCC)     Cocaine abuse, episodic use (HCC)     Colon polyp     Diarrhea     DJD (degenerative joint disease)     Drug-seeking behavior     Dyskinesia     Esophageal disorder     Esophageal erosions     Flank pain, acute     Foot ulcer, right (HCC)     Gastric ulcer     GERD (gastroesophageal reflux disease)     Gross hematuria     Head trauma     Hearing reduced     Hematuria     Herniated disc     X 10 IN BACK    Hiatus hernia syndrome     History of kidney stones     HNP (herniated nucleus pulposus)     Hyperlipemia     Hypertension     Hypokalemia     Infection     Jaw fracture (HCC)     Kidney stones     Knee pain     Left ureteral stone     Muscle atrophy     Myocardial infarction ([address] 1999    N&V (nausea and vomiting)     Nocturia     Nondependent alcohol abuse, in remission     Nutcracker esophagus     Other ill-defined conditions(799.89)     dysphagia    Renal stone     Slurred speech     Stroke ([address] 1999    questionable TIA    Swallowing difficulty     Syncopal episodes     Syncope and collapse     Tobacco dependence     Unspecified adverse effect of anesthesia     AT TIMES ESOPHAGUS SPASMS AFTER PROCEDURES    Unspecified cerebral artery occlusion with cerebral infarction        Past Surgical History:  Past Surgical History:   Procedure Laterality Date    HX APPENDECTOMY      9YEARS OLD    HX CHOLECYSTECTOMY  2009    HX CORONARY STENT PLACEMENT  2008    3 STENTS PLACED    HX ENDOSCOPY      with Dilatation, multiple times    HX HEART CATHETERIZATION  2012    EVERYTHING LOOKED GOOD AT THIS POINT    HX HEENT      \"2 jaw surgeries\"    HX HERNIA REPAIR      ABDOMEN    HX KNEE ARTHROSCOPY  9/2012    LEFT    HX MOHS PROCEDURES  2016    HX ORTHOPAEDIC  12/12    left knee    HX OTHER SURGICAL      ESOPHAGUS DILATATION    HX ROTATOR CUFF REPAIR Right     x2    HX UROLOGICAL  07/18/2016    [facility]-Cystoscopy,URETEROSCOPY W/ LITHOTRIPSY, Dr Maged Webster  UROLOGICAL  10/10/2016    [facility]-Cystoscopy with removal of ureteral stent, Dr Maged Webster  UROLOGICAL  01/30/2017    [facility]-Cystoscopy, Left retrograde pyelography, Left diagnostic ureteroscopy, Left 6 x 26 cm double-J ureteral stent placement,Right retrograde pyelography, Right ureteroscopic stone extraction,Right 6 x 24 cm double-J ureteral stent placement, Interpretation of urography,Intraoperative fluoro less than 1 hour,          UROLOGICAL  02/08/2017    [facility]-CYSTOURETHROSCOPY WITH STENT REMOVAL, Dr Wing Freeman       Family History:  Family History   Problem Relation Age of Onset    Diabetes Father     Heart Disease Father     Stroke Father        Social History:  Social History     Tobacco Use    Smoking status: Current Every Day Smoker     Packs/day: 0.00     Years: 20.00     Pack years: 0.00     Types: Cigarettes    Smokeless tobacco: Never Used    Tobacco comment: Patient states he smokes 2-3 cigarettes per day   Substance Use Topics    Alcohol use: No     Alcohol/week: 0.0 oz     Comment: \"none in over 20 years\"    Drug use: No     Types: Cocaine, Hallucinogenics, Opiates, Benzodiazepines     Comment: cocaine 2011       Allergies: Allergies   Allergen Reactions    Bee Sting [Sting, Bee] Hives and Shortness of Breath    Codeine Hives and Shortness of Breath     Has tolerated Hydromorphone.      Haldol [Haloperidol Lactate] Hives and Shortness of Breath    Haloperidol Hives and Cough    Keflex [Cephalexin] Diarrhea    No Allergy Information Available Other (comments)     Other reaction(s): hives    Shellfish Containing Products Hives    Stadol [Butorphanol Tartrate] Hives, Shortness of Breath and Rash    Vicodin [Hydrocodone-Acetaminophen] Hives, Shortness of Breath and Rash         Review of Systems       Review of Systems   Constitutional: Negative. Negative for chills, diaphoresis and fever. HENT: Negative. Negative for congestion, rhinorrhea and sore throat. Eyes: Negative. Negative for pain, discharge and redness. Respiratory: Positive for cough and choking. Negative for chest tightness, shortness of breath and wheezing. Cardiovascular: Positive for chest pain. Gastrointestinal: Negative. Negative for abdominal pain, constipation, diarrhea, nausea and vomiting. Genitourinary: Negative. Negative for dysuria, flank pain, frequency, hematuria and urgency. Musculoskeletal: Negative. Negative for back pain and neck pain. Skin: Negative. Negative for rash. Neurological: Negative. Negative for syncope, weakness, numbness and headaches. Psychiatric/Behavioral: Negative. All other systems reviewed and are negative. Physical Exam     Visit Vitals  /82 (BP 1 Location: Left arm, BP Patient Position: At rest)   Pulse 92   Temp 98.2 °F (36.8 °C)   Resp 16   SpO2 95%         Physical Exam   Constitutional: He appears well-developed and well-nourished. Non-toxic appearance. He does not have a sickly appearance. He does not appear ill. No distress. HENT:   Head: Normocephalic and atraumatic. Mouth/Throat: Oropharynx is clear and moist. No oropharyngeal exudate. Eyes: Pupils are equal, round, and reactive to light. Conjunctivae and EOM are normal. No scleral icterus. Neck: Normal range of motion. Neck supple. No hepatojugular reflux and no JVD present. No tracheal deviation present. No thyromegaly present. Cardiovascular: Normal rate, regular rhythm, S1 normal, S2 normal, normal heart sounds, intact distal pulses and normal pulses. Exam reveals no gallop, no S3 and no S4. No murmur heard. Pulses:       Radial pulses are 2+ on the right side, and 2+ on the left side. Dorsalis pedis pulses are 2+ on the right side, and 2+ on the left side. Pulmonary/Chest: Effort normal and breath sounds normal. No respiratory distress. He has no decreased breath sounds. He has no wheezes. He has no rhonchi. He has no rales. Abdominal: Soft. Normal appearance and bowel sounds are normal. He exhibits no distension and no mass. There is no hepatosplenomegaly. There is no tenderness. There is no rigidity, no rebound, no guarding, no CVA tenderness, no tenderness at McBurney's point and negative Hicks's sign. Musculoskeletal: Normal range of motion. Strength 5 out of 5 throughout. Lymphadenopathy:        Head (right side): No submental, no submandibular, no preauricular and no occipital adenopathy present. Head (left side): No submental, no submandibular, no preauricular and no occipital adenopathy present. He has no cervical adenopathy. Right: No supraclavicular adenopathy present. Left: No supraclavicular adenopathy present. Neurological: He is alert. He has normal strength and normal reflexes. He is not disoriented. No cranial nerve deficit or sensory deficit. Coordination and gait normal. GCS eye subscore is 4. GCS verbal subscore is 5. GCS motor subscore is 6. Grossly intact. Skin: Skin is warm, dry and intact. No rash noted. He is not diaphoretic. Psychiatric: He has a normal mood and affect. His speech is normal and behavior is normal. Judgment and thought content normal. Cognition and memory are normal.   Nursing note and vitals reviewed.         Diagnostic Study Results     Labs -  Recent Results (from the past 12 hour(s))   EKG, 12 LEAD, INITIAL    Collection Time: 06/30/19 11:30 PM   Result Value Ref Range    Ventricular Rate 95 BPM    Atrial Rate 95 BPM    P-R Interval 150 ms    QRS Duration 80 ms    Q-T Interval 354 ms    QTC Calculation (Bezet) 444 ms    Calculated P Axis 51 degrees    Calculated R Axis -18 degrees    Calculated T Axis 49 degrees    Diagnosis       Normal sinus rhythm  Minimal voltage criteria for LVH, may be normal variant  Borderline ECG  When compared with ECG of 22-MAY-2019 00:26,  No significant change was found     CBC WITH AUTOMATED DIFF    Collection Time: 07/01/19 12:00 AM   Result Value Ref Range    WBC 7.4 4.6 - 13.2 K/uL    RBC 4.54 (L) 4.70 - 5.50 M/uL    HGB 14.6 13.0 - 16.0 g/dL    HCT 40.9 36.0 - 48.0 %    MCV 90.1 74.0 - 97.0 FL    MCH 32.2 24.0 - 34.0 PG    MCHC 35.7 31.0 - 37.0 g/dL    RDW 13.8 11.6 - 14.5 %    PLATELET 834 (L) 332 - 420 K/uL    MPV 10.4 9.2 - 11.8 FL    NEUTROPHILS 50 40 - 73 %    LYMPHOCYTES 38 21 - 52 %    MONOCYTES 7 3 - 10 %    EOSINOPHILS 4 0 - 5 %    BASOPHILS 1 0 - 2 %    ABS. NEUTROPHILS 3.7 1.8 - 8.0 K/UL    ABS. LYMPHOCYTES 2.8 0.9 - 3.6 K/UL    ABS. MONOCYTES 0.5 0.05 - 1.2 K/UL    ABS. EOSINOPHILS 0.3 0.0 - 0.4 K/UL    ABS. BASOPHILS 0.0 0.0 - 0.1 K/UL    DF AUTOMATED     METABOLIC PANEL, COMPREHENSIVE    Collection Time: 07/01/19 12:00 AM   Result Value Ref Range    Sodium 144 136 - 145 mmol/L    Potassium 3.3 (L) 3.5 - 5.5 mmol/L    Chloride 112 (H) 100 - 108 mmol/L    CO2 25 21 - 32 mmol/L    Anion gap 7 3.0 - 18 mmol/L    Glucose 131 (H) 74 - 99 mg/dL    BUN 13 7.0 - 18 MG/DL    Creatinine 0.92 0.6 - 1.3 MG/DL    BUN/Creatinine ratio 14 12 - 20      GFR est AA >60 >60 ml/min/1.73m2    GFR est non-AA >60 >60 ml/min/1.73m2    Calcium 9.7 8.5 - 10.1 MG/DL    Bilirubin, total 1.0 0.2 - 1.0 MG/DL    ALT (SGPT) 55 16 - 61 U/L    AST (SGOT) 60 (H) 15 - 37 U/L    Alk.  phosphatase 171 (H) 45 - 117 U/L    Protein, total 6.9 6.4 - 8.2 g/dL    Albumin 2.9 (L) 3.4 - 5.0 g/dL    Globulin 4.0 2.0 - 4.0 g/dL    A-G Ratio 0.7 (L) 0.8 - 1.7     CARDIAC PANEL,(CK, CKMB & TROPONIN)    Collection Time: 07/01/19 12:00 AM   Result Value Ref Range     39 - 308 U/L    CK - MB 1.2 <3.6 ng/ml CK-MB Index 0.9 0.0 - 4.0 %    Troponin-I, QT <0.02 0.0 - 0.045 NG/ML       Radiologic Studies -   XR CHEST PA LAT    (Results Pending)         Medical Decision Making   Provider Notes (Medical Decision Making):  MDM  Number of Diagnoses or Management Options  Esophageal stricture:   Diagnosis management comments: ACS  Esophageal strictures      I am the first provider for this patient. I reviewed the vital signs, available nursing notes, past medical history, past surgical history, family history and social history. Vital Signs-Reviewed the patient's vital signs. Records Reviewed: Nursing Notes (Time of Review: 1:20 AM)    ED Course: Progress Notes, Reevaluation, and Consults:    Labs essentially normal.  Chest X-Ray showed No acute process. EKG showed NSR with rate of 95 bpm. With no ST elevations or depression and non specific T wave changes. 2:20 AM 6/30/2019        Diagnosis       I have reassessed the patient. Patient is feeling better. Patient will be prescribed Zofran. Patient was discharged in stable condition. Patient is to return to emergency department if any new or worsening condition. Clinical Impression:   1. Esophageal stricture        Disposition: Discharged home     Follow-up Information     Follow up With Specialties Details Why Contact Info    Cindy Morataya DO Osteopathic Medicine In 2 days  [address]  [phone]      Gena Jeffries MD Gastroenterology In 2 days  [address] [address]               Attestation        Provider Attestation:     I personally performed the services described in the documentation, reviewed the documentation and it accurately and completely records my words and actions utilizing the [address] July 01, 2019 at 3:05 AM - Henrietta Hess DO    Disclaimer. It is dictated using utilizing voice recognition software.   Unfortunately this leads to occasional typographical errors. I apologize in advance if the situation occurs. If questions arise please do not hesitate to contact me or call our department.

## 2019-07-01 NOTE — DISCHARGE INSTRUCTIONS
Patient Education        Esophageal Dilation: Before Your Procedure  What is esophageal dilation? Esophageal dilation is a procedure that can open up narrow areas of the esophagus. The esophagus is the tube that carries food to your stomach. When this tube is too narrow, it is hard for food and liquids to pass through. This makes it hard to swallow. During the procedure, the doctor guides a balloon or plastic dilator down your throat and into your esophagus. Then the device expands, like a balloon filling with air. It widens any narrow parts of your esophagus. To guide the balloon or plastic dilator, the doctor may use a thin, lighted tube that bends. (It is called an endoscope, or a scope.) Or he or she may use a thin wire as a guide. You may get medicine to numb the back of your throat and help you relax during the procedure. You will not feel pain. You may go home after your doctor or nurse checks to make sure that you are not having any problems. Follow-up care is a key part of your treatment and safety. Be sure to make and go to all appointments, and call your doctor if you are having problems. It's also a good idea to know your test results and keep a list of the medicines you take. What happens before the procedure?   Preparing for the procedure    · Understand exactly what procedure is planned, along with the risks, benefits, and other options. · Tell your doctors ALL the medicines, vitamins, supplements, and herbal remedies you take. Some of these can increase the risk of bleeding or interact with anesthesia.     · If you take blood thinners, such as warfarin (Coumadin), clopidogrel (Plavix), or aspirin, be sure to talk to your doctor. He or she will tell you if you should stop taking these medicines before your procedure. Make sure that you understand exactly what your doctor wants you to do.     · Your doctor will tell you which medicines to take or stop before your procedure.  You may need to stop taking certain medicines a week or more before the procedure. So talk to your doctor as soon as you can.     · If you have an advance directive, let your doctor know. It may include a living will and a durable power of  for health care. Bring a copy to the hospital. If you don't have one, you may want to prepare one. It lets your doctor and loved ones know your health care wishes. Doctors advise that everyone prepare these papers before any type of surgery or procedure. Procedures can be stressful. This information will help you understand what you can expect. And it will help you safely prepare for your procedure. What happens on the day of the procedure? · Follow the instructions exactly about when to stop eating and drinking. If you don't, your procedure may be canceled. If your doctor told you to take your medicines on the day of the procedure, take them with only a sip of water.     · Take a bath or shower before you come in for your procedure. Do not apply lotions, perfumes, deodorants, or nail polish.     · Take off all jewelry and piercings. And take out contact lenses, if you wear them.    At the hospital or surgery center   · Bring a picture ID.     · The procedure will take about 15 to 30 minutes.     · The doctor may spray medicine on the back of your throat to numb it. You also will get medicine to prevent pain and to relax you.     · You will stay at the hospital or surgery center for 1 to 2 hours until the medicine you were given wears off. Going home   · Be sure you have someone to drive you home. Anesthesia and pain medicine make it unsafe for you to drive.     · You will be given more specific instructions about recovering from your procedure. They will cover things like diet, wound care, follow-up care, driving, and getting back to your normal routine. When should you call your doctor?    · You have questions or concerns.     · You don't understand how to prepare for your procedure.     · You become ill before the procedure (such as fever, flu, or a cold).     · You need to reschedule or have changed your mind about having the procedure. Where can you learn more? Go to http://turner-doris.info/. Enter E469 in the search box to learn more about \"Esophageal Dilation: Before Your Procedure. \"  Current as of: March 27, 2018  Content Version: 11.9  © 8113-3392 [employer]. Care instructions adapted under license by [employer] (which disclaims liability or warranty for this information). If you have questions about a medical condition or this instruction, always ask your healthcare professional. [place] [address] any warranty or liability for your use of this information. Patient Education        Learning About Esophageal Stricture  What is an esophageal stricture? A stricture is a narrowing in one area of the esophagus, the tube that carries food and liquid to your stomach. It most often happens close to where the esophagus meets the stomach. A stricture can make it hard to swallow. You may feel like food gets stuck in your esophagus. If you have gastroesophageal reflux disease (GERD), stomach acid can flow backward into the esophagus. This can damage the lining of your esophagus and cause a stricture. Other things that can cause a stricture include:  · Surgery, radiation, or other treatments on the esophagus. · Some diseases and infections. · Reactions to some chemicals or medicines. How are strictures diagnosed? Your doctor may check your esophagus if you are having trouble swallowing or if you feel like food is getting stuck. The doctor will use a tool called an endoscope, or scope. It's a thin, flexible, lighted viewing tool. It goes into the mouth and down the throat. Your doctor can use it to check for any problems. The scope can also be used to take a sample of tissue to test (biopsy).   You might need an X-ray. For the X-ray, you may need to swallow a substance, such as barium, that makes it easier to see what happens in your esophagus. How are strictures treated? Strictures are usually treated with a procedure called esophageal dilation. Dilation can open up narrow areas of the esophagus. Before the procedure, you will get medicines through a needle in your vein (IV) in your arm or hand. These medicines reduce pain and will make you feel relaxed and drowsy. Your throat will also be numbed. You may not remember much about the treatment. The doctor will guide a balloon or a plastic tool for widening (dilator) down your throat and into your esophagus. The dilator is used to widen any narrow area. To guide the dilator, the doctor may use a scope. Or he or she may use a thin wire as a guide. After the procedure, you will be observed for 1 to 2 hours until the medicines wear off. If your throat was numbed before the test, you should not eat or drink until your throat is no longer numb. When you are fully recovered, you can go home. You will not be able to drive or operate machinery for 12 hours after the test. Your doctor will tell you when you can go back to your usual diet and activities. Do not drink alcohol for 12 to 24 hours after the test.  You may still need to treat some symptoms of GERD. Your doctor may give you information about that. Follow-up care is a key part of your treatment and safety. Be sure to make and go to all appointments, and call your doctor if you are having problems. It's also a good idea to know your test results and keep a list of the medicines you take. Where can you learn more? Go to http://turner-doris.info/. Enter P226 in the search box to learn more about \"Learning About Esophageal Stricture. \"  Current as of: March 27, 2018  Content Version: 11.9  © 9115-5522 [employer].  Care instructions adapted under license by [employer] (which disclaims liability or warranty for this information). If you have questions about a medical condition or this instruction, always ask your healthcare professional. [address] any warranty or liability for your use of this information.

## 2019-07-22 ENCOUNTER — ANESTHESIA EVENT (OUTPATIENT)
Dept: ENDOSCOPY | Age: 64
End: 2019-07-22
Payer: MEDICARE

## 2019-07-23 ENCOUNTER — HOSPITAL ENCOUNTER (OUTPATIENT)
Age: 64
Setting detail: OUTPATIENT SURGERY
Discharge: HOME | End: 2019-07-23
Attending: INTERNAL MEDICINE | Admitting: INTERNAL MEDICINE
Payer: MEDICARE

## 2019-07-23 ENCOUNTER — ANESTHESIA (OUTPATIENT)
Dept: ENDOSCOPY | Age: 64
End: 2019-07-23
Payer: MEDICARE

## 2019-07-23 VITALS
HEART RATE: 63 BPM | WEIGHT: 230 LBS | SYSTOLIC BLOOD PRESSURE: 111 MMHG | DIASTOLIC BLOOD PRESSURE: 61 MMHG | OXYGEN SATURATION: 98 % | BODY MASS INDEX: 34.86 KG/M2 | HEIGHT: 68 IN | TEMPERATURE: 98.3 F | RESPIRATION RATE: 15 BRPM

## 2019-07-23 PROCEDURE — 74011250636 HC RX REV CODE- 250/636: Performed by: NURSE ANESTHETIST, CERTIFIED REGISTERED

## 2019-07-23 PROCEDURE — 76060000031 HC ANESTHESIA FIRST 0.5 HR: Performed by: INTERNAL MEDICINE

## 2019-07-23 PROCEDURE — 76040000019: Performed by: INTERNAL MEDICINE

## 2019-07-23 PROCEDURE — 74011250636 HC RX REV CODE- 250/636

## 2019-07-23 RX ORDER — ATROPINE SULFATE 0.1 MG/ML
0.5 INJECTION INTRAVENOUS
Status: CANCELLED | OUTPATIENT
Start: 2019-07-23 | End: 2019-07-24

## 2019-07-23 RX ORDER — NALOXONE HYDROCHLORIDE 0.4 MG/ML
0.4 INJECTION, SOLUTION INTRAMUSCULAR; INTRAVENOUS; SUBCUTANEOUS
Status: CANCELLED | OUTPATIENT
Start: 2019-07-23 | End: 2019-07-23

## 2019-07-23 RX ORDER — SODIUM CHLORIDE, SODIUM LACTATE, POTASSIUM CHLORIDE, CALCIUM CHLORIDE 600; 310; 30; 20 MG/100ML; MG/100ML; MG/100ML; MG/100ML
25 INJECTION, SOLUTION INTRAVENOUS CONTINUOUS
Status: DISCONTINUED | OUTPATIENT
Start: 2019-07-23 | End: 2019-07-23 | Stop reason: HOSPADM

## 2019-07-23 RX ORDER — FLUMAZENIL 0.1 MG/ML
0.2 INJECTION INTRAVENOUS
Status: CANCELLED | OUTPATIENT
Start: 2019-07-23 | End: 2019-07-23

## 2019-07-23 RX ORDER — LIDOCAINE HYDROCHLORIDE 20 MG/ML
INJECTION, SOLUTION EPIDURAL; INFILTRATION; INTRACAUDAL; PERINEURAL AS NEEDED
Status: DISCONTINUED | OUTPATIENT
Start: 2019-07-23 | End: 2019-07-23 | Stop reason: HOSPADM

## 2019-07-23 RX ORDER — SODIUM CHLORIDE 0.9 % (FLUSH) 0.9 %
5-40 SYRINGE (ML) INJECTION EVERY 8 HOURS
Status: CANCELLED | OUTPATIENT
Start: 2019-07-23

## 2019-07-23 RX ORDER — DEXTROMETHORPHAN/PSEUDOEPHED 2.5-7.5/.8
1.2 DROPS ORAL
Status: CANCELLED | OUTPATIENT
Start: 2019-07-23

## 2019-07-23 RX ORDER — PROPOFOL 10 MG/ML
INJECTION, EMULSION INTRAVENOUS AS NEEDED
Status: DISCONTINUED | OUTPATIENT
Start: 2019-07-23 | End: 2019-07-23 | Stop reason: HOSPADM

## 2019-07-23 RX ORDER — EPINEPHRINE 0.1 MG/ML
1 INJECTION INTRACARDIAC; INTRAVENOUS
Status: CANCELLED | OUTPATIENT
Start: 2019-07-23 | End: 2019-07-24

## 2019-07-23 RX ORDER — SODIUM CHLORIDE 0.9 % (FLUSH) 0.9 %
5-40 SYRINGE (ML) INJECTION AS NEEDED
Status: CANCELLED | OUTPATIENT
Start: 2019-07-23

## 2019-07-23 RX ADMIN — SODIUM CHLORIDE, SODIUM LACTATE, POTASSIUM CHLORIDE, AND CALCIUM CHLORIDE 25 ML/HR: 600; 310; 30; 20 INJECTION, SOLUTION INTRAVENOUS at 09:39

## 2019-07-23 RX ADMIN — FAMOTIDINE 20 MG: 10 INJECTION, SOLUTION INTRAVENOUS at 09:40

## 2019-07-23 RX ADMIN — PROPOFOL 180 MG: 10 INJECTION, EMULSION INTRAVENOUS at 10:07

## 2019-07-23 RX ADMIN — SODIUM CHLORIDE, SODIUM LACTATE, POTASSIUM CHLORIDE, AND CALCIUM CHLORIDE: 600; 310; 30; 20 INJECTION, SOLUTION INTRAVENOUS at 09:48

## 2019-07-23 RX ADMIN — LIDOCAINE HYDROCHLORIDE 80 MG: 20 INJECTION, SOLUTION EPIDURAL; INFILTRATION; INTRACAUDAL; PERINEURAL at 10:02

## 2019-07-23 NOTE — H&P
[facility]   [email or web address]      Impression: 1. Esoph spasms. 2. CAD      Plan:     1. EGD/Dil with MAC      Chief Complaint: Esoph spasms. HPI:  Mikie Negro is a 71 y.o. male who is being seen preop for Esophageal spasms. He has underlying CAD but is reportedly stable. Rosalinda Yoo     PMH:   Past Medical History:   Diagnosis Date    Adverse effect of anesthesia     esophageal spasms    Altered mental status     Arthritis     Arthropathy     Back pain     Bilateral kidney stones     Burn     ON HANDS    CAD (coronary artery disease) 1999    MI    3 cardiac stents    Calcium nephrolithiasis     Calculus of kidney     Cardiac arrhythmia     Cerebral artery occlusion with cerebral infarction ([address] 2010    no residual    Chest pain     Cigarette smoker     Cirrhosis, alcoholic (HCC)     Cocaine abuse, episodic use (HCC)     Colon polyp     Diarrhea     DJD (degenerative joint disease)     Drug-seeking behavior     Dyskinesia     Esophageal disorder     Esophageal erosions     Flank pain, acute     Foot ulcer, right (HCC)     Gastric ulcer     GERD (gastroesophageal reflux disease)     Gross hematuria     Head trauma     Hearing reduced     Hematuria     Herniated disc     X 10 IN BACK    Hiatus hernia syndrome     History of kidney stones     HNP (herniated nucleus pulposus)     Hyperlipemia     Hypertension     Hypokalemia     Infection     Jaw fracture (HCC)     Kidney stones     Knee pain     Left ureteral stone     Muscle atrophy     Myocardial infarction ([address] 1999    N&V (nausea and vomiting)     Nocturia     Nondependent alcohol abuse, in remission     Nutcracker esophagus     Other ill-defined conditions(799.89)     dysphagia    Renal stone     Slurred speech     Stroke ([address] 1999    questionable TIA    Swallowing difficulty     Syncopal episodes     Syncope and collapse     Tobacco dependence  Unspecified adverse effect of anesthesia     AT TIMES ESOPHAGUS SPASMS AFTER PROCEDURES    Unspecified cerebral artery occlusion with cerebral infarction        PSH:   Past Surgical History:   Procedure Laterality Date    HX APPENDECTOMY      9YEARS OLD    HX CHOLECYSTECTOMY  2009    HX CORONARY STENT PLACEMENT  2008    3 STENTS PLACED    HX ENDOSCOPY      with Dilatation, multiple times    HX HEART CATHETERIZATION  2012    EVERYTHING LOOKED GOOD AT THIS POINT    HX HEENT      \"2 jaw surgeries\"    HX HERNIA REPAIR      ABDOMEN    HX KNEE ARTHROSCOPY  9/2012    LEFT    HX MOHS PROCEDURES  2016    HX ORTHOPAEDIC  12/12    left knee    HX OTHER SURGICAL      ESOPHAGUS DILATATION    HX ROTATOR CUFF REPAIR Right     x2    HX UROLOGICAL  07/18/2016    SPA-Cystoscopy,URETEROSCOPY W/ LITHOTRIPSY, Dr Jessi Dowd  UROLOGICAL  10/10/2016    SL-Cystoscopy with removal of ureteral stent, Dr Jessi Dowd  UROLOGICAL  01/30/2017    SPA-Cystoscopy, Left retrograde pyelography, Left diagnostic ureteroscopy, Left 6 x 26 cm double-J ureteral stent placement,Right retrograde pyelography, Right ureteroscopic stone extraction,Right 6 x 24 cm double-J ureteral stent placement, Interpretation of urography,Intraoperative fluoro less than 1 hour,          UROLOGICAL  02/08/2017    SL-CYSTOURETHROSCOPY WITH STENT REMOVAL, Dr Nixon Ramirez       Social HX:   Social History     Socioeconomic History    Marital status: UNKNOWN     Spouse name: Not on file    Number of children: Not on file    Years of education: Not on file    Highest education level: Not on file   Occupational History    Not on file   Social Needs    Financial resource strain: Not on file    Food insecurity:     Worry: Not on file     Inability: Not on file    Transportation needs:     Medical: Not on file     Non-medical: Not on file   Tobacco Use    Smoking status: Current Every Day Smoker     Packs/day: 0.00     Years: 20.00 Pack years: 0.00     Types: Cigarettes    Smokeless tobacco: Never Used    Tobacco comment: Patient states he smokes 2-3 cigarettes per day   Substance and Sexual Activity    Alcohol use: No     Alcohol/week: 0.0 standard drinks     Comment: \"none in over 20 years\"    Drug use: No     Types: Cocaine, Hallucinogenics, Opiates, Benzodiazepines     Comment: cocaine 2011    Sexual activity: Yes     Partners: Female   Lifestyle    Physical activity:     Days per week: Not on file     Minutes per session: Not on file    Stress: Not on file   Relationships    Social connections:     Talks on phone: Not on file     Gets together: Not on file     Attends [employer]ical service: Not on file     Active member of club or organization: Not on file     Attends meetings of clubs or organizations: Not on file     Relationship status: Not on file    Intimate partner violence:     Fear of current or ex partner: Not on file     Emotionally abused: Not on file     Physically abused: Not on file     Forced sexual activity: Not on file   Other Topics Concern    Not on file   Social History Narrative    Not on file       FHX:   Family History   Problem Relation Age of Onset    Diabetes Father     Heart Disease Father     Stroke Father        Allergy:   Allergies   Allergen Reactions    Bee Sting [Sting, Bee] Hives and Shortness of Breath    Codeine Hives and Shortness of Breath     Has tolerated Hydromorphone.      Haldol [Haloperidol Lactate] Hives and Shortness of Breath    Haloperidol Hives and Cough    Keflex [Cephalexin] Diarrhea    No Allergy Information Available Other (comments)     Other reaction(s): hives    Shellfish Containing Products Hives    Stadol [Butorphanol Tartrate] Hives, Shortness of Breath and Rash    Vicodin [Hydrocodone-Acetaminophen] Hives, Shortness of Breath and Rash       Home Medications:     Medications Prior to Admission   Medication Sig    omeprazole (PRILOSEC) 10 mg capsule Take 10 mg by mouth two (2) times a day.  carvedilol (COREG) 25 mg tablet Take 25 mg by mouth two (2) times daily (with meals).  atorvastatin (LIPITOR) 40 mg tablet Take 40 mg by mouth daily.  DULoxetine (CYMBALTA) 60 mg capsule Take 60 mg by mouth daily.  ondansetron (ZOFRAN ODT) 4 mg disintegrating tablet Take 1 Tab by mouth every eight (8) hours as needed for Nausea.  clopidogrel (PLAVIX) 75 mg tab Take 75 mg by mouth.  nitroglycerin (NITROSTAT) 0.3 mg SL tablet 0.3 mg.       Review of Systems:     Constitutional: No fevers, chills, weight loss, fatigue. Cardiovascular: No chest pain, heart palpitations. Respiratory: No cough, SOB, wheezing, chest discomfort, orthopnea. Gastrointestinal: Chronic esophageal spasm with CP       Musculoskeletal: No weakness, arthralgias, wasting. Allergies: As noted. Visit Vitals  /59   Pulse 68   Temp 98.3 °F (36.8 °C)   Resp 16   Ht 5' 8\" (1.727 m)   Wt 104.3 kg (230 lb)   SpO2 98%   BMI 34.97 kg/m²       Physical Assessment:     constitutional: appearance: well developed, well nourished, normal habitus, no deformities, in no acute distress. ENMT: mouth: normal oral mucosa,lips and gums; good dentition. oropharynx: normal tongue, hard and soft palate; posterior pharynx without erithema, exudate or lesions. respiratory: effort: normal chest excursion; no intercostal retraction or accessory muscle use. cardiovascular: abdominal aorta: normal size and position; no bruits. palpation: PMI of normal size and position; normal rhythm; no thrill or murmurs. abdominal: abdomen: normal consistency; no tenderness or masses. hernias: no hernias appreciated. liver: normal size and consistency. spleen: not palpable. rectal: hemoccult/guaiac: not performed. musculoskeletal: digits and nails: no clubbing, cyanosis, petechiae or other inflammatory conditions. psychiatric: orientation: oriented to time, space and person. Radha Miller MD, MYairD. [facility], [address] [place] [address]  Pager [phone]  [email or web address]

## 2019-07-23 NOTE — DISCHARGE INSTRUCTIONS
Upper GI Endoscopy: What to Expect at [address] [place] [address]  After you have an endoscopy, you will stay at the hospital or clinic for 1 to 2 hours. This will allow the medicine to wear off. You will be able to go home after your doctor or nurse checks to make sure you are not having any problems. You may have to stay overnight if you had treatment during the test. You may have a sore throat for a day or two after the test.  This care sheet gives you a general idea about what to expect after the test.  How can you care for yourself at home? Activity  · Rest as much as you need to after you go home. · You should be able to go back to your usual activities the day after the test.  Diet  · Follow your doctor's directions for eating after the test.  · Drink plenty of fluids (unless your doctor has told you not to). Medications  · If you have a sore throat the day after the test, use an over-the-counter spray to numb your throat. Follow-up care is a key part of your treatment and safety. Be sure to make and go to all appointments, and call your doctor if you are having problems. It's also a good idea to know your test results and keep a list of the medicines you take. When should you call for help? Call 911 anytime you think you may need emergency care. For example, call if:  · You passed out (lost consciousness). · You cough up blood. · You vomit blood or what looks like coffee grounds. · You pass maroon or very bloody stools. Call your doctor now or seek immediate medical care if:  · You have trouble swallowing. · You have belly pain. · Your stools are black and tarlike or have streaks of blood. · You are sick to your stomach or cannot keep fluids down. Watch closely for changes in your health, and be sure to contact your doctor if:  · Your throat still hurts after a day or two. · You do not get better as expected. Where can you learn more?    Go to DealExplorer.be  Enter J454 in the search box to learn more about \"Upper GI Endoscopy: What to Expect at Home. \"   © 5187-5386 Healthwise, Incorporated. Care instructions adapted under license by [employer] (which disclaims liability or warranty for this information). This care instruction is for use with your licensed healthcare professional. If you have questions about a medical condition or this instruction, always ask your healthcare professional. [address] any warranty or liability for your use of this information. Content Version: 74.2.248846; Current as of: November 14, 2014  Patient Education      Esophageal Dilation: What to Expect at [facility]  After you have esophageal dilation, you will stay at the hospital or surgery center for 1 to 2 hours. This will allow the medicine to wear off. You will be able to go home after your doctor or nurse checks to make sure you are not having any problems. This care sheet gives you a general idea about how long it will take for you to recover. But each person recovers at a different pace. Follow the steps below to get better as quickly as possible. How can you care for yourself at home? Activity    · Rest as much as you need to after you go home.     · You should be able to go back to your usual activities the day after the procedure. Diet    · Follow your doctor's directions for eating after the procedure.     · Drink plenty of fluids (unless your doctor has told you not to). Medicines    · Your doctor will tell you if and when you can restart your medicines. He or she will also give you instructions about taking any new medicines.     · If you take blood thinners, such as warfarin (Coumadin), clopidogrel (Plavix), or aspirin, be sure to talk to your doctor. He or she will tell you if and when to start taking those medicines again.  Make sure that you understand exactly what your doctor wants you to do.     · If you have a sore throat the day after the procedure, use an over-the-counter spray to numb your throat. Sucking on throat lozenges and gargling with warm salt water may also help relieve your symptoms. Follow-up care is a key part of your treatment and safety. Be sure to make and go to all appointments, and call your doctor if you are having problems. It's also a good idea to know your test results and keep a list of the medicines you take. When should you call for help? Call 911 anytime you think you may need emergency care. For example, call if:    · You passed out (lost consciousness).     · You have trouble breathing.     · Your stools are maroon or very bloody    Call your doctor now or seek immediate medical care if:    · You have new or worse belly pain.     · You have a fever.     · You have new or more blood in your stools.     · You are sick to your stomach and cannot drink fluids.     · You cannot pass stools or gas.     · You have pain that does not get better after you take pain medicine.    Watch closely for changes in your health, and be sure to contact your doctor if:    · Your throat still hurts after a day or two.     · You do not get better as expected. Where can you learn more? Go to http://turner-doris.info/. Enter Z417 in the search box to learn more about \"Esophageal Dilation: What to Expect at Home. \"  Current as of: November 7, 2018  Content Version: 12.1  © 1540-5290 Healthwise, Incorporated. Care instructions adapted under license by [employer] (which disclaims liability or warranty for this information). If you have questions about a medical condition or this instruction, always ask your healthcare professional. [place] [address] any warranty or liability for your use of this information. DISCHARGE SUMMARY from Nurse     POST-PROCEDURE INSTRUCTIONS:    Call your Physician if you:  ? Observe any excess bleeding. ? Develop a temperature over 100.5o F.  ?  Experience abdominal, shoulder or chest pain. ? Notice any signs of decreased circulation or nerve impairment to an extremity such as a change in color, persistent numbness, tingling, coldness or increase in pain. ? Vomit blood or you have nausea and vomiting lasting longer than 4 hours. ? Are unable to take medications. ? Are unable to urinate within 8 hours after discharge following general anesthesia or intravenous sedation. For the next 24 hours after receiving general anesthesia or intravenous sedation, or while taking prescription Narcotics, limit your activities:  ? Do NOT drive a motor vehicle, operate hazard machinery or power tools, or perform tasks that require coordination. The medication you received during your procedure may have some effect on your mental awareness. ? Do NOT make important personal or business decisions. The medication you received during your procedure may have some effect on your mental awareness. ? Do NOT drink alcoholic beverages. These drinks do not mix well with the medications that have been given to you. ? Upon discharge from the hospital, you must be accompanied by a responsible adult. ? Resume your diet as directed by your physician. ? Resume medications as your physician has prescribed. ? Please give a list of your current medications to your Primary Care Provider. ? Please update this list whenever your medications are discontinued, doses are changed, or new medications (including over-the-counter products) are added. ? Please carry medication information at all times in case of emergency situations. These are general instructions for a healthy lifestyle:    No smoking/ No tobacco products/ Avoid exposure to second hand smoke.  Surgeon General's Warning:  Quitting smoking now greatly reduces serious risk to your health. Obesity, smoking, and a sedentary lifestyle greatly increase your risk for illness.    A healthy diet, regular physical exercise & weight monitoring are important for maintaining a healthy lifestyle   You may be retaining fluid if you have a history of heart failure or if you experience any of the following symptoms:  Weight gain of 3 pounds or more overnight or 5 pounds in a week, increased swelling in our hands or feet or shortness of breath while lying flat in bed. Please call your doctor as soon as you notice any of these symptoms; do not wait until your next office visit. Recognize signs and symptoms of STROKE:  F  -  Face looks uneven  A  -  Arms unable to move or move unevenly  S  -  Speech slurred or non-existent  T  -  Time to call 911 - as soon as signs and symptoms begin - DO NOT go back to bed or wait to see If you get better - TIME IS BRAIN. Colorectal Screening   Colorectal cancer almost always develops from precancerous polyps (abnormal growths) in the colon or rectum. Screening tests can find precancerous polyps, so that they can be removed before they turn into cancer. Screening tests can also find colorectal cancer early, when treatment works best.  [facility] Speak with your physician about when you should begin screening and how often you should be tested     Additional Information    If you have questions, please call [phone]. Remember, [employer] is NOT to be used for urgent needs. For medical emergencies, dial 911. Educational references and/or instructions provided during this visit included:    See attached. APPOINTMENTS:    Please make a follow-up appointment with your physician. Discharge information has been reviewed with the patient. The patient verbalized understanding.

## 2019-07-23 NOTE — ANESTHESIA PREPROCEDURE EVALUATION
Relevant Problems   No relevant active problems       Anesthetic History   No history of anesthetic complications            Review of Systems / Medical History  Patient summary reviewed and pertinent labs reviewed    Pulmonary  Within defined limits                 Neuro/Psych       CVA: no residual symptoms       Cardiovascular    Hypertension        Dysrhythmias   CAD    Exercise tolerance: >4 METS     GI/Hepatic/Renal     GERD      PUD     Endo/Other  Within defined limits           Other Findings              Physical Exam    Airway  Mallampati: II  TM Distance: 4 - 6 cm  Neck ROM: normal range of motion   Mouth opening: Normal     Cardiovascular  Regular rate and rhythm,  S1 and S2 normal,  no murmur, click, rub, or gallop  Rhythm: regular  Rate: normal         Dental    Dentition: Lower dentition intact and Upper dentition intact  Comments: Missing R upper incisor   Pulmonary  Breath sounds clear to auscultation               Abdominal  GI exam deferred       Other Findings            Anesthetic Plan    ASA: 3  Anesthesia type: MAC          Induction: Intravenous  Anesthetic plan and risks discussed with: Patient      Per staff there is some history of drug seeking following the procedure. This pt is well known to the staff.

## 2019-07-23 NOTE — ANESTHESIA POSTPROCEDURE EVALUATION
Procedure(s):  UPPER  ENDOSCOPY / dilation 47 FR bipin. MAC    Anesthesia Post Evaluation      Multimodal analgesia: multimodal analgesia used between 6 hours prior to anesthesia start to PACU discharge  Patient location during evaluation: bedside  Patient participation: complete - patient participated  Level of consciousness: awake  Pain score: 0  Pain management: adequate  Airway patency: patent  Anesthetic complications: no  Cardiovascular status: stable  Respiratory status: acceptable  Hydration status: acceptable  Post anesthesia nausea and vomiting:  none      Vitals Value Taken Time   BP 90/36 7/23/2019 10:13 AM   Temp     Pulse 66 7/23/2019 10:15 AM   Resp 17 7/23/2019 10:15 AM   SpO2 94 % 7/23/2019 10:15 AM   Vitals shown include unvalidated device data.

## 2019-09-09 ENCOUNTER — ANESTHESIA EVENT (OUTPATIENT)
Dept: ENDOSCOPY | Age: 64
End: 2019-09-09
Payer: MEDICARE

## 2019-09-10 ENCOUNTER — HOSPITAL ENCOUNTER (OUTPATIENT)
Age: 64
Setting detail: OUTPATIENT SURGERY
Discharge: HOME | End: 2019-09-10
Attending: INTERNAL MEDICINE | Admitting: INTERNAL MEDICINE
Payer: MEDICARE

## 2019-09-10 ENCOUNTER — ANESTHESIA (OUTPATIENT)
Dept: ENDOSCOPY | Age: 64
End: 2019-09-10
Payer: MEDICARE

## 2019-09-10 VITALS
WEIGHT: 245 LBS | HEART RATE: 73 BPM | OXYGEN SATURATION: 97 % | BODY MASS INDEX: 38.45 KG/M2 | SYSTOLIC BLOOD PRESSURE: 117 MMHG | HEIGHT: 67 IN | TEMPERATURE: 97.3 F | DIASTOLIC BLOOD PRESSURE: 54 MMHG | RESPIRATION RATE: 18 BRPM

## 2019-09-10 PROCEDURE — 74011000250 HC RX REV CODE- 250

## 2019-09-10 PROCEDURE — 74011250636 HC RX REV CODE- 250/636: Performed by: NURSE ANESTHETIST, CERTIFIED REGISTERED

## 2019-09-10 PROCEDURE — 76060000031 HC ANESTHESIA FIRST 0.5 HR: Performed by: INTERNAL MEDICINE

## 2019-09-10 PROCEDURE — 74011250636 HC RX REV CODE- 250/636

## 2019-09-10 PROCEDURE — 76040000019: Performed by: INTERNAL MEDICINE

## 2019-09-10 RX ORDER — SODIUM CHLORIDE, SODIUM LACTATE, POTASSIUM CHLORIDE, CALCIUM CHLORIDE 600; 310; 30; 20 MG/100ML; MG/100ML; MG/100ML; MG/100ML
INJECTION, SOLUTION INTRAVENOUS
Status: DISCONTINUED | OUTPATIENT
Start: 2019-09-10 | End: 2019-09-10 | Stop reason: HOSPADM

## 2019-09-10 RX ORDER — PROPOFOL 10 MG/ML
INJECTION, EMULSION INTRAVENOUS AS NEEDED
Status: DISCONTINUED | OUTPATIENT
Start: 2019-09-10 | End: 2019-09-10 | Stop reason: HOSPADM

## 2019-09-10 RX ORDER — INSULIN LISPRO 100 [IU]/ML
INJECTION, SOLUTION INTRAVENOUS; SUBCUTANEOUS ONCE
Status: DISCONTINUED | OUTPATIENT
Start: 2019-09-10 | End: 2019-09-10 | Stop reason: HOSPADM

## 2019-09-10 RX ORDER — NALOXONE HYDROCHLORIDE 0.4 MG/ML
0.4 INJECTION, SOLUTION INTRAMUSCULAR; INTRAVENOUS; SUBCUTANEOUS
Status: CANCELLED | OUTPATIENT
Start: 2019-09-10 | End: 2019-09-10

## 2019-09-10 RX ORDER — SODIUM CHLORIDE 0.9 % (FLUSH) 0.9 %
5-40 SYRINGE (ML) INJECTION EVERY 8 HOURS
Status: CANCELLED | OUTPATIENT
Start: 2019-09-10

## 2019-09-10 RX ORDER — SODIUM CHLORIDE, SODIUM LACTATE, POTASSIUM CHLORIDE, CALCIUM CHLORIDE 600; 310; 30; 20 MG/100ML; MG/100ML; MG/100ML; MG/100ML
75 INJECTION, SOLUTION INTRAVENOUS CONTINUOUS
Status: DISCONTINUED | OUTPATIENT
Start: 2019-09-10 | End: 2019-09-10 | Stop reason: HOSPADM

## 2019-09-10 RX ORDER — ATROPINE SULFATE 0.1 MG/ML
0.5 INJECTION INTRAVENOUS
Status: CANCELLED | OUTPATIENT
Start: 2019-09-10 | End: 2019-09-11

## 2019-09-10 RX ORDER — SODIUM CHLORIDE 0.9 % (FLUSH) 0.9 %
5-40 SYRINGE (ML) INJECTION AS NEEDED
Status: CANCELLED | OUTPATIENT
Start: 2019-09-10

## 2019-09-10 RX ORDER — LIDOCAINE HYDROCHLORIDE 10 MG/ML
0.1 INJECTION, SOLUTION EPIDURAL; INFILTRATION; INTRACAUDAL; PERINEURAL AS NEEDED
Status: DISCONTINUED | OUTPATIENT
Start: 2019-09-10 | End: 2019-09-10 | Stop reason: HOSPADM

## 2019-09-10 RX ORDER — EPINEPHRINE 0.1 MG/ML
1 INJECTION INTRACARDIAC; INTRAVENOUS
Status: CANCELLED | OUTPATIENT
Start: 2019-09-10 | End: 2019-09-11

## 2019-09-10 RX ORDER — DEXTROMETHORPHAN/PSEUDOEPHED 2.5-7.5/.8
1.2 DROPS ORAL
Status: CANCELLED | OUTPATIENT
Start: 2019-09-10

## 2019-09-10 RX ORDER — FLUMAZENIL 0.1 MG/ML
0.2 INJECTION INTRAVENOUS
Status: CANCELLED | OUTPATIENT
Start: 2019-09-10 | End: 2019-09-10

## 2019-09-10 RX ORDER — SODIUM CHLORIDE 0.9 % (FLUSH) 0.9 %
5-40 SYRINGE (ML) INJECTION EVERY 8 HOURS
Status: DISCONTINUED | OUTPATIENT
Start: 2019-09-10 | End: 2019-09-10 | Stop reason: HOSPADM

## 2019-09-10 RX ORDER — LIDOCAINE HYDROCHLORIDE 20 MG/ML
INJECTION, SOLUTION EPIDURAL; INFILTRATION; INTRACAUDAL; PERINEURAL AS NEEDED
Status: DISCONTINUED | OUTPATIENT
Start: 2019-09-10 | End: 2019-09-10 | Stop reason: HOSPADM

## 2019-09-10 RX ORDER — SODIUM CHLORIDE 0.9 % (FLUSH) 0.9 %
5-40 SYRINGE (ML) INJECTION AS NEEDED
Status: DISCONTINUED | OUTPATIENT
Start: 2019-09-10 | End: 2019-09-10 | Stop reason: HOSPADM

## 2019-09-10 RX ADMIN — SODIUM CHLORIDE, SODIUM LACTATE, POTASSIUM CHLORIDE, CALCIUM CHLORIDE: 600; 310; 30; 20 INJECTION, SOLUTION INTRAVENOUS at 08:18

## 2019-09-10 RX ADMIN — SODIUM CHLORIDE, SODIUM LACTATE, POTASSIUM CHLORIDE, AND CALCIUM CHLORIDE 75 ML/HR: 600; 310; 30; 20 INJECTION, SOLUTION INTRAVENOUS at 08:15

## 2019-09-10 RX ADMIN — PROPOFOL 50 MG: 10 INJECTION, EMULSION INTRAVENOUS at 08:21

## 2019-09-10 RX ADMIN — PROPOFOL 100 MG: 10 INJECTION, EMULSION INTRAVENOUS at 08:19

## 2019-09-10 RX ADMIN — LIDOCAINE HYDROCHLORIDE 50 MG: 20 INJECTION, SOLUTION EPIDURAL; INFILTRATION; INTRACAUDAL; PERINEURAL at 08:19

## 2019-09-10 NOTE — DISCHARGE INSTRUCTIONS
Upper GI Endoscopy: What to Expect at [address] [place] [address]  After you have an endoscopy, you will stay at the hospital or clinic for 1 to 2 hours. This will allow the medicine to wear off. You will be able to go home after your doctor or nurse checks to make sure you are not having any problems. You may have to stay overnight if you had treatment during the test. You may have a sore throat for a day or two after the test.  This care sheet gives you a general idea about what to expect after the test.  How can you care for yourself at home? Activity  · Rest as much as you need to after you go home. · You should be able to go back to your usual activities the day after the test.  Diet  · Follow your doctor's directions for eating after the test.  · Drink plenty of fluids (unless your doctor has told you not to). Medications  · If you have a sore throat the day after the test, use an over-the-counter spray to numb your throat. Follow-up care is a key part of your treatment and safety. Be sure to make and go to all appointments, and call your doctor if you are having problems. It's also a good idea to know your test results and keep a list of the medicines you take. When should you call for help? Call 911 anytime you think you may need emergency care. For example, call if:  · You passed out (lost consciousness). · You cough up blood. · You vomit blood or what looks like coffee grounds. · You pass maroon or very bloody stools. Call your doctor now or seek immediate medical care if:  · You have trouble swallowing. · You have belly pain. · Your stools are black and tarlike or have streaks of blood. · You are sick to your stomach or cannot keep fluids down. Watch closely for changes in your health, and be sure to contact your doctor if:  · Your throat still hurts after a day or two. · You do not get better as expected. Where can you learn more?    Go to DealExplorer.be  Enter J454 in the search box to learn more about \"Upper GI Endoscopy: What to Expect at Home. \"   © 1324-1160 Healthwise, Incorporated. Care instructions adapted under license by [facility] (which disclaims liability or warranty for this information). This care instruction is for use with your licensed healthcare professional. If you have questions about a medical condition or this instruction, always ask your healthcare professional. [address] any warranty or liability for your use of this information. Content Version: 34.7.539973; Current as of: November 14, 2014    DISCHARGE SUMMARY from Nurse     POST-PROCEDURE INSTRUCTIONS:    Call your Physician if you:  ? Observe any excess bleeding. ? Develop a temperature over 100.5o F.  ? Experience abdominal, shoulder or chest pain. ? Notice any signs of decreased circulation or nerve impairment to an extremity such as a change in color, persistent numbness, tingling, coldness or increase in pain. ? Vomit blood or you have nausea and vomiting lasting longer than 4 hours. ? Are unable to take medications. ? Are unable to urinate within 8 hours after discharge following general anesthesia or intravenous sedation. For the next 24 hours after receiving general anesthesia or intravenous sedation, or while taking prescription Narcotics, limit your activities:  ? Do NOT drive a motor vehicle, operate hazard machinery or power tools, or perform tasks that require coordination. The medication you received during your procedure may have some effect on your mental awareness. ? Do NOT make important personal or business decisions. The medication you received during your procedure may have some effect on your mental awareness. ? Do NOT drink alcoholic beverages. These drinks do not mix well with the medications that have been given to you. ? Upon discharge from the hospital, you must be accompanied by a responsible adult. ?  Resume your diet as directed by your physician. ? Resume medications as your physician has prescribed. ? Please give a list of your current medications to your Primary Care Provider. ? Please update this list whenever your medications are discontinued, doses are changed, or new medications (including over-the-counter products) are added. ? Please carry medication information at all times in case of emergency situations. These are general instructions for a healthy lifestyle:    No smoking/ No tobacco products/ Avoid exposure to second hand smoke.  Surgeon General's Warning:  Quitting smoking now greatly reduces serious risk to your health. Obesity, smoking, and a sedentary lifestyle greatly increase your risk for illness.  A healthy diet, regular physical exercise & weight monitoring are important for maintaining a healthy lifestyle   You may be retaining fluid if you have a history of heart failure or if you experience any of the following symptoms:  Weight gain of 3 pounds or more overnight or 5 pounds in a week, increased swelling in our hands or feet or shortness of breath while lying flat in bed. Please call your doctor as soon as you notice any of these symptoms; do not wait until your next office visit. Recognize signs and symptoms of STROKE:  F  -  Face looks uneven  A  -  Arms unable to move or move unevenly  S  -  Speech slurred or non-existent  T  -  Time to call 911 - as soon as signs and symptoms begin - DO NOT go back to bed or wait to see If you get better - TIME IS BRAIN. Colorectal Screening   Colorectal cancer almost always develops from precancerous polyps (abnormal growths) in the colon or rectum. Screening tests can find precancerous polyps, so that they can be removed before they turn into cancer.  Screening tests can also find colorectal cancer early, when treatment works best.  [facility] Speak with your physician about when you should begin screening and how often you should be tested  [facility]   Additional Information    If you have questions, please call [phone]. Remember, MyChart is NOT to be used for urgent needs. For medical emergencies, dial 911. Discharge information has been reviewed with the patient. The patient verbalized understanding.

## 2019-09-10 NOTE — H&P
[facility]   [email or web address]      Impression: 1. Nutcracker esophagus  2. GERD a  3. CAD      Plan:     1. EGD/Dil with MAC      Chief Complaint: CP      HPI:  Harriet Manuel is a 71 y.o. male who is being seen preop for Dysphagia and intermitent CP.   He has CAD as well as nutcracker esophagus and is on a 2 month schedule for EGD and Dil./    PMH:   Past Medical History:   Diagnosis Date    Adverse effect of anesthesia     esophageal spasms    Altered mental status     Arthritis     Arthropathy     Back pain     Bilateral kidney stones     Burn     ON HANDS    CAD (coronary artery disease) 1999    MI    3 cardiac stents    Calcium nephrolithiasis     Calculus of kidney     Cardiac arrhythmia     Cerebral artery occlusion with cerebral infarction ([address] 2010    no residual    Chest pain     Cigarette smoker     Cirrhosis, alcoholic (HCC)     Cocaine abuse, episodic use (HCC)     Colon polyp     Diarrhea     DJD (degenerative joint disease)     Drug-seeking behavior     Dyskinesia     Esophageal disorder     Esophageal erosions     Flank pain, acute     Foot ulcer, right (HCC)     Gastric ulcer     GERD (gastroesophageal reflux disease)     Gross hematuria     Head trauma     Hearing reduced     Hematuria     Herniated disc     X 10 IN BACK    Hiatus hernia syndrome     History of kidney stones     HNP (herniated nucleus pulposus)     Hyperlipemia     Hypertension     Hypokalemia     Infection     Jaw fracture (HCC)     Kidney stones     Knee pain     Left ureteral stone     Muscle atrophy     Myocardial infarction ([address] 1999    N&V (nausea and vomiting)     Nocturia     Nondependent alcohol abuse, in remission     Nutcracker esophagus     Other ill-defined conditions(799.89)     dysphagia    Renal stone     Slurred speech     Stroke ([address] 1999    questionable TIA    Swallowing difficulty     Syncopal episodes     Syncope and collapse     Tobacco dependence     Unspecified adverse effect of anesthesia     AT TIMES ESOPHAGUS SPASMS AFTER PROCEDURES    Unspecified cerebral artery occlusion with cerebral infarction        PSH:   Past Surgical History:   Procedure Laterality Date    HX APPENDECTOMY      9YEARS OLD    HX CHOLECYSTECTOMY  2009    HX CORONARY STENT PLACEMENT  2008    3 STENTS PLACED    HX ENDOSCOPY      with Dilatation, multiple times    HX HEART CATHETERIZATION  2012    EVERYTHING LOOKED GOOD AT THIS POINT    HX HEENT      \"2 jaw surgeries\"    HX HERNIA REPAIR      ABDOMEN    HX KNEE ARTHROSCOPY  9/2012    LEFT    HX MOHS PROCEDURES  2016    HX ORTHOPAEDIC  12/12    left knee    HX OTHER SURGICAL      ESOPHAGUS DILATATION    HX ROTATOR CUFF REPAIR Right     x2    HX UROLOGICAL  07/18/2016    [facility]-Cystoscopy,URETEROSCOPY W/ LITHOTRIPSY, Dr Calvin Cason  UROLOGICAL  10/10/2016    [facility]-Cystoscopy with removal of ureteral stent, Dr Calvin Cason  UROLOGICAL  01/30/2017    [facility]-Cystoscopy, Left retrograde pyelography, Left diagnostic ureteroscopy, Left 6 x 26 cm double-J ureteral stent placement,Right retrograde pyelography, Right ureteroscopic stone extraction,Right 6 x 24 cm double-J ureteral stent placement, Interpretation of urography,Intraoperative fluoro less than 1 hour,          UROLOGICAL  02/08/2017    [facility]-CYSTOURETHROSCOPY WITH STENT REMOVAL, Dr Leah Dsouza       Social HX:   Social History     Socioeconomic History    Marital status: UNKNOWN     Spouse name: Not on file    Number of children: Not on file    Years of education: Not on file    Highest education level: Not on file   Occupational History    Not on file   Social Needs    Financial resource strain: Not on file    Food insecurity:     Worry: Not on file     Inability: Not on file    Transportation needs:     Medical: Not on file     Non-medical: Not on file   Tobacco Use    Smoking status: Current Every Day Smoker     Packs/day: 0.00     Years: 20.00     Pack years: 0.00     Types: Cigarettes    Smokeless tobacco: Never Used    Tobacco comment: Patient states he smokes 2-3 cigarettes per day   Substance and Sexual Activity    Alcohol use: No     Alcohol/week: 0.0 standard drinks     Comment: \"none in over 20 years\"    Drug use: No     Types: Cocaine, Hallucinogenics, Opiates, Benzodiazepines     Comment: cocaine 2011    Sexual activity: Yes     Partners: Female   Lifestyle    Physical activity:     Days per week: Not on file     Minutes per session: Not on file    Stress: Not on file   Relationships    Social connections:     Talks on phone: Not on file     Gets together: Not on file     Attends Jainism service: Not on file     Active member of club or organization: Not on file     Attends meetings of clubs or organizations: Not on file     Relationship status: Not on file    Intimate partner violence:     Fear of current or ex partner: Not on file     Emotionally abused: Not on file     Physically abused: Not on file     Forced sexual activity: Not on file   Other Topics Concern    Not on file   Social History Narrative    Not on file       FHX:   Family History   Problem Relation Age of Onset    Diabetes Father     Heart Disease Father     Stroke Father        Allergy:   Allergies   Allergen Reactions    Bee Sting [Sting, Bee] Hives and Shortness of Breath    Codeine Hives and Shortness of Breath     Has tolerated Hydromorphone.      Haldol [Haloperidol Lactate] Hives and Shortness of Breath    Haloperidol Hives and Cough    Keflex [Cephalexin] Diarrhea    No Allergy Information Available Other (comments)     Other reaction(s): hives    Shellfish Containing Products Hives    Stadol [Butorphanol Tartrate] Hives, Shortness of Breath and Rash    Vicodin [Hydrocodone-Acetaminophen] Hives, Shortness of Breath and Rash       Home Medications:     Medications Prior to Admission Medication Sig    ondansetron (ZOFRAN ODT) 4 mg disintegrating tablet Take 1 Tab by mouth every eight (8) hours as needed for Nausea.  omeprazole (PRILOSEC) 10 mg capsule Take 10 mg by mouth two (2) times a day.  clopidogrel (PLAVIX) 75 mg tab Take 75 mg by mouth.  nitroglycerin (NITROSTAT) 0.3 mg SL tablet 0.3 mg every five (5) minutes as needed.  carvedilol (COREG) 25 mg tablet Take 25 mg by mouth two (2) times daily (with meals).  atorvastatin (LIPITOR) 40 mg tablet Take 40 mg by mouth daily.  DULoxetine (CYMBALTA) 60 mg capsule Take 60 mg by mouth daily. Review of Systems:     Constitutional: No fevers, chills, weight loss, fatigue. Cardiovascular: No chest pain, heart palpitations. Respiratory: No cough, SOB, wheezing, chest discomfort, orthopnea. Gastrointestinal: Intermitent dysphagia with CP       Musculoskeletal: No weakness, arthralgias, wasting. Allergies: As noted. Visit Vitals  Ht 5' 7\" (1.702 m)   Wt 111.1 kg (245 lb)   BMI 38.37 kg/m²       Physical Assessment:     constitutional: appearance: well developed, well nourished, normal habitus, no deformities, in no acute distress. ENMT: mouth: normal oral mucosa,lips and gums; good dentition. oropharynx: normal tongue, hard and soft palate; posterior pharynx without erithema, exudate or lesions. respiratory: effort: normal chest excursion; no intercostal retraction or accessory muscle use. cardiovascular: abdominal aorta: normal size and position; no bruits. palpation: PMI of normal size and position; normal rhythm; no thrill or murmurs. abdominal: abdomen: normal consistency; no tenderness or masses. hernias: no hernias appreciated. liver: normal size and consistency. spleen: not palpable. rectal: hemoccult/guaiac: not performed. musculoskeletal: digits and nails: no clubbing, cyanosis, petechiae or other inflammatory conditions.    psychiatric: orientation: oriented to time, space and person. Rafiq Leggett MD, M.D. [facility], [address] [place] [address]  Pager [phone]  [email or web address]

## 2019-09-10 NOTE — PROCEDURES
[facility], [address]      Brief Procedure Note    Leatha Prajapati  1955  [id number]    Date of Procedure: 9/10/2019    Preoperative diagnosis: 787.20 - R13.10,  Dysphagia    Postoperative diagnosis:  grade 2 esophagitis,  mild gastritis, dilated #54 voss non- stricture    Type of Anesthesia: MAC (monitered anesthesia care)    Description of Findings: same as post op dx    Procedure: Procedure(s):  UPPER  ENDOSCOPY / dilation    :  Dr. Preston Mcclellan MD    Assistant(s): [unfilled]    Type of Anesthesia:MAC     EBL:None    Specimens: * No specimens in log *    Findings: See printed and scanned procedure note    Complications: None    Dr. Preston Mcclellan MD  9/10/2019  8:26 AM

## 2019-09-10 NOTE — ANESTHESIA PREPROCEDURE EVALUATION
Relevant Problems   No relevant active problems       Anesthetic History   No history of anesthetic complications            Review of Systems / Medical History  Patient summary reviewed and pertinent labs reviewed    Pulmonary          Smoker         Neuro/Psych       CVA: no residual symptoms       Cardiovascular    Hypertension          CAD, cardiac stents and hyperlipidemia    Exercise tolerance: >4 METS     GI/Hepatic/Renal     GERD           Endo/Other        Obesity and arthritis     Other Findings            Physical Exam    Airway  Mallampati: III  TM Distance: 4 - 6 cm  Neck ROM: normal range of motion   Mouth opening: Normal     Cardiovascular    Rhythm: regular  Rate: normal         Dental    Dentition: Lower dentition intact and Upper dentition intact  Comments: Missing R upper incisor   Pulmonary  Breath sounds clear to auscultation               Abdominal  GI exam deferred       Other Findings            Anesthetic Plan    ASA: 3  Anesthesia type: MAC          Induction: Intravenous  Anesthetic plan and risks discussed with: Patient      Per staff there is some history of drug seeking following the procedure. This pt is well known to the staff.

## 2019-09-10 NOTE — ANESTHESIA POSTPROCEDURE EVALUATION
Procedure(s):  UPPER  ENDOSCOPY / dilation. MAC    Anesthesia Post Evaluation      Multimodal analgesia: multimodal analgesia used between 6 hours prior to anesthesia start to PACU discharge  Patient location during evaluation: PACU  Patient participation: complete - patient participated  Level of consciousness: awake and alert  Pain management: adequate  Airway patency: patent  Anesthetic complications: no  Cardiovascular status: acceptable and hemodynamically stable  Respiratory status: acceptable and room air  Hydration status: acceptable  Post anesthesia nausea and vomiting:  controlled      Vitals Value Taken Time   /54 9/10/2019  8:50 AM   Temp 36.3 °C (97.3 °F) 9/10/2019  8:35 AM   Pulse 69 9/10/2019  8:59 AM   Resp 16 9/10/2019  8:59 AM   SpO2 100 % 9/10/2019  8:59 AM   Vitals shown include unvalidated device data.

## 2019-11-05 ENCOUNTER — ANESTHESIA EVENT (OUTPATIENT)
Dept: ENDOSCOPY | Age: 64
End: 2019-11-05
Payer: MEDICARE

## 2019-11-06 ENCOUNTER — HOSPITAL ENCOUNTER (OUTPATIENT)
Age: 64
Setting detail: OUTPATIENT SURGERY
Discharge: HOME | End: 2019-11-06
Attending: INTERNAL MEDICINE | Admitting: INTERNAL MEDICINE
Payer: MEDICARE

## 2019-11-06 ENCOUNTER — ANESTHESIA (OUTPATIENT)
Dept: ENDOSCOPY | Age: 64
End: 2019-11-06
Payer: MEDICARE

## 2019-11-06 VITALS
RESPIRATION RATE: 18 BRPM | WEIGHT: 266.5 LBS | HEIGHT: 68 IN | BODY MASS INDEX: 40.39 KG/M2 | OXYGEN SATURATION: 97 % | TEMPERATURE: 97.5 F | DIASTOLIC BLOOD PRESSURE: 72 MMHG | SYSTOLIC BLOOD PRESSURE: 151 MMHG | HEART RATE: 92 BPM

## 2019-11-06 PROCEDURE — 74011250636 HC RX REV CODE- 250/636: Performed by: NURSE ANESTHETIST, CERTIFIED REGISTERED

## 2019-11-06 PROCEDURE — 80307 DRUG TEST PRSMV CHEM ANLYZR: CPT

## 2019-11-06 PROCEDURE — 76040000019: Performed by: INTERNAL MEDICINE

## 2019-11-06 PROCEDURE — 74011000250 HC RX REV CODE- 250: Performed by: NURSE ANESTHETIST, CERTIFIED REGISTERED

## 2019-11-06 PROCEDURE — 74011250636 HC RX REV CODE- 250/636

## 2019-11-06 PROCEDURE — 77030018846 HC SOL IRR STRL H20 ICUM -A: Performed by: INTERNAL MEDICINE

## 2019-11-06 PROCEDURE — 74011250636 HC RX REV CODE- 250/636: Performed by: INTERNAL MEDICINE

## 2019-11-06 PROCEDURE — 76060000031 HC ANESTHESIA FIRST 0.5 HR: Performed by: INTERNAL MEDICINE

## 2019-11-06 PROCEDURE — 77030008565 HC TBNG SUC IRR ERBE -B: Performed by: INTERNAL MEDICINE

## 2019-11-06 RX ORDER — INSULIN LISPRO 100 [IU]/ML
INJECTION, SOLUTION INTRAVENOUS; SUBCUTANEOUS ONCE
Status: DISCONTINUED | OUTPATIENT
Start: 2019-11-06 | End: 2019-11-06 | Stop reason: HOSPADM

## 2019-11-06 RX ORDER — HYDROMORPHONE HYDROCHLORIDE 2 MG/ML
0.5 INJECTION, SOLUTION INTRAMUSCULAR; INTRAVENOUS; SUBCUTANEOUS
Status: DISCONTINUED | OUTPATIENT
Start: 2019-11-06 | End: 2019-11-06 | Stop reason: HOSPADM

## 2019-11-06 RX ORDER — SODIUM CHLORIDE, SODIUM LACTATE, POTASSIUM CHLORIDE, CALCIUM CHLORIDE 600; 310; 30; 20 MG/100ML; MG/100ML; MG/100ML; MG/100ML
50 INJECTION, SOLUTION INTRAVENOUS CONTINUOUS
Status: DISCONTINUED | OUTPATIENT
Start: 2019-11-06 | End: 2019-11-06 | Stop reason: HOSPADM

## 2019-11-06 RX ORDER — SODIUM CHLORIDE 0.9 % (FLUSH) 0.9 %
5-40 SYRINGE (ML) INJECTION AS NEEDED
Status: DISCONTINUED | OUTPATIENT
Start: 2019-11-06 | End: 2019-11-06 | Stop reason: HOSPADM

## 2019-11-06 RX ORDER — SODIUM CHLORIDE, SODIUM LACTATE, POTASSIUM CHLORIDE, CALCIUM CHLORIDE 600; 310; 30; 20 MG/100ML; MG/100ML; MG/100ML; MG/100ML
75 INJECTION, SOLUTION INTRAVENOUS CONTINUOUS
Status: DISCONTINUED | OUTPATIENT
Start: 2019-11-06 | End: 2019-11-06 | Stop reason: HOSPADM

## 2019-11-06 RX ORDER — LIDOCAINE HYDROCHLORIDE 20 MG/ML
INJECTION, SOLUTION EPIDURAL; INFILTRATION; INTRACAUDAL; PERINEURAL AS NEEDED
Status: DISCONTINUED | OUTPATIENT
Start: 2019-11-06 | End: 2019-11-06 | Stop reason: HOSPADM

## 2019-11-06 RX ORDER — PROPOFOL 10 MG/ML
INJECTION, EMULSION INTRAVENOUS AS NEEDED
Status: DISCONTINUED | OUTPATIENT
Start: 2019-11-06 | End: 2019-11-06 | Stop reason: HOSPADM

## 2019-11-06 RX ORDER — SODIUM CHLORIDE 0.9 % (FLUSH) 0.9 %
5-40 SYRINGE (ML) INJECTION EVERY 8 HOURS
Status: DISCONTINUED | OUTPATIENT
Start: 2019-11-06 | End: 2019-11-06 | Stop reason: HOSPADM

## 2019-11-06 RX ORDER — ONDANSETRON 2 MG/ML
INJECTION INTRAMUSCULAR; INTRAVENOUS
Status: COMPLETED
Start: 2019-11-06 | End: 2019-11-06

## 2019-11-06 RX ORDER — NALOXONE HYDROCHLORIDE 0.4 MG/ML
0.1 INJECTION, SOLUTION INTRAMUSCULAR; INTRAVENOUS; SUBCUTANEOUS ONCE
Status: DISCONTINUED | OUTPATIENT
Start: 2019-11-06 | End: 2019-11-06 | Stop reason: HOSPADM

## 2019-11-06 RX ORDER — HYDROMORPHONE HYDROCHLORIDE 2 MG/ML
2 INJECTION, SOLUTION INTRAMUSCULAR; INTRAVENOUS; SUBCUTANEOUS ONCE
Status: COMPLETED | OUTPATIENT
Start: 2019-11-06 | End: 2019-11-06

## 2019-11-06 RX ORDER — LIDOCAINE HYDROCHLORIDE 10 MG/ML
0.1 INJECTION, SOLUTION EPIDURAL; INFILTRATION; INTRACAUDAL; PERINEURAL AS NEEDED
Status: DISCONTINUED | OUTPATIENT
Start: 2019-11-06 | End: 2019-11-06 | Stop reason: HOSPADM

## 2019-11-06 RX ORDER — ONDANSETRON 2 MG/ML
4 INJECTION INTRAMUSCULAR; INTRAVENOUS ONCE
Status: COMPLETED | OUTPATIENT
Start: 2019-11-06 | End: 2019-11-06

## 2019-11-06 RX ADMIN — PROPOFOL 100 MG: 10 INJECTION, EMULSION INTRAVENOUS at 12:24

## 2019-11-06 RX ADMIN — ONDANSETRON 4 MG: 2 INJECTION INTRAMUSCULAR; INTRAVENOUS at 12:43

## 2019-11-06 RX ADMIN — LIDOCAINE HYDROCHLORIDE 100 MG: 20 INJECTION, SOLUTION EPIDURAL; INFILTRATION; INTRACAUDAL; PERINEURAL at 12:24

## 2019-11-06 RX ADMIN — SODIUM CHLORIDE, SODIUM LACTATE, POTASSIUM CHLORIDE, AND CALCIUM CHLORIDE 75 ML/HR: 600; 310; 30; 20 INJECTION, SOLUTION INTRAVENOUS at 10:47

## 2019-11-06 RX ADMIN — HYDROMORPHONE HYDROCHLORIDE 0.5 MG: 2 INJECTION, SOLUTION INTRAMUSCULAR; INTRAVENOUS; SUBCUTANEOUS at 13:37

## 2019-11-06 RX ADMIN — PROPOFOL 100 MG: 10 INJECTION, EMULSION INTRAVENOUS at 12:25

## 2019-11-06 RX ADMIN — HYDROMORPHONE HYDROCHLORIDE 2 MG: 2 INJECTION, SOLUTION INTRAMUSCULAR; INTRAVENOUS; SUBCUTANEOUS at 12:43

## 2019-11-06 RX ADMIN — FAMOTIDINE: 10 INJECTION INTRAVENOUS at 10:47

## 2019-11-06 NOTE — ANESTHESIA PREPROCEDURE EVALUATION
Relevant Problems   No relevant active problems       Anesthetic History   No history of anesthetic complications            Review of Systems / Medical History  Patient summary reviewed and pertinent labs reviewed    Pulmonary          Smoker         Neuro/Psych       CVA: no residual symptoms       Cardiovascular              Past MI, CAD and cardiac stents    Exercise tolerance: >4 METS     GI/Hepatic/Renal           Liver disease     Endo/Other  Within defined limits           Other Findings   Comments:                  Physical Exam    Airway  Mallampati: II  TM Distance: 4 - 6 cm  Neck ROM: normal range of motion   Mouth opening: Normal     Cardiovascular  Regular rate and rhythm,  S1 and S2 normal,  no murmur, click, rub, or gallop  Rhythm: regular  Rate: normal         Dental    Dentition: Poor dentition  Comments: The patient has very poor dentition. Loose, broken, and or missing teeth. I explained the risk of dental damage and or loss. The patient understands and accepts these risks.      Pulmonary  Breath sounds clear to auscultation               Abdominal  GI exam deferred       Other Findings            Anesthetic Plan    ASA: 3  Anesthesia type: MAC          Induction: Intravenous  Anesthetic plan and risks discussed with: Patient

## 2019-11-06 NOTE — DISCHARGE INSTRUCTIONS
RESUME PLAVIX TOMORROW     Esophageal Dilation: What to Expect at [facility]  After you have esophageal dilation, you will stay at the hospital or surgery center for 1 to 2 hours. This will allow the medicine to wear off. You will be able to go home after your doctor or nurse checks to make sure you are not having any problems. This care sheet gives you a general idea about how long it will take for you to recover. But each person recovers at a different pace. Follow the steps below to get better as quickly as possible. How can you care for yourself at home? Activity    · Rest as much as you need to after you go home.     · You should be able to go back to your usual activities the day after the procedure. Diet    · Follow your doctor's directions for eating after the procedure.     · Drink plenty of fluids (unless your doctor has told you not to). Medicines    · Your doctor will tell you if and when you can restart your medicines. He or she will also give you instructions about taking any new medicines.     · If you take blood thinners, such as warfarin (Coumadin), clopidogrel (Plavix), or aspirin, be sure to talk to your doctor. He or she will tell you if and when to start taking those medicines again. Make sure that you understand exactly what your doctor wants you to do.     · If you have a sore throat the day after the procedure, use an over-the-counter spray to numb your throat. Sucking on throat lozenges and gargling with warm salt water may also help relieve your symptoms. Follow-up care is a key part of your treatment and safety. Be sure to make and go to all appointments, and call your doctor if you are having problems. It's also a good idea to know your test results and keep a list of the medicines you take. When should you call for help? Call 911 anytime you think you may need emergency care.  For example, call if:    · You passed out (lost consciousness).     · You have trouble breathing.     · Your stools are maroon or very bloody    Call your doctor now or seek immediate medical care if:    · You have new or worse belly pain.     · You have a fever.     · You have new or more blood in your stools.     · You are sick to your stomach and cannot drink fluids.     · You cannot pass stools or gas.     · You have pain that does not get better after you take pain medicine.    Watch closely for changes in your health, and be sure to contact your doctor if:    · Your throat still hurts after a day or two.     · You do not get better as expected. Where can you learn more? Go to http://turner-doris.info/. Enter H866 in the search box to learn more about \"Esophageal Dilation: What to Expect at Home. \"  Current as of: November 7, 2018  Content Version: 12.2  © 6959-3948 [employer]. Care instructions adapted under license by [employer] (which disclaims liability or warranty for this information). If you have questions about a medical condition or this instruction, always ask your healthcare professional. [address] any warranty or liability for your use of this information. Esophagitis: Care Instructions  Your Care Instructions    Esophagitis (say \"ih-sof-uh-JY-tus\") is irritation of the esophagus, the tube that carries food from your throat to your stomach. Acid reflux is the most common cause of this condition. When you have reflux, stomach acid and juices flow upward. This can cause pain or a burning feeling in your chest. You may have a sore throat. It may be hard to swallow. Other causes of this condition include some medicines and supplements. Allergies or an infection can also cause it. Your doctor will ask about your symptoms and past health. He or she might do tests to find the cause of your symptoms. Treatment depends on what is causing the problem.  Treatment might include changing your diet or taking medicine to relieve your symptoms. It might also include changing a medicine that is causing your symptoms. If you have reflux, medicine that reduces the stomach acid helps your body heal. It might take 1 to 3 weeks to heal.  Follow-up care is a key part of your treatment and safety. Be sure to make and go to all appointments, and call your doctor if you are having problems. It's also a good idea to know your test results and keep a list of the medicines you take. How can you care for yourself at home? · If you have acid reflux, your doctor may recommend that you:  ? Eat several small meals instead of two or three large meals. After you eat, wait 2 to 3 hours before you lie down. ? Avoid chocolate, mint, alcohol, and spicy foods. ? Don't smoke or use smokeless tobacco. Smoking can make this condition worse. If you need help quitting, talk to your doctor about stop-smoking programs and medicines. These can increase your chances of quitting for good. ? Raise the head of your bed 6 to 8 inches if you have symptoms at night. ? Lose weight if you are overweight. ? Take an over-the-counter antacid, such as Maalox, Mylanta, or Tums. Be careful when you take over-the-counter antacid medicines. Many of these medicines have aspirin in them. Read the label to make sure that you are not taking more than the recommended dose. Too much aspirin can be harmful. ? Take stronger acid reducers. Examples are famotidine (such as Pepcid), omeprazole (such as Prilosec), and ranitidine (such as Zantac). · If your condition is caused by infection, allergy, or other problems, use the medicine or treatments that your doctor recommends. · Be safe with medicines. Take your medicines exactly as prescribed. Call your doctor if you think you are having a problem with your medicine. When should you call for help? Call your doctor now or seek immediate medical care if:    · You have new or worse belly pain.     · You are vomiting.  Watch closely for changes in your health, and be sure to contact your doctor if:    · You have new or worse symptoms of reflux.     · You have trouble or pain swallowing.     · You are losing weight.     · You do not get better as expected. Where can you learn more? Go to http://turner-doris.info/. Enter U341 in the search box to learn more about \"Esophagitis: Care Instructions. \"  Current as of: November 7, 2018  Content Version: 12.2  © 2689-9100 [employer]. Care instructions adapted under license by [employer] (which disclaims liability or warranty for this information). If you have questions about a medical condition or this instruction, always ask your healthcare professional. [address] any warranty or liability for your use of this information. Upper GI Endoscopy: What to Expect at [address] [place] [address]  After you have an endoscopy, you will stay at the hospital or clinic for 1 to 2 hours. This will allow the medicine to wear off. You will be able to go home after your doctor or nurse checks to make sure you are not having any problems. You may have to stay overnight if you had treatment during the test. You may have a sore throat for a day or two after the test.  This care sheet gives you a general idea about what to expect after the test.  How can you care for yourself at home? Activity  · Rest as much as you need to after you go home. · You should be able to go back to your usual activities the day after the test.  Diet  · Follow your doctor's directions for eating after the test.  · Drink plenty of fluids (unless your doctor has told you not to). Medications  · If you have a sore throat the day after the test, use an over-the-counter spray to numb your throat. Follow-up care is a key part of your treatment and safety. Be sure to make and go to all appointments, and call your doctor if you are having problems.  It's also a good idea to know your test results and keep a list of the medicines you take. When should you call for help? Call 911 anytime you think you may need emergency care. For example, call if:    · You passed out (loses consciousness).     · You have trouble breathing.     · You pass maroon or bloody stools.    Call your doctor now or seek immediate medical care if:    · You have pain that does not get better after your take pain medicine.     · You have new or worse belly pain.     · You have blood in your stools.     · You are sick to your stomach and cannot keep fluids down.     · You have a fever.     · You cannot pass stools or gas.    Watch closely for changes in your health, and be sure to contact your doctor if:    · Your throat still hurts after a day or two.     · You do not get better as expected. Where can you learn more? Go to http://turner-doris.info/. Enter [phone] [phone] in the search box to learn more about \"Upper GI Endoscopy: What to Expect at Home. \"  Current as of: November 7, 2018  Content Version: 12.2  © 2645-2208 [employer]. Care instructions adapted under license by [employer] (which disclaims liability or warranty for this information). If you have questions about a medical condition or this instruction, always ask your healthcare professional. [address] any warranty or liability for your use of this information. DISCHARGE SUMMARY from Nurse    PATIENT INSTRUCTIONS:    After general anesthesia or intravenous sedation, for 24 hours or while taking prescription Narcotics:  · Limit your activities  · Do not drive and operate hazardous machinery  · Do not make important personal or business decisions  · Do  not drink alcoholic beverages  · If you have not urinated within 8 hours after discharge, please contact your surgeon on call.     Report the following to your surgeon:  · Excessive pain, swelling, redness or odor of or around the surgical area  · Temperature over 100.5  · Nausea and vomiting lasting longer than 4 hours or if unable to take medications  · Any signs of decreased circulation or nerve impairment to extremity: change in color, persistent  numbness, tingling, coldness or increase pain  · Any questions    What to do at Home:  Recommended activity: Activity as tolerated and no driving for today. *  Please give a list of your current medications to your Primary Care Provider. *  Please update this list whenever your medications are discontinued, doses are      changed, or new medications (including over-the-counter products) are added. *  Please carry medication information at all times in case of emergency situations. These are general instructions for a healthy lifestyle:    No smoking/ No tobacco products/ Avoid exposure to second hand smoke  Surgeon General's Warning:  Quitting smoking now greatly reduces serious risk to your health. Obesity, smoking, and sedentary lifestyle greatly increases your risk for illness    A healthy diet, regular physical exercise & weight monitoring are important for maintaining a healthy lifestyle    You may be retaining fluid if you have a history of heart failure or if you experience any of the following symptoms:  Weight gain of 3 pounds or more overnight or 5 pounds in a week, increased swelling in our hands or feet or shortness of breath while lying flat in bed. Please call your doctor as soon as you notice any of these symptoms; do not wait until your next office visit. The discharge information has been reviewed with the patient and spouse. The patient and spouse verbalized understanding.   Discharge medications reviewed with the patient and spouse and appropriate educational materials and side effects teaching were provided.   ___________________________________________________________________________________________________________________________________

## 2019-11-06 NOTE — PROCEDURES
[email or web address]  [phone]        Brief Procedure Note    [place]  1955  [id number]    Date of Procedure: 11/6/2019    Preoperative diagnosis: 787.20 - R13.10,  Dysphagia    Postoperative diagnosis: esophagitis grade B; non obstuctive dysphagia - 47 Fr dilation    Description of Findings: same    Sedation/Anesthesia: Monitored Anesthesia Care; See Anesthesia Note    Procedure: Procedure(s):  UPPER  ENDOSCOPY / dilation voss 54 fr    :  Dr. Dyana Jones MD    Assistant(s): Endoscopy Technician-1: Grover Villegas  Endoscopy RN-1: Catia Heredia RN; Kristina Veronica, PIYUSH; Anahi Polanco, PIYUSH    EBL:None    Specimens: * No specimens in log *    Findings: See printed and scanned procedure note    Complications: None    Tissue Implant Device: None    Dr. Dyana Jones MD  11/6/2019  12:43 PM    Dyana Jones MD  [facility] [address] [phone]  [email or web address]

## 2019-11-06 NOTE — H&P
[email or web address]  [phone]      History and Physical    Patient: Deforest Ormond MRN: [id number]  SSN: [id number]    YOB: 1955  Age: 71 y.o. Sex: male      Subjective:      Deforest Ormond is a 71 y.o. male who presents with long history of dysphagia and esophageal dyskinesis, with associated chest pain and GERD, requiring frequent dilation ~every two months.      Past Medical History:   Diagnosis Date    Adverse effect of anesthesia     esophageal spasms    Altered mental status     Arthritis     Arthropathy     Back pain     Bilateral kidney stones     Burn     ON HANDS    CAD (coronary artery disease) 1999    MI    3 cardiac stents    Calcium nephrolithiasis     Calculus of kidney     Cardiac arrhythmia     Cerebral artery occlusion with cerebral infarction ([address] 2010    no residual    Chest pain     Cigarette smoker     Cirrhosis, alcoholic (HCC)     Cocaine abuse, episodic use (HCC)     Colon polyp     Diarrhea     DJD (degenerative joint disease)     Drug-seeking behavior     Dyskinesia     Esophageal disorder     Esophageal erosions     Flank pain, acute     Foot ulcer, right (HCC)     Gastric ulcer     GERD (gastroesophageal reflux disease)     Gross hematuria     Head trauma     Hearing reduced     Hematuria     Herniated disc     X 10 IN BACK    Hiatus hernia syndrome     History of kidney stones     HNP (herniated nucleus pulposus)     Hyperlipemia     Hypertension     Hypokalemia     Infection     Jaw fracture (HCC)     Kidney stones     Knee pain     Left ureteral stone     Muscle atrophy     Myocardial infarction ([address] 1999    N&V (nausea and vomiting)     Nocturia     Nondependent alcohol abuse, in remission     Nutcracker esophagus     Other ill-defined conditions(799.89)     dysphagia    Renal stone     Slurred speech     Stroke ([address] 1999    questionable TIA    Swallowing difficulty     Syncopal episodes     Syncope and collapse     Tobacco dependence     Unspecified adverse effect of anesthesia     AT TIMES ESOPHAGUS SPASMS AFTER PROCEDURES    Unspecified cerebral artery occlusion with cerebral infarction      Past Surgical History:   Procedure Laterality Date    HX APPENDECTOMY      9YEARS OLD    HX CHOLECYSTECTOMY  2009    HX CORONARY STENT PLACEMENT  2008    3 STENTS PLACED    HX ENDOSCOPY      with Dilatation, multiple times    HX HEART CATHETERIZATION  2012    EVERYTHING LOOKED GOOD AT THIS POINT    HX HEENT      \"2 jaw surgeries\"    HX HERNIA REPAIR      ABDOMEN    HX KNEE ARTHROSCOPY  9/2012    LEFT    HX MOHS PROCEDURES  2016    HX ORTHOPAEDIC  12/12    left knee    HX OTHER SURGICAL      ESOPHAGUS DILATATION    HX ROTATOR CUFF REPAIR Right     x2    HX UROLOGICAL  07/18/2016    SPA-Cystoscopy,URETEROSCOPY W/ LITHOTRIPSY, Dr Tati Jones  UROLOGICAL  10/10/2016    SL-Cystoscopy with removal of ureteral stent, Dr Tati Jones  UROLOGICAL  01/30/2017    SPA-Cystoscopy, Left retrograde pyelography, Left diagnostic ureteroscopy, Left 6 x 26 cm double-J ureteral stent placement,Right retrograde pyelography, Right ureteroscopic stone extraction,Right 6 x 24 cm double-J ureteral stent placement, Interpretation of urography,Intraoperative fluoro less than 1 hour,          UROLOGICAL  02/08/2017    SL-CYSTOURETHROSCOPY WITH STENT REMOVAL, Dr Price Dyer      Family History   Problem Relation Age of Onset    Diabetes Father     Heart Disease Father     Stroke Father      Social History     Tobacco Use    Smoking status: Current Every Day Smoker     Packs/day: 0.00     Years: 20.00     Pack years: 0.00     Types: Cigarettes    Smokeless tobacco: Never Used    Tobacco comment: Patient states he smokes 2-3 cigarettes per day   Substance Use Topics    Alcohol use: No     Alcohol/week: 0.0 standard drinks     Comment: \"none in over 20 years\"      Prior to Admission medications    Medication Sig Start Date End Date Taking? Authorizing Provider   ondansetron (ZOFRAN ODT) 4 mg disintegrating tablet Take 1 Tab by mouth every eight (8) hours as needed for Nausea. 11/24/18   DAVID Tolbert   omeprazole (PRILOSEC) 10 mg capsule Take 10 mg by mouth two (2) times a day. Provider, Historical   clopidogrel (PLAVIX) 75 mg tab Take 75 mg by mouth. Other, MD Jing   nitroglycerin (NITROSTAT) 0.3 mg SL tablet 0.3 mg every five (5) minutes as needed. Provider, Historical   carvedilol (COREG) 25 mg tablet Take 25 mg by mouth two (2) times daily (with meals). Provider, Historical   atorvastatin (LIPITOR) 40 mg tablet Take 40 mg by mouth daily. Provider, Historical   DULoxetine (CYMBALTA) 60 mg capsule Take 60 mg by mouth daily. Provider, Historical        Allergies   Allergen Reactions    Bee Sting [Sting, Bee] Hives and Shortness of Breath    Codeine Hives and Shortness of Breath     Has tolerated Hydromorphone.  Haldol [Haloperidol Lactate] Hives and Shortness of Breath    Haloperidol Hives and Cough    Keflex [Cephalexin] Diarrhea    No Allergy Information Available Other (comments)     Other reaction(s): hives    Shellfish Containing Products Hives    Stadol [Butorphanol Tartrate] Hives, Shortness of Breath and Rash    Vicodin [Hydrocodone-Acetaminophen] Hives, Shortness of Breath and Rash       Review of Systems:  A comprehensive review of systems was negative except for that written in the History of Present Illness. Objective:     Vitals:    11/04/19 0958   Weight: 104.3 kg (230 lb)   Height: 5' 8\" (1.727 m)        Physical Exam:  GENERAL: alert, cooperative, no distress, appears stated age  LUNG: clear to auscultation bilaterally  HEART: regular rate and rhythm, S1, S2 normal, no murmur, click, rub or gallop  ABDOMEN: soft, non-tender. Bowel sounds normal. No masses,  no organomegaly  NEUROLOGIC: alert & oriented x 3    Assessment:     1. Dysphagia  2.  Esophageal dyskinesia  3. Chest pain - non-cardiac  4. GERD    Plan:     1. EGD    Signed By: Leelee Rivera MD     November 6, 2019      Leelee Rivera MD  [facility] [address] - [phone]  [email or web address] [email or web address]

## 2019-11-06 NOTE — ANESTHESIA POSTPROCEDURE EVALUATION
Procedure(s):  UPPER  ENDOSCOPY / dilation bipin 54 fr. MAC    Anesthesia Post Evaluation      Multimodal analgesia: multimodal analgesia used between 6 hours prior to anesthesia start to PACU discharge  Patient location during evaluation: bedside  Patient participation: complete - patient participated  Level of consciousness: awake  Pain management: adequate  Airway patency: patent  Anesthetic complications: no  Cardiovascular status: stable  Respiratory status: acceptable  Hydration status: acceptable  Post anesthesia nausea and vomiting:  controlled      Vitals Value Taken Time   /81 11/6/2019 12:55 PM   Temp 36.3 °C (97.3 °F) 11/6/2019 12:36 PM   Pulse 83 11/6/2019  1:03 PM   Resp 14 11/6/2019  1:03 PM   SpO2 96 % 11/6/2019  1:03 PM   Vitals shown include unvalidated device data.
